# Patient Record
Sex: FEMALE | Race: WHITE | NOT HISPANIC OR LATINO | Employment: OTHER | URBAN - METROPOLITAN AREA
[De-identification: names, ages, dates, MRNs, and addresses within clinical notes are randomized per-mention and may not be internally consistent; named-entity substitution may affect disease eponyms.]

---

## 2017-11-21 ENCOUNTER — HOSPITAL ENCOUNTER (OUTPATIENT)
Dept: CT IMAGING | Facility: HOSPITAL | Age: 77
Discharge: HOME | End: 2017-11-21
Attending: NURSE PRACTITIONER | Admitting: NURSE PRACTITIONER

## 2017-11-21 LAB — CREAT BLDA-MCNC: 0.6 MG/DL (ref 0.6–1.3)

## 2018-01-03 ENCOUNTER — HOSPITAL ENCOUNTER (OUTPATIENT)
Dept: RESPIRATORY THERAPY | Facility: HOSPITAL | Age: 78
Discharge: HOME | End: 2018-01-03
Attending: INTERNAL MEDICINE | Admitting: INTERNAL MEDICINE

## 2018-01-04 ENCOUNTER — HOSPITAL ENCOUNTER (OUTPATIENT)
Dept: OTHER | Facility: HOSPITAL | Age: 78
Discharge: HOME | End: 2018-01-04
Attending: INTERNAL MEDICINE | Admitting: INTERNAL MEDICINE

## 2018-02-21 ENCOUNTER — HOSPITAL ENCOUNTER (OUTPATIENT)
Dept: RESPIRATORY THERAPY | Facility: HOSPITAL | Age: 78
Discharge: HOME | End: 2018-02-21
Attending: INTERNAL MEDICINE | Admitting: INTERNAL MEDICINE

## 2019-05-13 ENCOUNTER — HOSPITAL ENCOUNTER (OUTPATIENT)
Dept: LAB | Facility: HOSPITAL | Age: 79
Discharge: HOME | End: 2019-05-13
Attending: INTERNAL MEDICINE | Admitting: INTERNAL MEDICINE

## 2019-05-13 LAB
ALBUMIN SERPL-MCNC: 3.9 G/DL (ref 3.5–4.8)
ALBUMIN/GLOB SERPL: 1.4 {RATIO} (ref 1–1.7)
ALP SERPL-CCNC: 47 IU/L (ref 32–91)
ALT SERPL-CCNC: 20 IU/L (ref 14–54)
ANION GAP SERPL CALC-SCNC: 12.8 MMOL/L (ref 10–20)
AST SERPL-CCNC: 25 IU/L (ref 15–41)
BILIRUB SERPL-MCNC: 0.8 MG/DL (ref 0.3–1.2)
BNP SERPL-MCNC: 55 PG/ML
BUN SERPL-MCNC: 13 MG/DL (ref 8–20)
BUN/CREAT SERPL: 18.6 (ref 5.4–26.2)
CALCIUM SERPL-MCNC: 9.2 MG/DL (ref 8.9–10.3)
CHLORIDE SERPL-SCNC: 101 MMOL/L (ref 101–111)
CHOLEST SERPL-MCNC: 217 MG/DL
CHOLEST/HDLC SERPL: 3.3 {RATIO}
CONV CO2: 28 MMOL/L (ref 22–32)
CONV LDL CHOLESTEROL DIRECT: 140 MG/DL (ref 0–100)
CONV TOTAL PROTEIN: 6.7 G/DL (ref 6.1–7.9)
CREAT UR-MCNC: 0.7 MG/DL (ref 0.4–1)
GLOBULIN UR ELPH-MCNC: 2.8 G/DL (ref 2.5–3.8)
GLUCOSE SERPL-MCNC: 104 MG/DL (ref 65–99)
HDLC SERPL-MCNC: 66 MG/DL
LDLC/HDLC SERPL: 2.1 {RATIO}
LIPID INTERPRETATION: ABNORMAL
POTASSIUM SERPL-SCNC: 3.8 MMOL/L (ref 3.6–5.1)
SODIUM SERPL-SCNC: 138 MMOL/L (ref 136–144)
TRIGL SERPL-MCNC: 94 MG/DL
VLDLC SERPL CALC-MCNC: 11.2 MG/DL

## 2019-05-20 ENCOUNTER — CONVERSION ENCOUNTER (OUTPATIENT)
Dept: ORTHOPEDIC SURGERY | Facility: CLINIC | Age: 79
End: 2019-05-20

## 2019-06-01 ENCOUNTER — TRANSCRIBE ORDERS (OUTPATIENT)
Dept: CARDIOLOGY | Facility: HOSPITAL | Age: 79
End: 2019-06-01

## 2019-06-01 DIAGNOSIS — R07.9 CHEST PAIN, UNSPECIFIED TYPE: Primary | ICD-10-CM

## 2019-06-04 VITALS
DIASTOLIC BLOOD PRESSURE: 76 MMHG | OXYGEN SATURATION: 94 % | WEIGHT: 144 LBS | SYSTOLIC BLOOD PRESSURE: 145 MMHG | HEART RATE: 64 BPM

## 2019-06-13 ENCOUNTER — CONVERSION ENCOUNTER (OUTPATIENT)
Dept: ORTHOPEDIC SURGERY | Facility: CLINIC | Age: 79
End: 2019-06-13

## 2019-06-13 VITALS — HEIGHT: 62 IN | BODY MASS INDEX: 26.34 KG/M2

## 2019-07-10 ENCOUNTER — OFFICE VISIT (OUTPATIENT)
Dept: ORTHOPEDIC SURGERY | Facility: CLINIC | Age: 79
End: 2019-07-10

## 2019-07-10 VITALS
SYSTOLIC BLOOD PRESSURE: 134 MMHG | BODY MASS INDEX: 27.29 KG/M2 | WEIGHT: 139 LBS | DIASTOLIC BLOOD PRESSURE: 68 MMHG | HEIGHT: 60 IN | HEART RATE: 69 BPM

## 2019-07-10 DIAGNOSIS — M17.11 PRIMARY LOCALIZED OSTEOARTHRITIS OF RIGHT KNEE: Primary | ICD-10-CM

## 2019-07-10 PROCEDURE — 20610 DRAIN/INJ JOINT/BURSA W/O US: CPT | Performed by: ORTHOPAEDIC SURGERY

## 2019-07-10 RX ORDER — BISOPROLOL FUMARATE AND HYDROCHLOROTHIAZIDE 5; 6.25 MG/1; MG/1
TABLET ORAL
COMMUNITY
Start: 2019-05-14 | End: 2020-02-13 | Stop reason: SDUPTHER

## 2019-07-10 RX ORDER — ALPRAZOLAM 0.5 MG/1
TABLET ORAL
Refills: 0 | COMMUNITY
Start: 2019-06-23 | End: 2020-03-24

## 2019-07-10 RX ORDER — ASPIRIN 81 MG/1
81 TABLET ORAL DAILY
COMMUNITY
Start: 2019-02-18 | End: 2022-11-17 | Stop reason: HOSPADM

## 2019-07-10 NOTE — PROGRESS NOTES
"     Patient ID: Becka Oliva is a 79 y.o. female.    Right knee pain  Here for Visco 1  Review of Systems:    Right knee demonstrates no redness trace effusion    Objective:    /68   Pulse 69   Ht 152.4 cm (60\")   Wt 63 kg (139 lb)   BMI 27.15 kg/m²     Physical Examination:         Imaging:       Assessment:    Right knee degenerative joint disease    Plan:  Risks and benefits of the injection were discussed. Under sterile technique and written consent I injected one syringe of Orthovisc into the knee. It was well tolerated. Postinjection instructions were given  "

## 2019-07-17 ENCOUNTER — OFFICE VISIT (OUTPATIENT)
Dept: ORTHOPEDIC SURGERY | Facility: CLINIC | Age: 79
End: 2019-07-17

## 2019-07-17 VITALS
WEIGHT: 139 LBS | SYSTOLIC BLOOD PRESSURE: 157 MMHG | BODY MASS INDEX: 27.29 KG/M2 | HEART RATE: 72 BPM | HEIGHT: 60 IN | DIASTOLIC BLOOD PRESSURE: 83 MMHG

## 2019-07-17 DIAGNOSIS — M17.11 PRIMARY LOCALIZED OSTEOARTHRITIS OF RIGHT KNEE: Primary | ICD-10-CM

## 2019-07-17 PROCEDURE — 20610 DRAIN/INJ JOINT/BURSA W/O US: CPT | Performed by: ORTHOPAEDIC SURGERY

## 2019-07-17 NOTE — PROGRESS NOTES
"     Patient ID: Becka Oliva is a 79 y.o. female.  Right knee pain  For Visco 2      Objective:    /83   Pulse 72   Ht 152.4 cm (60\")   Wt 63 kg (139 lb)   BMI 27.15 kg/m²     Physical Examination:  Right knee demonstrates no redness mild effusion      Imaging:      Assessment:  Right knee degenerative joint disease    Plan:  Risks and benefits of the injection were discussed. Under sterile technique and written consent I injected one syringe of Orthovisc into the knee. It was well tolerated. Postinjection instructions were given  "

## 2019-07-24 ENCOUNTER — OFFICE VISIT (OUTPATIENT)
Dept: ORTHOPEDIC SURGERY | Facility: CLINIC | Age: 79
End: 2019-07-24

## 2019-07-24 VITALS
HEART RATE: 66 BPM | DIASTOLIC BLOOD PRESSURE: 77 MMHG | HEIGHT: 60 IN | BODY MASS INDEX: 27.29 KG/M2 | WEIGHT: 139 LBS | SYSTOLIC BLOOD PRESSURE: 152 MMHG

## 2019-07-24 DIAGNOSIS — M17.11 PRIMARY LOCALIZED OSTEOARTHRITIS OF RIGHT KNEE: Primary | ICD-10-CM

## 2019-07-24 PROCEDURE — 20610 DRAIN/INJ JOINT/BURSA W/O US: CPT | Performed by: ORTHOPAEDIC SURGERY

## 2019-07-24 NOTE — PROGRESS NOTES
"     Patient ID: Becka Oliva is a 79 y.o. female.  Right knee pain, here for Visco 3      Objective:    /77   Pulse 66   Ht 152.4 cm (60\")   Wt 63 kg (139 lb)   BMI 27.15 kg/m²     Physical Examination:  He demonstrates no redness trace effusion      Imaging:      Assessment:    Right knee degenerative joint disease  Plan:  Risks and benefits of the injection were discussed. Under sterile technique and written consent I injected one syringe of Orthovisc into the knee. It was well tolerated. Postinjection instructions were given  "

## 2019-09-26 ENCOUNTER — OFFICE VISIT (OUTPATIENT)
Dept: ORTHOPEDIC SURGERY | Facility: CLINIC | Age: 79
End: 2019-09-26

## 2019-09-26 VITALS
HEIGHT: 60 IN | BODY MASS INDEX: 27.29 KG/M2 | HEART RATE: 66 BPM | SYSTOLIC BLOOD PRESSURE: 183 MMHG | DIASTOLIC BLOOD PRESSURE: 79 MMHG | WEIGHT: 139 LBS

## 2019-09-26 DIAGNOSIS — M17.11 PRIMARY OSTEOARTHRITIS OF RIGHT KNEE: Primary | ICD-10-CM

## 2019-09-26 PROCEDURE — 20610 DRAIN/INJ JOINT/BURSA W/O US: CPT | Performed by: ORTHOPAEDIC SURGERY

## 2019-09-26 PROCEDURE — 99213 OFFICE O/P EST LOW 20 MIN: CPT | Performed by: ORTHOPAEDIC SURGERY

## 2019-09-26 RX ORDER — MONTELUKAST SODIUM 10 MG/1
10 TABLET ORAL NIGHTLY
COMMUNITY
Start: 2019-08-26 | End: 2021-08-23

## 2019-09-26 NOTE — PROGRESS NOTES
"     Patient ID: Becka Oliva is a 79 y.o. female.  Right  Knee pain  Having poor response from visco in July    Review of Systems:  Knee pain  Denies chest pain      Objective:    BP (!) 183/79   Pulse 66   Ht 152.4 cm (60\")   Wt 63 kg (139 lb)   BMI 27.15 kg/m²     Physical Examination:   She is a pleasant female in no distress. She is alert and oriented x3 and appears her stated age.  Right knee demonstrates a mild effusion with no redness.  She has medial joint line tenderness.  Motion is 0 to 110 degrees with no instability.Sensory and motor exam are intact all distributions. Dorsalis pedis and posterior tibialis pulses are palpable and capillary refill is less than two seconds to all digits      Imaging:       Assessment:    Right knee degenerative joint disease      Plan:  I recommend Zilretta after today's evaluation  Large Joint Arthrocentesis  Date/Time: 9/26/2019 1:06 PM  Timeout: Immediately prior to procedure a time out was called to verify the correct patient, procedure, equipment, support staff and site/side marked as required   Procedure Details  Location: knee -   Needle size: 22 G  Medications administered: 32 mg Triamcinolone Acetonide 32 MG  Patient tolerance: patient tolerated the procedure well with no immediate complications        "

## 2019-12-02 ENCOUNTER — OFFICE VISIT (OUTPATIENT)
Dept: CARDIOLOGY | Facility: CLINIC | Age: 79
End: 2019-12-02

## 2019-12-02 ENCOUNTER — HOSPITAL ENCOUNTER (OUTPATIENT)
Dept: CARDIOLOGY | Facility: HOSPITAL | Age: 79
Discharge: HOME | End: 2019-12-02
Admitting: INTERNAL MEDICINE

## 2019-12-02 VITALS
OXYGEN SATURATION: 93 % | SYSTOLIC BLOOD PRESSURE: 181 MMHG | WEIGHT: 141 LBS | HEIGHT: 64 IN | HEART RATE: 71 BPM | BODY MASS INDEX: 24.07 KG/M2 | DIASTOLIC BLOOD PRESSURE: 80 MMHG

## 2019-12-02 VITALS
SYSTOLIC BLOOD PRESSURE: 191 MMHG | HEIGHT: 64 IN | BODY MASS INDEX: 23.9 KG/M2 | DIASTOLIC BLOOD PRESSURE: 91 MMHG | WEIGHT: 140 LBS

## 2019-12-02 DIAGNOSIS — I31.39 PERICARDIAL EFFUSION: ICD-10-CM

## 2019-12-02 DIAGNOSIS — Z98.890 HISTORY OF AAA (ABDOMINAL AORTIC ANEURYSM) REPAIR: ICD-10-CM

## 2019-12-02 DIAGNOSIS — E78.5 DYSLIPIDEMIA: ICD-10-CM

## 2019-12-02 DIAGNOSIS — R07.9 CHEST PAIN, UNSPECIFIED TYPE: ICD-10-CM

## 2019-12-02 DIAGNOSIS — I10 ESSENTIAL HYPERTENSION: ICD-10-CM

## 2019-12-02 DIAGNOSIS — R06.09 DYSPNEA ON EXERTION: Primary | ICD-10-CM

## 2019-12-02 DIAGNOSIS — I73.9 PAD (PERIPHERAL ARTERY DISEASE) (HCC): ICD-10-CM

## 2019-12-02 LAB
BH CV ECHO MEAS - AI DEC SLOPE: 100.8 CM/SEC^2
BH CV ECHO MEAS - AI DEC TIME: 3 SEC
BH CV ECHO MEAS - AI MAX PG: 35.5 MMHG
BH CV ECHO MEAS - AI MAX VEL: 297.8 CM/SEC
BH CV ECHO MEAS - AI P1/2T: 864.9 MSEC
BH CV ECHO MEAS - AO MAX PG (FULL): 5.2 MMHG
BH CV ECHO MEAS - AO MAX PG: 10.7 MMHG
BH CV ECHO MEAS - AO MEAN PG (FULL): 2.8 MMHG
BH CV ECHO MEAS - AO MEAN PG: 5.6 MMHG
BH CV ECHO MEAS - AO ROOT AREA: 8.3 CM^2
BH CV ECHO MEAS - AO ROOT DIAM: 3.2 CM
BH CV ECHO MEAS - AO V2 MAX: 163.9 CM/SEC
BH CV ECHO MEAS - AO V2 MEAN: 111.5 CM/SEC
BH CV ECHO MEAS - AO V2 VTI: 35.5 CM
BH CV ECHO MEAS - ASC AORTA: 3.2 CM
BH CV ECHO MEAS - AVA(I,A): 2.1 CM^2
BH CV ECHO MEAS - AVA(I,D): 2.1 CM^2
BH CV ECHO MEAS - AVA(V,A): 2.1 CM^2
BH CV ECHO MEAS - AVA(V,D): 2.1 CM^2
BH CV ECHO MEAS - EDV(CUBED): 68.1 ML
BH CV ECHO MEAS - EDV(MOD-SP4): 37.1 ML
BH CV ECHO MEAS - EDV(TEICH): 73.5 ML
BH CV ECHO MEAS - EF(CUBED): 71.4 %
BH CV ECHO MEAS - EF(MOD-SP4): 74 %
BH CV ECHO MEAS - EF(TEICH): 63.6 %
BH CV ECHO MEAS - ESV(CUBED): 19.5 ML
BH CV ECHO MEAS - ESV(MOD-SP4): 9.6 ML
BH CV ECHO MEAS - ESV(TEICH): 26.8 ML
BH CV ECHO MEAS - FS: 34.1 %
BH CV ECHO MEAS - IVS/LVPW: 1
BH CV ECHO MEAS - IVSD: 1.1 CM
BH CV ECHO MEAS - LA DIMENSION: 4.3 CM
BH CV ECHO MEAS - LA/AO: 1.3
BH CV ECHO MEAS - LV MASS(C)D: 152.3 GRAMS
BH CV ECHO MEAS - LV MAX PG: 5.5 MMHG
BH CV ECHO MEAS - LV MEAN PG: 2.8 MMHG
BH CV ECHO MEAS - LV V1 MAX: 117.5 CM/SEC
BH CV ECHO MEAS - LV V1 MEAN: 78.3 CM/SEC
BH CV ECHO MEAS - LV V1 VTI: 25.2 CM
BH CV ECHO MEAS - LVIDD: 4.1 CM
BH CV ECHO MEAS - LVIDS: 2.7 CM
BH CV ECHO MEAS - LVOT AREA: 3 CM^2
BH CV ECHO MEAS - LVOT DIAM: 1.9 CM
BH CV ECHO MEAS - LVPWD: 1.1 CM
BH CV ECHO MEAS - MV A MAX VEL: 78.8 CM/SEC
BH CV ECHO MEAS - MV DEC SLOPE: 364.4 CM/SEC^2
BH CV ECHO MEAS - MV DEC TIME: 0.21 SEC
BH CV ECHO MEAS - MV E MAX VEL: 78.3 CM/SEC
BH CV ECHO MEAS - MV E/A: 0.99
BH CV ECHO MEAS - MV MAX PG: 3.7 MMHG
BH CV ECHO MEAS - MV MEAN PG: 1.7 MMHG
BH CV ECHO MEAS - MV V2 MAX: 96.3 CM/SEC
BH CV ECHO MEAS - MV V2 MEAN: 61.9 CM/SEC
BH CV ECHO MEAS - MV V2 VTI: 24.2 CM
BH CV ECHO MEAS - MVA(VTI): 3.1 CM^2
BH CV ECHO MEAS - PA ACC TIME: 0.12 SEC
BH CV ECHO MEAS - PA MAX PG (FULL): 0.65 MMHG
BH CV ECHO MEAS - PA MAX PG: 2.1 MMHG
BH CV ECHO MEAS - PA PR(ACCEL): 26.1 MMHG
BH CV ECHO MEAS - PA V2 MAX: 71.6 CM/SEC
BH CV ECHO MEAS - PI END-D VEL: 98.2 CM/SEC
BH CV ECHO MEAS - PI MAX PG: 13.9 MMHG
BH CV ECHO MEAS - PI MAX VEL: 186.5 CM/SEC
BH CV ECHO MEAS - RV MAX PG: 1.4 MMHG
BH CV ECHO MEAS - RV MEAN PG: 0.64 MMHG
BH CV ECHO MEAS - RV V1 MAX: 59.2 CM/SEC
BH CV ECHO MEAS - RV V1 MEAN: 36.7 CM/SEC
BH CV ECHO MEAS - RV V1 VTI: 13.3 CM
BH CV ECHO MEAS - RVDD: 2.8 CM
BH CV ECHO MEAS - SV(AO): 293.9 ML
BH CV ECHO MEAS - SV(CUBED): 48.6 ML
BH CV ECHO MEAS - SV(LVOT): 74.4 ML
BH CV ECHO MEAS - SV(MOD-SP4): 27.4 ML
BH CV ECHO MEAS - SV(TEICH): 46.7 ML
LV EF 2D ECHO EST: 70 %
MAXIMAL PREDICTED HEART RATE: 140 BPM
STRESS TARGET HR: 119 BPM

## 2019-12-02 PROCEDURE — 99214 OFFICE O/P EST MOD 30 MIN: CPT | Performed by: INTERNAL MEDICINE

## 2019-12-02 PROCEDURE — 93306 TTE W/DOPPLER COMPLETE: CPT | Performed by: INTERNAL MEDICINE

## 2019-12-02 PROCEDURE — 93306 TTE W/DOPPLER COMPLETE: CPT

## 2019-12-02 PROCEDURE — 93000 ELECTROCARDIOGRAM COMPLETE: CPT | Performed by: INTERNAL MEDICINE

## 2019-12-02 NOTE — PROGRESS NOTES
Subjective:     Encounter Date:12/02/2019      Patient ID: Becka Oliva is a 79 y.o. female.    Chief Complaint: Echo follow-up for hypertension, dyspnea  History of Present Illness     This is a 79-year-old with PMH of      # hypertension  # COPD  # AAA , endograft repair  # former smoker    Here for echo follow-up.  Patient was complaining of feeling tired and getting out of breath with class III symptoms relieved with rest cannot even take a shower without getting out of breath.  Patient's arterial blood pressure is 181/80 heart rate is 71 O2 sat of 93% on room air.  Patient had an echocardiogram today which showed mild pericardial effusion.    Was in the hospital with in the 1st week of January with cough shortness of breath palpitations and tachycardia with elevated troponin.  Reviewed hospital records, had normal Lexiscan Cardiolite 01/11/2019.  Patient reportedly and pulse ox were showing readings of 160 beats per Min    Labs from 5/13/2019 reveal normal CMP, BNP 55, cholesterol 217, HDL 66, LDL not to goal at 140    ASSESSMENT:  # ISAACS/ SOA  #Pericardial effusion  # hypertension  # recent fever cough  # COPD former smoker  # PAD, AAA repair    PLAN:  Patient has history of arthritis we will check CRUZITO and ESR  Continues to be having shortness of breath which is worsening we will check stress test patient cannot walk due to shortness of breath will do Lexiscan Cardiolite  Advised patient to check blood pressure daily and bring readings when she comes for stress test  Continue bisoprolol hydrochlorothiazide, aspirin  Patient had BNP level which was normal at 55 on 6/2019  Patient would benefit from statins, will check lipid profile and follow  Echocardiogram 1/10/19 reveals normal function PA pressures of 30-40 with mild AI/MR/TR and moderate size pericardial effusion  Review extensive records from recent hospital visit which is been sumarized in the note      Past Medical History:  Past Medical History:    Diagnosis Date   • Hypertension      Past Surgical History:  Past Surgical History:   Procedure Laterality Date   • OTHER SURGICAL HISTORY      STENT      Allergies:  No Known Allergies  Home Meds:  Current Meds:     Current Outpatient Medications:   •  ALPRAZolam (XANAX) 0.5 MG tablet, take 1 tablet by mouth every 12 hours if needed for anxiety, Disp: , Rfl: 0  •  aspirin (ASPIR-LOW) 81 MG EC tablet, Daily., Disp: , Rfl:   •  bisoprolol-hydrochlorothiazide (ZIAC) 5-6.25 MG per tablet, , Disp: , Rfl:   •  montelukast (SINGULAIR) 10 MG tablet, , Disp: , Rfl:     Current Facility-Administered Medications:   •  Hyaluronan (ORTHOVISC) injection 30 mg, 30 mg, Intra-articular, Weekly, Nick Colindres MD, 30 mg at 07/10/19 1429  •  Hyaluronan (ORTHOVISC) injection 30 mg, 30 mg, Intra-articular, Weekly, Nick Colindres MD, 30 mg at 07/17/19 1405  •  Hyaluronan (ORTHOVISC) injection 30 mg, 30 mg, Intra-articular, Weekly, Nick Colindres MD, 30 mg at 07/24/19 1410  Social History:   Social History     Tobacco Use   • Smoking status: Former Smoker   • Smokeless tobacco: Never Used   Substance Use Topics   • Alcohol use: No     Frequency: Never      Family History:  Family History   Problem Relation Age of Onset   • Cancer Other    • Diabetes Other         The following portions of the patient's history were reviewed and updated as appropriate: allergies, current medications, past family history, past medical history, past social history, past surgical history and problem list.    Review of Systems   Constitution: Positive for malaise/fatigue.   Cardiovascular: Positive for dyspnea on exertion. Negative for chest pain, leg swelling and palpitations.   Respiratory: Negative for shortness of breath.    Skin: Negative for rash.   Neurological: Negative for dizziness, light-headedness and numbness.         ECG 12 Lead  Date/Time: 12/2/2019 3:42 PM  Performed by: Kyrie Campoverde MD  Authorized by:  "Kyrie Campoverde MD   Comparison: compared with previous ECG from 1/10/2019  Comparison to previous ECG: EKG done today reviewed by me shows sinus rhythm with rate of 67 bpm with Q waves in V1 V2 cyst of anteroseptal MI unchanged from EKG from 1/10/2019, T wave inversion nonspecific in lateral leads unchanged from EKG from January 2019                 Objective:     Physical Exam  BP (!) 181/80 (BP Location: Left arm, Patient Position: Sitting, Cuff Size: Adult)   Pulse 71   Ht 162.6 cm (64\")   Wt 64 kg (141 lb)   SpO2 93%   BMI 24.20 kg/m²   General:  Appears in no acute distress  Eyes: Sclera is anicteric,  conjunctiva is clear   HEENT:  No JVD. Thyroid not visibly enlarged. No mucosal pallor or cyanosis  Respiratory: Respirations regular and unlabored at rest.  Bilaterally good breath sounds, with good air entry in all fields. No crackles, rubs or wheezes auscultated  Cardiovascular: S1,S2 Regular rate and rhythm. No murmur, rub or gallop auscultated. No pretibial pitting edema  Gastrointestinal: Abdomen soft, flat, non tender. Bowel sounds present.   Musculoskeletal:  No abnormal movements  Extremities: No digital clubbing or cyanosis  Skin: Color pink. Skin warm and dry to touch. No rashes  No xanthoma  Neuro: Alert and awake, no lateralizing deficits appreciated    Lab Review:       Assessment:         No diagnosis found.       Plan:                "

## 2019-12-05 ENCOUNTER — LAB (OUTPATIENT)
Dept: LAB | Facility: HOSPITAL | Age: 79
End: 2019-12-05

## 2019-12-05 DIAGNOSIS — R06.09 DYSPNEA ON EXERTION: ICD-10-CM

## 2019-12-05 DIAGNOSIS — Z98.890 HISTORY OF AAA (ABDOMINAL AORTIC ANEURYSM) REPAIR: ICD-10-CM

## 2019-12-05 DIAGNOSIS — I10 ESSENTIAL HYPERTENSION: ICD-10-CM

## 2019-12-05 DIAGNOSIS — E78.5 DYSLIPIDEMIA: ICD-10-CM

## 2019-12-05 DIAGNOSIS — I31.39 PERICARDIAL EFFUSION: ICD-10-CM

## 2019-12-05 DIAGNOSIS — I73.9 PAD (PERIPHERAL ARTERY DISEASE) (HCC): ICD-10-CM

## 2019-12-05 LAB — ERYTHROCYTE [SEDIMENTATION RATE] IN BLOOD: 16 MM/HR (ref 0–30)

## 2019-12-05 PROCEDURE — 36415 COLL VENOUS BLD VENIPUNCTURE: CPT

## 2019-12-05 PROCEDURE — 85652 RBC SED RATE AUTOMATED: CPT

## 2019-12-05 PROCEDURE — 86038 ANTINUCLEAR ANTIBODIES: CPT

## 2019-12-06 LAB — ANA SER QL: NEGATIVE

## 2020-01-09 ENCOUNTER — HOSPITAL ENCOUNTER (OUTPATIENT)
Dept: CARDIOLOGY | Facility: HOSPITAL | Age: 80
Discharge: HOME | End: 2020-01-09

## 2020-01-09 ENCOUNTER — OFFICE VISIT (OUTPATIENT)
Dept: CARDIOLOGY | Facility: CLINIC | Age: 80
End: 2020-01-09

## 2020-01-09 ENCOUNTER — TELEPHONE (OUTPATIENT)
Dept: CARDIOLOGY | Facility: CLINIC | Age: 80
End: 2020-01-09

## 2020-01-09 ENCOUNTER — HOSPITAL ENCOUNTER (OUTPATIENT)
Dept: CARDIOLOGY | Facility: HOSPITAL | Age: 80
Discharge: HOME | End: 2020-01-09
Admitting: INTERNAL MEDICINE

## 2020-01-09 VITALS
HEART RATE: 65 BPM | BODY MASS INDEX: 24.2 KG/M2 | DIASTOLIC BLOOD PRESSURE: 80 MMHG | SYSTOLIC BLOOD PRESSURE: 138 MMHG | HEIGHT: 64 IN

## 2020-01-09 DIAGNOSIS — Z98.890 HISTORY OF AAA (ABDOMINAL AORTIC ANEURYSM) REPAIR: ICD-10-CM

## 2020-01-09 DIAGNOSIS — R06.09 DYSPNEA ON EXERTION: ICD-10-CM

## 2020-01-09 DIAGNOSIS — I10 ESSENTIAL HYPERTENSION: Primary | ICD-10-CM

## 2020-01-09 DIAGNOSIS — I73.9 PAD (PERIPHERAL ARTERY DISEASE) (HCC): ICD-10-CM

## 2020-01-09 DIAGNOSIS — M79.604 PAIN OF RIGHT LOWER EXTREMITY: ICD-10-CM

## 2020-01-09 DIAGNOSIS — I10 ESSENTIAL HYPERTENSION: ICD-10-CM

## 2020-01-09 DIAGNOSIS — E78.5 DYSLIPIDEMIA: ICD-10-CM

## 2020-01-09 DIAGNOSIS — I31.39 PERICARDIAL EFFUSION: ICD-10-CM

## 2020-01-09 DIAGNOSIS — R60.0 UNILATERAL EDEMA OF LOWER EXTREMITY: ICD-10-CM

## 2020-01-09 LAB
BH CV LOW VAS RIGHT POPLITEAL SPONT: 1
BH CV LOWER VASCULAR LEFT COMMON FEMORAL AUGMENT: NORMAL
BH CV LOWER VASCULAR LEFT COMMON FEMORAL COMPETENT: NORMAL
BH CV LOWER VASCULAR LEFT COMMON FEMORAL COMPRESS: NORMAL
BH CV LOWER VASCULAR LEFT COMMON FEMORAL PHASIC: NORMAL
BH CV LOWER VASCULAR LEFT COMMON FEMORAL SPONT: NORMAL
BH CV LOWER VASCULAR RIGHT COMMON FEMORAL AUGMENT: NORMAL
BH CV LOWER VASCULAR RIGHT COMMON FEMORAL COMPETENT: NORMAL
BH CV LOWER VASCULAR RIGHT COMMON FEMORAL COMPRESS: NORMAL
BH CV LOWER VASCULAR RIGHT COMMON FEMORAL PHASIC: NORMAL
BH CV LOWER VASCULAR RIGHT COMMON FEMORAL SPONT: NORMAL
BH CV LOWER VASCULAR RIGHT DISTAL FEMORAL COMPRESS: NORMAL
BH CV LOWER VASCULAR RIGHT GASTRONEMIUS COMPRESS: NORMAL
BH CV LOWER VASCULAR RIGHT GREATER SAPH AK COMPRESS: NORMAL
BH CV LOWER VASCULAR RIGHT GREATER SAPH BK COMPRESS: NORMAL
BH CV LOWER VASCULAR RIGHT LESSER SAPH COMPRESS: NORMAL
BH CV LOWER VASCULAR RIGHT MID FEMORAL AUGMENT: NORMAL
BH CV LOWER VASCULAR RIGHT MID FEMORAL COMPETENT: NORMAL
BH CV LOWER VASCULAR RIGHT MID FEMORAL COMPRESS: NORMAL
BH CV LOWER VASCULAR RIGHT MID FEMORAL PHASIC: NORMAL
BH CV LOWER VASCULAR RIGHT MID FEMORAL SPONT: NORMAL
BH CV LOWER VASCULAR RIGHT PERONEAL COMPRESS: NORMAL
BH CV LOWER VASCULAR RIGHT POPLITEAL AUGMENT: NORMAL
BH CV LOWER VASCULAR RIGHT POPLITEAL COMPETENT: NORMAL
BH CV LOWER VASCULAR RIGHT POPLITEAL COMPRESS: NORMAL
BH CV LOWER VASCULAR RIGHT POPLITEAL PHASIC: NORMAL
BH CV LOWER VASCULAR RIGHT POPLITEAL SPONT: NORMAL
BH CV LOWER VASCULAR RIGHT POSTERIOR TIBIAL COMPRESS: NORMAL
BH CV LOWER VASCULAR RIGHT PROXIMAL FEMORAL COMPRESS: NORMAL
BH CV LOWER VASCULAR RIGHT SAPHENOFEMORAL JUNCTION AUGMENT: NORMAL
BH CV LOWER VASCULAR RIGHT SAPHENOFEMORAL JUNCTION COMPETENT: NORMAL
BH CV LOWER VASCULAR RIGHT SAPHENOFEMORAL JUNCTION COMPRESS: NORMAL
BH CV LOWER VASCULAR RIGHT SAPHENOFEMORAL JUNCTION PHASIC: NORMAL
BH CV LOWER VASCULAR RIGHT SAPHENOFEMORAL JUNCTION SPONT: NORMAL
BH CV VAS POP FLUID COLLECTED: 1

## 2020-01-09 PROCEDURE — 93017 CV STRESS TEST TRACING ONLY: CPT

## 2020-01-09 PROCEDURE — 0 TECHNETIUM SESTAMIBI: Performed by: INTERNAL MEDICINE

## 2020-01-09 PROCEDURE — 99214 OFFICE O/P EST MOD 30 MIN: CPT | Performed by: INTERNAL MEDICINE

## 2020-01-09 PROCEDURE — 93971 EXTREMITY STUDY: CPT

## 2020-01-09 PROCEDURE — A9500 TC99M SESTAMIBI: HCPCS | Performed by: INTERNAL MEDICINE

## 2020-01-09 PROCEDURE — 78452 HT MUSCLE IMAGE SPECT MULT: CPT

## 2020-01-09 PROCEDURE — 25010000002 REGADENOSON 0.4 MG/5ML SOLUTION: Performed by: INTERNAL MEDICINE

## 2020-01-09 PROCEDURE — 93016 CV STRESS TEST SUPVJ ONLY: CPT | Performed by: NURSE PRACTITIONER

## 2020-01-09 RX ADMIN — TECHNETIUM TC 99M SESTAMIBI 1 DOSE: 1 INJECTION INTRAVENOUS at 13:15

## 2020-01-09 RX ADMIN — REGADENOSON 0.4 MG: 0.08 INJECTION, SOLUTION INTRAVENOUS at 14:45

## 2020-01-09 NOTE — PROGRESS NOTES
Subjective:     Encounter Date:01/09/2020      Patient ID: Becka Oliva is a 79 y.o. female.    Chief Complaint : Follow-up for pericardial effusion, hypertension, dyspnea  History of Present Illness      This is a 79-year-old with PMH of    # Pericardial effusion  # hypertension  # COPD  # AAA , endograft repair  # former smoker    Here for echo follow-up.  Patient complaining of swelling and pain and unilateral lower extremity.  Denies any chest pain shortness of breath palpitations.  Patient's arterial blood pressure is 138/80, heart rate 65.    Patient had an echocardiogram today which showed mild pericardial effusion.    Was in the hospital with in the 1st week of January with cough shortness of breath palpitations and tachycardia with elevated troponin.  Reviewed hospital records, had normal Lexiscan Cardiolite 01/11/2019.  Patient reportedly and pulse ox were showing readings of 160 beats per Min.  Labs from 5/13/2019 reveal normal CMP, BNP 55, cholesterol 217, HDL 66, LDL not to goal at 140.      ASSESSMENT:  #Unilateral limb swelling and pain, right lower extremity  #Pericardial effusion  # hypertension  # recent fever cough  # COPD former smoker  # PAD, AAA repair    PLAN:  We will check venous Doppler since its unilateral leg extremity swelling  Continue medical management  Lipid profile and CMP  Patient had Lexiscan Cardiolite 12/2/2019 which was negative for ischemia  Advised patient to check blood pressure daily and bring readings when she comes for follow-up  Continue bisoprolol hydrochlorothiazide, aspirin  Patient had BNP level which was normal at 55 on 6/2019  Patient would benefit from statins, will check lipid profile and follow  Echocardiogram 1/10/19 reveals normal function PA pressures of 30-40 with mild AI/MR/TR and moderate size pericardial effusion  Review extensive records from recent hospital visit which is been sumarized in the note          Assessment:          Diagnosis Plan   1.  Essential hypertension  Duplex Venous Lower Extremity - Bilateral CAR    Comprehensive Metabolic Panel    Lipid Panel   2. Pain of right lower extremity  Duplex Venous Lower Extremity - Bilateral CAR    Comprehensive Metabolic Panel    Lipid Panel   3. Unilateral edema of lower extremity  Duplex Venous Lower Extremity - Bilateral CAR    Comprehensive Metabolic Panel    Lipid Panel   4. History of AAA (abdominal aortic aneurysm) repair  Duplex Venous Lower Extremity - Bilateral CAR    Comprehensive Metabolic Panel    Lipid Panel   5. PAD (peripheral artery disease) (CMS/HCC)  Duplex Venous Lower Extremity - Bilateral CAR    Comprehensive Metabolic Panel    Lipid Panel   6. Pericardial effusion  Duplex Venous Lower Extremity - Bilateral CAR    Comprehensive Metabolic Panel    Lipid Panel   7. Dyslipidemia  Duplex Venous Lower Extremity - Bilateral CAR    Comprehensive Metabolic Panel    Lipid Panel          Plan:         Past Medical History:  Past Medical History:   Diagnosis Date   • Aortic aneurysm (CMS/HCC)    • Hypertension      Past Surgical History:  Past Surgical History:   Procedure Laterality Date   • CARPAL TUNNEL RELEASE     • DESCENDING AORTIC ANEURYSM REPAIR W/ STENT     • OTHER SURGICAL HISTORY      STENT      Allergies:  No Known Allergies  Home Meds:  Current Meds:     Current Outpatient Medications:   •  ALPRAZolam (XANAX) 0.5 MG tablet, take 1 tablet by mouth every 12 hours if needed for anxiety, Disp: , Rfl: 0  •  aspirin (ASPIR-LOW) 81 MG EC tablet, Daily., Disp: , Rfl:   •  bisoprolol-hydrochlorothiazide (ZIAC) 5-6.25 MG per tablet, , Disp: , Rfl:   •  montelukast (SINGULAIR) 10 MG tablet, , Disp: , Rfl:     Current Facility-Administered Medications:   •  Hyaluronan (ORTHOVISC) injection 30 mg, 30 mg, Intra-articular, Weekly, Nick Colindres MD, 30 mg at 07/10/19 1429  •  Hyaluronan (ORTHOVISC) injection 30 mg, 30 mg, Intra-articular, Weekly, Nick Colindres MD, 30 mg at 07/17/19  "1405  •  Hyaluronan (ORTHOVISC) injection 30 mg, 30 mg, Intra-articular, Weekly, Nick Colindres MD, 30 mg at 07/24/19 1410  Social History:   Social History     Tobacco Use   • Smoking status: Former Smoker   • Smokeless tobacco: Never Used   Substance Use Topics   • Alcohol use: No     Frequency: Never      Family History:  Family History   Problem Relation Age of Onset   • Cancer Other    • Diabetes Other    • Aneurysm Father         The following portions of the patient's history were reviewed and updated as appropriate: allergies, current medications, past family history, past medical history, past social history, past surgical history and problem list.    Review of Systems   Constitution: Negative for malaise/fatigue.   Cardiovascular: Positive for leg swelling. Negative for chest pain and palpitations.   Respiratory: Positive for shortness of breath.    Skin: Negative for rash.   Neurological: Positive for dizziness, light-headedness and numbness.     Comprehensive review of systems were reviewed and all others review of systems were found to be negative other than HPI    Procedures       Objective:     Physical Exam  /80   Pulse 65   Ht 162.6 cm (64\")   BMI 24.20 kg/m²   General:  Appears in no acute distress  Eyes: Sclera is anicteric,  conjunctiva is clear   HEENT:  No JVD. Thyroid not visibly enlarged. No mucosal pallor or cyanosis  Respiratory: Respirations regular and unlabored at rest.  Bilaterally good breath sounds, with good air entry in all fields. No crackles, rubs or wheezes auscultated  Cardiovascular: S1,S2 Regular rate and rhythm. No murmur, rub or gallop auscultated. No pretibial pitting edema  Gastrointestinal: Abdomen soft, flat, non tender. Bowel sounds present.   Musculoskeletal:  No abnormal movements  Extremities: No digital clubbing or cyanosis  Skin: Color pink. Skin warm and dry to touch. No rashes  No xanthoma  Neuro: Alert and awake, no lateralizing deficits " appreciated    Lab Reviewed:

## 2020-01-10 LAB
BH CV STRESS COMMENTS STAGE 1: NORMAL
BH CV STRESS DOSE REGADENOSON STAGE 1: 0.4
BH CV STRESS DURATION MIN STAGE 1: 0
BH CV STRESS DURATION SEC STAGE 1: 10
BH CV STRESS PROTOCOL 1: NORMAL
BH CV STRESS RECOVERY BP: NORMAL MMHG
BH CV STRESS RECOVERY HR: 89 BPM
BH CV STRESS STAGE 1: 1
LV EF NUC BP: 66 %
MAXIMAL PREDICTED HEART RATE: 140 BPM
STRESS BASELINE BP: NORMAL MMHG
STRESS BASELINE HR: 64 BPM
STRESS TARGET HR: 119 BPM

## 2020-01-10 PROCEDURE — 93018 CV STRESS TEST I&R ONLY: CPT | Performed by: INTERNAL MEDICINE

## 2020-01-10 PROCEDURE — 78452 HT MUSCLE IMAGE SPECT MULT: CPT | Performed by: INTERNAL MEDICINE

## 2020-01-16 ENCOUNTER — OFFICE VISIT (OUTPATIENT)
Dept: ORTHOPEDIC SURGERY | Facility: CLINIC | Age: 80
End: 2020-01-16

## 2020-01-16 VITALS
WEIGHT: 141 LBS | SYSTOLIC BLOOD PRESSURE: 171 MMHG | HEIGHT: 64 IN | DIASTOLIC BLOOD PRESSURE: 80 MMHG | HEART RATE: 83 BPM | BODY MASS INDEX: 24.07 KG/M2

## 2020-01-16 DIAGNOSIS — M17.11 PRIMARY OSTEOARTHRITIS OF RIGHT KNEE: Primary | ICD-10-CM

## 2020-01-16 PROBLEM — Z86.79 HISTORY OF AORTIC ANEURYSM: Status: ACTIVE | Noted: 2019-02-18

## 2020-01-16 PROBLEM — J44.9 COPD (CHRONIC OBSTRUCTIVE PULMONARY DISEASE) (HCC): Status: ACTIVE | Noted: 2020-01-16

## 2020-01-16 PROBLEM — I10 HYPERTENSION: Status: ACTIVE | Noted: 2020-01-16

## 2020-01-16 PROBLEM — I70.209 ATHEROSCLEROTIC PERIPHERAL VASCULAR DISEASE: Status: ACTIVE | Noted: 2020-01-16

## 2020-01-16 PROBLEM — N18.9 CHRONIC KIDNEY DISEASE: Status: ACTIVE | Noted: 2020-01-16

## 2020-01-16 PROBLEM — M19.91 PRIMARY LOCALIZED OSTEOARTHRITIS: Status: ACTIVE | Noted: 2018-08-13

## 2020-01-16 PROBLEM — I31.39 PERICARDIAL EFFUSION: Status: ACTIVE | Noted: 2019-02-18

## 2020-01-16 PROBLEM — I11.9 BENIGN HYPERTENSIVE HEART DISEASE: Status: ACTIVE | Noted: 2019-02-18

## 2020-01-16 PROBLEM — R06.09 DYSPNEA ON EXERTION: Status: ACTIVE | Noted: 2019-02-18

## 2020-01-16 PROCEDURE — 99213 OFFICE O/P EST LOW 20 MIN: CPT | Performed by: ORTHOPAEDIC SURGERY

## 2020-01-16 RX ORDER — MELOXICAM 7.5 MG/1
7.5 TABLET ORAL DAILY PRN
Qty: 30 TABLET | Refills: 0 | Status: SHIPPED | OUTPATIENT
Start: 2020-01-16 | End: 2020-02-13

## 2020-01-16 NOTE — PROGRESS NOTES
"     Patient ID: Bceka Oliva is a 79 y.o. female.  Right knee pain  Did well with Zilretta injection in September, pain is returned without injury in the last week or 2.  Has had some diffuse swelling of the leg, duplex was negative.    Review of Systems:    Right leg pain  Denies chest pain    Objective:    /80   Pulse 83   Ht 162.6 cm (64\")   Wt 64 kg (141 lb)   BMI 24.20 kg/m²     Physical Examination:     She is a pleasant female in no distress. She is alert and oriented x3 and appears her stated age.  Right knee demonstrates a mild effusion with no redness.  She has medial joint line tenderness.  Motion is 0 to 120 degrees with no instability.Sensory and motor exam are intact all distributions. Dorsalis pedis and posterior tibialis pulses are palpable and capillary refill is less than two seconds to all digits    Imaging:   X-rays demonstrate mild degenerative joint disease    Assessment:    Right knee pain with degenerative joint disease    Plan:  I recommend meloxicam for the next 3 weeks, discontinue at that time and if pain has resolved see me as needed, otherwise call to proceed with repeat Zilretta          Disclaimer: Please note that areas of this note were completed with computer voice recognition software.  Quite often unanticipated grammatical, syntax, homophones, and other interpretive errors are inadvertently transcribed by the computer software. Please excuse any errors that have escaped final proofreading.  "

## 2020-02-11 ENCOUNTER — TELEPHONE (OUTPATIENT)
Dept: ORTHOPEDIC SURGERY | Facility: CLINIC | Age: 80
End: 2020-02-11

## 2020-02-13 ENCOUNTER — OFFICE VISIT (OUTPATIENT)
Dept: ORTHOPEDIC SURGERY | Facility: CLINIC | Age: 80
End: 2020-02-13

## 2020-02-13 VITALS
HEART RATE: 73 BPM | WEIGHT: 141 LBS | SYSTOLIC BLOOD PRESSURE: 182 MMHG | DIASTOLIC BLOOD PRESSURE: 93 MMHG | HEIGHT: 64 IN | BODY MASS INDEX: 24.07 KG/M2

## 2020-02-13 DIAGNOSIS — M17.11 PRIMARY OSTEOARTHRITIS OF RIGHT KNEE: Primary | ICD-10-CM

## 2020-02-13 PROCEDURE — 20610 DRAIN/INJ JOINT/BURSA W/O US: CPT | Performed by: ORTHOPAEDIC SURGERY

## 2020-02-13 PROCEDURE — 99213 OFFICE O/P EST LOW 20 MIN: CPT | Performed by: ORTHOPAEDIC SURGERY

## 2020-02-13 RX ORDER — BISOPROLOL FUMARATE AND HYDROCHLOROTHIAZIDE 5; 6.25 MG/1; MG/1
1 TABLET ORAL DAILY
Qty: 90 TABLET | Refills: 1 | Status: SHIPPED | OUTPATIENT
Start: 2020-02-13 | End: 2020-08-10

## 2020-02-13 RX ORDER — MELOXICAM 7.5 MG/1
7.5 TABLET ORAL DAILY PRN
Qty: 30 TABLET | Refills: 4 | Status: SHIPPED | OUTPATIENT
Start: 2020-02-13 | End: 2020-05-06

## 2020-02-13 NOTE — PROGRESS NOTES
"     Patient ID: Becka Oliva is a 79 y.o. female.  Right knee pain  Had return of right knee pain last week there is moderate.  No significant swelling.  Did well with Zilretta injection in September    Review of Systems:  Right knee pain denies chest pain        Objective:    BP (!) 182/93   Pulse 73   Ht 162.6 cm (64\")   Wt 64 kg (141 lb)   BMI 24.20 kg/m²     Physical Examination:   She is a pleasant female in no distress. She is alert and oriented x3 and appears her stated age.  Right knee demonstrates no scars with mild medial joint line tenderness and a mild effusion.  Range of motion 0 to 120 degrees with no instability.Sensory and motor exam are intact all distributions. Dorsalis pedis and posterior tibialis pulses are palpable and capillary refill is less than two seconds to all digits      Imaging:       Assessment:    Right knee degenerative joint disease    Plan:  I recommend Zilretta after today's evaluation  Large Joint Arthrocentesis: R knee  Date/Time: 2/13/2020 12:53 PM  Timeout: Immediately prior to procedure a time out was called to verify the correct patient, procedure, equipment, support staff and site/side marked as required   Supporting Documentation  Indications: pain   Procedure Details  Location: knee - R knee  Needle size: 22 G  Medications administered: 32 mg Triamcinolone Acetonide 32 MG  Patient tolerance: patient tolerated the procedure well with no immediate complications                Disclaimer: Please note that areas of this note were completed with computer voice recognition software.  Quite often unanticipated grammatical, syntax, homophones, and other interpretive errors are inadvertently transcribed by the computer software. Please excuse any errors that have escaped final proofreading.  "

## 2020-03-24 ENCOUNTER — APPOINTMENT (OUTPATIENT)
Dept: GENERAL RADIOLOGY | Facility: HOSPITAL | Age: 80
End: 2020-03-24

## 2020-03-24 ENCOUNTER — HOSPITAL ENCOUNTER (OUTPATIENT)
Facility: HOSPITAL | Age: 80
Setting detail: OBSERVATION
Discharge: HOME | End: 2020-03-26
Attending: INTERNAL MEDICINE | Admitting: INTERNAL MEDICINE

## 2020-03-24 DIAGNOSIS — R06.00 DYSPNEA, UNSPECIFIED TYPE: Primary | ICD-10-CM

## 2020-03-24 DIAGNOSIS — J44.1 ACUTE EXACERBATION OF CHRONIC OBSTRUCTIVE PULMONARY DISEASE (COPD) (HCC): ICD-10-CM

## 2020-03-24 DIAGNOSIS — J21.1 ACUTE BRONCHIOLITIS DUE TO HUMAN METAPNEUMOVIRUS: ICD-10-CM

## 2020-03-24 DIAGNOSIS — A41.9 SEPSIS WITHOUT ACUTE ORGAN DYSFUNCTION, DUE TO UNSPECIFIED ORGANISM (HCC): ICD-10-CM

## 2020-03-24 LAB
ALBUMIN SERPL-MCNC: 3.9 G/DL (ref 3.5–5.2)
ALBUMIN/GLOB SERPL: 1.4 G/DL
ALP SERPL-CCNC: 58 U/L (ref 39–117)
ALT SERPL W P-5'-P-CCNC: 18 U/L (ref 1–33)
ANION GAP SERPL CALCULATED.3IONS-SCNC: 13 MMOL/L (ref 5–15)
ARTERIAL PATENCY WRIST A: POSITIVE
AST SERPL-CCNC: 27 U/L (ref 1–32)
ATMOSPHERIC PRESS: ABNORMAL MM[HG]
B PERT DNA SPEC QL NAA+PROBE: NOT DETECTED
BASE EXCESS BLDA CALC-SCNC: 4.1 MMOL/L (ref 0–3)
BASOPHILS # BLD AUTO: 0 10*3/MM3 (ref 0–0.2)
BASOPHILS NFR BLD AUTO: 0.3 % (ref 0–1.5)
BDY SITE: ABNORMAL
BILIRUB SERPL-MCNC: 0.4 MG/DL (ref 0.2–1.2)
BUN BLD-MCNC: 19 MG/DL (ref 8–23)
BUN/CREAT SERPL: 30.2 (ref 7–25)
C PNEUM DNA NPH QL NAA+NON-PROBE: NOT DETECTED
CALCIUM SPEC-SCNC: 8.9 MG/DL (ref 8.6–10.5)
CHLORIDE SERPL-SCNC: 91 MMOL/L (ref 98–107)
CO2 BLDA-SCNC: 30.5 MMOL/L (ref 22–29)
CO2 SERPL-SCNC: 27 MMOL/L (ref 22–29)
CREAT BLD-MCNC: 0.63 MG/DL (ref 0.57–1)
D-LACTATE SERPL-SCNC: 0.6 MMOL/L (ref 0.5–2)
DEPRECATED RDW RBC AUTO: 45.1 FL (ref 37–54)
EOSINOPHIL # BLD AUTO: 0 10*3/MM3 (ref 0–0.4)
EOSINOPHIL NFR BLD AUTO: 0 % (ref 0.3–6.2)
ERYTHROCYTE [DISTWIDTH] IN BLOOD BY AUTOMATED COUNT: 13.4 % (ref 12.3–15.4)
FLUAV H1 2009 PAND RNA NPH QL NAA+PROBE: NOT DETECTED
FLUAV H1 HA GENE NPH QL NAA+PROBE: NOT DETECTED
FLUAV H3 RNA NPH QL NAA+PROBE: NOT DETECTED
FLUAV SUBTYP SPEC NAA+PROBE: NOT DETECTED
FLUBV RNA ISLT QL NAA+PROBE: NOT DETECTED
GFR SERPL CREATININE-BSD FRML MDRD: 91 ML/MIN/1.73
GLOBULIN UR ELPH-MCNC: 2.7 GM/DL
GLUCOSE BLD-MCNC: 195 MG/DL (ref 65–99)
GLUCOSE BLDC GLUCOMTR-MCNC: 214 MG/DL (ref 70–105)
GLUCOSE BLDC GLUCOMTR-MCNC: 216 MG/DL (ref 70–105)
HADV DNA SPEC NAA+PROBE: NOT DETECTED
HBA1C MFR BLD: 5.7 % (ref 3.5–5.6)
HCO3 BLDA-SCNC: 29.1 MMOL/L (ref 21–28)
HCOV 229E RNA SPEC QL NAA+PROBE: NOT DETECTED
HCOV HKU1 RNA SPEC QL NAA+PROBE: NOT DETECTED
HCOV NL63 RNA SPEC QL NAA+PROBE: NOT DETECTED
HCOV OC43 RNA SPEC QL NAA+PROBE: NOT DETECTED
HCT VFR BLD AUTO: 41.7 % (ref 34–46.6)
HEMODILUTION: NO
HGB BLD-MCNC: 14 G/DL (ref 12–15.9)
HMPV RNA NPH QL NAA+NON-PROBE: DETECTED
HOROWITZ INDEX BLD+IHG-RTO: 21 %
HPIV1 RNA SPEC QL NAA+PROBE: NOT DETECTED
HPIV2 RNA SPEC QL NAA+PROBE: NOT DETECTED
HPIV3 RNA NPH QL NAA+PROBE: NOT DETECTED
HPIV4 P GENE NPH QL NAA+PROBE: NOT DETECTED
LYMPHOCYTES # BLD AUTO: 0.5 10*3/MM3 (ref 0.7–3.1)
LYMPHOCYTES NFR BLD AUTO: 5.9 % (ref 19.6–45.3)
M PNEUMO IGG SER IA-ACNC: NOT DETECTED
MCH RBC QN AUTO: 32.6 PG (ref 26.6–33)
MCHC RBC AUTO-ENTMCNC: 33.5 G/DL (ref 31.5–35.7)
MCV RBC AUTO: 97.3 FL (ref 79–97)
MODALITY: ABNORMAL
MONOCYTES # BLD AUTO: 0.9 10*3/MM3 (ref 0.1–0.9)
MONOCYTES NFR BLD AUTO: 9.7 % (ref 5–12)
NEUTROPHILS # BLD AUTO: 7.5 10*3/MM3 (ref 1.7–7)
NEUTROPHILS NFR BLD AUTO: 84.1 % (ref 42.7–76)
NRBC BLD AUTO-RTO: 0 /100 WBC (ref 0–0.2)
NT-PROBNP SERPL-MCNC: 154.6 PG/ML (ref 5–1800)
PCO2 BLDA: 44.2 MM HG (ref 35–48)
PH BLDA: 7.43 PH UNITS (ref 7.35–7.45)
PLATELET # BLD AUTO: 263 10*3/MM3 (ref 140–450)
PMV BLD AUTO: 8 FL (ref 6–12)
PO2 BLDA: 82.6 MM HG (ref 83–108)
POTASSIUM BLD-SCNC: 3.6 MMOL/L (ref 3.5–5.2)
PROT SERPL-MCNC: 6.6 G/DL (ref 6–8.5)
RBC # BLD AUTO: 4.29 10*6/MM3 (ref 3.77–5.28)
RHINOVIRUS RNA SPEC NAA+PROBE: NOT DETECTED
RSV RNA NPH QL NAA+NON-PROBE: NOT DETECTED
SAO2 % BLDCOA: 96.3 % (ref 94–98)
SODIUM BLD-SCNC: 131 MMOL/L (ref 136–145)
TROPONIN T SERPL-MCNC: <0.01 NG/ML (ref 0–0.03)
WBC NRBC COR # BLD: 8.9 10*3/MM3 (ref 3.4–10.8)

## 2020-03-24 PROCEDURE — 84484 ASSAY OF TROPONIN QUANT: CPT | Performed by: NURSE PRACTITIONER

## 2020-03-24 PROCEDURE — 25010000002 METHYLPREDNISOLONE PER 125 MG: Performed by: INTERNAL MEDICINE

## 2020-03-24 PROCEDURE — 82962 GLUCOSE BLOOD TEST: CPT

## 2020-03-24 PROCEDURE — G0378 HOSPITAL OBSERVATION PER HR: HCPCS

## 2020-03-24 PROCEDURE — 71045 X-RAY EXAM CHEST 1 VIEW: CPT

## 2020-03-24 PROCEDURE — 94799 UNLISTED PULMONARY SVC/PX: CPT

## 2020-03-24 PROCEDURE — 25010000002 METHYLPREDNISOLONE PER 125 MG: Performed by: NURSE PRACTITIONER

## 2020-03-24 PROCEDURE — 93005 ELECTROCARDIOGRAM TRACING: CPT | Performed by: NURSE PRACTITIONER

## 2020-03-24 PROCEDURE — 80053 COMPREHEN METABOLIC PANEL: CPT | Performed by: NURSE PRACTITIONER

## 2020-03-24 PROCEDURE — 96376 TX/PRO/DX INJ SAME DRUG ADON: CPT

## 2020-03-24 PROCEDURE — 96365 THER/PROPH/DIAG IV INF INIT: CPT

## 2020-03-24 PROCEDURE — 85025 COMPLETE CBC W/AUTO DIFF WBC: CPT | Performed by: NURSE PRACTITIONER

## 2020-03-24 PROCEDURE — 0099U HC BIOFIRE FILMARRAY RESP PANEL 1: CPT | Performed by: NURSE PRACTITIONER

## 2020-03-24 PROCEDURE — 25010000002 ENOXAPARIN PER 10 MG: Performed by: INTERNAL MEDICINE

## 2020-03-24 PROCEDURE — 25010000002 CEFTRIAXONE PER 250 MG: Performed by: INTERNAL MEDICINE

## 2020-03-24 PROCEDURE — 87040 BLOOD CULTURE FOR BACTERIA: CPT | Performed by: NURSE PRACTITIONER

## 2020-03-24 PROCEDURE — 83605 ASSAY OF LACTIC ACID: CPT

## 2020-03-24 PROCEDURE — 63710000001 INSULIN LISPRO (HUMAN) PER 5 UNITS: Performed by: INTERNAL MEDICINE

## 2020-03-24 PROCEDURE — 96372 THER/PROPH/DIAG INJ SC/IM: CPT

## 2020-03-24 PROCEDURE — 83880 ASSAY OF NATRIURETIC PEPTIDE: CPT | Performed by: NURSE PRACTITIONER

## 2020-03-24 PROCEDURE — 82803 BLOOD GASES ANY COMBINATION: CPT

## 2020-03-24 PROCEDURE — 99284 EMERGENCY DEPT VISIT MOD MDM: CPT

## 2020-03-24 PROCEDURE — 94640 AIRWAY INHALATION TREATMENT: CPT

## 2020-03-24 PROCEDURE — 96375 TX/PRO/DX INJ NEW DRUG ADDON: CPT

## 2020-03-24 PROCEDURE — 83036 HEMOGLOBIN GLYCOSYLATED A1C: CPT | Performed by: INTERNAL MEDICINE

## 2020-03-24 PROCEDURE — 36600 WITHDRAWAL OF ARTERIAL BLOOD: CPT

## 2020-03-24 RX ORDER — DEXTROSE MONOHYDRATE 25 G/50ML
25 INJECTION, SOLUTION INTRAVENOUS
Status: DISCONTINUED | OUTPATIENT
Start: 2020-03-24 | End: 2020-03-26 | Stop reason: HOSPADM

## 2020-03-24 RX ORDER — MONTELUKAST SODIUM 10 MG/1
10 TABLET ORAL NIGHTLY
Status: DISCONTINUED | OUTPATIENT
Start: 2020-03-24 | End: 2020-03-26 | Stop reason: HOSPADM

## 2020-03-24 RX ORDER — METHYLPREDNISOLONE SODIUM SUCCINATE 125 MG/2ML
125 INJECTION, POWDER, LYOPHILIZED, FOR SOLUTION INTRAMUSCULAR; INTRAVENOUS ONCE
Status: COMPLETED | OUTPATIENT
Start: 2020-03-24 | End: 2020-03-24

## 2020-03-24 RX ORDER — ACETAMINOPHEN 325 MG/1
650 TABLET ORAL EVERY 4 HOURS PRN
Status: DISCONTINUED | OUTPATIENT
Start: 2020-03-24 | End: 2020-03-26 | Stop reason: HOSPADM

## 2020-03-24 RX ORDER — NITROGLYCERIN 0.4 MG/1
0.4 TABLET SUBLINGUAL
Status: DISCONTINUED | OUTPATIENT
Start: 2020-03-24 | End: 2020-03-26 | Stop reason: HOSPADM

## 2020-03-24 RX ORDER — IPRATROPIUM BROMIDE AND ALBUTEROL SULFATE 2.5; .5 MG/3ML; MG/3ML
3 SOLUTION RESPIRATORY (INHALATION)
Status: DISCONTINUED | OUTPATIENT
Start: 2020-03-24 | End: 2020-03-25

## 2020-03-24 RX ORDER — DOXYCYCLINE 100 MG/1
100 TABLET ORAL EVERY 12 HOURS SCHEDULED
Status: DISCONTINUED | OUTPATIENT
Start: 2020-03-24 | End: 2020-03-26 | Stop reason: HOSPADM

## 2020-03-24 RX ORDER — SODIUM CHLORIDE 0.9 % (FLUSH) 0.9 %
10 SYRINGE (ML) INJECTION EVERY 12 HOURS SCHEDULED
Status: DISCONTINUED | OUTPATIENT
Start: 2020-03-24 | End: 2020-03-26 | Stop reason: HOSPADM

## 2020-03-24 RX ORDER — NICOTINE POLACRILEX 4 MG
15 LOZENGE BUCCAL
Status: DISCONTINUED | OUTPATIENT
Start: 2020-03-24 | End: 2020-03-26 | Stop reason: HOSPADM

## 2020-03-24 RX ORDER — ASPIRIN 81 MG/1
81 TABLET ORAL DAILY
Status: DISCONTINUED | OUTPATIENT
Start: 2020-03-25 | End: 2020-03-26 | Stop reason: HOSPADM

## 2020-03-24 RX ORDER — SODIUM CHLORIDE 0.9 % (FLUSH) 0.9 %
10 SYRINGE (ML) INJECTION AS NEEDED
Status: DISCONTINUED | OUTPATIENT
Start: 2020-03-24 | End: 2020-03-26 | Stop reason: HOSPADM

## 2020-03-24 RX ORDER — METHYLPREDNISOLONE SODIUM SUCCINATE 125 MG/2ML
125 INJECTION, POWDER, LYOPHILIZED, FOR SOLUTION INTRAMUSCULAR; INTRAVENOUS EVERY 8 HOURS
Status: DISCONTINUED | OUTPATIENT
Start: 2020-03-24 | End: 2020-03-24

## 2020-03-24 RX ORDER — METHYLPREDNISOLONE SODIUM SUCCINATE 125 MG/2ML
80 INJECTION, POWDER, LYOPHILIZED, FOR SOLUTION INTRAMUSCULAR; INTRAVENOUS EVERY 12 HOURS
Status: DISCONTINUED | OUTPATIENT
Start: 2020-03-25 | End: 2020-03-26 | Stop reason: HOSPADM

## 2020-03-24 RX ORDER — IPRATROPIUM BROMIDE AND ALBUTEROL SULFATE 2.5; .5 MG/3ML; MG/3ML
3 SOLUTION RESPIRATORY (INHALATION) ONCE
Status: COMPLETED | OUTPATIENT
Start: 2020-03-24 | End: 2020-03-24

## 2020-03-24 RX ORDER — BISOPROLOL FUMARATE AND HYDROCHLOROTHIAZIDE 5; 6.25 MG/1; MG/1
1 TABLET ORAL DAILY
Status: DISCONTINUED | OUTPATIENT
Start: 2020-03-25 | End: 2020-03-26 | Stop reason: HOSPADM

## 2020-03-24 RX ORDER — ONDANSETRON 2 MG/ML
4 INJECTION INTRAMUSCULAR; INTRAVENOUS EVERY 6 HOURS PRN
Status: DISCONTINUED | OUTPATIENT
Start: 2020-03-24 | End: 2020-03-26 | Stop reason: HOSPADM

## 2020-03-24 RX ORDER — ACETAMINOPHEN 500 MG
1000 TABLET ORAL ONCE
Status: COMPLETED | OUTPATIENT
Start: 2020-03-24 | End: 2020-03-24

## 2020-03-24 RX ORDER — PANTOPRAZOLE SODIUM 40 MG/1
40 TABLET, DELAYED RELEASE ORAL
Status: DISCONTINUED | OUTPATIENT
Start: 2020-03-24 | End: 2020-03-26 | Stop reason: HOSPADM

## 2020-03-24 RX ADMIN — IPRATROPIUM BROMIDE AND ALBUTEROL SULFATE 3 ML: .5; 3 SOLUTION RESPIRATORY (INHALATION) at 09:44

## 2020-03-24 RX ADMIN — INSULIN LISPRO 3 UNITS: 100 INJECTION, SOLUTION INTRAVENOUS; SUBCUTANEOUS at 21:17

## 2020-03-24 RX ADMIN — IPRATROPIUM BROMIDE AND ALBUTEROL SULFATE 3 ML: .5; 3 SOLUTION RESPIRATORY (INHALATION) at 14:45

## 2020-03-24 RX ADMIN — CEFTRIAXONE SODIUM 1 G: 1 INJECTION, POWDER, FOR SOLUTION INTRAMUSCULAR; INTRAVENOUS at 13:00

## 2020-03-24 RX ADMIN — IPRATROPIUM BROMIDE AND ALBUTEROL SULFATE 3 ML: .5; 3 SOLUTION RESPIRATORY (INHALATION) at 18:53

## 2020-03-24 RX ADMIN — Medication 10 ML: at 16:55

## 2020-03-24 RX ADMIN — Medication 10 ML: at 21:18

## 2020-03-24 RX ADMIN — ACETAMINOPHEN 1000 MG: 500 TABLET, FILM COATED ORAL at 09:39

## 2020-03-24 RX ADMIN — MONTELUKAST SODIUM 10 MG: 10 TABLET, COATED ORAL at 21:18

## 2020-03-24 RX ADMIN — DOXYCYCLINE 100 MG: 100 TABLET, FILM COATED ORAL at 22:43

## 2020-03-24 RX ADMIN — PANTOPRAZOLE SODIUM 40 MG: 40 TABLET, DELAYED RELEASE ORAL at 16:47

## 2020-03-24 RX ADMIN — ENOXAPARIN SODIUM 40 MG: 40 INJECTION SUBCUTANEOUS at 16:47

## 2020-03-24 RX ADMIN — METHYLPREDNISOLONE SODIUM SUCCINATE 125 MG: 125 INJECTION, POWDER, FOR SOLUTION INTRAMUSCULAR; INTRAVENOUS at 09:39

## 2020-03-24 RX ADMIN — DOXYCYCLINE 100 MG: 100 INJECTION, POWDER, LYOPHILIZED, FOR SOLUTION INTRAVENOUS at 16:54

## 2020-03-24 RX ADMIN — METHYLPREDNISOLONE SODIUM SUCCINATE 125 MG: 125 INJECTION, POWDER, FOR SOLUTION INTRAMUSCULAR; INTRAVENOUS at 16:52

## 2020-03-24 NOTE — ED PROVIDER NOTES
"Subjective   Patient is an 80-year-old white female with history of COPD and hypertension who presents today with complaints of shortness of breath, productive cough, general weakness and body aches.  She states she has had a cough for the last 2 weeks.  She reports the last few days she is grown increasingly weak and \"just feels bad.\"  She is unsure whether she had fever at home.  She denies chills.  She denies any nasal congestion or sore throat.  Denies any chest pain but does report some shortness of breath worse with exertion.  She denies any abdominal pain, nausea vomiting or diarrhea.  She denies any ill contacts or recent travel.          Review of Systems   Constitutional: Negative for chills.   HENT: Negative for congestion and sore throat.    Respiratory: Positive for cough, chest tightness, shortness of breath and wheezing.    Cardiovascular: Negative for chest pain and leg swelling.   Gastrointestinal: Negative for abdominal pain, diarrhea, nausea and vomiting.   Genitourinary: Negative for decreased urine volume, dysuria, frequency and urgency.   Skin: Negative for rash.   Neurological: Positive for weakness. Negative for dizziness, numbness and headaches.       Past Medical History:   Diagnosis Date   • Aortic aneurysm (CMS/[facility])    • COPD (chronic obstructive pulmonary disease) (CMS/[facility])    • Hypertension        No Known Allergies    Past Surgical History:   Procedure Laterality Date   • CARPAL TUNNEL RELEASE     • DESCENDING AORTIC ANEURYSM REPAIR W/ STENT     • OTHER SURGICAL HISTORY      STENT       Family History   Problem Relation Age of Onset   • Cancer Other    • Diabetes Other    • Aneurysm Father        Social History     Socioeconomic History   • Marital status:      Spouse name: Not on file   • Number of children: Not on file   • Years of education: Not on file   • Highest education level: Not on file   Tobacco Use   • Smoking status: Former Smoker   • Smokeless tobacco: Never Used "   Substance and Sexual Activity   • Alcohol use: No     Frequency: Never           Objective   Physical Exam   Constitutional: She appears well-developed.   Vital signs and triage nurse note reviewed.  Constitutional: Awake, alert; well-developed and well-nourished. No acute distress is noted.  HEENT: Normocephalic, atraumatic; pupils are PERRL with intact EOM; oropharynx is pink and moist without exudate or erythema.  No drooling or pooling of oral secretions.  Neck: Supple, full range of motion without pain; no cervical lymphadenopathy. Normal phonation.  Cardiovascular: Regular rate and rhythm, normal S1-S2.  No murmur noted.  Pulmonary: Respiratory effort regular mildly tachypneic, breath sounds diffuse expiratory wheezes with rhonchi noted in the right lung base.  Abdomen: Soft, nontender, nondistended with normoactive bowel sounds; no rebound or guarding.  Musculoskeletal: Independent range of motion of all extremities with no palpable tenderness or edema.  Calves are symmetric and nontender.  Neuro: Alert oriented x3, speech is clear and appropriate, GCS 15.    Skin: Flesh tone, warm, dry, intact; no erythematous or petechial rash or lesion.        Procedures           ED Course      Labs Reviewed   RESPIRATORY PANEL, PCR - Abnormal; Notable for the following components:       Result Value    Human Metapneumovirus Detected (*)     All other components within normal limits    Narrative:     The coronavirus on the RVP is NOT COVID-19 and is NOT indicative of infection with COVID-19.    COMPREHENSIVE METABOLIC PANEL - Abnormal; Notable for the following components:    Glucose 195 (*)     Sodium 131 (*)     Chloride 91 (*)     BUN/Creatinine Ratio 30.2 (*)     All other components within normal limits    Narrative:     GFR Normal >60  Chronic Kidney Disease <60  Kidney Failure <15     CBC WITH AUTO DIFFERENTIAL - Abnormal; Notable for the following components:    MCV 97.3 (*)     Neutrophil % 84.1 (*)      Lymphocyte % 5.9 (*)     Eosinophil % 0.0 (*)     Neutrophils, Absolute 7.50 (*)     Lymphocytes, Absolute 0.50 (*)     All other components within normal limits   BLOOD GAS, ARTERIAL - Abnormal; Notable for the following components:    pO2, Arterial 82.6 (*)     HCO3, Arterial 29.1 (*)     Base Excess, Arterial 4.1 (*)     CO2 Content 30.5 (*)     All other components within normal limits   BNP (IN-HOUSE) - Normal    Narrative:     Among patients with dyspnea, NT-proBNP is highly sensitive for the detection of acute congestive heart failure. In addition NT-proBNP of <300 pg/ml effectively rules out acute congestive heart failure with 99% negative predictive value.    Results may be falsely decreased if patient taking Biotin.     TROPONIN (IN-HOUSE) - Normal    Narrative:     Troponin T Reference Range:  <= 0.03 ng/mL-   Negative for AMI  >0.03 ng/mL-     Abnormal for myocardial necrosis.  Clinicians would have to utilize clinical acumen, EKG, Troponin and serial changes to determine if it is an Acute Myocardial Infarction or myocardial injury due to an underlying chronic condition.       Results may be falsely decreased if patient taking Biotin.     POC LACTATE - Normal   BLOOD CULTURE   BLOOD CULTURE   BLOOD GAS, ARTERIAL   POC LACTATE   CBC AND DIFFERENTIAL    Narrative:     The following orders were created for panel order CBC & Differential.  Procedure                               Abnormality         Status                     ---------                               -----------         ------                     CBC Auto Differential[[id number]]        Abnormal            Final result                 Please view results for these tests on the individual orders.     Xr Chest 1 View    Result Date: 3/24/2020  Mildly limited study demonstrating chronic changes with no active disease.  Electronically Signed By-Adelfo De Jesus On:3/24/2020 10:16 AM This report was finalized on [id number] by  Adelfo De Jesus,  .    Medications   sodium chloride 0.9 % flush 10 mL (has no administration in time range)   cefTRIAXone (ROCEPHIN) in SWFI 1 gram/10ml IV PUSH syringe (has no administration in time range)   doxycycline (VIBRAMYCIN) 100 mg in sodium chloride 0.9 % 100 mL IVPB (has no administration in time range)   ipratropium-albuterol (DUO-NEB) nebulizer solution 3 mL (3 mL Nebulization Given 3/24/20 0944)   methylPREDNISolone sodium succinate (SOLU-Medrol) injection 125 mg (125 mg Intravenous Given 3/24/20 0939)   acetaminophen (TYLENOL) tablet 1,000 mg (1,000 mg Oral Given 3/24/20 0939)                                          MDM  Number of Diagnoses or Management Options  Acute bronchiolitis due to human metapneumovirus:   Dyspnea, unspecified type:   Sepsis without acute organ dysfunction, due to unspecified organism (CMS/[facility]):   Diagnosis management comments: Comorbidities: COPD, hypertension  Differentials: Pneumonia, COPD exacerbation, influenza, viral illness;this list is not all inclusive and does not constitute the entirety of considered causes  Discussion with provider:  Radiology interpretation: X-rays reviewed by me and interpreted by radiologist: As above  Lab interpretation: Labs viewed by me significant for: As above    Patient is placed on continuous cardiac monitor.  She had IV established.  She had labs, EKG chest x-ray obtained.  She was given Solu-Medrol as well as DuoNeb treatment.  She had blood cultures obtained.  POC lactate obtained and found to be normal at 0.6.    Patient was given Tylenol for fever.    Patient given 1 dose of IV antibiotics in the ED.  She was discussed with Dr. Cronin who agreed to admit.    Patient was admitted to the hospital for IV steroids, breathing treatments, further observation and treatment.    Diagnosis and treatment plan discussed with patient.  Patient agreeable to plan.            Amount and/or Complexity of Data Reviewed  Clinical lab tests: ordered and reviewed  Tests  in the radiology section of CPT®: ordered and reviewed    Patient Progress  Patient progress: stable      Final diagnoses:   Dyspnea, unspecified type   Sepsis without acute organ dysfunction, due to unspecified organism (CMS/[facility])   Acute bronchiolitis due to human metapneumovirus   Acute exacerbation of chronic obstructive pulmonary disease (COPD) (CMS/[facility])            Missy Phipps, RADHA  03/24/20 1132

## 2020-03-24 NOTE — PLAN OF CARE
Problem: Patient Care Overview  Goal: Plan of Care Review  Outcome: Ongoing (interventions implemented as appropriate)   Pt resting with no complaints

## 2020-03-24 NOTE — H&P
Patient Care Team:  Nick Colindres MD as PCP - General  Jamee Torres APRN as PCP - Family Medicine  Kyrie Campoverde MD as Consulting Physician (Cardiology)    Chief complaint SOA    Subjective     Patient is a 80 y.o. female with h/o COPD developed nasal congestion, ST, non-productive cough 10 days ago.  Two days ago she developed fever, worsening cough and SOA.  She denied recent travel or known ill exposures.  In the ER she was noted to have extensive wheezing and mild-moderate respiratory distress.  She feels much better after receiving IV steroids and bronchodilators.     Review of Systems   Constitutional: Positive for activity change, fatigue and fever. Negative for appetite change and diaphoresis.   HENT: Positive for rhinorrhea, sinus pressure, sneezing and sore throat. Negative for facial swelling and sinus pain.    Eyes: Negative for visual disturbance.   Respiratory: Positive for cough, chest tightness, shortness of breath and wheezing. Negative for choking.    Cardiovascular: Negative for chest pain, palpitations and leg swelling.   Gastrointestinal: Negative for anal bleeding, constipation, diarrhea, nausea and vomiting.   Genitourinary: Negative for dysuria and urgency.   Musculoskeletal: Negative for back pain and myalgias.   Skin: Negative for rash.   Neurological: Negative for tremors, seizures, weakness and headaches.   Psychiatric/Behavioral: Negative for confusion.          History  Past Medical History:   Diagnosis Date   • Aortic aneurysm (CMS/H[facility])    • COPD (chronic obstructive pulmonary disease) (CMS/[facility])    • Hypertension      Past Surgical History:   Procedure Laterality Date   • CARPAL TUNNEL RELEASE     • DESCENDING AORTIC ANEURYSM REPAIR W/ STENT     • OTHER SURGICAL HISTORY      STENT     Family History   Problem Relation Age of Onset   • Cancer Other    • Diabetes Other    • Aneurysm Father      Social History     Tobacco Use   • Smoking status: Former  Smoker   • Smokeless tobacco: Never Used   Substance Use Topics   • Alcohol use: No     Frequency: Never   • Drug use: Not on file     Facility-Administered Medications Prior to Admission   Medication Dose Route Frequency Provider Last Rate Last Dose   • Hyaluronan (ORTHOVISC) injection 30 mg  30 mg Intra-articular Weekly Nick Colindres MD   30 mg at 07/10/19 1429   • Hyaluronan (ORTHOVISC) injection 30 mg  30 mg Intra-articular Weekly Nick Colindres MD   30 mg at 07/17/19 1405   • Hyaluronan (ORTHOVISC) injection 30 mg  30 mg Intra-articular Weekly Nick Colindres MD   30 mg at 07/24/19 1410     Medications Prior to Admission   Medication Sig Dispense Refill Last Dose   • aspirin (ASPIR-LOW) 81 MG EC tablet Take 81 mg by mouth Daily.   3/24/2020 at Unknown time   • bisoprolol-hydrochlorothiazide (ZIAC) 5-6.25 MG per tablet Take 1 tablet by mouth Daily. 90 tablet 1 3/24/2020 at Unknown time   • montelukast (SINGULAIR) 10 MG tablet Take 10 mg by mouth Every Night.   3/23/2020 at Unknown time   • meloxicam (MOBIC) 7.5 MG tablet Take 1 tablet by mouth Daily As Needed for Moderate Pain . 30 tablet 4 Unknown at Unknown time     Allergies:  Patient has no known allergies.    Objective     Vital Signs  Temp:  [98.2 °F (36.8 °C)-101 °F (38.3 °C)] 98.2 °F (36.8 °C)  Heart Rate:  [76-93] 77  Resp:  [14-18] 18  BP: (106-174)/(67-90) 106/67     Physical Exam:      General Appearance:    Alert, cooperative, in no acute distress   Head:    Normocephalic, without obvious abnormality, atraumatic   Eyes:            Lids and lashes normal, conjunctivae and sclerae normal, no   icterus, no pallor, corneas clear, PERRLA   Ears:    Ears appear intact with no abnormalities noted   Throat:   No oral lesions, no thrush, oral mucosa moist   Neck:   No adenopathy, supple, trachea midline, no thyromegaly, no   carotid bruit, no JVD   Lungs:    Scant, scattered wheezing    Heart:    Regular rhythm and normal rate,  normal S1 and S2, no            murmur, no gallop, no rub, no click   Chest Wall:    No abnormalities observed   Abdomen:     Normal bowel sounds, no masses, no organomegaly, soft        non-tender, non-distended, no guarding, no rebound                tenderness   Extremities:   Moves all extremities well, no edema, no cyanosis, no             redness   Pulses:   Pulses palpable and equal bilaterally   Skin:   No bleeding, bruising or rash   Lymph nodes:   No palpable adenopathy   Neurologic:   Cranial nerves 2 - 12 grossly intact, sensation intact, DTR       present and equal bilaterally       Results Review:     Imaging Results (Last 24 Hours)     Procedure Component Value Units Date/Time    XR Chest 1 View [[id number]] Collected:  03/24/20 1015     Updated:  03/24/20 1019    Narrative:       DATE OF EXAM:  3/24/2020 10:00 AM     PROCEDURE:  XR CHEST 1 VW-     INDICATIONS:  Shortness of breath with fever and cough for a few weeks.     COMPARISON:  Chest radiographs 01/10/2019, 01/04/2018, and 03/09/2017. CT the chest  11/21/2017.     TECHNIQUE:   Single radiographic AP view of the chest was obtained.     FINDINGS:  The study is limited by lordotic patient positioning. Overlying  artifacts. Mild biapical pleural-parenchymal scarring. Stable  interstitial prominence throughout both lungs. No new focal lung  consolidation. No pneumothorax. Unchanged cardiomediastinal contours.  Calcified atherosclerotic disease in the thoracic aorta. Diffuse  osteopenia. Chronic changes in both shoulders. Mild thoracic  dextroscoliosis. No acute osseous abnormality is identified.        Impression:       Mildly limited study demonstrating chronic changes with no active  disease.     Electronically Signed By-Adelfo De Jesus On:3/24/2020 10:16 AM  This report was finalized on [id number] by  Adelfo De Jesus, .           Lab Results (last 24 hours)     Procedure Component Value Units Date/Time    POC Glucose Once [[id number]]  (Abnormal)  Collected:  03/24/20 1645    Specimen:  Blood Updated:  03/24/20 1647     Glucose 216 mg/dL      Comment: Serial Number: [id number]:  [id number]       Hemoglobin A1c [[id number]]  (Abnormal) Collected:  03/24/20 0932    Specimen:  Blood from Arm, Left Updated:  03/24/20 1531     Hemoglobin A1C 5.7 %     Narrative:       Hemoglobin A1C Reference Range:    <5.7 %        Normal  5.7-6.4 %     Increased risk for diabetes  > 6.4 %        Diabetes       These guidelines have been recommended by the American Diabetic Association for Hgb A1c.      The following 2010 guidelines have been recommended by the American Diabetes Association for Hemoglobin A1c.    HBA1c 5.7-6.4% Increased risk for future diabetes (pre-diabetes)  HBA1c     >6.4% Diabetes      Respiratory Panel, PCR - Swab, Nasopharynx [[id number]]  (Abnormal) Collected:  03/24/20 0933    Specimen:  Swab from Nasopharynx Updated:  03/24/20 1112     ADENOVIRUS, PCR Not Detected     Coronavirus 229E Not Detected     Coronavirus HKU1 Not Detected     Coronavirus NL63 Not Detected     Coronavirus OC43 Not Detected     Human Metapneumovirus Detected     Human Rhinovirus/Enterovirus Not Detected     Influenza B PCR Not Detected     Parainfluenza Virus 1 Not Detected     Parainfluenza Virus 2 Not Detected     Parainfluenza Virus 3 Not Detected     Parainfluenza Virus 4 Not Detected     Bordetella pertussis pcr Not Detected     Influenza A H1 2009 PCR Not Detected     Chlamydophila pneumoniae PCR Not Detected     Mycoplasma pneumo by PCR Not Detected     Influenza A PCR Not Detected     Influenza A H3 Not Detected     Influenza A H1 Not Detected     RSV, PCR Not Detected    Narrative:       The coronavirus on the RVP is NOT COVID-19 and is NOT indicative of infection with COVID-19.     Comprehensive Metabolic Panel [[id number]]  (Abnormal) Collected:  03/24/20 0932    Specimen:  Blood from Arm, Left Updated:  03/24/20 1025     Glucose 195 mg/dL      BUN 19 mg/dL       Creatinine 0.63 mg/dL      Sodium 131 mmol/L      Potassium 3.6 mmol/L      Chloride 91 mmol/L      CO2 27.0 mmol/L      Calcium 8.9 mg/dL      Total Protein 6.6 g/dL      Albumin 3.90 g/dL      ALT (SGPT) 18 U/L      AST (SGOT) 27 U/L      Alkaline Phosphatase 58 U/L      Total Bilirubin 0.4 mg/dL      eGFR Non African Amer 91 mL/min/1.73      Globulin 2.7 gm/dL      A/G Ratio 1.4 g/dL      BUN/Creatinine Ratio 30.2     Anion Gap 13.0 mmol/L     Narrative:       GFR Normal >60  Chronic Kidney Disease <60  Kidney Failure <15      Troponin [[id number]]  (Normal) Collected:  03/24/20 0932    Specimen:  Blood from Arm, Left Updated:  03/24/20 1025     Troponin T <0.010 ng/mL     Narrative:       Troponin T Reference Range:  <= 0.03 ng/mL-   Negative for AMI  >0.03 ng/mL-     Abnormal for myocardial necrosis.  Clinicians would have to utilize clinical acumen, EKG, Troponin and serial changes to determine if it is an Acute Myocardial Infarction or myocardial injury due to an underlying chronic condition.       Results may be falsely decreased if patient taking Biotin.      BNP [[id number]]  (Normal) Collected:  03/24/20 0932    Specimen:  Blood from Arm, Left Updated:  03/24/20 1022     proBNP 154.6 pg/mL     Narrative:       Among patients with dyspnea, NT-proBNP is highly sensitive for the detection of acute congestive heart failure. In addition NT-proBNP of <300 pg/ml effectively rules out acute congestive heart failure with 99% negative predictive value.    Results may be falsely decreased if patient taking Biotin.      Blood Culture - Blood, Arm, Left [[id number]] Collected:  03/24/20 0957    Specimen:  Blood from Arm, Left Updated:  03/24/20 1004    CBC & Differential [[id number]] Collected:  03/24/20 0932    Specimen:  Blood from Arm, Left Updated:  03/24/20 1003    Narrative:       The following orders were created for panel order CBC & Differential.  Procedure                               Abnormality         Status                      ---------                               -----------         ------                     CBC Auto Differential[[id number]]        Abnormal            Final result                 Please view results for these tests on the individual orders.    CBC Auto Differential [[id number]]  (Abnormal) Collected:  03/24/20 0932    Specimen:  Blood from Arm, Left Updated:  03/24/20 1003     WBC 8.90 10*3/mm3      RBC 4.29 10*6/mm3      Hemoglobin 14.0 g/dL      Hematocrit 41.7 %      MCV 97.3 fL      MCH 32.6 pg      MCHC 33.5 g/dL      RDW 13.4 %      RDW-SD 45.1 fl      MPV 8.0 fL      Platelets 263 10*3/mm3      Neutrophil % 84.1 %      Lymphocyte % 5.9 %      Monocyte % 9.7 %      Eosinophil % 0.0 %      Basophil % 0.3 %      Neutrophils, Absolute 7.50 10*3/mm3      Lymphocytes, Absolute 0.50 10*3/mm3      Monocytes, Absolute 0.90 10*3/mm3      Eosinophils, Absolute 0.00 10*3/mm3      Basophils, Absolute 0.00 10*3/mm3      nRBC 0.0 /100 WBC     Blood Culture - Blood, Arm, Left [[id number]] Collected:  03/24/20 0931    Specimen:  Blood from Arm, Left Updated:  03/24/20 0954    Blood Gas, Arterial [[id number]]  (Abnormal) Collected:  03/24/20 0948    Specimen:  Arterial Blood Updated:  03/24/20 0952     Site Right Radial     Ananth's Test Positive     pH, Arterial 7.428 pH units      pCO2, Arterial 44.2 mm Hg      pO2, Arterial 82.6 mm Hg      HCO3, Arterial 29.1 mmol/L      Base Excess, Arterial 4.1 mmol/L      Comment: Serial Number: [id number]:  [id number]        O2 Saturation, Arterial 96.3 %      CO2 Content 30.5 mmol/L      Barometric Pressure for Blood Gas --     Comment: N/A        Modality Room Air     FIO2 21 %      Hemodilution No    POC Lactate [[id number]]  (Normal) Collected:  03/24/20 0950    Specimen:  Blood Updated:  03/24/20 0951     Lactate 0.6 mmol/L      Comment: Serial Number: [id number]:  [id number]              I reviewed the patient's new clinical results.    Assessment/Plan        Dyspnea  COPD exacerbation - steroids, bronchodilators, antibiotics; attempt to collect sputum cultures  Viral URI (human metapneumovirus)  Hyperglycemia - check A1C and monitor with steroid use         I discussed the patients findings and my recommendations with patient.     Dianna Cronin MD  03/24/20  18:28

## 2020-03-25 LAB
ANION GAP SERPL CALCULATED.3IONS-SCNC: 12 MMOL/L (ref 5–15)
BASOPHILS # BLD AUTO: 0 10*3/MM3 (ref 0–0.2)
BASOPHILS NFR BLD AUTO: 0.2 % (ref 0–1.5)
BUN BLD-MCNC: 18 MG/DL (ref 8–23)
BUN/CREAT SERPL: 32.1 (ref 7–25)
CALCIUM SPEC-SCNC: 9 MG/DL (ref 8.6–10.5)
CHLORIDE SERPL-SCNC: 93 MMOL/L (ref 98–107)
CO2 SERPL-SCNC: 30 MMOL/L (ref 22–29)
CREAT BLD-MCNC: 0.56 MG/DL (ref 0.57–1)
DEPRECATED RDW RBC AUTO: 43.3 FL (ref 37–54)
EOSINOPHIL # BLD AUTO: 0 10*3/MM3 (ref 0–0.4)
EOSINOPHIL NFR BLD AUTO: 0 % (ref 0.3–6.2)
ERYTHROCYTE [DISTWIDTH] IN BLOOD BY AUTOMATED COUNT: 13 % (ref 12.3–15.4)
GFR SERPL CREATININE-BSD FRML MDRD: 104 ML/MIN/1.73
GLUCOSE BLD-MCNC: 129 MG/DL (ref 65–99)
GLUCOSE BLDC GLUCOMTR-MCNC: 126 MG/DL (ref 70–105)
GLUCOSE BLDC GLUCOMTR-MCNC: 126 MG/DL (ref 70–105)
GLUCOSE BLDC GLUCOMTR-MCNC: 144 MG/DL (ref 70–105)
GLUCOSE BLDC GLUCOMTR-MCNC: 183 MG/DL (ref 70–105)
HCT VFR BLD AUTO: 39.9 % (ref 34–46.6)
HGB BLD-MCNC: 13.8 G/DL (ref 12–15.9)
LYMPHOCYTES # BLD AUTO: 0.8 10*3/MM3 (ref 0.7–3.1)
LYMPHOCYTES NFR BLD AUTO: 9.7 % (ref 19.6–45.3)
MCH RBC QN AUTO: 33.1 PG (ref 26.6–33)
MCHC RBC AUTO-ENTMCNC: 34.5 G/DL (ref 31.5–35.7)
MCV RBC AUTO: 95.9 FL (ref 79–97)
MONOCYTES # BLD AUTO: 0.4 10*3/MM3 (ref 0.1–0.9)
MONOCYTES NFR BLD AUTO: 4.6 % (ref 5–12)
NEUTROPHILS # BLD AUTO: 7.3 10*3/MM3 (ref 1.7–7)
NEUTROPHILS NFR BLD AUTO: 85.5 % (ref 42.7–76)
NRBC BLD AUTO-RTO: 0 /100 WBC (ref 0–0.2)
PLATELET # BLD AUTO: 251 10*3/MM3 (ref 140–450)
PMV BLD AUTO: 8 FL (ref 6–12)
POTASSIUM BLD-SCNC: 4.3 MMOL/L (ref 3.5–5.2)
RBC # BLD AUTO: 4.16 10*6/MM3 (ref 3.77–5.28)
SODIUM BLD-SCNC: 135 MMOL/L (ref 136–145)
WBC NRBC COR # BLD: 8.5 10*3/MM3 (ref 3.4–10.8)

## 2020-03-25 PROCEDURE — 94799 UNLISTED PULMONARY SVC/PX: CPT

## 2020-03-25 PROCEDURE — 63710000001 INSULIN LISPRO (HUMAN) PER 5 UNITS: Performed by: INTERNAL MEDICINE

## 2020-03-25 PROCEDURE — 96372 THER/PROPH/DIAG INJ SC/IM: CPT

## 2020-03-25 PROCEDURE — 82962 GLUCOSE BLOOD TEST: CPT

## 2020-03-25 PROCEDURE — 87070 CULTURE OTHR SPECIMN AEROBIC: CPT | Performed by: INTERNAL MEDICINE

## 2020-03-25 PROCEDURE — 25010000002 ENOXAPARIN PER 10 MG: Performed by: INTERNAL MEDICINE

## 2020-03-25 PROCEDURE — 85025 COMPLETE CBC W/AUTO DIFF WBC: CPT | Performed by: INTERNAL MEDICINE

## 2020-03-25 PROCEDURE — 87205 SMEAR GRAM STAIN: CPT | Performed by: INTERNAL MEDICINE

## 2020-03-25 PROCEDURE — G0378 HOSPITAL OBSERVATION PER HR: HCPCS

## 2020-03-25 PROCEDURE — 25010000002 METHYLPREDNISOLONE PER 125 MG: Performed by: INTERNAL MEDICINE

## 2020-03-25 PROCEDURE — 96376 TX/PRO/DX INJ SAME DRUG ADON: CPT

## 2020-03-25 PROCEDURE — 25010000002 CEFTRIAXONE PER 250 MG: Performed by: INTERNAL MEDICINE

## 2020-03-25 PROCEDURE — 80048 BASIC METABOLIC PNL TOTAL CA: CPT | Performed by: INTERNAL MEDICINE

## 2020-03-25 RX ORDER — IPRATROPIUM BROMIDE AND ALBUTEROL SULFATE 2.5; .5 MG/3ML; MG/3ML
3 SOLUTION RESPIRATORY (INHALATION) EVERY 4 HOURS PRN
Status: DISCONTINUED | OUTPATIENT
Start: 2020-03-25 | End: 2020-03-26 | Stop reason: HOSPADM

## 2020-03-25 RX ADMIN — DOXYCYCLINE 100 MG: 100 TABLET, FILM COATED ORAL at 09:00

## 2020-03-25 RX ADMIN — IPRATROPIUM BROMIDE AND ALBUTEROL SULFATE 3 ML: .5; 3 SOLUTION RESPIRATORY (INHALATION) at 06:55

## 2020-03-25 RX ADMIN — DOXYCYCLINE 100 MG: 100 TABLET, FILM COATED ORAL at 20:59

## 2020-03-25 RX ADMIN — METHYLPREDNISOLONE SODIUM SUCCINATE 80 MG: 125 INJECTION, POWDER, FOR SOLUTION INTRAMUSCULAR; INTRAVENOUS at 06:04

## 2020-03-25 RX ADMIN — ASPIRIN 81 MG: 81 TABLET, COATED ORAL at 09:00

## 2020-03-25 RX ADMIN — PANTOPRAZOLE SODIUM 40 MG: 40 TABLET, DELAYED RELEASE ORAL at 06:04

## 2020-03-25 RX ADMIN — Medication 10 ML: at 21:00

## 2020-03-25 RX ADMIN — METHYLPREDNISOLONE SODIUM SUCCINATE 80 MG: 125 INJECTION, POWDER, FOR SOLUTION INTRAMUSCULAR; INTRAVENOUS at 17:03

## 2020-03-25 RX ADMIN — CEFTRIAXONE SODIUM 1 G: 1 INJECTION, POWDER, FOR SOLUTION INTRAMUSCULAR; INTRAVENOUS at 12:49

## 2020-03-25 RX ADMIN — Medication 10 ML: at 09:02

## 2020-03-25 RX ADMIN — ENOXAPARIN SODIUM 40 MG: 40 INJECTION SUBCUTANEOUS at 17:03

## 2020-03-25 RX ADMIN — MONTELUKAST SODIUM 10 MG: 10 TABLET, COATED ORAL at 20:59

## 2020-03-25 RX ADMIN — BISOPROLOL FUMARATE AND HYDROCHLOROTHIAZIDE 1 TABLET: 6.25; 5 TABLET ORAL at 10:00

## 2020-03-25 RX ADMIN — INSULIN LISPRO 2 UNITS: 100 INJECTION, SOLUTION INTRAVENOUS; SUBCUTANEOUS at 20:59

## 2020-03-25 NOTE — PROGRESS NOTES
Discharge Planning Assessment   [facility]     Patient Name: Becka Oliva  MRN: [id number]  Today's Date: 3/25/2020    Admit Date: 3/24/2020    Discharge Needs Assessment     Row Name 03/25/20 0923       Living Environment    Lives With  alone    Unique Family Situation  children rotate staying overnight with patient    Current Living Arrangements  home/apartment/condo    Primary Care Provided by  self    Provides Primary Care For  no one    Family Caregiver if Needed  child(honorio), adult    Quality of Family Relationships  helpful;supportive    Able to Return to Prior Arrangements  yes       Resource/Environmental Concerns    Resource/Environmental Concerns  none    Transportation Concerns  car, none       Transition Planning    Patient/Family Anticipates Transition to  home    Patient/Family Anticipated Services at Transition  none    Transportation Anticipated  family or friend will provide       Discharge Needs Assessment    Readmission Within the Last 30 Days  no previous admission in last 30 days    Concerns to be Addressed  denies needs/concerns at this time    Equipment Currently Used at Home  oxygen oxygen concentrator with [employer]     Anticipated Changes Related to Illness  none    Equipment Needed After Discharge  none        Discharge Plan     Row Name 03/25/20 0924       Plan    Plan  D/C Plan: Anticipate home with family.    Patient/Family in Agreement with Plan  yes    Plan Comments  Spoke with patient's daughter, Jaci, via phone. Patient lives alone but children rotate staying the night with patient. Patient is legally blind. Children provide transportation to app. Patient has home oxygen concentrator with [employer] and uses [employer].  PCP Roseanne. Denies any d/c needs at this time. Barrier to D/C: IV abx, IV Solumedrol., O2 at 2L.           Expected Discharge Date and Time     Expected Discharge Date Expected Discharge Time    Mar 26, 2020         Demographic Summary     Row Name 03/25/20 0923       General  Information    Admission Type  observation    Arrived From  emergency department    Referral Source  admission list    Reason for Consult  discharge planning    Preferred Language  English     Used During This Interaction  no        Functional Status     Row Name 03/25/20 0923       Functional Status    Usual Activity Tolerance  moderate    Current Activity Tolerance  moderate       Functional Status, IADL    Medications  independent    Meal Preparation  independent    Housekeeping  independent    Laundry  independent    Shopping  independent       Mental Status    General Appearance WDL  WDL       Mental Status Summary    Recent Changes in Mental Status/Cognitive Functioning  unable to assess        Neeru Moreira

## 2020-03-25 NOTE — PROGRESS NOTES
LOS: 0 days   Patient Care Team:  Nick Colindres MD as PCP - General  [facility] - [facility]  Kyrie Campoverde MD as Consulting Physician (Cardiology)    Subjective     Interval History:    Patient Complaints: cough and SOA is improving; feels shaky and weak after Duonebs    History taken from: patient    Review of Systems   Constitutional: Negative for chills, fatigue and fever.   HENT: Negative for rhinorrhea, sinus pressure and sinus pain.    Eyes: Negative for visual disturbance.   Respiratory: Positive for cough, shortness of breath and wheezing. Negative for stridor.    Cardiovascular: Negative for chest pain, palpitations and leg swelling.   Gastrointestinal: Negative for constipation, diarrhea, nausea and vomiting.   Endocrine: Negative for polyuria.   Genitourinary: Negative for frequency and urgency.   Musculoskeletal: Negative for arthralgias and myalgias.   Skin: Negative for rash.   Neurological: Negative for seizures and light-headedness.   Psychiatric/Behavioral: Negative for confusion.           Objective     Vital Signs  Temp:  [97.6 °F (36.4 °C)-98.2 °F (36.8 °C)] 97.6 °F (36.4 °C)  Heart Rate:  [70-83] 70  Resp:  [16-18] 18  BP: (106-137)/(63-70) 125/63    Physical Exam:     General Appearance:    Alert, cooperative, in no acute distress,   Head:    Normocephalic, without obvious abnormality, atraumatic   Eyes:            Lids and lashes normal, conjunctivae and sclerae normal, no   icterus, no pallor, corneas clear, PERRLA   Ears:    Ears appear intact with no abnormalities noted   Throat:   No oral lesions, no thrush, oral mucosa moist   Neck:   No adenopathy, supple, trachea midline, no thyromegaly, no   carotid bruit, no JVD   Lungs:     Scattered rhonchi and wheezing, left>right    Heart:    Regular rhythm and normal rate, normal S1 and S2, no            murmur, no gallop, no rub, no click   Chest Wall:    No abnormalities observed   Abdomen:      Normal bowel sounds, no masses, no organomegaly, soft        Non-tender non-distended, no guarding,   Extremities:   Moves all extremities well, no edema, no cyanosis, no             Redness   Pulses:   Pulses palpable and equal bilaterally   Skin:   No bleeding, bruising or rash   Lymph nodes:   No palpable adenopathy   Neurologic:   Cranial nerves 2 - 12 grossly intact, sensation intact, DTR       present and equal bilaterally        Results Review:    Lab Results (last 24 hours)     Procedure Component Value Units Date/Time    Blood Culture - Blood, Arm, Left [[id number]] Collected:  03/24/20 0957    Specimen:  Blood from Arm, Left Updated:  03/25/20 1015     Blood Culture No growth at 24 hours    Respiratory Culture - Sputum, Cough [[id number]] Collected:  03/25/20 0607    Specimen:  Sputum from Cough Updated:  03/25/20 1014     Gram Stain Many (4+) WBCs per low power field      Rare (1+) Mixed bacterial morphotypes seen on Gram Stain    Blood Culture - Blood, Arm, Left [[id number]] Collected:  03/24/20 0931    Specimen:  Blood from Arm, Left Updated:  03/25/20 1000     Blood Culture No growth at 24 hours    POC Glucose Once [[id number]]  (Abnormal) Collected:  03/25/20 0729    Specimen:  Blood Updated:  03/25/20 0730     Glucose 126 mg/dL      Comment: Serial Number: [id number]:  [id number]       Basic Metabolic Panel [[id number]]  (Abnormal) Collected:  03/25/20 0312    Specimen:  Blood Updated:  03/25/20 0429     Glucose 129 mg/dL      BUN 18 mg/dL      Creatinine 0.56 mg/dL      Sodium 135 mmol/L      Potassium 4.3 mmol/L      Chloride 93 mmol/L      CO2 30.0 mmol/L      Calcium 9.0 mg/dL      eGFR Non African Amer 104 mL/min/1.73      BUN/Creatinine Ratio 32.1     Anion Gap 12.0 mmol/L     Narrative:       GFR Normal >60  Chronic Kidney Disease <60  Kidney Failure <15      CBC Auto Differential [[id number]]  (Abnormal) Collected:  03/25/20 0312    Specimen:  Blood Updated:  03/25/20 0412     WBC 8.50  10*3/mm3      RBC 4.16 10*6/mm3      Hemoglobin 13.8 g/dL      Hematocrit 39.9 %      MCV 95.9 fL      MCH 33.1 pg      MCHC 34.5 g/dL      RDW 13.0 %      RDW-SD 43.3 fl      MPV 8.0 fL      Platelets 251 10*3/mm3      Neutrophil % 85.5 %      Lymphocyte % 9.7 %      Monocyte % 4.6 %      Eosinophil % 0.0 %      Basophil % 0.2 %      Neutrophils, Absolute 7.30 10*3/mm3      Lymphocytes, Absolute 0.80 10*3/mm3      Monocytes, Absolute 0.40 10*3/mm3      Eosinophils, Absolute 0.00 10*3/mm3      Basophils, Absolute 0.00 10*3/mm3      nRBC 0.0 /100 WBC     POC Glucose Once [[id number]]  (Abnormal) Collected:  03/24/20 2006    Specimen:  Blood Updated:  03/24/20 2008     Glucose 214 mg/dL      Comment: Serial Number: [id number]:  [id number]       POC Glucose Once [[id number]]  (Abnormal) Collected:  03/24/20 1645    Specimen:  Blood Updated:  03/24/20 1647     Glucose 216 mg/dL      Comment: Serial Number: [id number]:  [id number]       Hemoglobin A1c [[id number]]  (Abnormal) Collected:  03/24/20 0932    Specimen:  Blood from Arm, Left Updated:  03/24/20 1531     Hemoglobin A1C 5.7 %     Narrative:       Hemoglobin A1C Reference Range:    <5.7 %        Normal  5.7-6.4 %     Increased risk for diabetes  > 6.4 %        Diabetes       These guidelines have been recommended by the American Diabetic Association for Hgb A1c.      The following 2010 guidelines have been recommended by the American Diabetes Association for Hemoglobin A1c.    HBA1c 5.7-6.4% Increased risk for future diabetes (pre-diabetes)  HBA1c     >6.4% Diabetes      Respiratory Panel, PCR - Swab, Nasopharynx [[id number]]  (Abnormal) Collected:  03/24/20 0933    Specimen:  Swab from Nasopharynx Updated:  03/24/20 1112     ADENOVIRUS, PCR Not Detected     Coronavirus 229E Not Detected     Coronavirus HKU1 Not Detected     Coronavirus NL63 Not Detected     Coronavirus OC43 Not Detected     Human Metapneumovirus Detected     Human Rhinovirus/Enterovirus  Not Detected     Influenza B PCR Not Detected     Parainfluenza Virus 1 Not Detected     Parainfluenza Virus 2 Not Detected     Parainfluenza Virus 3 Not Detected     Parainfluenza Virus 4 Not Detected     Bordetella pertussis pcr Not Detected     Influenza A H1 2009 PCR Not Detected     Chlamydophila pneumoniae PCR Not Detected     Mycoplasma pneumo by PCR Not Detected     Influenza A PCR Not Detected     Influenza A H3 Not Detected     Influenza A H1 Not Detected     RSV, PCR Not Detected    Narrative:       The coronavirus on the RVP is NOT COVID-19 and is NOT indicative of infection with COVID-19.            Imaging Results (Last 24 Hours)     ** No results found for the last 24 hours. **               I reviewed the patient's new clinical results.    Medication Review:   Scheduled Meds:  aspirin 81 mg Oral Daily   bisoprolol-hydrochlorothiazide 1 tablet Oral Daily   cefTRIAXone 1 g Intravenous Q24H   doxycycline 100 mg Oral Q12H   enoxaparin 40 mg Subcutaneous Q24H   insulin lispro 0-7 Units Subcutaneous 4x Daily With Meals & Nightly   ipratropium-albuterol 3 mL Nebulization 4x Daily - RT   methylPREDNISolone sodium succinate 80 mg Intravenous Q12H   montelukast 10 mg Oral Nightly   pantoprazole 40 mg Oral Q AM   sodium chloride 10 mL Intravenous Q12H     Continuous Infusions:   PRN Meds:.•  acetaminophen  •  dextrose  •  dextrose  •  glucagon (human recombinant)  •  insulin lispro **AND** insulin lispro  •  nitroglycerin  •  ondansetron  •  [COMPLETED] Insert peripheral IV **AND** sodium chloride  •  sodium chloride     Assessment/Plan       Dyspnea  COPD exacerbation with human metapneumovirus, sputum culture for secondary bacterial pathogens is pending.  Continue antibiotics, steroids.  Decrease frequency of bronchodilators due to side effects.  Likely home tomorrow.        Plan for disposition:home with family assistance    Dianna Cronin MD  03/25/20  10:36

## 2020-03-25 NOTE — PLAN OF CARE
Pt rested throughout the shift. Had no c/o pain. IV steroids started. Continuing Abx. Will continue to monitor.

## 2020-03-25 NOTE — PLAN OF CARE
Problem: Patient Care Overview  Goal: Plan of Care Review  Outcome: Ongoing (interventions implemented as appropriate)  Flowsheets  Taken 3/25/2020 1619 by Daiana Hollingsworth, RN  Progress: improving  Outcome Summary: Pt resting with no complaints.  PO and IV antibiotic given.  Possible d/c tomorrow.  Taken 3/24/2020 1901 by Lisa Corey, RN  Plan of Care Reviewed With: patient

## 2020-03-26 VITALS
WEIGHT: 136.69 LBS | HEIGHT: 64 IN | TEMPERATURE: 97.5 F | OXYGEN SATURATION: 95 % | DIASTOLIC BLOOD PRESSURE: 72 MMHG | RESPIRATION RATE: 20 BRPM | HEART RATE: 70 BPM | BODY MASS INDEX: 23.34 KG/M2 | SYSTOLIC BLOOD PRESSURE: 146 MMHG

## 2020-03-26 LAB
GLUCOSE BLDC GLUCOMTR-MCNC: 135 MG/DL (ref 70–105)
GLUCOSE BLDC GLUCOMTR-MCNC: 98 MG/DL (ref 70–105)

## 2020-03-26 PROCEDURE — 94799 UNLISTED PULMONARY SVC/PX: CPT

## 2020-03-26 PROCEDURE — 25010000002 METHYLPREDNISOLONE PER 125 MG: Performed by: INTERNAL MEDICINE

## 2020-03-26 PROCEDURE — 82962 GLUCOSE BLOOD TEST: CPT

## 2020-03-26 PROCEDURE — G0378 HOSPITAL OBSERVATION PER HR: HCPCS

## 2020-03-26 PROCEDURE — 96376 TX/PRO/DX INJ SAME DRUG ADON: CPT

## 2020-03-26 RX ORDER — DOXYCYCLINE 100 MG/1
100 TABLET ORAL 2 TIMES DAILY
Qty: 14 TABLET | Refills: 0 | Status: SHIPPED | OUTPATIENT
Start: 2020-03-26 | End: 2020-04-02

## 2020-03-26 RX ORDER — ACETAMINOPHEN 325 MG/1
650 TABLET ORAL EVERY 4 HOURS PRN
Start: 2020-03-26 | End: 2023-01-05

## 2020-03-26 RX ORDER — PANTOPRAZOLE SODIUM 40 MG/1
40 TABLET, DELAYED RELEASE ORAL DAILY
Qty: 30 TABLET | Refills: 1 | Status: SHIPPED | OUTPATIENT
Start: 2020-03-26 | End: 2020-09-04

## 2020-03-26 RX ORDER — IPRATROPIUM BROMIDE AND ALBUTEROL SULFATE 2.5; .5 MG/3ML; MG/3ML
3 SOLUTION RESPIRATORY (INHALATION) EVERY 4 HOURS PRN
Qty: 360 ML | Refills: 0 | Status: SHIPPED | OUTPATIENT
Start: 2020-03-26 | End: 2021-03-23

## 2020-03-26 RX ORDER — PREDNISONE 10 MG/1
TABLET ORAL
Qty: 18 TABLET | Refills: 0 | Status: SHIPPED | OUTPATIENT
Start: 2020-03-26 | End: 2020-05-06

## 2020-03-26 RX ADMIN — IPRATROPIUM BROMIDE AND ALBUTEROL SULFATE 3 ML: .5; 3 SOLUTION RESPIRATORY (INHALATION) at 10:37

## 2020-03-26 RX ADMIN — PANTOPRAZOLE SODIUM 40 MG: 40 TABLET, DELAYED RELEASE ORAL at 05:28

## 2020-03-26 RX ADMIN — Medication 10 ML: at 11:31

## 2020-03-26 RX ADMIN — METHYLPREDNISOLONE SODIUM SUCCINATE 80 MG: 125 INJECTION, POWDER, FOR SOLUTION INTRAMUSCULAR; INTRAVENOUS at 05:28

## 2020-03-26 RX ADMIN — BISOPROLOL FUMARATE AND HYDROCHLOROTHIAZIDE 1 TABLET: 6.25; 5 TABLET ORAL at 09:39

## 2020-03-26 RX ADMIN — DOXYCYCLINE 100 MG: 100 TABLET, FILM COATED ORAL at 09:39

## 2020-03-26 RX ADMIN — ASPIRIN 81 MG: 81 TABLET, COATED ORAL at 09:39

## 2020-03-26 NOTE — DISCHARGE SUMMARY
Date of Discharge:  3/26/2020    Discharge Diagnosis: COPD exacerbation; upper respiratory tract infection with human metapneumovirus; acute bronchitis; essential hypertension, acute on chronic hypoxemia    Presenting Problem/History of Present Illness  Active Hospital Problems    Diagnosis  POA   • **Dyspnea [R06.00]  Yes      Resolved Hospital Problems   No resolved problems to display.          Hospital Course  Patient is a 80 y.o. female presented with cough, wheezing, shortness of breath.  X-ray did not show any acute infiltrates.  Respiratory panel revealed human metapneumovirus.  Sputum showed 4+ white blood cells and routine respiratory amber.  She was treated with IV steroids, bronchodilators, oxygen, and antibiotics for secondary bacterial infection.  She improved.  At the time of discharge she is able to tolerate room air during the day and is still requiring oxygen at night.  Her lung sounds have improved significantly.  She has been afebrile and is tolerating regular diet.  She will complete a course of oral antibiotics and a tapering dose of prednisone.  She has home oxygen.  She has a nebulizer.  She will not be scheduled to follow-up with me due to the COVID outbreak, but will call for any problems.    Procedures Performed         Consults:   Consults     Date and Time Order Name Status Description    3/24/2020 1122 Family Medicine Consult Completed           Pertinent Test Results:    Lab Results (most recent)     Procedure Component Value Units Date/Time    Blood Culture - Blood, Arm, Left [[id number]] Collected:  03/24/20 0957    Specimen:  Blood from Arm, Left Updated:  03/26/20 1015     Blood Culture No growth at 2 days    Blood Culture - Blood, Arm, Left [[id number]] Collected:  03/24/20 0931    Specimen:  Blood from Arm, Left Updated:  03/26/20 1000     Blood Culture No growth at 2 days    Respiratory Culture - Sputum, Cough [[id number]] Collected:  03/25/20 0607    Specimen:  Sputum from Cough  Updated:  03/26/20 0924     Respiratory Culture Light growth (2+) Normal Respiratory Dana     Gram Stain Many (4+) WBCs per low power field      Rare (1+) Mixed bacterial morphotypes seen on Gram Stain    POC Glucose Once [[id number]]  (Normal) Collected:  03/26/20 0737    Specimen:  Blood Updated:  03/26/20 0739     Glucose 98 mg/dL      Comment: Serial Number: [id number]:  [id number]       POC Glucose Once [[id number]]  (Abnormal) Collected:  03/25/20 2002    Specimen:  Blood Updated:  03/25/20 2003     Glucose 183 mg/dL      Comment: Serial Number: [id number]:  [id number]       Basic Metabolic Panel [[id number]]  (Abnormal) Collected:  03/25/20 0312    Specimen:  Blood Updated:  03/25/20 0429     Glucose 129 mg/dL      BUN 18 mg/dL      Creatinine 0.56 mg/dL      Sodium 135 mmol/L      Potassium 4.3 mmol/L      Chloride 93 mmol/L      CO2 30.0 mmol/L      Calcium 9.0 mg/dL      eGFR Non African Amer 104 mL/min/1.73      BUN/Creatinine Ratio 32.1     Anion Gap 12.0 mmol/L     Narrative:       GFR Normal >60  Chronic Kidney Disease <60  Kidney Failure <15      CBC Auto Differential [[id number]]  (Abnormal) Collected:  03/25/20 0312    Specimen:  Blood Updated:  03/25/20 0412     WBC 8.50 10*3/mm3      RBC 4.16 10*6/mm3      Hemoglobin 13.8 g/dL      Hematocrit 39.9 %      MCV 95.9 fL      MCH 33.1 pg      MCHC 34.5 g/dL      RDW 13.0 %      RDW-SD 43.3 fl      MPV 8.0 fL      Platelets 251 10*3/mm3      Neutrophil % 85.5 %      Lymphocyte % 9.7 %      Monocyte % 4.6 %      Eosinophil % 0.0 %      Basophil % 0.2 %      Neutrophils, Absolute 7.30 10*3/mm3      Lymphocytes, Absolute 0.80 10*3/mm3      Monocytes, Absolute 0.40 10*3/mm3      Eosinophils, Absolute 0.00 10*3/mm3      Basophils, Absolute 0.00 10*3/mm3      nRBC 0.0 /100 WBC     Hemoglobin A1c [[id number]]  (Abnormal) Collected:  03/24/20 0932    Specimen:  Blood from Arm, Left Updated:  03/24/20 1531     Hemoglobin A1C 5.7 %     Narrative:        Hemoglobin A1C Reference Range:    <5.7 %        Normal  5.7-6.4 %     Increased risk for diabetes  > 6.4 %        Diabetes       These guidelines have been recommended by the American Diabetic Association for Hgb A1c.      The following 2010 guidelines have been recommended by the American Diabetes Association for Hemoglobin A1c.    HBA1c 5.7-6.4% Increased risk for future diabetes (pre-diabetes)  HBA1c     >6.4% Diabetes      Respiratory Panel, PCR - Swab, Nasopharynx [[id number]]  (Abnormal) Collected:  03/24/20 0933    Specimen:  Swab from Nasopharynx Updated:  03/24/20 1112     ADENOVIRUS, PCR Not Detected     Coronavirus 229E Not Detected     Coronavirus HKU1 Not Detected     Coronavirus NL63 Not Detected     Coronavirus OC43 Not Detected     Human Metapneumovirus Detected     Human Rhinovirus/Enterovirus Not Detected     Influenza B PCR Not Detected     Parainfluenza Virus 1 Not Detected     Parainfluenza Virus 2 Not Detected     Parainfluenza Virus 3 Not Detected     Parainfluenza Virus 4 Not Detected     Bordetella pertussis pcr Not Detected     Influenza A H1 2009 PCR Not Detected     Chlamydophila pneumoniae PCR Not Detected     Mycoplasma pneumo by PCR Not Detected     Influenza A PCR Not Detected     Influenza A H3 Not Detected     Influenza A H1 Not Detected     RSV, PCR Not Detected    Narrative:       The coronavirus on the RVP is NOT COVID-19 and is NOT indicative of infection with COVID-19.     Comprehensive Metabolic Panel [[id number]]  (Abnormal) Collected:  03/24/20 0932    Specimen:  Blood from Arm, Left Updated:  03/24/20 1025     Glucose 195 mg/dL      BUN 19 mg/dL      Creatinine 0.63 mg/dL      Sodium 131 mmol/L      Potassium 3.6 mmol/L      Chloride 91 mmol/L      CO2 27.0 mmol/L      Calcium 8.9 mg/dL      Total Protein 6.6 g/dL      Albumin 3.90 g/dL      ALT (SGPT) 18 U/L      AST (SGOT) 27 U/L      Alkaline Phosphatase 58 U/L      Total Bilirubin 0.4 mg/dL      eGFR Non African Amer 91  mL/min/1.73      Globulin 2.7 gm/dL      A/G Ratio 1.4 g/dL      BUN/Creatinine Ratio 30.2     Anion Gap 13.0 mmol/L     Narrative:       GFR Normal >60  Chronic Kidney Disease <60  Kidney Failure <15      Troponin [[id number]]  (Normal) Collected:  03/24/20 0932    Specimen:  Blood from Arm, Left Updated:  03/24/20 1025     Troponin T <0.010 ng/mL     Narrative:       Troponin T Reference Range:  <= 0.03 ng/mL-   Negative for AMI  >0.03 ng/mL-     Abnormal for myocardial necrosis.  Clinicians would have to utilize clinical acumen, EKG, Troponin and serial changes to determine if it is an Acute Myocardial Infarction or myocardial injury due to an underlying chronic condition.       Results may be falsely decreased if patient taking Biotin.      BNP [[id number]]  (Normal) Collected:  03/24/20 0932    Specimen:  Blood from Arm, Left Updated:  03/24/20 1022     proBNP 154.6 pg/mL     Narrative:       Among patients with dyspnea, NT-proBNP is highly sensitive for the detection of acute congestive heart failure. In addition NT-proBNP of <300 pg/ml effectively rules out acute congestive heart failure with 99% negative predictive value.    Results may be falsely decreased if patient taking Biotin.      CBC & Differential [[id number]] Collected:  03/24/20 0932    Specimen:  Blood from Arm, Left Updated:  03/24/20 1003    Narrative:       The following orders were created for panel order CBC & Differential.  Procedure                               Abnormality         Status                     ---------                               -----------         ------                     CBC Auto Differential[[id number]]        Abnormal            Final result                 Please view results for these tests on the individual orders.    CBC Auto Differential [[id number]]  (Abnormal) Collected:  03/24/20 0932    Specimen:  Blood from Arm, Left Updated:  03/24/20 1003     WBC 8.90 10*3/mm3      RBC 4.29 10*6/mm3      Hemoglobin 14.0 g/dL       Hematocrit 41.7 %      MCV 97.3 fL      MCH 32.6 pg      MCHC 33.5 g/dL      RDW 13.4 %      RDW-SD 45.1 fl      MPV 8.0 fL      Platelets 263 10*3/mm3      Neutrophil % 84.1 %      Lymphocyte % 5.9 %      Monocyte % 9.7 %      Eosinophil % 0.0 %      Basophil % 0.3 %      Neutrophils, Absolute 7.50 10*3/mm3      Lymphocytes, Absolute 0.50 10*3/mm3      Monocytes, Absolute 0.90 10*3/mm3      Eosinophils, Absolute 0.00 10*3/mm3      Basophils, Absolute 0.00 10*3/mm3      nRBC 0.0 /100 WBC     Blood Gas, Arterial [[id number]]  (Abnormal) Collected:  03/24/20 0948    Specimen:  Arterial Blood Updated:  03/24/20 0952     Site Right Radial     Ananth's Test Positive     pH, Arterial 7.428 pH units      pCO2, Arterial 44.2 mm Hg      pO2, Arterial 82.6 mm Hg      HCO3, Arterial 29.1 mmol/L      Base Excess, Arterial 4.1 mmol/L      Comment: Serial Number: [id number]:  [id number]        O2 Saturation, Arterial 96.3 %      CO2 Content 30.5 mmol/L      Barometric Pressure for Blood Gas --     Comment: N/A        Modality Room Air     FIO2 21 %      Hemodilution No    POC Lactate [[id number]]  (Normal) Collected:  03/24/20 0950    Specimen:  Blood Updated:  03/24/20 0951     Lactate 0.6 mmol/L      Comment: Serial Number: [id number]:  [id number]              Results for orders placed during the hospital encounter of 12/02/19   Adult Transthoracic Echo Complete W/ Cont if Necessary Per Protocol    Narrative · Left ventricular wall thickness is consistent with septal asymmetric   hypertrophy.  · Estimated EF = 70%.  · Left ventricular systolic function is normal.  · Left atrial cavity size is mildly dilated.  · Mild mitral valve regurgitation is present  · There is a small (<1cm) pericardial effusion.       Normal LV size with basal septal hypertrophy, EF of 70%, no resting LVOT   gradient  Normal RV size  Mild left atrial enlargement  Aortic valve, mitral valve, tricuspid valve appears structurally normal,   mild mitral  regurgitation seen.  Small pericardial effusion seen.  Proximal aorta appears normal in size.                 Condition on Discharge: Stable    Vital Signs  Temp:  [97.5 °F (36.4 °C)-98.9 °F (37.2 °C)] 97.5 °F (36.4 °C)  Heart Rate:  [66-91] 66  Resp:  [16-17] 17  BP: (117-149)/(72-78) 146/72    Physical Exam:     General Appearance:    Alert, cooperative, in no acute distress   Head:    Normocephalic, without obvious abnormality, atraumatic   Eyes:            Lids and lashes normal, conjunctivae and sclerae normal, no   icterus, no pallor, corneas clear, PERRLA   Ears:    Ears appear intact with no abnormalities noted   Throat:   No oral lesions, no thrush, oral mucosa moist   Neck:   No adenopathy, supple, trachea midline, no thyromegaly, no   carotid bruit, no JVD   Lungs:    Mild scattered wheezing, no respiratory distress    Heart:    Regular rhythm and normal rate, normal S1 and S2, no            murmur, no gallop, no rub, no click   Chest Wall:    No abnormalities observed   Abdomen:     Normal bowel sounds, no masses, no organomegaly, soft        non-tender, non-distended, no guarding, no rebound                tenderness   Extremities:   Moves all extremities well, no edema, no cyanosis, no             redness   Pulses:   Pulses palpable and equal bilaterally   Skin:   No bleeding, bruising or rash   Lymph nodes:   No palpable adenopathy   Neurologic:   Cranial nerves 2 - 12 grossly intact, sensation intact, DTR       present and equal bilaterally       Discharge Disposition  Home or Self Care    Discharge Medications     Discharge Medications      New Medications      Instructions Start Date   acetaminophen 325 MG tablet  Commonly known as:  TYLENOL   650 mg, Oral, Every 4 Hours PRN      doxycycline 100 MG tablet  Commonly known as:  ADOXA   100 mg, Oral, 2 Times Daily      ipratropium-albuterol 0.5-2.5 mg/3 ml nebulizer  Commonly known as:  DUO-NEB   3 mL, Nebulization, Every 4 Hours PRN      pantoprazole  40 MG EC tablet  Commonly known as:  PROTONIX   40 mg, Oral, Daily      predniSONE 10 MG tablet  Commonly known as:  DELTASONE   3 po q day x 3 days, 2 po q day x 3  days, 1 po q day x 3 days         Continue These Medications      Instructions Start Date   Aspir-Low 81 MG EC tablet  Generic drug:  aspirin   81 mg, Oral, Daily      bisoprolol-hydrochlorothiazide 5-6.25 MG per tablet  Commonly known as:  ZIAC   1 tablet, Oral, Daily      meloxicam 7.5 MG tablet  Commonly known as:  MOBIC   7.5 mg, Oral, Daily PRN      montelukast 10 MG tablet  Commonly known as:  SINGULAIR   10 mg, Oral, Nightly             Discharge Diet:     Activity at Discharge:     Follow-up Appointments  Future Appointments   Date Time Provider Department Center   1/14/2021  1:00 PM Kyrie Campoverde MD [facility]     Additional Instructions for the Follow-ups that You Need to Schedule     Discharge Follow-up with Specified Provider: Call Dr. Cronin's office for any problems   As directed      To:  Call Dr. Cronin's office for any problems               Test Results Pending at Discharge   Order Current Status    Blood Culture - Blood, Arm, Left Preliminary result    Blood Culture - Blood, Arm, Left Preliminary result    Respiratory Culture - Sputum, Cough Preliminary result           Dianna Cronin MD  03/26/20  10:31    Time: Discharge 25 min

## 2020-03-26 NOTE — PLAN OF CARE
Pt rested throughout the shift. Had no complaints. Continuing steroids, abx, and bronchodilators. Poss d/c today. Will continue to monitor.

## 2020-03-27 ENCOUNTER — READMISSION MANAGEMENT (OUTPATIENT)
Dept: CALL CENTER | Facility: HOSPITAL | Age: 80
End: 2020-03-27

## 2020-03-27 LAB
BACTERIA SPEC RESP CULT: NORMAL
GRAM STN SPEC: NORMAL
GRAM STN SPEC: NORMAL

## 2020-03-27 NOTE — OUTREACH NOTE
Prep Survey      Responses   [facility]?  [facility]   Is LACE score < 7 ?  No   Eligibility  Read[facility] Mgmt   Discharge diagnosis  COPD exac. upper resp. tract infection with hman metapneumovirus, acute bronchitis, essential HTN, a/c hypoxemia   Does the patient have one of the following disease processes/diagnoses(primary or secondary)?  COPD/Pneumonia   Does the patient have Home health ordered?  No   Is there a DME ordered?  No   Comments regarding appointments  Pt to schedule F/U with PCP   Prep survey completed?  Yes          Bettina Nieto RN

## 2020-03-29 LAB
BACTERIA SPEC AEROBE CULT: NORMAL
BACTERIA SPEC AEROBE CULT: NORMAL

## 2020-03-30 ENCOUNTER — READMISSION MANAGEMENT (OUTPATIENT)
Dept: CALL CENTER | Facility: HOSPITAL | Age: 80
End: 2020-03-30

## 2020-03-30 NOTE — OUTREACH NOTE
COPD/PN Week 1 Survey      Responses   [facility] patient discharged from?  [facility]   Does the patient have one of the following disease processes/diagnoses(primary or secondary)?  COPD/Pneumonia   Is there a successful TCM telephone encounter documented?  No   Was the primary reason for admission:  COPD exacerbation   Week 1 attempt successful?  Yes   Call start time  1030   Call end time  1037   Meds reviewed with patient/caregiver?  Yes   Is the patient having any side effects they believe may be caused by any medication additions or changes?  No   Does the patient have all medications ordered at discharge?  Yes   Is the patient taking all medications as directed (includes completed medication regime)?  Yes   Does the patient have a primary care provider?   Yes   Does the patient have an appointment with their PCP or pulmonologist within 7 days of discharge?  No   Comments regarding PCP  PCP DOCUMENTS IN HOSPITAL CHART, NO NEED FOR FOLLOW UP APPOINTMENT DUE TO COVID 19, AND PATIENT SHOULD CALL WITH ANY PROBLEMS. SAME INSTRUCTIONS REITERATED TO PATIENT.   Has the patient kept scheduled appointments due by today?  N/A   Has home health visited the patient within 72 hours of discharge?  N/A   Did the patient receive a copy of their discharge instructions?  Yes   Nursing interventions  Reviewed instructions with patient   What is the patient's perception of their health status since discharge?  Improving   Nursing Interventions  Nurse provided patient education   Is the patient/caregiver able to teach back the hierarchy of who to call/visit for symptoms/problems? PCP, Specialist, Home health nurse, Urgent Care, ED, 911  Yes   Is the patient able to teach back COPD zones?  Yes   Nursing interventions  Education provided on various zones   Patient reports what zone on this call?  Green Zone   Green Zone  Reports doing well, Breathing without shortness of breath, Usual activity and exercise level, Usual amount of  phlegm/mucus without difficulty coughing up, Sleeping well, Appetite is good   Green Zone interventions:  Take daily medications, Use oxygen as prescribed, Avoid indoor/outdoor triggers, Continue regular exercise/diet plan   Week 1 call completed?  Yes          Melanie Rucker LPN

## 2020-04-06 ENCOUNTER — READMISSION MANAGEMENT (OUTPATIENT)
Dept: CALL CENTER | Facility: HOSPITAL | Age: 80
End: 2020-04-06

## 2020-04-06 NOTE — OUTREACH NOTE
COPD/PN Week 2 Survey      Responses   [facility] patient discharged from?  [facility]   COVID-19 Test Status  Not tested   Does the patient have one of the following disease processes/diagnoses(primary or secondary)?  COPD/Pneumonia   Was the primary reason for admission:  COPD exacerbation   Week 2 attempt successful?  Yes   Call start time  1249   Call end time  1304   Discharge diagnosis  COPD exac. upper resp. tract infection with hman metapneumovirus, acute bronchitis, essential HTN, a/c hypoxemia   Is patient permission given to speak with other caregiver?  No   Meds reviewed with patient/caregiver?  Yes   Is the patient taking all medications as directed (includes completed medication regime)?  Yes   Has the patient kept scheduled appointments due by today?  N/A   Comments  No appt were made for pt with PCP, she will call office to see if needs to have e visit. Pt is doing well.    What is the patient's perception of their health status since discharge?  Improving   Are the patient's immunizations up to date?   Yes   Additional teach back comments  Pt is not sure about if she has had pneumonia vaccine but take the flu shot yearly.    Is the patient/caregiver able to teach back signs and symptoms of worsening condition:  Fever/chills, Shortness of breath, Chest pain   Is the patient/caregiver able to teach back importance of completing antibiotic course of treatment?  Yes   Week 2 call completed?  Yes   Wrap up additional comments  O2 stays above 93%, has home O2 but has not required it.  Feeling better. Afebrie. Cough has decreased and no longer productive.           Dannielle Couch, RN

## 2020-04-13 ENCOUNTER — READMISSION MANAGEMENT (OUTPATIENT)
Dept: CALL CENTER | Facility: HOSPITAL | Age: 80
End: 2020-04-13

## 2020-04-13 NOTE — OUTREACH NOTE
COPD/PN Week 3 Survey      Responses   [facility] patient discharged from?  [facility]   COVID-19 Test Status  Not tested   Does the patient have one of the following disease processes/diagnoses(primary or secondary)?  COPD/Pneumonia   Was the primary reason for admission:  COPD exacerbation   Week 3 attempt successful?  No   Unsuccessful attempts  Attempt 1          Melanie Rucker LPN

## 2020-04-14 ENCOUNTER — READMISSION MANAGEMENT (OUTPATIENT)
Dept: CALL CENTER | Facility: HOSPITAL | Age: 80
End: 2020-04-14

## 2020-04-14 NOTE — OUTREACH NOTE
COPD/PN Week 3 Survey      Responses   [facility] patient discharged from?  [facility]   COVID-19 Test Status  Not tested   Does the patient have one of the following disease processes/diagnoses(primary or secondary)?  COPD/Pneumonia   Week 3 attempt successful?  No   Unsuccessful attempts  Attempt 2          Chelsea Wadsworth, RN

## 2020-05-06 ENCOUNTER — OFFICE VISIT (OUTPATIENT)
Dept: ORTHOPEDIC SURGERY | Facility: CLINIC | Age: 80
End: 2020-05-06

## 2020-05-06 VITALS
DIASTOLIC BLOOD PRESSURE: 82 MMHG | HEIGHT: 64 IN | WEIGHT: 136 LBS | SYSTOLIC BLOOD PRESSURE: 192 MMHG | BODY MASS INDEX: 23.22 KG/M2 | HEART RATE: 65 BPM

## 2020-05-06 DIAGNOSIS — M17.11 PRIMARY OSTEOARTHRITIS OF RIGHT KNEE: Primary | ICD-10-CM

## 2020-05-06 PROCEDURE — 99213 OFFICE O/P EST LOW 20 MIN: CPT | Performed by: ORTHOPAEDIC SURGERY

## 2020-05-06 NOTE — PROGRESS NOTES
"     Patient ID: Becka Oliva is a 80 y.o. female.  Right knee pain  Becka is had return of right knee pain after Zilretta injection in February, relief lasted about 2 months.  Pain is sharp and a 6/10    Review of Systems:  Right knee pain  Denies chest pain      Objective:    BP (!) 192/82 (BP Location: Left arm, Patient Position: Sitting, Cuff Size: Adult)   Pulse 65   Ht 162.6 cm (64\")   Wt 61.7 kg (136 lb)   BMI 23.34 kg/m²     Physical Examination:   She is a pleasant female in no distress. She is alert and oriented x3 and appears her stated age.  Right knee demonstrates no scars with mild medial joint line tenderness and a moderate effusion.  Range of motion 0 to 120 degrees with no instability.Sensory and motor exam are intact all distributions. Dorsalis pedis and posterior tibialis pulses are palpable and capillary refill is less than two seconds to all digits      Imaging:       Assessment:    Right knee degenerative disease    Plan:  I recommend a compound cream and Visco supplementation          Disclaimer: Please note that areas of this note were completed with computer voice recognition software.  Quite often unanticipated grammatical, syntax, homophones, and other interpretive errors are inadvertently transcribed by the computer software. Please excuse any errors that have escaped final proofreading.  "

## 2020-05-08 ENCOUNTER — TELEPHONE (OUTPATIENT)
Dept: ORTHOPEDIC SURGERY | Facility: CLINIC | Age: 80
End: 2020-05-08

## 2020-05-08 NOTE — TELEPHONE ENCOUNTER
Called Patient to go over SOB for Monovisc inj. Would like to proceed with monovisc inj right knee. Was transferred to schedule her appointment.

## 2020-05-13 ENCOUNTER — OFFICE VISIT (OUTPATIENT)
Dept: ORTHOPEDIC SURGERY | Facility: CLINIC | Age: 80
End: 2020-05-13

## 2020-05-13 VITALS
DIASTOLIC BLOOD PRESSURE: 78 MMHG | BODY MASS INDEX: 23.22 KG/M2 | SYSTOLIC BLOOD PRESSURE: 190 MMHG | HEART RATE: 78 BPM | HEIGHT: 64 IN | WEIGHT: 136 LBS

## 2020-05-13 DIAGNOSIS — M17.11 PRIMARY OSTEOARTHRITIS OF RIGHT KNEE: Primary | ICD-10-CM

## 2020-05-13 PROCEDURE — 20610 DRAIN/INJ JOINT/BURSA W/O US: CPT | Performed by: ORTHOPAEDIC SURGERY

## 2020-05-13 NOTE — PROGRESS NOTES
Patient ID: Becka Oliva is a 80 y.o. female.  Right knee pain  Here for Visco  Review of Systems:        Objective:    There were no vitals taken for this visit.    Physical Examination:  Right knee demonstrates mild effusion no redness      Imaging:       Assessment:    Right knee degenerative disease    Plan:  Risks and benefits of the injection were discussed. Under sterile technique and written consent I injected one syringe of monovisc into the knee. It was well tolerated. Postinjection instructions were given          Disclaimer: Please note that areas of this note were completed with computer voice recognition software.  Quite often unanticipated grammatical, syntax, homophones, and other interpretive errors are inadvertently transcribed by the computer software. Please excuse any errors that have escaped final proofreading.

## 2020-06-24 ENCOUNTER — OFFICE VISIT (OUTPATIENT)
Dept: ORTHOPEDIC SURGERY | Facility: CLINIC | Age: 80
End: 2020-06-24

## 2020-06-24 VITALS
DIASTOLIC BLOOD PRESSURE: 83 MMHG | HEIGHT: 63 IN | WEIGHT: 148 LBS | SYSTOLIC BLOOD PRESSURE: 172 MMHG | HEART RATE: 73 BPM | BODY MASS INDEX: 26.22 KG/M2

## 2020-06-24 DIAGNOSIS — M25.561 ACUTE PAIN OF RIGHT KNEE: ICD-10-CM

## 2020-06-24 DIAGNOSIS — M17.11 PRIMARY OSTEOARTHRITIS OF RIGHT KNEE: Primary | ICD-10-CM

## 2020-06-24 PROCEDURE — 99213 OFFICE O/P EST LOW 20 MIN: CPT | Performed by: ORTHOPAEDIC SURGERY

## 2020-06-24 RX ORDER — BUDESONIDE AND FORMOTEROL FUMARATE DIHYDRATE 160; 4.5 UG/1; UG/1
2 AEROSOL RESPIRATORY (INHALATION)
Status: ON HOLD | COMMUNITY
Start: 2020-05-27 | End: 2021-07-28

## 2020-06-24 NOTE — PROGRESS NOTES
"     Patient ID: Becka Oliva is a 80 y.o. female.  Right knee pain  Becka is an 80-year-old female with continued right knee pain.  She had Visco supplementation in May with little long-term relief.  She continues to have medial lateral joint line with clicking difficulty bending and straightening her knee    Review of Systems:  Right knee pain  Denies chest pain      Objective:    /83   Pulse 73   Ht 160 cm (63\")   Wt 67.1 kg (148 lb)   BMI 26.22 kg/m²     Physical Examination:   She is a pleasant female in no distress. She is alert and oriented x3 and appears her stated age.  Right knee demonstrates no scars and no atrophy with mild effusion and medial joint line tenderness, range of motion 2 to 125 degrees with no varus or valgus laxity and a positive medial Cesar  Sensory and motor exam are intact all distributions. Dorsalis pedis and posterior tibialis pulses are palpable and capillary refill is less than two seconds to all digits      Imaging:     Assessment:    Right knee pain  Plan:  Recommend MRI to evaluate for meniscus tear          Disclaimer: Please note that areas of this note were completed with computer voice recognition software.  Quite often unanticipated grammatical, syntax, homophones, and other interpretive errors are inadvertently transcribed by the computer software. Please excuse any errors that have escaped final proofreading.  "

## 2020-07-22 ENCOUNTER — OFFICE VISIT (OUTPATIENT)
Dept: ORTHOPEDIC SURGERY | Facility: CLINIC | Age: 80
End: 2020-07-22

## 2020-07-22 VITALS
DIASTOLIC BLOOD PRESSURE: 84 MMHG | BODY MASS INDEX: 26.22 KG/M2 | HEART RATE: 66 BPM | SYSTOLIC BLOOD PRESSURE: 199 MMHG | HEIGHT: 63 IN | WEIGHT: 148 LBS

## 2020-07-22 DIAGNOSIS — M17.11 PRIMARY OSTEOARTHRITIS OF RIGHT KNEE: Primary | ICD-10-CM

## 2020-07-22 PROCEDURE — 99212 OFFICE O/P EST SF 10 MIN: CPT | Performed by: ORTHOPAEDIC SURGERY

## 2020-07-22 NOTE — PROGRESS NOTES
"     Patient ID: Becka Oliva is a 80 y.o. female.  Right knee pain  Becka is an 80-year-old female with continued right knee pain.  She had Visco supplementation in May with little long-term relief.  She continues to have medial lateral joint line with clicking difficulty bending and straightening her knee    Review of Systems:  Right knee pain  Denies chest pain      Objective:    Ht 160 cm (63\")   Wt 67.1 kg (148 lb)   BMI 26.22 kg/m²     Physical Examination:     She is a pleasant female in no distress. She is alert and oriented x3 and appears her stated age.  Right knee demonstrates no scars and no atrophy with mild effusion and medial joint line tenderness, range of motion 2 to 125 degrees with no varus or valgus laxity and a positive medial Cesar  Sensory and motor exam are intact all distributions. Dorsalis pedis and posterior tibialis pulses are palpable and capillary refill is less than two seconds to all digits    Imaging:   MRI demonstrates end-stage degenerative joint disease especially in the lateral compartment with other widespread areas of mild to moderate degenerative disease    Assessment:    Right knee degenerative joint disease    Plan:  Treatment options were discussed and she would like to discuss total knee arthroplasty with a colleague of mine.          Disclaimer: Please note that areas of this note were completed with computer voice recognition software.  Quite often unanticipated grammatical, syntax, homophones, and other interpretive errors are inadvertently transcribed by the computer software. Please excuse any errors that have escaped final proofreading.  "

## 2020-08-10 RX ORDER — BISOPROLOL FUMARATE AND HYDROCHLOROTHIAZIDE 5; 6.25 MG/1; MG/1
1 TABLET ORAL DAILY
Qty: 90 TABLET | Refills: 1 | Status: SHIPPED | OUTPATIENT
Start: 2020-08-10 | End: 2021-02-08

## 2020-08-17 ENCOUNTER — TELEPHONE (OUTPATIENT)
Dept: ORTHOPEDIC SURGERY | Facility: CLINIC | Age: 80
End: 2020-08-17

## 2020-08-17 NOTE — TELEPHONE ENCOUNTER
Patients daughter called for her mother and LVM, did not states what it was about. Called her back and, could not reach her. LVM.

## 2020-08-25 ENCOUNTER — TELEPHONE (OUTPATIENT)
Dept: CARDIOLOGY | Facility: CLINIC | Age: 80
End: 2020-08-25

## 2020-09-02 ENCOUNTER — HOSPITAL ENCOUNTER (OUTPATIENT)
Dept: CARDIOLOGY | Facility: HOSPITAL | Age: 80
Discharge: HOME | End: 2020-09-02
Admitting: ORTHOPAEDIC SURGERY

## 2020-09-02 ENCOUNTER — TRANSCRIBE ORDERS (OUTPATIENT)
Dept: ADMINISTRATIVE | Facility: HOSPITAL | Age: 80
End: 2020-09-02

## 2020-09-02 ENCOUNTER — LAB (OUTPATIENT)
Dept: LAB | Facility: HOSPITAL | Age: 80
End: 2020-09-02

## 2020-09-02 ENCOUNTER — HOSPITAL ENCOUNTER (OUTPATIENT)
Dept: GENERAL RADIOLOGY | Facility: HOSPITAL | Age: 80
Discharge: HOME | End: 2020-09-02

## 2020-09-02 DIAGNOSIS — Z01.818 PREOPERATIVE CLEARANCE: ICD-10-CM

## 2020-09-02 DIAGNOSIS — Z01.818 PREOPERATIVE CLEARANCE: Primary | ICD-10-CM

## 2020-09-02 DIAGNOSIS — I99.8 ANGIECTASIA: ICD-10-CM

## 2020-09-02 LAB
ALBUMIN SERPL-MCNC: 4.1 G/DL (ref 3.5–5.2)
ANION GAP SERPL CALCULATED.3IONS-SCNC: 11.7 MMOL/L (ref 5–15)
BASOPHILS # BLD AUTO: 0.05 10*3/MM3 (ref 0–0.2)
BASOPHILS NFR BLD AUTO: 0.7 % (ref 0–1.5)
BUN SERPL-MCNC: 12 MG/DL (ref 8–23)
BUN/CREAT SERPL: 17.4 (ref 7–25)
CALCIUM SPEC-SCNC: 9.9 MG/DL (ref 8.6–10.5)
CHLORIDE SERPL-SCNC: 99 MMOL/L (ref 98–107)
CO2 SERPL-SCNC: 29.3 MMOL/L (ref 22–29)
CREAT SERPL-MCNC: 0.69 MG/DL (ref 0.57–1)
DEPRECATED RDW RBC AUTO: 41.6 FL (ref 37–54)
EOSINOPHIL # BLD AUTO: 0.17 10*3/MM3 (ref 0–0.4)
EOSINOPHIL NFR BLD AUTO: 2.5 % (ref 0.3–6.2)
ERYTHROCYTE [DISTWIDTH] IN BLOOD BY AUTOMATED COUNT: 11.9 % (ref 12.3–15.4)
GFR SERPL CREATININE-BSD FRML MDRD: 82 ML/MIN/1.73
GLUCOSE SERPL-MCNC: 101 MG/DL (ref 65–99)
HBA1C MFR BLD: 5.6 % (ref 3.5–5.6)
HCT VFR BLD AUTO: 40.3 % (ref 34–46.6)
HGB BLD-MCNC: 13.8 G/DL (ref 12–15.9)
IMM GRANULOCYTES # BLD AUTO: 0.02 10*3/MM3 (ref 0–0.05)
IMM GRANULOCYTES NFR BLD AUTO: 0.3 % (ref 0–0.5)
LYMPHOCYTES # BLD AUTO: 1.53 10*3/MM3 (ref 0.7–3.1)
LYMPHOCYTES NFR BLD AUTO: 22.5 % (ref 19.6–45.3)
MCH RBC QN AUTO: 32.2 PG (ref 26.6–33)
MCHC RBC AUTO-ENTMCNC: 34.2 G/DL (ref 31.5–35.7)
MCV RBC AUTO: 94.2 FL (ref 79–97)
MONOCYTES # BLD AUTO: 0.63 10*3/MM3 (ref 0.1–0.9)
MONOCYTES NFR BLD AUTO: 9.3 % (ref 5–12)
NEUTROPHILS NFR BLD AUTO: 4.39 10*3/MM3 (ref 1.7–7)
NEUTROPHILS NFR BLD AUTO: 64.7 % (ref 42.7–76)
NRBC BLD AUTO-RTO: 0 /100 WBC (ref 0–0.2)
PLATELET # BLD AUTO: 260 10*3/MM3 (ref 140–450)
PMV BLD AUTO: 10.7 FL (ref 6–12)
POTASSIUM SERPL-SCNC: 4.2 MMOL/L (ref 3.5–5.2)
RBC # BLD AUTO: 4.28 10*6/MM3 (ref 3.77–5.28)
SODIUM SERPL-SCNC: 140 MMOL/L (ref 136–145)
WBC # BLD AUTO: 6.79 10*3/MM3 (ref 3.4–10.8)

## 2020-09-02 PROCEDURE — U0004 COV-19 TEST NON-CDC HGH THRU: HCPCS

## 2020-09-02 PROCEDURE — C9803 HOPD COVID-19 SPEC COLLECT: HCPCS

## 2020-09-02 PROCEDURE — 71046 X-RAY EXAM CHEST 2 VIEWS: CPT

## 2020-09-02 PROCEDURE — 83036 HEMOGLOBIN GLYCOSYLATED A1C: CPT

## 2020-09-02 PROCEDURE — 85025 COMPLETE CBC W/AUTO DIFF WBC: CPT

## 2020-09-02 PROCEDURE — 93005 ELECTROCARDIOGRAM TRACING: CPT | Performed by: ORTHOPAEDIC SURGERY

## 2020-09-02 PROCEDURE — 80048 BASIC METABOLIC PNL TOTAL CA: CPT

## 2020-09-02 PROCEDURE — 36415 COLL VENOUS BLD VENIPUNCTURE: CPT

## 2020-09-02 PROCEDURE — 82040 ASSAY OF SERUM ALBUMIN: CPT

## 2020-09-03 LAB — SARS-COV-2 RNA NOSE QL NAA+PROBE: NOT DETECTED

## 2020-09-04 ENCOUNTER — OFFICE VISIT (OUTPATIENT)
Dept: CARDIOLOGY | Facility: CLINIC | Age: 80
End: 2020-09-04

## 2020-09-04 VITALS
HEART RATE: 71 BPM | BODY MASS INDEX: 26.4 KG/M2 | DIASTOLIC BLOOD PRESSURE: 83 MMHG | WEIGHT: 149 LBS | OXYGEN SATURATION: 93 % | SYSTOLIC BLOOD PRESSURE: 193 MMHG | HEIGHT: 63 IN

## 2020-09-04 DIAGNOSIS — R20.0 BILATERAL LEG NUMBNESS: ICD-10-CM

## 2020-09-04 DIAGNOSIS — R06.09 DYSPNEA ON EXERTION: ICD-10-CM

## 2020-09-04 DIAGNOSIS — I73.9 PAD (PERIPHERAL ARTERY DISEASE) (HCC): ICD-10-CM

## 2020-09-04 DIAGNOSIS — I10 ESSENTIAL HYPERTENSION: ICD-10-CM

## 2020-09-04 DIAGNOSIS — J43.1 PANLOBULAR EMPHYSEMA (HCC): ICD-10-CM

## 2020-09-04 DIAGNOSIS — Z01.810 PREOPERATIVE CARDIOVASCULAR EXAMINATION: Primary | ICD-10-CM

## 2020-09-04 DIAGNOSIS — I31.39 PERICARDIAL EFFUSION: ICD-10-CM

## 2020-09-04 DIAGNOSIS — E78.5 DYSLIPIDEMIA: ICD-10-CM

## 2020-09-04 DIAGNOSIS — M17.4 OTHER SECONDARY OSTEOARTHRITIS OF BOTH KNEES: ICD-10-CM

## 2020-09-04 PROCEDURE — 99214 OFFICE O/P EST MOD 30 MIN: CPT | Performed by: INTERNAL MEDICINE

## 2020-09-04 RX ORDER — MELOXICAM 7.5 MG/1
TABLET ORAL
COMMUNITY
Start: 2020-08-20 | End: 2020-09-04

## 2020-09-04 NOTE — PROGRESS NOTES
Subjective:     Encounter Date:09/04/2020      Patient ID: Becka Oliva is a 80 y.o. female.    Chief Complaint : Acute visit for preoperative cardiovascular evaluation for knee replacement surgery.  History of pericardial effusion, hypertension, AAA, COPD.  Complaining of numbness left greater than right lower extremity.  History of Present Illness      This is a 80-year-old with PMH of    # Pericardial effusion  # hypertension  # COPD  # AAA , endograft repair  # former smoker    Here for acute visit for preoperative cardiovascular evaluation.  Patient has been having DJD and right knee pain and is scheduled for total knee replacement is here for preoperative cardiovascular evaluation.  Patient is complaining of shortness of breath was seen by PMD for COPD and started on Symbicort which relieved her dyspnea,denies any chest pain, palpitations.  Patient is fairly active in spite of her knee pain..  Patient was complaining of swelling and pain and unilateral lower extremity.  Underwent venous Dopplers which revealed fluid in the popliteal fossa no DVT.    Patient's arterial blood pressure is 193/83, heart rate 71, O2 sat of 93% on room air.    Patient had an echocardiogram 12-2-19 which showed mild pericardial effusion.  Patient had Lexiscan Cardiolite 1/10/2020 which was negative for ischemia    Was in the hospital with in the 1st week of January with cough shortness of breath palpitations and tachycardia with elevated troponin.  Reviewed hospital records, had normal Lexiscan Cardiolite 01/11/20.  Patient reportedly and pulse ox were showing readings of 160 beats per Min.    Labs from 5/13/2019 reveal normal CMP, BNP 55, cholesterol 217, HDL 66, LDL not to goal at 140.  Repeat labs 9/2/2020 reveal hemoglobin A1c of 5.6.  Chem-7 unremarkable.      ASSESSMENT:  #Leg numbness  #DJD, preoperative cardiovascular evaluation for total knee replacement  #Hypertension  #Pericardial effusion  # hypertension  # recent  fever cough  # COPD, kyphoscoliosis, former smoker  # PAD, AAA repair    PLAN:  Reviewed EKG results with patient  Patient is chest pain-free and has normal EKG should be okay from cardiovascular standpoint for knee surgery.  Patient's numbness is not exertional or claudication advised her to follow-up with PMD to evaluate neuropathy.  Reviewed venous Doppler results with patient.  Continue medical management  Patient's arterial blood pressure is elevated.  Advised her to check blood pressure and call me with readings since patient is saying her blood pressure was normal at home.  Patient has aortic aneurysm would benefit from good blood pressure control.  Continue bisoprolol hydrochlorothiazide, aspirin  Patient had BNP level which was normal at 55 on 6/2019  Patient would benefit from statins, will check lipid profile and follow-up  Echocardiogram 1/10/19 reveals normal function PA pressures of 30-40 with mild AI/MR/TR and moderate size pericardial effusion, repeat echo 6/1/2019 revealed small pericardial effusion, LVEF of 70%  Review extensive records from recent hospital visit which is been sumarized in the note        Assessment:          Diagnosis Plan   1. Preoperative cardiovascular examination     2. Other secondary osteoarthritis of both knees     3. Bilateral leg numbness     4. Essential hypertension     5. Dyspnea on exertion     6. Pericardial effusion     7. Dyslipidemia     8. Panlobular emphysema (CMS/HCC)     9. PAD (peripheral artery disease) (CMS/HCC)            Plan:         Past Medical History:  Past Medical History:   Diagnosis Date   • Aortic aneurysm (CMS/HCC)    • COPD (chronic obstructive pulmonary disease) (CMS/HCC)    • Hypertension      Past Surgical History:  Past Surgical History:   Procedure Laterality Date   • CARPAL TUNNEL RELEASE     • DESCENDING AORTIC ANEURYSM REPAIR W/ STENT     • OTHER SURGICAL HISTORY      STENT      Allergies:  No Known Allergies  Home Meds:  Current Meds:  "    Current Outpatient Medications:   •  acetaminophen (TYLENOL) 325 MG tablet, Take 2 tablets by mouth Every 4 (Four) Hours As Needed for Mild Pain ., Disp: , Rfl:   •  aspirin (ASPIR-LOW) 81 MG EC tablet, Take 81 mg by mouth Daily., Disp: , Rfl:   •  bisoprolol-hydrochlorothiazide (ZIAC) 5-6.25 MG per tablet, TAKE 1 TABLET BY MOUTH DAILY. , Disp: 90 tablet, Rfl: 1  •  diclofenac (VOLTAREN) 1 % gel gel, APPLY 4 GRAMS TOPICALLY TO AFFECTED AREA ON KNEE QID, Disp: , Rfl:   •  ipratropium-albuterol (DUO-NEB) 0.5-2.5 mg/3 ml nebulizer, Take 3 mL by nebulization Every 4 (Four) Hours As Needed for Wheezing or Shortness of Air., Disp: 360 mL, Rfl: 0  •  montelukast (SINGULAIR) 10 MG tablet, Take 10 mg by mouth Every Night., Disp: , Rfl:   •  SYMBICORT 160-4.5 MCG/ACT inhaler, , Disp: , Rfl:   Social History:   Social History     Tobacco Use   • Smoking status: Former Smoker   • Smokeless tobacco: Never Used   Substance Use Topics   • Alcohol use: No     Frequency: Never      Family History:  Family History   Problem Relation Age of Onset   • Cancer Other    • Diabetes Other    • Aneurysm Father         The following portions of the patient's history were reviewed and updated as appropriate: allergies, current medications, past family history, past medical history, past social history, past surgical history and problem list.      Review of Systems   Cardiovascular: Positive for leg swelling. Negative for chest pain and palpitations.   Respiratory: Positive for shortness of breath.    Neurological: Positive for numbness. Negative for dizziness.     All other systems are negative    Procedures EKG reviewed by me from 9/2/2020 reveals sinus rhythm at the rate of 66 bpm with no acute ST-T changes       Objective:     Physical Exam  BP (!) 193/83 (BP Location: Left arm, Patient Position: Sitting, Cuff Size: Adult)   Pulse 71   Ht 160 cm (63\")   Wt 67.6 kg (149 lb)   SpO2 93%   BMI 26.39 kg/m²   General:  Appears in no " acute distress  Eyes: Sclera is anicteric,  conjunctiva is clear   HEENT:  No JVD. Thyroid not visibly enlarged. No mucosal pallor or cyanosis  Respiratory: Respirations regular and unlabored at rest.  Bilaterally good breath sounds, with good air entry in all fields. No crackles, rubs or wheezes auscultated  Cardiovascular: S1,S2 Regular rate and rhythm. No murmur, rub or gallop auscultated. No pretibial pitting edema  Gastrointestinal: Abdomen soft, flat, non tender. Bowel sounds present.   Musculoskeletal:  No abnormal movements  Extremities: No digital clubbing or cyanosis  Skin: Color pink. Skin warm and dry to touch. No rashes  No xanthoma  Neuro: Alert and awake, no lateralizing deficits appreciated    Lab Reviewed:

## 2020-09-06 PROCEDURE — 93010 ELECTROCARDIOGRAM REPORT: CPT | Performed by: INTERNAL MEDICINE

## 2021-02-08 RX ORDER — BISOPROLOL FUMARATE AND HYDROCHLOROTHIAZIDE 5; 6.25 MG/1; MG/1
1 TABLET ORAL DAILY
Qty: 90 TABLET | Refills: 1 | Status: SHIPPED | OUTPATIENT
Start: 2021-02-08 | End: 2021-08-12

## 2021-03-23 ENCOUNTER — APPOINTMENT (OUTPATIENT)
Dept: GENERAL RADIOLOGY | Facility: HOSPITAL | Age: 81
End: 2021-03-23

## 2021-03-23 ENCOUNTER — HOSPITAL ENCOUNTER (INPATIENT)
Facility: HOSPITAL | Age: 81
LOS: 3 days | Discharge: HOME HEALTH | End: 2021-03-26
Attending: INTERNAL MEDICINE | Admitting: INTERNAL MEDICINE

## 2021-03-23 ENCOUNTER — APPOINTMENT (OUTPATIENT)
Dept: CT IMAGING | Facility: HOSPITAL | Age: 81
End: 2021-03-23

## 2021-03-23 DIAGNOSIS — I70.209 ATHEROSCLEROTIC PERIPHERAL VASCULAR DISEASE (HCC): ICD-10-CM

## 2021-03-23 DIAGNOSIS — R06.02 SHORTNESS OF BREATH: ICD-10-CM

## 2021-03-23 DIAGNOSIS — J44.1 CHRONIC OBSTRUCTIVE PULMONARY DISEASE WITH ACUTE EXACERBATION (HCC): ICD-10-CM

## 2021-03-23 DIAGNOSIS — R53.1 GENERALIZED WEAKNESS: ICD-10-CM

## 2021-03-23 DIAGNOSIS — N12 PYELONEPHRITIS: ICD-10-CM

## 2021-03-23 DIAGNOSIS — N39.0 ACUTE UTI: Primary | ICD-10-CM

## 2021-03-23 LAB
ALBUMIN SERPL-MCNC: 3.9 G/DL (ref 3.5–5.2)
ALBUMIN/GLOB SERPL: 1.2 G/DL
ALP SERPL-CCNC: 79 U/L (ref 39–117)
ALT SERPL W P-5'-P-CCNC: 14 U/L (ref 1–33)
ANION GAP SERPL CALCULATED.3IONS-SCNC: 13 MMOL/L (ref 5–15)
AST SERPL-CCNC: 21 U/L (ref 1–32)
B PARAPERT DNA SPEC QL NAA+PROBE: NOT DETECTED
B PERT DNA SPEC QL NAA+PROBE: NOT DETECTED
BACTERIA UR QL AUTO: ABNORMAL /HPF
BASOPHILS # BLD AUTO: 0 10*3/MM3 (ref 0–0.2)
BASOPHILS NFR BLD AUTO: 0.3 % (ref 0–1.5)
BILIRUB SERPL-MCNC: 0.7 MG/DL (ref 0–1.2)
BILIRUB UR QL STRIP: ABNORMAL
BUN SERPL-MCNC: 11 MG/DL (ref 8–23)
BUN/CREAT SERPL: 17.2 (ref 7–25)
C PNEUM DNA NPH QL NAA+NON-PROBE: NOT DETECTED
CALCIUM SPEC-SCNC: 9.4 MG/DL (ref 8.6–10.5)
CHLORIDE SERPL-SCNC: 94 MMOL/L (ref 98–107)
CLARITY UR: ABNORMAL
CO2 SERPL-SCNC: 26 MMOL/L (ref 22–29)
COLOR UR: ABNORMAL
CREAT SERPL-MCNC: 0.64 MG/DL (ref 0.57–1)
D DIMER PPP FEU-MCNC: 2.26 MG/L (FEU) (ref 0–0.59)
D-LACTATE SERPL-SCNC: 1.1 MMOL/L (ref 0.5–2)
DEPRECATED RDW RBC AUTO: 54.3 FL (ref 37–54)
EOSINOPHIL # BLD AUTO: 0 10*3/MM3 (ref 0–0.4)
EOSINOPHIL NFR BLD AUTO: 0 % (ref 0.3–6.2)
ERYTHROCYTE [DISTWIDTH] IN BLOOD BY AUTOMATED COUNT: 16.8 % (ref 12.3–15.4)
FLUAV SUBTYP SPEC NAA+PROBE: NOT DETECTED
FLUBV RNA ISLT QL NAA+PROBE: NOT DETECTED
GFR SERPL CREATININE-BSD FRML MDRD: 89 ML/MIN/1.73
GLOBULIN UR ELPH-MCNC: 3.3 GM/DL
GLUCOSE SERPL-MCNC: 133 MG/DL (ref 65–99)
GLUCOSE UR STRIP-MCNC: NEGATIVE MG/DL
HADV DNA SPEC NAA+PROBE: NOT DETECTED
HCOV 229E RNA SPEC QL NAA+PROBE: NOT DETECTED
HCOV HKU1 RNA SPEC QL NAA+PROBE: NOT DETECTED
HCOV NL63 RNA SPEC QL NAA+PROBE: NOT DETECTED
HCOV OC43 RNA SPEC QL NAA+PROBE: NOT DETECTED
HCT VFR BLD AUTO: 39.2 % (ref 34–46.6)
HGB BLD-MCNC: 13.5 G/DL (ref 12–15.9)
HGB UR QL STRIP.AUTO: ABNORMAL
HMPV RNA NPH QL NAA+NON-PROBE: NOT DETECTED
HPIV1 RNA SPEC QL NAA+PROBE: NOT DETECTED
HPIV2 RNA SPEC QL NAA+PROBE: NOT DETECTED
HPIV3 RNA NPH QL NAA+PROBE: NOT DETECTED
HPIV4 P GENE NPH QL NAA+PROBE: NOT DETECTED
HYALINE CASTS UR QL AUTO: ABNORMAL /LPF
KETONES UR QL STRIP: ABNORMAL
LEUKOCYTE ESTERASE UR QL STRIP.AUTO: ABNORMAL
LIPASE SERPL-CCNC: 11 U/L (ref 13–60)
LYMPHOCYTES # BLD AUTO: 0.5 10*3/MM3 (ref 0.7–3.1)
LYMPHOCYTES NFR BLD AUTO: 3.1 % (ref 19.6–45.3)
M PNEUMO IGG SER IA-ACNC: NOT DETECTED
MCH RBC QN AUTO: 31.9 PG (ref 26.6–33)
MCHC RBC AUTO-ENTMCNC: 34.3 G/DL (ref 31.5–35.7)
MCV RBC AUTO: 92.8 FL (ref 79–97)
MONOCYTES # BLD AUTO: 1.3 10*3/MM3 (ref 0.1–0.9)
MONOCYTES NFR BLD AUTO: 7.8 % (ref 5–12)
NEUTROPHILS NFR BLD AUTO: 14.8 10*3/MM3 (ref 1.7–7)
NEUTROPHILS NFR BLD AUTO: 88.8 % (ref 42.7–76)
NITRITE UR QL STRIP: POSITIVE
NRBC BLD AUTO-RTO: 0 /100 WBC (ref 0–0.2)
NT-PROBNP SERPL-MCNC: 668.4 PG/ML (ref 0–1800)
PH UR STRIP.AUTO: 7 [PH] (ref 5–8)
PLATELET # BLD AUTO: 264 10*3/MM3 (ref 140–450)
PMV BLD AUTO: 8 FL (ref 6–12)
POTASSIUM SERPL-SCNC: 3.9 MMOL/L (ref 3.5–5.2)
PROT SERPL-MCNC: 7.2 G/DL (ref 6–8.5)
PROT UR QL STRIP: ABNORMAL
RBC # BLD AUTO: 4.22 10*6/MM3 (ref 3.77–5.28)
RBC # UR: ABNORMAL /HPF
REF LAB TEST METHOD: ABNORMAL
RHINOVIRUS RNA SPEC NAA+PROBE: NOT DETECTED
RSV RNA NPH QL NAA+NON-PROBE: NOT DETECTED
SARS-COV-2 RNA NPH QL NAA+NON-PROBE: NOT DETECTED
SODIUM SERPL-SCNC: 133 MMOL/L (ref 136–145)
SP GR UR STRIP: 1.02 (ref 1–1.03)
SQUAMOUS #/AREA URNS HPF: ABNORMAL /HPF
TROPONIN T SERPL-MCNC: <0.01 NG/ML (ref 0–0.03)
UROBILINOGEN UR QL STRIP: ABNORMAL
WBC # BLD AUTO: 16.7 10*3/MM3 (ref 3.4–10.8)
WBC UR QL AUTO: ABNORMAL /HPF

## 2021-03-23 PROCEDURE — 87040 BLOOD CULTURE FOR BACTERIA: CPT | Performed by: NURSE PRACTITIONER

## 2021-03-23 PROCEDURE — 83605 ASSAY OF LACTIC ACID: CPT

## 2021-03-23 PROCEDURE — 99284 EMERGENCY DEPT VISIT MOD MDM: CPT

## 2021-03-23 PROCEDURE — 80053 COMPREHEN METABOLIC PANEL: CPT | Performed by: NURSE PRACTITIONER

## 2021-03-23 PROCEDURE — 87186 SC STD MICRODIL/AGAR DIL: CPT | Performed by: NURSE PRACTITIONER

## 2021-03-23 PROCEDURE — 83690 ASSAY OF LIPASE: CPT | Performed by: NURSE PRACTITIONER

## 2021-03-23 PROCEDURE — 25010000002 CEFTRIAXONE PER 250 MG: Performed by: NURSE PRACTITIONER

## 2021-03-23 PROCEDURE — 94760 N-INVAS EAR/PLS OXIMETRY 1: CPT

## 2021-03-23 PROCEDURE — 87088 URINE BACTERIA CULTURE: CPT | Performed by: NURSE PRACTITIONER

## 2021-03-23 PROCEDURE — 71275 CT ANGIOGRAPHY CHEST: CPT

## 2021-03-23 PROCEDURE — 85379 FIBRIN DEGRADATION QUANT: CPT | Performed by: NURSE PRACTITIONER

## 2021-03-23 PROCEDURE — 84484 ASSAY OF TROPONIN QUANT: CPT | Performed by: NURSE PRACTITIONER

## 2021-03-23 PROCEDURE — 74177 CT ABD & PELVIS W/CONTRAST: CPT

## 2021-03-23 PROCEDURE — 81001 URINALYSIS AUTO W/SCOPE: CPT | Performed by: NURSE PRACTITIONER

## 2021-03-23 PROCEDURE — 71045 X-RAY EXAM CHEST 1 VIEW: CPT

## 2021-03-23 PROCEDURE — 83880 ASSAY OF NATRIURETIC PEPTIDE: CPT | Performed by: NURSE PRACTITIONER

## 2021-03-23 PROCEDURE — 87086 URINE CULTURE/COLONY COUNT: CPT | Performed by: NURSE PRACTITIONER

## 2021-03-23 PROCEDURE — 93005 ELECTROCARDIOGRAM TRACING: CPT | Performed by: NURSE PRACTITIONER

## 2021-03-23 PROCEDURE — 94640 AIRWAY INHALATION TREATMENT: CPT

## 2021-03-23 PROCEDURE — 0 IOPAMIDOL PER 1 ML: Performed by: NURSE PRACTITIONER

## 2021-03-23 PROCEDURE — 85025 COMPLETE CBC W/AUTO DIFF WBC: CPT | Performed by: NURSE PRACTITIONER

## 2021-03-23 PROCEDURE — 0202U NFCT DS 22 TRGT SARS-COV-2: CPT | Performed by: NURSE PRACTITIONER

## 2021-03-23 RX ORDER — PANTOPRAZOLE SODIUM 40 MG/1
40 TABLET, DELAYED RELEASE ORAL
Status: DISCONTINUED | OUTPATIENT
Start: 2021-03-24 | End: 2021-03-25

## 2021-03-23 RX ORDER — SODIUM CHLORIDE 0.9 % (FLUSH) 0.9 %
3-10 SYRINGE (ML) INJECTION AS NEEDED
Status: DISCONTINUED | OUTPATIENT
Start: 2021-03-23 | End: 2021-03-26 | Stop reason: HOSPADM

## 2021-03-23 RX ORDER — MELATONIN
2000 DAILY
COMMUNITY

## 2021-03-23 RX ORDER — SODIUM CHLORIDE 9 MG/ML
100 INJECTION, SOLUTION INTRAVENOUS CONTINUOUS
Status: DISCONTINUED | OUTPATIENT
Start: 2021-03-23 | End: 2021-03-25

## 2021-03-23 RX ORDER — PYRIDOXINE HCL (VITAMIN B6) 100 MG
100 TABLET ORAL DAILY
COMMUNITY

## 2021-03-23 RX ORDER — ONDANSETRON 2 MG/ML
4 INJECTION INTRAMUSCULAR; INTRAVENOUS EVERY 6 HOURS PRN
Status: DISCONTINUED | OUTPATIENT
Start: 2021-03-23 | End: 2021-03-26 | Stop reason: HOSPADM

## 2021-03-23 RX ORDER — SODIUM CHLORIDE 0.9 % (FLUSH) 0.9 %
10 SYRINGE (ML) INJECTION AS NEEDED
Status: DISCONTINUED | OUTPATIENT
Start: 2021-03-23 | End: 2021-03-26 | Stop reason: HOSPADM

## 2021-03-23 RX ORDER — ACETAMINOPHEN 325 MG/1
650 TABLET ORAL EVERY 4 HOURS PRN
Status: DISCONTINUED | OUTPATIENT
Start: 2021-03-23 | End: 2021-03-26 | Stop reason: HOSPADM

## 2021-03-23 RX ORDER — BUDESONIDE AND FORMOTEROL FUMARATE DIHYDRATE 160; 4.5 UG/1; UG/1
2 AEROSOL RESPIRATORY (INHALATION)
Status: DISCONTINUED | OUTPATIENT
Start: 2021-03-23 | End: 2021-03-26 | Stop reason: HOSPADM

## 2021-03-23 RX ORDER — LANOLIN ALCOHOL/MO/W.PET/CERES
2500 CREAM (GRAM) TOPICAL DAILY
COMMUNITY

## 2021-03-23 RX ORDER — ACETAMINOPHEN 325 MG/1
650 TABLET ORAL EVERY 4 HOURS PRN
Status: DISCONTINUED | OUTPATIENT
Start: 2021-03-23 | End: 2021-03-23

## 2021-03-23 RX ORDER — ACETAMINOPHEN 500 MG
1000 TABLET ORAL ONCE
Status: COMPLETED | OUTPATIENT
Start: 2021-03-23 | End: 2021-03-23

## 2021-03-23 RX ORDER — ASPIRIN 81 MG/1
81 TABLET ORAL DAILY
Status: DISCONTINUED | OUTPATIENT
Start: 2021-03-23 | End: 2021-03-26 | Stop reason: HOSPADM

## 2021-03-23 RX ORDER — MONTELUKAST SODIUM 10 MG/1
10 TABLET ORAL NIGHTLY
Status: DISCONTINUED | OUTPATIENT
Start: 2021-03-23 | End: 2021-03-26 | Stop reason: HOSPADM

## 2021-03-23 RX ORDER — IPRATROPIUM BROMIDE AND ALBUTEROL SULFATE 2.5; .5 MG/3ML; MG/3ML
3 SOLUTION RESPIRATORY (INHALATION) EVERY 4 HOURS PRN
Status: DISCONTINUED | OUTPATIENT
Start: 2021-03-23 | End: 2021-03-26 | Stop reason: HOSPADM

## 2021-03-23 RX ORDER — SODIUM CHLORIDE 0.9 % (FLUSH) 0.9 %
3 SYRINGE (ML) INJECTION EVERY 12 HOURS SCHEDULED
Status: DISCONTINUED | OUTPATIENT
Start: 2021-03-23 | End: 2021-03-26 | Stop reason: HOSPADM

## 2021-03-23 RX ADMIN — ASPIRIN 81 MG: 81 TABLET, COATED ORAL at 19:43

## 2021-03-23 RX ADMIN — ACETAMINOPHEN 1000 MG: 500 TABLET, FILM COATED ORAL at 10:13

## 2021-03-23 RX ADMIN — SODIUM CHLORIDE 100 ML/HR: 9 INJECTION, SOLUTION INTRAVENOUS at 19:57

## 2021-03-23 RX ADMIN — Medication 3 ML: at 22:11

## 2021-03-23 RX ADMIN — MONTELUKAST 10 MG: 10 TABLET, FILM COATED ORAL at 21:56

## 2021-03-23 RX ADMIN — Medication 10 ML: at 19:45

## 2021-03-23 RX ADMIN — IOPAMIDOL 100 ML: 755 INJECTION, SOLUTION INTRAVENOUS at 11:21

## 2021-03-23 RX ADMIN — WATER 1 G: 100 INJECTION, SOLUTION INTRAVENOUS at 12:38

## 2021-03-23 RX ADMIN — BUDESONIDE AND FORMOTEROL FUMARATE DIHYDRATE 2 PUFF: 160; 4.5 AEROSOL RESPIRATORY (INHALATION) at 20:37

## 2021-03-24 LAB
ANION GAP SERPL CALCULATED.3IONS-SCNC: 11 MMOL/L (ref 5–15)
BASOPHILS # BLD AUTO: 0 10*3/MM3 (ref 0–0.2)
BASOPHILS NFR BLD AUTO: 0.3 % (ref 0–1.5)
BUN SERPL-MCNC: 14 MG/DL (ref 8–23)
BUN/CREAT SERPL: 24.1 (ref 7–25)
CALCIUM SPEC-SCNC: 8.5 MG/DL (ref 8.6–10.5)
CHLORIDE SERPL-SCNC: 96 MMOL/L (ref 98–107)
CO2 SERPL-SCNC: 25 MMOL/L (ref 22–29)
CREAT SERPL-MCNC: 0.58 MG/DL (ref 0.57–1)
DEPRECATED RDW RBC AUTO: 55.6 FL (ref 37–54)
EOSINOPHIL # BLD AUTO: 0 10*3/MM3 (ref 0–0.4)
EOSINOPHIL NFR BLD AUTO: 0.1 % (ref 0.3–6.2)
ERYTHROCYTE [DISTWIDTH] IN BLOOD BY AUTOMATED COUNT: 16.8 % (ref 12.3–15.4)
GFR SERPL CREATININE-BSD FRML MDRD: 100 ML/MIN/1.73
GLUCOSE SERPL-MCNC: 96 MG/DL (ref 65–99)
HCT VFR BLD AUTO: 36.9 % (ref 34–46.6)
HGB BLD-MCNC: 12 G/DL (ref 12–15.9)
LYMPHOCYTES # BLD AUTO: 1.3 10*3/MM3 (ref 0.7–3.1)
LYMPHOCYTES NFR BLD AUTO: 9.1 % (ref 19.6–45.3)
MCH RBC QN AUTO: 30.8 PG (ref 26.6–33)
MCHC RBC AUTO-ENTMCNC: 32.6 G/DL (ref 31.5–35.7)
MCV RBC AUTO: 94.6 FL (ref 79–97)
MONOCYTES # BLD AUTO: 1.1 10*3/MM3 (ref 0.1–0.9)
MONOCYTES NFR BLD AUTO: 8 % (ref 5–12)
NEUTROPHILS NFR BLD AUTO: 11.8 10*3/MM3 (ref 1.7–7)
NEUTROPHILS NFR BLD AUTO: 82.5 % (ref 42.7–76)
NRBC BLD AUTO-RTO: 0.1 /100 WBC (ref 0–0.2)
PLATELET # BLD AUTO: 210 10*3/MM3 (ref 140–450)
PMV BLD AUTO: 8.4 FL (ref 6–12)
POTASSIUM SERPL-SCNC: 3.6 MMOL/L (ref 3.5–5.2)
QT INTERVAL: 360 MS
RBC # BLD AUTO: 3.9 10*6/MM3 (ref 3.77–5.28)
SODIUM SERPL-SCNC: 132 MMOL/L (ref 136–145)
WBC # BLD AUTO: 14.3 10*3/MM3 (ref 3.4–10.8)

## 2021-03-24 PROCEDURE — 94799 UNLISTED PULMONARY SVC/PX: CPT

## 2021-03-24 PROCEDURE — 25010000002 ENOXAPARIN PER 10 MG: Performed by: INTERNAL MEDICINE

## 2021-03-24 PROCEDURE — 25010000002 CEFTRIAXONE PER 250 MG: Performed by: INTERNAL MEDICINE

## 2021-03-24 PROCEDURE — 85025 COMPLETE CBC W/AUTO DIFF WBC: CPT | Performed by: INTERNAL MEDICINE

## 2021-03-24 PROCEDURE — 80048 BASIC METABOLIC PNL TOTAL CA: CPT | Performed by: INTERNAL MEDICINE

## 2021-03-24 RX ADMIN — CEFTRIAXONE SODIUM 1 G: 1 INJECTION, POWDER, FOR SOLUTION INTRAMUSCULAR; INTRAVENOUS at 08:52

## 2021-03-24 RX ADMIN — BUDESONIDE AND FORMOTEROL FUMARATE DIHYDRATE 2 PUFF: 160; 4.5 AEROSOL RESPIRATORY (INHALATION) at 20:18

## 2021-03-24 RX ADMIN — Medication 3 ML: at 08:53

## 2021-03-24 RX ADMIN — PANTOPRAZOLE SODIUM 40 MG: 40 TABLET, DELAYED RELEASE ORAL at 05:36

## 2021-03-24 RX ADMIN — BUDESONIDE AND FORMOTEROL FUMARATE DIHYDRATE 2 PUFF: 160; 4.5 AEROSOL RESPIRATORY (INHALATION) at 07:13

## 2021-03-24 RX ADMIN — MONTELUKAST 10 MG: 10 TABLET, FILM COATED ORAL at 21:15

## 2021-03-24 RX ADMIN — Medication 3 ML: at 21:15

## 2021-03-24 RX ADMIN — SODIUM CHLORIDE 100 ML/HR: 9 INJECTION, SOLUTION INTRAVENOUS at 20:30

## 2021-03-24 RX ADMIN — ENOXAPARIN SODIUM 40 MG: 40 INJECTION SUBCUTANEOUS at 17:00

## 2021-03-24 RX ADMIN — SODIUM CHLORIDE 100 ML/HR: 9 INJECTION, SOLUTION INTRAVENOUS at 08:52

## 2021-03-24 RX ADMIN — ASPIRIN 81 MG: 81 TABLET, COATED ORAL at 08:53

## 2021-03-25 LAB
ANION GAP SERPL CALCULATED.3IONS-SCNC: 7 MMOL/L (ref 5–15)
BACTERIA SPEC AEROBE CULT: ABNORMAL
BASOPHILS # BLD AUTO: 0 10*3/MM3 (ref 0–0.2)
BASOPHILS NFR BLD AUTO: 0.4 % (ref 0–1.5)
BUN SERPL-MCNC: 8 MG/DL (ref 8–23)
BUN/CREAT SERPL: 14.3 (ref 7–25)
CALCIUM SPEC-SCNC: 8.3 MG/DL (ref 8.6–10.5)
CHLORIDE SERPL-SCNC: 98 MMOL/L (ref 98–107)
CO2 SERPL-SCNC: 28 MMOL/L (ref 22–29)
CREAT SERPL-MCNC: 0.56 MG/DL (ref 0.57–1)
DEPRECATED RDW RBC AUTO: 55.1 FL (ref 37–54)
EOSINOPHIL # BLD AUTO: 0 10*3/MM3 (ref 0–0.4)
EOSINOPHIL NFR BLD AUTO: 0.1 % (ref 0.3–6.2)
ERYTHROCYTE [DISTWIDTH] IN BLOOD BY AUTOMATED COUNT: 16.5 % (ref 12.3–15.4)
GFR SERPL CREATININE-BSD FRML MDRD: 104 ML/MIN/1.73
GLUCOSE SERPL-MCNC: 98 MG/DL (ref 65–99)
HCT VFR BLD AUTO: 33.9 % (ref 34–46.6)
HGB BLD-MCNC: 11.2 G/DL (ref 12–15.9)
LYMPHOCYTES # BLD AUTO: 0.7 10*3/MM3 (ref 0.7–3.1)
LYMPHOCYTES NFR BLD AUTO: 8.2 % (ref 19.6–45.3)
MCH RBC QN AUTO: 31.1 PG (ref 26.6–33)
MCHC RBC AUTO-ENTMCNC: 33.1 G/DL (ref 31.5–35.7)
MCV RBC AUTO: 94.1 FL (ref 79–97)
MONOCYTES # BLD AUTO: 0.8 10*3/MM3 (ref 0.1–0.9)
MONOCYTES NFR BLD AUTO: 10.1 % (ref 5–12)
NEUTROPHILS NFR BLD AUTO: 6.8 10*3/MM3 (ref 1.7–7)
NEUTROPHILS NFR BLD AUTO: 81.2 % (ref 42.7–76)
NRBC BLD AUTO-RTO: 0.1 /100 WBC (ref 0–0.2)
PLATELET # BLD AUTO: 210 10*3/MM3 (ref 140–450)
PMV BLD AUTO: 8.2 FL (ref 6–12)
POTASSIUM SERPL-SCNC: 4.3 MMOL/L (ref 3.5–5.2)
RBC # BLD AUTO: 3.6 10*6/MM3 (ref 3.77–5.28)
SODIUM SERPL-SCNC: 133 MMOL/L (ref 136–145)
WBC # BLD AUTO: 8.4 10*3/MM3 (ref 3.4–10.8)

## 2021-03-25 PROCEDURE — 85025 COMPLETE CBC W/AUTO DIFF WBC: CPT | Performed by: INTERNAL MEDICINE

## 2021-03-25 PROCEDURE — 97161 PT EVAL LOW COMPLEX 20 MIN: CPT

## 2021-03-25 PROCEDURE — 94799 UNLISTED PULMONARY SVC/PX: CPT

## 2021-03-25 PROCEDURE — 25010000002 CEFTRIAXONE PER 250 MG: Performed by: INTERNAL MEDICINE

## 2021-03-25 PROCEDURE — 25010000002 ENOXAPARIN PER 10 MG: Performed by: INTERNAL MEDICINE

## 2021-03-25 PROCEDURE — 80048 BASIC METABOLIC PNL TOTAL CA: CPT | Performed by: INTERNAL MEDICINE

## 2021-03-25 RX ORDER — CEPHALEXIN 500 MG/1
500 CAPSULE ORAL EVERY 8 HOURS SCHEDULED
Status: DISCONTINUED | OUTPATIENT
Start: 2021-03-26 | End: 2021-03-26 | Stop reason: HOSPADM

## 2021-03-25 RX ORDER — BISOPROLOL FUMARATE 5 MG/1
5 TABLET, FILM COATED ORAL
Status: DISCONTINUED | OUTPATIENT
Start: 2021-03-25 | End: 2021-03-26 | Stop reason: HOSPADM

## 2021-03-25 RX ADMIN — Medication 3 ML: at 09:51

## 2021-03-25 RX ADMIN — MONTELUKAST 10 MG: 10 TABLET, FILM COATED ORAL at 21:51

## 2021-03-25 RX ADMIN — Medication 3 ML: at 21:50

## 2021-03-25 RX ADMIN — ASPIRIN 81 MG: 81 TABLET, COATED ORAL at 09:51

## 2021-03-25 RX ADMIN — BUDESONIDE AND FORMOTEROL FUMARATE DIHYDRATE 2 PUFF: 160; 4.5 AEROSOL RESPIRATORY (INHALATION) at 07:15

## 2021-03-25 RX ADMIN — PANTOPRAZOLE SODIUM 40 MG: 40 TABLET, DELAYED RELEASE ORAL at 05:59

## 2021-03-25 RX ADMIN — BUDESONIDE AND FORMOTEROL FUMARATE DIHYDRATE 2 PUFF: 160; 4.5 AEROSOL RESPIRATORY (INHALATION) at 19:21

## 2021-03-25 RX ADMIN — CEFTRIAXONE SODIUM 1 G: 1 INJECTION, POWDER, FOR SOLUTION INTRAMUSCULAR; INTRAVENOUS at 09:58

## 2021-03-25 RX ADMIN — BISOPROLOL FUMARATE 5 MG: 5 TABLET, FILM COATED ORAL at 09:51

## 2021-03-25 RX ADMIN — ENOXAPARIN SODIUM 40 MG: 40 INJECTION SUBCUTANEOUS at 16:00

## 2021-03-26 VITALS
DIASTOLIC BLOOD PRESSURE: 80 MMHG | WEIGHT: 152.56 LBS | BODY MASS INDEX: 28.07 KG/M2 | OXYGEN SATURATION: 93 % | HEIGHT: 62 IN | HEART RATE: 78 BPM | TEMPERATURE: 98.2 F | SYSTOLIC BLOOD PRESSURE: 162 MMHG | RESPIRATION RATE: 17 BRPM

## 2021-03-26 PROBLEM — R06.02 SHORTNESS OF BREATH: Status: ACTIVE | Noted: 2020-03-24

## 2021-03-26 PROBLEM — R53.1 GENERALIZED WEAKNESS: Status: ACTIVE | Noted: 2021-03-26

## 2021-03-26 LAB
ANION GAP SERPL CALCULATED.3IONS-SCNC: 9 MMOL/L (ref 5–15)
BASOPHILS # BLD AUTO: 0 10*3/MM3 (ref 0–0.2)
BASOPHILS NFR BLD AUTO: 1 % (ref 0–1.5)
BUN SERPL-MCNC: 7 MG/DL (ref 8–23)
BUN/CREAT SERPL: 14.6 (ref 7–25)
CALCIUM SPEC-SCNC: 8 MG/DL (ref 8.6–10.5)
CHLORIDE SERPL-SCNC: 102 MMOL/L (ref 98–107)
CO2 SERPL-SCNC: 26 MMOL/L (ref 22–29)
CREAT SERPL-MCNC: 0.48 MG/DL (ref 0.57–1)
DEPRECATED RDW RBC AUTO: 52.1 FL (ref 37–54)
EOSINOPHIL # BLD AUTO: 0.1 10*3/MM3 (ref 0–0.4)
EOSINOPHIL NFR BLD AUTO: 2.2 % (ref 0.3–6.2)
ERYTHROCYTE [DISTWIDTH] IN BLOOD BY AUTOMATED COUNT: 16 % (ref 12.3–15.4)
GFR SERPL CREATININE-BSD FRML MDRD: 124 ML/MIN/1.73
GLUCOSE SERPL-MCNC: 112 MG/DL (ref 65–99)
HCT VFR BLD AUTO: 33.8 % (ref 34–46.6)
HGB BLD-MCNC: 11.1 G/DL (ref 12–15.9)
LYMPHOCYTES # BLD AUTO: 0.8 10*3/MM3 (ref 0.7–3.1)
LYMPHOCYTES NFR BLD AUTO: 17 % (ref 19.6–45.3)
MCH RBC QN AUTO: 31 PG (ref 26.6–33)
MCHC RBC AUTO-ENTMCNC: 32.8 G/DL (ref 31.5–35.7)
MCV RBC AUTO: 94.4 FL (ref 79–97)
MONOCYTES # BLD AUTO: 0.7 10*3/MM3 (ref 0.1–0.9)
MONOCYTES NFR BLD AUTO: 15.6 % (ref 5–12)
NEUTROPHILS NFR BLD AUTO: 3 10*3/MM3 (ref 1.7–7)
NEUTROPHILS NFR BLD AUTO: 64.2 % (ref 42.7–76)
NRBC BLD AUTO-RTO: 0.1 /100 WBC (ref 0–0.2)
PLATELET # BLD AUTO: 181 10*3/MM3 (ref 140–450)
PMV BLD AUTO: 8.5 FL (ref 6–12)
POTASSIUM SERPL-SCNC: 3.5 MMOL/L (ref 3.5–5.2)
RBC # BLD AUTO: 3.58 10*6/MM3 (ref 3.77–5.28)
SODIUM SERPL-SCNC: 137 MMOL/L (ref 136–145)
WBC # BLD AUTO: 4.7 10*3/MM3 (ref 3.4–10.8)

## 2021-03-26 PROCEDURE — 85025 COMPLETE CBC W/AUTO DIFF WBC: CPT | Performed by: INTERNAL MEDICINE

## 2021-03-26 PROCEDURE — 94799 UNLISTED PULMONARY SVC/PX: CPT

## 2021-03-26 PROCEDURE — 80048 BASIC METABOLIC PNL TOTAL CA: CPT | Performed by: INTERNAL MEDICINE

## 2021-03-26 RX ORDER — CEPHALEXIN 500 MG/1
500 CAPSULE ORAL EVERY 8 HOURS SCHEDULED
Qty: 23 CAPSULE | Refills: 0 | Status: SHIPPED | OUTPATIENT
Start: 2021-03-26 | End: 2021-04-03

## 2021-03-26 RX ORDER — IPRATROPIUM BROMIDE AND ALBUTEROL SULFATE 2.5; .5 MG/3ML; MG/3ML
3 SOLUTION RESPIRATORY (INHALATION) EVERY 4 HOURS PRN
Qty: 360 ML
Start: 2021-03-26 | End: 2022-01-16

## 2021-03-26 RX ADMIN — ASPIRIN 81 MG: 81 TABLET, COATED ORAL at 10:16

## 2021-03-26 RX ADMIN — BISOPROLOL FUMARATE 5 MG: 5 TABLET, FILM COATED ORAL at 10:16

## 2021-03-26 RX ADMIN — Medication 3 ML: at 10:16

## 2021-03-26 RX ADMIN — CEPHALEXIN 500 MG: 500 CAPSULE ORAL at 05:27

## 2021-03-26 RX ADMIN — BUDESONIDE AND FORMOTEROL FUMARATE DIHYDRATE 2 PUFF: 160; 4.5 AEROSOL RESPIRATORY (INHALATION) at 08:07

## 2021-03-26 RX ADMIN — CEPHALEXIN 500 MG: 500 CAPSULE ORAL at 14:38

## 2021-03-27 ENCOUNTER — READMISSION MANAGEMENT (OUTPATIENT)
Dept: CALL CENTER | Facility: HOSPITAL | Age: 81
End: 2021-03-27

## 2021-03-27 NOTE — OUTREACH NOTE
Prep Survey      Responses   [facility]   Is LACE score < 7 ?  No   Emergency Room discharge w/ pulse ox?  No   Eligibility  Readm Mgmt   Discharge diagnosis  Pyelonephritis, generalized weakness, acute UTI   Does the patient have one of the following disease processes/diagnoses(primary or secondary)?  Other   Does the patient have Home health ordered?  Yes   What is the Home health agency?   [facility]   Is there a DME ordered?  No   Comments regarding appointments  Needs f/u scheduled   Medication alerts for this patient  Continue Aspirin.  Meds per AVS.   Prep survey completed?  Yes          Michelle Arizmendi RN

## 2021-03-28 LAB
BACTERIA SPEC AEROBE CULT: NORMAL
BACTERIA SPEC AEROBE CULT: NORMAL

## 2021-04-01 ENCOUNTER — READMISSION MANAGEMENT (OUTPATIENT)
Dept: CALL CENTER | Facility: HOSPITAL | Age: 81
End: 2021-04-01

## 2021-04-01 NOTE — OUTREACH NOTE
Medical Week 1 Survey      Responses   [facility] patient discharged from?  [facility]   Does the patient have one of the following disease processes/diagnoses(primary or secondary)?  Other   Week 1 attempt successful?  No   Unsuccessful attempts  Attempt 1          Melanie Rucker LPN

## 2021-04-05 ENCOUNTER — READMISSION MANAGEMENT (OUTPATIENT)
Dept: CALL CENTER | Facility: HOSPITAL | Age: 81
End: 2021-04-05

## 2021-04-05 NOTE — OUTREACH NOTE
Medical Week 1 Survey      Responses   [facility] patient discharged from?  [facility]   Does the patient have one of the following disease processes/diagnoses(primary or secondary)?  Other   Week 1 attempt successful?  No   Unsuccessful attempts  Attempt 2          Melanie Rucker LPN

## 2021-04-07 ENCOUNTER — READMISSION MANAGEMENT (OUTPATIENT)
Dept: CALL CENTER | Facility: HOSPITAL | Age: 81
End: 2021-04-07

## 2021-04-07 NOTE — OUTREACH NOTE
Medical Week 2 Survey      Responses   [facility] patient discharged from?  [facility]   Does the patient have one of the following disease processes/diagnoses(primary or secondary)?  Other   Week 2 attempt successful?  No   Unsuccessful attempts  Attempt 1 [ATTEMPTED TO CALL PATIENT'S AND DAUGHTER'S PHONES]          Melanie Rucker LPN

## 2021-04-09 ENCOUNTER — READMISSION MANAGEMENT (OUTPATIENT)
Dept: CALL CENTER | Facility: HOSPITAL | Age: 81
End: 2021-04-09

## 2021-04-09 NOTE — OUTREACH NOTE
Medical Week 2 Survey      Responses   [facility] patient discharged from?  [facility]   Does the patient have one of the following disease processes/diagnoses(primary or secondary)?  Other   Week 2 attempt successful?  Yes   Call start time  1544   Call end time  1546   Is patient permission given to speak with other caregiver?  Yes   List who call center can speak with  MARYJO FENG   Person spoke with today (if not patient) and relationship  MARYJOTOMAS FENG- DAUGHTER   Meds reviewed with patient/caregiver?  Yes   Is the patient having any side effects they believe may be caused by any medication additions or changes?  No   Does the patient have all medications ordered at discharge?  Yes   Is the patient taking all medications as directed (includes completed medication regime)?  Yes   Does the patient have a primary care provider?   Yes   Has the patient kept scheduled appointments due by today?  Yes   What is the Home health agency?   [facility]   Has home health visited the patient within 72 hours of discharge?  Yes   Psychosocial issues?  No   Did the patient receive a copy of their discharge instructions?  Yes   Nursing interventions  Reviewed instructions with patient   What is the patient's perception of their health status since discharge?  Improving   Is the patient/caregiver able to teach back signs and symptoms related to disease process for when to call PCP?  Yes   Is the patient/caregiver able to teach back signs and symptoms related to disease process for when to call 911?  Yes   Is the patient/caregiver able to teach back the hierarchy of who to call/visit for symptoms/problems? PCP, Specialist, Home health nurse, Urgent Care, ED, 911  Yes   If the patient is a current smoker, are they able to teach back resources for cessation?  Not a smoker   Week 2 Call Completed?  Yes          Melanie Rucker LPN

## 2021-04-15 ENCOUNTER — READMISSION MANAGEMENT (OUTPATIENT)
Dept: CALL CENTER | Facility: HOSPITAL | Age: 81
End: 2021-04-15

## 2021-04-15 NOTE — OUTREACH NOTE
Medical Week 3 Survey      Responses   [facility] patient discharged from?  [facility]   Does the patient have one of the following disease processes/diagnoses(primary or secondary)?  Other   Week 3 attempt successful?  Yes   Call start time  1326   Call end time  1327   Discharge diagnosis  Pyelonephritis, generalized weakness, acute UTI   Is patient permission given to speak with other caregiver?  Yes   List who call center can speak with  MARYJO FENG   Person spoke with today (if not patient) and relationship  MARYJOTOMAS FENG- DAUGHTER   Meds reviewed with patient/caregiver?  Yes   Is the patient having any side effects they believe may be caused by any medication additions or changes?  No   Does the patient have all medications ordered at discharge?  Yes   Is the patient taking all medications as directed (includes completed medication regime)?  Yes   Does the patient have a primary care provider?   Yes   Comments regarding PCP  PCP DOCUMENTS IN HOSPITAL CHART, NO NEED FOR FOLLOW UP APPOINTMENT DUE TO COVID 19, AND PATIENT SHOULD CALL WITH ANY PROBLEMS. SAME INSTRUCTIONS REITERATED TO PATIENT.   Has the patient kept scheduled appointments due by today?  N/A   What is the Home health agency?   [facility]   Has home health visited the patient within 72 hours of discharge?  Yes   Psychosocial issues?  No   Did the patient receive a copy of their discharge instructions?  Yes   Nursing interventions  Reviewed instructions with patient   What is the patient's perception of their health status since discharge?  Improving   Is the patient/caregiver able to teach back signs and symptoms related to disease process for when to call PCP?  Yes   Is the patient/caregiver able to teach back signs and symptoms related to disease process for when to call 911?  Yes   Is the patient/caregiver able to teach back the hierarchy of who to call/visit for symptoms/problems? PCP, Specialist, Home health nurse, Urgent  [facility], ED, 911  Yes   If the patient is a current smoker, are they able to teach back resources for cessation?  Not a smoker   Week 3 Call Completed?  Yes          Melanie Rucker LPN

## 2021-04-23 ENCOUNTER — READMISSION MANAGEMENT (OUTPATIENT)
Dept: CALL CENTER | Facility: HOSPITAL | Age: 81
End: 2021-04-23

## 2021-04-23 NOTE — OUTREACH NOTE
Medical Week 4 Survey      Responses   [facility] patient discharged from?  [facility]   Does the patient have one of the following disease processes/diagnoses(primary or secondary)?  Other   Week 4 attempt successful?  Yes   Call start time  1850   Call end time  1852   Meds reviewed with patient/caregiver?  Yes   Is the patient taking all medications as directed (includes completed medication regime)?  Yes   Has the patient kept scheduled appointments due by today?  Yes   Is the patient still receiving Home Health Services?  N/A   What is the patient's perception of their health status since discharge?  Improving   Week 4 Call Completed?  Yes   Would the patient like one additional call?  No   Graduated  Yes   Is the patient interested in additional calls from an ambulatory ?  NOTE:  applies to high risk patients requiring additional follow-up.  No   Did the patient feel the follow up calls were helpful during their recovery period?  Yes   Was the number of calls appropriate?  Yes   Wrap up additional comments  goals are met and daughter feels she is doing well          Yris Garcia RN

## 2021-06-14 ENCOUNTER — OFFICE VISIT (OUTPATIENT)
Dept: CARDIOLOGY | Facility: CLINIC | Age: 81
End: 2021-06-14

## 2021-06-14 VITALS
HEIGHT: 62 IN | SYSTOLIC BLOOD PRESSURE: 183 MMHG | BODY MASS INDEX: 27.97 KG/M2 | DIASTOLIC BLOOD PRESSURE: 101 MMHG | WEIGHT: 152 LBS | OXYGEN SATURATION: 92 % | HEART RATE: 73 BPM

## 2021-06-14 DIAGNOSIS — I10 ESSENTIAL HYPERTENSION: ICD-10-CM

## 2021-06-14 DIAGNOSIS — J43.1 PANLOBULAR EMPHYSEMA (HCC): ICD-10-CM

## 2021-06-14 DIAGNOSIS — I73.9 PVD (PERIPHERAL VASCULAR DISEASE) WITH CLAUDICATION (HCC): ICD-10-CM

## 2021-06-14 DIAGNOSIS — I31.39 PERICARDIAL EFFUSION: ICD-10-CM

## 2021-06-14 DIAGNOSIS — R06.09 DYSPNEA ON EXERTION: Primary | ICD-10-CM

## 2021-06-14 DIAGNOSIS — E78.5 DYSLIPIDEMIA: ICD-10-CM

## 2021-06-14 DIAGNOSIS — R60.0 EDEMA LEG: ICD-10-CM

## 2021-06-14 DIAGNOSIS — I73.9 PAD (PERIPHERAL ARTERY DISEASE) (HCC): ICD-10-CM

## 2021-06-14 PROCEDURE — 99214 OFFICE O/P EST MOD 30 MIN: CPT | Performed by: INTERNAL MEDICINE

## 2021-06-14 RX ORDER — AMLODIPINE BESYLATE 10 MG/1
10 TABLET ORAL DAILY
Qty: 90 TABLET | Refills: 3 | Status: SHIPPED | OUTPATIENT
Start: 2021-06-14 | End: 2022-06-13

## 2021-06-14 NOTE — PROGRESS NOTES
Subjective:     Encounter Date:06/14/2021      Patient ID: Becka Oliva is a 81 y.o. female.     Chief Complaint : Follow-up for pericardial effusion, hypertension, AAA, COPD and is complaining of dyspnea on exertion, edema and claudication  History of Present Illness        This is a 81-year-old with PMH of    # Pericardial effusion  # hypertension  # COPD  # AAA , endograft repair  # former smoker    Here for follow-up..  Patient is complaining of shortness of breath and dyspnea on exertion relieved with rest, claudication, edema,denies any chest pain, palpitations.  Patient was complaining of swelling and pain and unilateral lower extremity.  Underwent venous Dopplers which revealed fluid in the popliteal fossa no DVT.    Patient's arterial blood pressure is 183/101, heart rate 73, O2 sat of 92 % on room air.    Patient had an echocardiogram 12-2-19 which showed mild pericardial effusion.  Patient had Lexiscan Cardiolite 1/10/2020 which was negative for ischemia    Was in the hospital with in the 1st week of January with cough shortness of breath palpitations and tachycardia with elevated troponin.  Reviewed hospital records, had normal Lexiscan Cardiolite 01/11/20.  Patient reportedly and pulse ox were showing readings of 160 beats per Min.    Labs from 5/13/2 019 reveal normal CMP, BNP 55, cholesterol 217, HDL 66, LDL not to goal at 140.  Repeat labs 9/2/2020 reveal hemoglobin A1c of 5.6.  Chem-7 unremarkable.  Labs from 3/26/2021 reveal Chem-7 with a glucose of 112, calcium of 8, hemoglobin of 11.1.  Labs from 3/23/2021 revealed proBNP of 668.      ASSESSMENT:  #Dyspnea on exertion  #edema  #Claudication  #Pericardial effusion  # hypertension  # COPD, kyphoscoliosis, former smoker  # PAD, AAA repair    PLAN:  Reviewed EKG results with patient  Patient's blood pressure is elevated will add amlodipine 10 mg.  We will schedule a stress test patient has left knee pain cannot walk on treadmill will do Lexiscan  Cardiolite.  Patient is complaining of numbness of the leg history of claudication we will check ABIs.  Continue medical management with aspirin, bisoprolol hydrochlorothiazide, amlodipine to help with blood pressure, dyspnea on exertion, edema, claudication, hypertension, PAD history of AAA repair as tolerated.  Patient's arterial blood pressure is elevated.  Advised her to check blood pressure and call me with readings since patient is saying her blood pressure was normal at home.  Patient has aortic aneurysm would benefit from good blood pressure control, will add amlodipine..  Continue bisoprolol hydrochlorothiazide, aspirin  Patient had BNP level which was normal   Patient would benefit from statins, will check lipid profile and follow-up  Echocardiogram 1/10/19 reveals normal function PA pressures of 30-40 with mild AI/MR/TR and moderate size pericardial effusion, repeat echo 6/1/2019 revealed small pericardial effusion, LVEF of 70%, will repeat echocardiogram.  Review extensive records from recent hospital visit which is been sumarized in the note            The following portions of the patient's history were reviewed and updated as appropriate: allergies, current medications, past family history, past medical history, past social history, past surgical history and problem list.    Assessment:         [facility]     Diagnosis Plan   1. Dyspnea on exertion  Adult Transthoracic Echo Complete W/ Cont if Necessary Per Protocol    Stress Test With Myocardial Perfusion One Day   2. Essential hypertension  Adult Transthoracic Echo Complete W/ Cont if Necessary Per Protocol    Stress Test With Myocardial Perfusion One Day   3. Panlobular emphysema (CMS/HCC)  Adult Transthoracic Echo Complete W/ Cont if Necessary Per Protocol    Stress Test With Myocardial Perfusion One Day   4. Pericardial effusion  Adult Transthoracic Echo Complete W/ Cont if Necessary Per Protocol    Stress Test With Myocardial Perfusion One Day   5.  Edema leg  Adult Transthoracic Echo Complete W/ Cont if Necessary Per Protocol    Stress Test With Myocardial Perfusion One Day   6. Dyslipidemia  Adult Transthoracic Echo Complete W/ Cont if Necessary Per Protocol    Stress Test With Myocardial Perfusion One Day   7. PAD (peripheral artery disease) (CMS/HCC)  Adult Transthoracic Echo Complete W/ Cont if Necessary Per Protocol    Stress Test With Myocardial Perfusion One Day   8. PVD (peripheral vascular disease) with claudication (CMS/HCC)  Doppler Ankle Brachial Index Single Level CAR          Plan:           Procedures EKG done 3/23/2021 reviewed and interpreted by me reveals sinus rhythm with rate of 96 bpm with no acute ST-T changes.    Past Medical History:  Past Medical History:   Diagnosis Date   • Aortic aneurysm (CMS/HCC)    • COPD (chronic obstructive pulmonary disease) (CMS/HCC)    • Hypertension      Past Surgical History:  Past Surgical History:   Procedure Laterality Date   • CARPAL TUNNEL RELEASE     • DESCENDING AORTIC ANEURYSM REPAIR W/ STENT     • OTHER SURGICAL HISTORY      STENT      Allergies:  No Known Allergies  Home Meds:  Current Meds:     Current Outpatient Medications:   •  acetaminophen (TYLENOL) 325 MG tablet, Take 2 tablets by mouth Every 4 (Four) Hours As Needed for Mild Pain ., Disp: , Rfl:   •  aspirin (ASPIR-LOW) 81 MG EC tablet, Take 81 mg by mouth Daily., Disp: , Rfl:   •  bisoprolol-hydrochlorothiazide (ZIAC) 5-6.25 MG per tablet, TAKE 1 TABLET BY MOUTH DAILY. , Disp: 90 tablet, Rfl: 1  •  cholecalciferol (VITAMIN D3) 25 MCG (1000 UT) tablet, Take 1,000 Units by mouth Daily., Disp: , Rfl:   •  ipratropium-albuterol (DUO-NEB) 0.5-2.5 mg/3 ml nebulizer, Take 3 mL by nebulization Every 4 (Four) Hours As Needed for Shortness of Air., Disp: 360 mL, Rfl:   •  montelukast (SINGULAIR) 10 MG tablet, Take 10 mg by mouth Every Night., Disp: , Rfl:   •  pyridoxine (VITAMIN B-6) 100 MG tablet tablet, Take 100 mg by mouth Daily., Disp: , Rfl:  "  •  SYMBICORT 160-4.5 MCG/ACT inhaler, Inhale 2 puffs 2 (Two) Times a Day., Disp: , Rfl:   •  vitamin B-12 (CYANOCOBALAMIN) 1000 MCG tablet, Take 1,000 mcg by mouth Daily., Disp: , Rfl:   •  amLODIPine (NORVASC) 10 MG tablet, Take 1 tablet by mouth Daily., Disp: 90 tablet, Rfl: 3  Social History:   Social History     Tobacco Use   • Smoking status: Former Smoker   • Smokeless tobacco: Never Used   Substance Use Topics   • Alcohol use: No      Family History:  Family History   Problem Relation Age of Onset   • Cancer Other    • Diabetes Other    • Aneurysm Father               Review of Systems   Constitutional: Positive for malaise/fatigue. Negative for fever.   Cardiovascular: Positive for leg swelling. Negative for chest pain, dyspnea on exertion and palpitations.   Respiratory: Positive for shortness of breath. Negative for cough.    Skin: Negative for rash.   Gastrointestinal: Negative for abdominal pain, nausea and vomiting.   Neurological: Positive for light-headedness and numbness. Negative for focal weakness and headaches.   All other systems reviewed and are negative.    All other systems are negative         Objective:     Physical Exam  BP (!) 183/101 (BP Location: Right arm, Patient Position: Sitting, Cuff Size: Adult)   Pulse 73   Ht 157.5 cm (62\")   Wt 68.9 kg (152 lb)   SpO2 92%   BMI 27.80 kg/m²   General:  Appears in no acute distress  Eyes: Sclera is anicteric,  conjunctiva is clear   HEENT:  No JVD.  No carotid bruits  Respiratory: Respirations regular and unlabored at rest.  Clear to auscultation  Cardiovascular: S1,S2 Regular rate and rhythm. No murmur, rub or gallop auscultated.   Gastrointestinal: Abdomen nondistended, soft  Extremities: No digital clubbing or cyanosis, trace edema  Skin: Color pink. Skin warm and dry to touch. No rashes  No xanthoma  Neuro: Alert and awake, no lateralizing deficits appreciated    Lab Reviewed:                  "

## 2021-06-25 ENCOUNTER — HOSPITAL ENCOUNTER (OUTPATIENT)
Dept: CARDIOLOGY | Facility: HOSPITAL | Age: 81
Discharge: HOME | End: 2021-06-25

## 2021-06-25 ENCOUNTER — OFFICE VISIT (OUTPATIENT)
Dept: CARDIOLOGY | Facility: CLINIC | Age: 81
End: 2021-06-25

## 2021-06-25 VITALS
SYSTOLIC BLOOD PRESSURE: 140 MMHG | HEART RATE: 75 BPM | DIASTOLIC BLOOD PRESSURE: 80 MMHG | WEIGHT: 152 LBS | HEIGHT: 62 IN | BODY MASS INDEX: 27.97 KG/M2

## 2021-06-25 VITALS
BODY MASS INDEX: 27.97 KG/M2 | SYSTOLIC BLOOD PRESSURE: 123 MMHG | WEIGHT: 152 LBS | DIASTOLIC BLOOD PRESSURE: 74 MMHG | HEIGHT: 62 IN

## 2021-06-25 DIAGNOSIS — I73.9 PAD (PERIPHERAL ARTERY DISEASE) (HCC): ICD-10-CM

## 2021-06-25 DIAGNOSIS — R60.0 EDEMA LEG: ICD-10-CM

## 2021-06-25 DIAGNOSIS — R06.09 DYSPNEA ON EXERTION: ICD-10-CM

## 2021-06-25 DIAGNOSIS — J43.1 PANLOBULAR EMPHYSEMA (HCC): ICD-10-CM

## 2021-06-25 DIAGNOSIS — I31.39 PERICARDIAL EFFUSION: ICD-10-CM

## 2021-06-25 DIAGNOSIS — I10 ESSENTIAL HYPERTENSION: ICD-10-CM

## 2021-06-25 DIAGNOSIS — E78.5 DYSLIPIDEMIA: ICD-10-CM

## 2021-06-25 DIAGNOSIS — I73.9 PVD (PERIPHERAL VASCULAR DISEASE) WITH CLAUDICATION (HCC): Primary | ICD-10-CM

## 2021-06-25 LAB
BH CV ECHO MEAS - AI DEC SLOPE: 138.5 CM/SEC^2
BH CV ECHO MEAS - AI DEC TIME: 1.9 SEC
BH CV ECHO MEAS - AI MAX PG: 27.9 MMHG
BH CV ECHO MEAS - AI MAX VEL: 264.2 CM/SEC
BH CV ECHO MEAS - AI P1/2T: 558.6 MSEC
BH CV ECHO MEAS - AO MAX PG (FULL): 4.2 MMHG
BH CV ECHO MEAS - AO MAX PG: 10.8 MMHG
BH CV ECHO MEAS - AO MEAN PG (FULL): 1.9 MMHG
BH CV ECHO MEAS - AO MEAN PG: 5.5 MMHG
BH CV ECHO MEAS - AO ROOT AREA (BSA CORRECTED): 2
BH CV ECHO MEAS - AO ROOT AREA: 9.2 CM^2
BH CV ECHO MEAS - AO ROOT DIAM: 3.4 CM
BH CV ECHO MEAS - AO V2 MAX: 164.6 CM/SEC
BH CV ECHO MEAS - AO V2 MEAN: 112.7 CM/SEC
BH CV ECHO MEAS - AO V2 VTI: 32 CM
BH CV ECHO MEAS - AVA(I,A): 2.8 CM^2
BH CV ECHO MEAS - AVA(I,D): 2.8 CM^2
BH CV ECHO MEAS - AVA(V,A): 2.6 CM^2
BH CV ECHO MEAS - AVA(V,D): 2.6 CM^2
BH CV ECHO MEAS - BSA(HAYCOCK): 1.8 M^2
BH CV ECHO MEAS - BSA: 1.7 M^2
BH CV ECHO MEAS - BZI_BMI: 27.8 KILOGRAMS/M^2
BH CV ECHO MEAS - BZI_METRIC_HEIGHT: 157.5 CM
BH CV ECHO MEAS - BZI_METRIC_WEIGHT: 68.9 KG
BH CV ECHO MEAS - EDV(CUBED): 64 ML
BH CV ECHO MEAS - EDV(MOD-SP4): 45.6 ML
BH CV ECHO MEAS - EDV(TEICH): 70 ML
BH CV ECHO MEAS - EF(CUBED): 90.2 %
BH CV ECHO MEAS - EF(MOD-SP4): 88 %
BH CV ECHO MEAS - EF(TEICH): 85.3 %
BH CV ECHO MEAS - ESV(CUBED): 6.3 ML
BH CV ECHO MEAS - ESV(MOD-SP4): 5.5 ML
BH CV ECHO MEAS - ESV(TEICH): 10.3 ML
BH CV ECHO MEAS - FS: 54 %
BH CV ECHO MEAS - IVS/LVPW: 1.1
BH CV ECHO MEAS - IVSD: 1.1 CM
BH CV ECHO MEAS - LA DIMENSION: 4.4 CM
BH CV ECHO MEAS - LA/AO: 1.3
BH CV ECHO MEAS - LV DIASTOLIC VOL/BSA (35-75): 26.8 ML/M^2
BH CV ECHO MEAS - LV MASS(C)D: 126.3 GRAMS
BH CV ECHO MEAS - LV MASS(C)DI: 74.3 GRAMS/M^2
BH CV ECHO MEAS - LV MAX PG: 6.7 MMHG
BH CV ECHO MEAS - LV MEAN PG: 3.6 MMHG
BH CV ECHO MEAS - LV SYSTOLIC VOL/BSA (12-30): 3.2 ML/M^2
BH CV ECHO MEAS - LV V1 MAX: 129.3 CM/SEC
BH CV ECHO MEAS - LV V1 MEAN: 91 CM/SEC
BH CV ECHO MEAS - LV V1 VTI: 27.4 CM
BH CV ECHO MEAS - LVIDD: 4 CM
BH CV ECHO MEAS - LVIDS: 1.8 CM
BH CV ECHO MEAS - LVOT AREA: 3.2 CM^2
BH CV ECHO MEAS - LVOT DIAM: 2 CM
BH CV ECHO MEAS - LVPWD: 0.94 CM
BH CV ECHO MEAS - MV A MAX VEL: 95.5 CM/SEC
BH CV ECHO MEAS - MV DEC SLOPE: 283.5 CM/SEC^2
BH CV ECHO MEAS - MV DEC TIME: 0.26 SEC
BH CV ECHO MEAS - MV E MAX VEL: 73.4 CM/SEC
BH CV ECHO MEAS - MV E/A: 0.77
BH CV ECHO MEAS - MV MAX PG: 2.9 MMHG
BH CV ECHO MEAS - MV MEAN PG: 1.5 MMHG
BH CV ECHO MEAS - MV V2 MAX: 85.6 CM/SEC
BH CV ECHO MEAS - MV V2 MEAN: 58.3 CM/SEC
BH CV ECHO MEAS - MV V2 VTI: 20.8 CM
BH CV ECHO MEAS - MVA(VTI): 4.3 CM^2
BH CV ECHO MEAS - PA ACC TIME: 0.1 SEC
BH CV ECHO MEAS - PA PR(ACCEL): 35.4 MMHG
BH CV ECHO MEAS - RV MAX PG: 2.2 MMHG
BH CV ECHO MEAS - RV MEAN PG: 1.2 MMHG
BH CV ECHO MEAS - RV V1 MAX: 73.8 CM/SEC
BH CV ECHO MEAS - RV V1 MEAN: 51.4 CM/SEC
BH CV ECHO MEAS - RV V1 VTI: 16.9 CM
BH CV ECHO MEAS - RVDD: 2.8 CM
BH CV ECHO MEAS - SI(AO): 173.6 ML/M^2
BH CV ECHO MEAS - SI(CUBED): 34 ML/M^2
BH CV ECHO MEAS - SI(LVOT): 52.3 ML/M^2
BH CV ECHO MEAS - SI(MOD-SP4): 23.6 ML/M^2
BH CV ECHO MEAS - SI(TEICH): 35.1 ML/M^2
BH CV ECHO MEAS - SV(AO): 295.3 ML
BH CV ECHO MEAS - SV(CUBED): 57.8 ML
BH CV ECHO MEAS - SV(LVOT): 89 ML
BH CV ECHO MEAS - SV(MOD-SP4): 40.2 ML
BH CV ECHO MEAS - SV(TEICH): 59.7 ML
MAXIMAL PREDICTED HEART RATE: 138 BPM
STRESS TARGET HR: 117 BPM

## 2021-06-25 PROCEDURE — 78452 HT MUSCLE IMAGE SPECT MULT: CPT

## 2021-06-25 PROCEDURE — 93018 CV STRESS TEST I&R ONLY: CPT | Performed by: INTERNAL MEDICINE

## 2021-06-25 PROCEDURE — 93017 CV STRESS TEST TRACING ONLY: CPT

## 2021-06-25 PROCEDURE — 0 TECHNETIUM SESTAMIBI: Performed by: INTERNAL MEDICINE

## 2021-06-25 PROCEDURE — 93306 TTE W/DOPPLER COMPLETE: CPT

## 2021-06-25 PROCEDURE — 93306 TTE W/DOPPLER COMPLETE: CPT | Performed by: INTERNAL MEDICINE

## 2021-06-25 PROCEDURE — A9500 TC99M SESTAMIBI: HCPCS | Performed by: INTERNAL MEDICINE

## 2021-06-25 PROCEDURE — 78452 HT MUSCLE IMAGE SPECT MULT: CPT | Performed by: INTERNAL MEDICINE

## 2021-06-25 PROCEDURE — 25010000002 REGADENOSON 0.4 MG/5ML SOLUTION: Performed by: INTERNAL MEDICINE

## 2021-06-25 PROCEDURE — 99214 OFFICE O/P EST MOD 30 MIN: CPT | Performed by: INTERNAL MEDICINE

## 2021-06-25 RX ADMIN — TECHNETIUM TC 99M SESTAMIBI 1 DOSE: 1 INJECTION INTRAVENOUS at 10:15

## 2021-06-25 RX ADMIN — REGADENOSON 0.4 MG: 0.08 INJECTION, SOLUTION INTRAVENOUS at 10:15

## 2021-06-25 RX ADMIN — TECHNETIUM TC 99M SESTAMIBI 1 DOSE: 1 INJECTION INTRAVENOUS at 08:05

## 2021-06-28 ENCOUNTER — HOSPITAL ENCOUNTER (OUTPATIENT)
Dept: CARDIOLOGY | Facility: HOSPITAL | Age: 81
Discharge: HOME | End: 2021-06-28
Admitting: INTERNAL MEDICINE

## 2021-06-28 DIAGNOSIS — I73.9 PVD (PERIPHERAL VASCULAR DISEASE) WITH CLAUDICATION (HCC): ICD-10-CM

## 2021-06-28 LAB
BH CV LOWER ARTERIAL LEFT ABI RATIO: 0.74
BH CV LOWER ARTERIAL LEFT CALF RATIO: 0.79
BH CV LOWER ARTERIAL LEFT DORSALIS PEDIS SYS MAX: 92 MMHG
BH CV LOWER ARTERIAL LEFT GREAT TOE SYS MAX: 72 MMHG
BH CV LOWER ARTERIAL LEFT LOW THIGH SYS MAX: 108 MMHG
BH CV LOWER ARTERIAL LEFT POPLITEAL SYS MAX: 98 MMHG
BH CV LOWER ARTERIAL LEFT POST TIBIAL SYS MAX: 81 MMHG
BH CV LOWER ARTERIAL LEFT TBI RATIO: 0.58
BH CV LOWER ARTERIAL RIGHT ABI RATIO: 1.05
BH CV LOWER ARTERIAL RIGHT CALF RATIO: 1.22
BH CV LOWER ARTERIAL RIGHT DORSALIS PEDIS SYS MAX: 125 MMHG
BH CV LOWER ARTERIAL RIGHT GREAT TOE SYS MAX: 73 MMHG
BH CV LOWER ARTERIAL RIGHT LOW THIGH SYS MAX: 168 MMHG
BH CV LOWER ARTERIAL RIGHT POPLITEAL SYS MAX: 151 MMHG
BH CV LOWER ARTERIAL RIGHT POST TIBIAL SYS MAX: 130 MMHG
BH CV LOWER ARTERIAL RIGHT TBI RATIO: 0.59
MAXIMAL PREDICTED HEART RATE: 138 BPM
STRESS TARGET HR: 117 BPM
UPPER ARTERIAL LEFT ARM BRACHIAL SYS MAX: 124 MMHG
UPPER ARTERIAL RIGHT ARM BRACHIAL SYS MAX: 119 MMHG

## 2021-06-28 PROCEDURE — 93922 UPR/L XTREMITY ART 2 LEVELS: CPT

## 2021-07-08 ENCOUNTER — TELEPHONE (OUTPATIENT)
Dept: CARDIOLOGY | Facility: CLINIC | Age: 81
End: 2021-07-08

## 2021-07-08 LAB
BH CV REST NUCLEAR ISOTOPE DOSE: 10 MCI
BH CV STRESS COMMENTS STAGE 1: NORMAL
BH CV STRESS DOSE REGADENOSON STAGE 1: 0.4
BH CV STRESS DURATION MIN STAGE 1: 0
BH CV STRESS DURATION SEC STAGE 1: 10
BH CV STRESS NUCLEAR ISOTOPE DOSE: 32.3 MCI
BH CV STRESS PROTOCOL 1: NORMAL
BH CV STRESS RECOVERY BP: NORMAL MMHG
BH CV STRESS RECOVERY HR: 93 BPM
BH CV STRESS STAGE 1: 1
MAXIMAL PREDICTED HEART RATE: 138 BPM
PERCENT MAX PREDICTED HR: 75.36 %
STRESS BASELINE BP: NORMAL MMHG
STRESS BASELINE HR: 73 BPM
STRESS PERCENT HR: 89 %
STRESS POST PEAK BP: NORMAL MMHG
STRESS POST PEAK HR: 104 BPM
STRESS TARGET HR: 117 BPM

## 2021-07-09 DIAGNOSIS — I73.9 PVD (PERIPHERAL VASCULAR DISEASE) WITH CLAUDICATION (HCC): Primary | ICD-10-CM

## 2021-07-09 DIAGNOSIS — E78.5 DYSLIPIDEMIA: ICD-10-CM

## 2021-07-09 DIAGNOSIS — I10 ESSENTIAL HYPERTENSION: ICD-10-CM

## 2021-07-09 DIAGNOSIS — I73.9 PAD (PERIPHERAL ARTERY DISEASE) (HCC): ICD-10-CM

## 2021-07-09 RX ORDER — CLOPIDOGREL BISULFATE 75 MG/1
75 TABLET ORAL DAILY
Qty: 90 TABLET | Refills: 3 | Status: SHIPPED | OUTPATIENT
Start: 2021-07-09 | End: 2022-08-01

## 2021-07-09 NOTE — TELEPHONE ENCOUNTER
Please ask scheduling to schedule patient for AIF, possible PCI on Wednesday  I have placed the orders  Discussed with patient's daughter explained risk benefits alternatives

## 2021-07-12 ENCOUNTER — TELEPHONE (OUTPATIENT)
Dept: CARDIOLOGY | Facility: CLINIC | Age: 81
End: 2021-07-12

## 2021-07-12 PROBLEM — I73.9 PAD (PERIPHERAL ARTERY DISEASE): Status: ACTIVE | Noted: 2021-07-12

## 2021-07-12 PROBLEM — E78.5 DYSLIPIDEMIA: Status: ACTIVE | Noted: 2021-07-12

## 2021-07-12 PROBLEM — I73.9 PVD (PERIPHERAL VASCULAR DISEASE) WITH CLAUDICATION (HCC): Status: ACTIVE | Noted: 2021-07-12

## 2021-07-12 PROBLEM — I10 ESSENTIAL HYPERTENSION: Status: ACTIVE | Noted: 2021-07-12

## 2021-07-26 ENCOUNTER — LAB (OUTPATIENT)
Dept: LAB | Facility: HOSPITAL | Age: 81
End: 2021-07-26

## 2021-07-26 ENCOUNTER — HOSPITAL ENCOUNTER (OUTPATIENT)
Dept: GENERAL RADIOLOGY | Facility: HOSPITAL | Age: 81
Discharge: HOME | End: 2021-07-26

## 2021-07-26 DIAGNOSIS — I73.9 PVD (PERIPHERAL VASCULAR DISEASE) WITH CLAUDICATION (HCC): ICD-10-CM

## 2021-07-26 DIAGNOSIS — I10 ESSENTIAL HYPERTENSION: ICD-10-CM

## 2021-07-26 DIAGNOSIS — I73.9 PAD (PERIPHERAL ARTERY DISEASE) (HCC): ICD-10-CM

## 2021-07-26 DIAGNOSIS — E78.5 DYSLIPIDEMIA: ICD-10-CM

## 2021-07-26 LAB
ANION GAP SERPL CALCULATED.3IONS-SCNC: 8.7 MMOL/L (ref 5–15)
BUN SERPL-MCNC: 11 MG/DL (ref 8–23)
BUN/CREAT SERPL: 18 (ref 7–25)
CALCIUM SPEC-SCNC: 9.2 MG/DL (ref 8.6–10.5)
CHLORIDE SERPL-SCNC: 101 MMOL/L (ref 98–107)
CO2 SERPL-SCNC: 30.3 MMOL/L (ref 22–29)
CREAT SERPL-MCNC: 0.61 MG/DL (ref 0.57–1)
DEPRECATED RDW RBC AUTO: 43.3 FL (ref 37–54)
ERYTHROCYTE [DISTWIDTH] IN BLOOD BY AUTOMATED COUNT: 12.5 % (ref 12.3–15.4)
GFR SERPL CREATININE-BSD FRML MDRD: 94 ML/MIN/1.73
GLUCOSE SERPL-MCNC: 104 MG/DL (ref 65–99)
HCT VFR BLD AUTO: 40.8 % (ref 34–46.6)
HGB BLD-MCNC: 13.7 G/DL (ref 12–15.9)
INR PPP: 0.98 (ref 0.93–1.1)
MCH RBC QN AUTO: 31.7 PG (ref 26.6–33)
MCHC RBC AUTO-ENTMCNC: 33.6 G/DL (ref 31.5–35.7)
MCV RBC AUTO: 94.4 FL (ref 79–97)
PLATELET # BLD AUTO: 291 10*3/MM3 (ref 140–450)
PMV BLD AUTO: 10.5 FL (ref 6–12)
POTASSIUM SERPL-SCNC: 4.2 MMOL/L (ref 3.5–5.2)
PROTHROMBIN TIME: 10.9 SECONDS (ref 9.6–11.7)
RBC # BLD AUTO: 4.32 10*6/MM3 (ref 3.77–5.28)
SODIUM SERPL-SCNC: 140 MMOL/L (ref 136–145)
WBC # BLD AUTO: 6.66 10*3/MM3 (ref 3.4–10.8)

## 2021-07-26 PROCEDURE — 85027 COMPLETE CBC AUTOMATED: CPT

## 2021-07-26 PROCEDURE — 71046 X-RAY EXAM CHEST 2 VIEWS: CPT

## 2021-07-26 PROCEDURE — 85610 PROTHROMBIN TIME: CPT

## 2021-07-26 PROCEDURE — 36415 COLL VENOUS BLD VENIPUNCTURE: CPT

## 2021-07-26 PROCEDURE — 80048 BASIC METABOLIC PNL TOTAL CA: CPT

## 2021-07-28 ENCOUNTER — HOSPITAL ENCOUNTER (OUTPATIENT)
Facility: HOSPITAL | Age: 81
Setting detail: HOSPITAL OUTPATIENT SURGERY
Discharge: HOME | End: 2021-07-28
Attending: INTERNAL MEDICINE | Admitting: INTERNAL MEDICINE

## 2021-07-28 VITALS
DIASTOLIC BLOOD PRESSURE: 57 MMHG | OXYGEN SATURATION: 94 % | WEIGHT: 152.56 LBS | RESPIRATION RATE: 16 BRPM | BODY MASS INDEX: 28.8 KG/M2 | TEMPERATURE: 97.1 F | SYSTOLIC BLOOD PRESSURE: 126 MMHG | HEIGHT: 61 IN | HEART RATE: 71 BPM

## 2021-07-28 DIAGNOSIS — E78.5 DYSLIPIDEMIA: ICD-10-CM

## 2021-07-28 DIAGNOSIS — I73.9 PAD (PERIPHERAL ARTERY DISEASE) (HCC): ICD-10-CM

## 2021-07-28 DIAGNOSIS — I73.9 PVD (PERIPHERAL VASCULAR DISEASE) WITH CLAUDICATION (HCC): ICD-10-CM

## 2021-07-28 DIAGNOSIS — I10 ESSENTIAL HYPERTENSION: ICD-10-CM

## 2021-07-28 PROCEDURE — 36200 PLACE CATHETER IN AORTA: CPT | Performed by: INTERNAL MEDICINE

## 2021-07-28 PROCEDURE — 99153 MOD SED SAME PHYS/QHP EA: CPT | Performed by: INTERNAL MEDICINE

## 2021-07-28 PROCEDURE — 75716 ARTERY X-RAYS ARMS/LEGS: CPT | Performed by: INTERNAL MEDICINE

## 2021-07-28 PROCEDURE — 25010000002 FENTANYL CITRATE (PF) 50 MCG/ML SOLUTION: Performed by: INTERNAL MEDICINE

## 2021-07-28 PROCEDURE — 75625 CONTRAST EXAM ABDOMINL AORTA: CPT | Performed by: INTERNAL MEDICINE

## 2021-07-28 PROCEDURE — C1894 INTRO/SHEATH, NON-LASER: HCPCS | Performed by: INTERNAL MEDICINE

## 2021-07-28 PROCEDURE — C1769 GUIDE WIRE: HCPCS | Performed by: INTERNAL MEDICINE

## 2021-07-28 PROCEDURE — C1887 CATHETER, GUIDING: HCPCS | Performed by: INTERNAL MEDICINE

## 2021-07-28 PROCEDURE — 25010000002 MIDAZOLAM PER 1 MG: Performed by: INTERNAL MEDICINE

## 2021-07-28 PROCEDURE — 99152 MOD SED SAME PHYS/QHP 5/>YRS: CPT | Performed by: INTERNAL MEDICINE

## 2021-07-28 PROCEDURE — 0 IOPAMIDOL PER 1 ML: Performed by: INTERNAL MEDICINE

## 2021-07-28 RX ORDER — LIDOCAINE HYDROCHLORIDE 20 MG/ML
INJECTION, SOLUTION INFILTRATION; PERINEURAL AS NEEDED
Status: DISCONTINUED | OUTPATIENT
Start: 2021-07-28 | End: 2021-07-28 | Stop reason: HOSPADM

## 2021-07-28 RX ORDER — FENTANYL CITRATE 50 UG/ML
INJECTION, SOLUTION INTRAMUSCULAR; INTRAVENOUS AS NEEDED
Status: DISCONTINUED | OUTPATIENT
Start: 2021-07-28 | End: 2021-07-28 | Stop reason: HOSPADM

## 2021-07-28 RX ORDER — MIDAZOLAM HYDROCHLORIDE 1 MG/ML
INJECTION INTRAMUSCULAR; INTRAVENOUS AS NEEDED
Status: DISCONTINUED | OUTPATIENT
Start: 2021-07-28 | End: 2021-07-28 | Stop reason: HOSPADM

## 2021-07-28 RX ORDER — ACETAMINOPHEN 325 MG/1
650 TABLET ORAL EVERY 4 HOURS PRN
Status: DISCONTINUED | OUTPATIENT
Start: 2021-07-28 | End: 2021-07-29 | Stop reason: HOSPADM

## 2021-07-28 RX ORDER — SODIUM CHLORIDE 9 MG/ML
100 INJECTION, SOLUTION INTRAVENOUS CONTINUOUS
Status: DISCONTINUED | OUTPATIENT
Start: 2021-07-28 | End: 2021-07-29 | Stop reason: HOSPADM

## 2021-07-28 RX ORDER — ROSUVASTATIN CALCIUM 10 MG/1
10 TABLET, COATED ORAL DAILY
Qty: 90 TABLET | Refills: 3 | Status: SHIPPED | OUTPATIENT
Start: 2021-07-28 | End: 2022-07-26

## 2021-07-28 RX ORDER — FENTANYL CITRATE 50 UG/ML
25 INJECTION, SOLUTION INTRAMUSCULAR; INTRAVENOUS
Status: DISCONTINUED | OUTPATIENT
Start: 2021-07-28 | End: 2021-07-29 | Stop reason: HOSPADM

## 2021-07-28 RX ORDER — SODIUM CHLORIDE 9 MG/ML
250 INJECTION, SOLUTION INTRAVENOUS ONCE AS NEEDED
Status: DISCONTINUED | OUTPATIENT
Start: 2021-07-28 | End: 2021-07-29 | Stop reason: HOSPADM

## 2021-07-28 RX ADMIN — FENTANYL CITRATE 25 MCG: 50 INJECTION, SOLUTION INTRAMUSCULAR; INTRAVENOUS at 12:50

## 2021-07-28 RX ADMIN — ACETAMINOPHEN 650 MG: 325 TABLET, FILM COATED ORAL at 13:22

## 2021-08-12 RX ORDER — BISOPROLOL FUMARATE AND HYDROCHLOROTHIAZIDE 5; 6.25 MG/1; MG/1
1 TABLET ORAL DAILY
Qty: 90 TABLET | Refills: 2 | Status: SHIPPED | OUTPATIENT
Start: 2021-08-12 | End: 2022-05-24

## 2021-08-12 NOTE — TELEPHONE ENCOUNTER
Rx Refill Note  Requested Prescriptions     Pending Prescriptions Disp Refills   • bisoprolol-hydrochlorothiazide (ZIAC) 5-6.25 MG per tablet [Pharmacy Med Name: BISOPROLOL FUMARATE/HYDROCHLOROTHIAZIDE 5-6.25MG TABLET] 90 tablet 1     Sig: TAKE 1 TABLET BY MOUTH DAILY.      Last office visit with prescribing clinician: 6/25/2021      Next office visit with prescribing clinician: 8/23/2021            Delma Calvillo MA  08/12/21, 15:40 EDT

## 2021-08-23 ENCOUNTER — OFFICE VISIT (OUTPATIENT)
Dept: CARDIOLOGY | Facility: CLINIC | Age: 81
End: 2021-08-23

## 2021-08-23 VITALS
HEIGHT: 61 IN | OXYGEN SATURATION: 94 % | HEART RATE: 72 BPM | DIASTOLIC BLOOD PRESSURE: 72 MMHG | WEIGHT: 157 LBS | BODY MASS INDEX: 29.64 KG/M2 | SYSTOLIC BLOOD PRESSURE: 119 MMHG

## 2021-08-23 DIAGNOSIS — J43.1 PANLOBULAR EMPHYSEMA (HCC): ICD-10-CM

## 2021-08-23 DIAGNOSIS — I10 ESSENTIAL HYPERTENSION: ICD-10-CM

## 2021-08-23 DIAGNOSIS — I73.9 PVD (PERIPHERAL VASCULAR DISEASE) WITH CLAUDICATION (HCC): Primary | ICD-10-CM

## 2021-08-23 DIAGNOSIS — I73.9 PAD (PERIPHERAL ARTERY DISEASE) (HCC): ICD-10-CM

## 2021-08-23 DIAGNOSIS — E78.5 DYSLIPIDEMIA: ICD-10-CM

## 2021-08-23 DIAGNOSIS — I31.39 PERICARDIAL EFFUSION: ICD-10-CM

## 2021-08-23 PROCEDURE — 99214 OFFICE O/P EST MOD 30 MIN: CPT | Performed by: INTERNAL MEDICINE

## 2021-08-23 NOTE — PROGRESS NOTES
Subjective:     Encounter Date:08/23/2021      Patient ID: Becka Oliva is a 81 y.o. female.    Chief Complaint : Follow-up for PAD, pericardial effusion, hypertension  History of Present Illness      Patient was complaining of claudication underwent HERBERT which showed abnormal on the left at 0.74.    Underwent AIF which showed 100% occluded left common iliac stent       This is a 81-year-old with PMH of     # Pericardial effusion  # hypertension  # COPD  #PAD, AAA , endograft repair with by common iliac stent  # former smoker     Here for  follow-up.  Patient is complaining of claudication on medical management with clopidogrel, amlodipine, aspirin, underwent HERBERT which was abnormal here for AIF..  Patient was was complaining of shortness of breath and dyspnea on exertion relieved with rest, claudication, edema,denies any chest pain, palpitations.  Patient was complaining of swelling and pain and unilateral lower extremity.  Underwent venous Dopplers which revealed fluid in the popliteal fossa no DVT.     Patient had an echocardiogram 12-2-19 which showed mild pericardial effusion.  Patient had Lexiscan Cardiolite 1/10/2020 which was negative for ischemia     Labs from 5/13/2 019 reveal normal CMP, BNP 55, cholesterol 217, HDL 66, LDL not to goal at 140.  Repeat labs 9/2/2020 reveal hemoglobin A1c of 5.6.  Chem-7 unremarkable.  Labs from 3/26/2021 reveal Chem-7 with a glucose of 112, calcium of 8, hemoglobin of 11.1.  Labs from 3/23/2021 revealed proBNP of 668.  Labs from 7/26/2021 revealed normal Chem-7 and CBC     Patient underwent echocardiogram and Lexiscan Cardiolite 6/25/2021 both of which were normal.        ASSESSMENT:  #Claudication, PAD, abnormal HERBERT, occluded left common iliac in-stent  #edema  #Shortness of breath  #Pericardial effusion  # hypertension  # COPD, kyphoscoliosis, former smoker  # PAD, AAA repair     PLAN:  Reviewed   AI films with patient.  We will send patient to vascular  surgery.  Continue medical management with aspirin, clopidogrel, bisoprolol hydrochlorothiazide, amlodipine to help with blood pressure, dyspnea on exertion, edema, claudication, hypertension, PAD history of AAA repair as tolerated.  Patient had BNP level which was normal   Patient would benefit from statins, will get lipid profile from [facility] office  Echocardiogram 1/10/19 reveals normal function PA pressures of 30-40 with mild AI/MR/TR and moderate size pericardial effusion, repeat echo 6/1/2019 revealed small pericardial effusion, LVEF of 70%     Lexiscan Cardiolite 6/25/2021 was negative for ischemia EF of 67%  Echocardiogram 6/25/2021 reviewed and interpreted by me reveals normal LV systolic function with LVEF of 70% with small pericardial effusion       Procedures   Procedures Lexiscan Cardiolite 6/25/2021 was negative for ischemia EF of 67%  Echocardiogram 6/25/2021 reviewed and interpreted by me reveals normal LV systolic function with LVEF of 70% with small pericardial effusion    The following portions of the patient's history were reviewed and updated as appropriate: allergies, current medications, past family history, past medical history, past social history, past surgical history and problem list.    Assessment:         [facility]     Diagnosis Plan   1. PVD (peripheral vascular disease) with claudication (CMS/[facility])  Ambulatory Referral to Vascular Surgery   2. Dyslipidemia  Ambulatory Referral to Vascular Surgery   3. Essential hypertension  Ambulatory Referral to Vascular Surgery   4. PAD (peripheral artery disease) (CMS/[facility])  Ambulatory Referral to Vascular Surgery   5. Panlobular emphysema (CMS/[facility])  Ambulatory Referral to Vascular Surgery   6. Pericardial effusion  Ambulatory Referral to Vascular Surgery          Plan:               Past Medical History:  Past Medical History:   Diagnosis Date   • Aortic aneurysm (CMS/[facility])    • COPD (chronic obstructive pulmonary disease) (CMS/[facility])    • Hernia, inguinal,  left    • Hypertension      Past Surgical History:  Past Surgical History:   Procedure Laterality Date   • CARPAL TUNNEL RELEASE     • DESCENDING AORTIC ANEURYSM REPAIR W/ STENT     • INTERVENTIONAL RADIOLOGY PROCEDURE N/A 7/28/2021    Procedure: Abdominal Aortagram with Runoff, possible PCI;  Surgeon: Kyrie Campoverde MD;  Location: [facility] INVASIVE LOCATION;  Service: Cardiovascular;  Laterality: N/A;   • KNEE SURGERY  2020   • OTHER SURGICAL HISTORY      STENT      Allergies:  No Known Allergies  Home Meds:  Current Meds:     Current Outpatient Medications:   •  amLODIPine (NORVASC) 10 MG tablet, Take 1 tablet by mouth Daily., Disp: 90 tablet, Rfl: 3  •  aspirin (ASPIR-LOW) 81 MG EC tablet, Take 81 mg by mouth Daily., Disp: , Rfl:   •  bisoprolol-hydrochlorothiazide (ZIAC) 5-6.25 MG per tablet, TAKE 1 TABLET BY MOUTH DAILY. , Disp: 90 tablet, Rfl: 2  •  cholecalciferol (VITAMIN D3) 25 MCG (1000 UT) tablet, Take 1,000 Units by mouth Daily., Disp: , Rfl:   •  clopidogrel (PLAVIX) 75 MG tablet, Take 1 tablet by mouth Daily., Disp: 90 tablet, Rfl: 3  •  Multiple Vitamins-Minerals (PRESERVISION AREDS 2+MULTI VIT PO), Take  by mouth., Disp: , Rfl:   •  pyridoxine (VITAMIN B-6) 100 MG tablet tablet, Take 100 mg by mouth Daily., Disp: , Rfl:   •  rosuvastatin (CRESTOR) 10 MG tablet, Take 1 tablet by mouth Daily., Disp: 90 tablet, Rfl: 3  •  Trelegy Ellipta 100-62.5-25 MCG/INH inhaler, , Disp: , Rfl:   •  vitamin B-12 (CYANOCOBALAMIN) 1000 MCG tablet, Take 1,000 mcg by mouth Daily., Disp: , Rfl:   •  acetaminophen (TYLENOL) 325 MG tablet, Take 2 tablets by mouth Every 4 (Four) Hours As Needed for Mild Pain ., Disp: , Rfl:   •  ipratropium-albuterol (DUO-NEB) 0.5-2.5 mg/3 ml nebulizer, Take 3 mL by nebulization Every 4 (Four) Hours As Needed for Shortness of Air., Disp: 360 mL, Rfl:   Social History:   Social History     Tobacco Use   • Smoking status: Former Smoker     Packs/day: 1.00     Years: 30.00      "Pack years: 30.00     Types: Cigarettes   • Smokeless tobacco: Never Used   Substance Use Topics   • Alcohol use: No      Family History:  Family History   Problem Relation Age of Onset   • Cancer Other    • Diabetes Other    • Aneurysm Father               Review of Systems   Constitutional: Positive for malaise/fatigue.   Cardiovascular: Positive for leg swelling. Negative for chest pain and palpitations.   Respiratory: Positive for shortness of breath.    Skin: Negative for rash.   Neurological: Positive for dizziness and light-headedness. Negative for numbness.     All other systems are negative         Objective:     Physical Exam  /72   Pulse 72   Ht 153.7 cm (60.5\")   Wt 71.2 kg (157 lb)   SpO2 94%   BMI 30.16 kg/m²   General:  Appears in no acute distress  Eyes: Sclera is anicteric,  conjunctiva is clear   HEENT:  No JVD.  No carotid bruits  Respiratory: Respirations regular and unlabored at rest.  Clear to auscultation  Cardiovascular: S1,S2 Regular rate and rhythm. No murmur, rub or gallop auscultated.   Extremities: No digital clubbing or cyanosis, no edema  Skin: Color pink. Skin warm and dry to touch. No rashes  No xanthoma  Neuro: Alert and awake, no lateralizing deficits appreciated    Lab Reviewed:                  "

## 2021-09-08 ENCOUNTER — TELEPHONE (OUTPATIENT)
Dept: CARDIOLOGY | Facility: CLINIC | Age: 81
End: 2021-09-08

## 2021-09-09 ENCOUNTER — OFFICE VISIT (OUTPATIENT)
Dept: SURGERY | Facility: CLINIC | Age: 81
End: 2021-09-09

## 2021-09-09 ENCOUNTER — PREP FOR SURGERY (OUTPATIENT)
Dept: OTHER | Facility: HOSPITAL | Age: 81
End: 2021-09-09

## 2021-09-09 VITALS
HEIGHT: 61 IN | SYSTOLIC BLOOD PRESSURE: 155 MMHG | BODY MASS INDEX: 29 KG/M2 | WEIGHT: 153.6 LBS | OXYGEN SATURATION: 96 % | TEMPERATURE: 97.7 F | DIASTOLIC BLOOD PRESSURE: 84 MMHG | HEART RATE: 74 BPM

## 2021-09-09 DIAGNOSIS — K40.90 LEFT INGUINAL HERNIA: Primary | ICD-10-CM

## 2021-09-09 PROCEDURE — 99204 OFFICE O/P NEW MOD 45 MIN: CPT | Performed by: STUDENT IN AN ORGANIZED HEALTH CARE EDUCATION/TRAINING PROGRAM

## 2021-09-09 NOTE — PROGRESS NOTES
"Chief Complaint  New Patient (Inguinal Hernia)    Savannah Oliva presents to [facility] GENERAL SURGERY  History of Present Illness    81-year-old lady with history of peripheral vascular disease on aspirin Plavix, known left iliac stent occlusion, COPD on duo nebs not on home oxygen, aortic aneurysm status post EVAR, chronic kidney disease who presents to my office to discuss her left inguinal hernia.  She is thinks this is been present for at least a few months, but probably a few years.  Causing her significant amount of pain especially when up and walking around for long amounts of time.  She denies nausea, vomiting or obstipation.  CT abdomen pelvis with large left inguinal hernia containing small bowel.            Objective   Vital Signs:   /84 (Cuff Size: Adult)   Pulse 74   Temp 97.7 °F (36.5 °C) (Infrared)   Ht 153.7 cm (60.5\")   Wt 69.7 kg (153 lb 9.6 oz)   SpO2 96%   BMI 29.50 kg/m²     Physical Exam  Constitutional:       General: She is not in acute distress.     Appearance: Normal appearance. She is not ill-appearing.   HENT:      Head: Normocephalic and atraumatic.      Right Ear: External ear normal.      Left Ear: External ear normal.   Eyes:      Extraocular Movements: Extraocular movements intact.      Conjunctiva/sclera: Conjunctivae normal.   Cardiovascular:      Rate and Rhythm: Normal rate and regular rhythm.   Pulmonary:      Effort: Pulmonary effort is normal. No respiratory distress.   Abdominal:      General: There is no distension.      Palpations: Abdomen is soft.      Tenderness: There is no abdominal tenderness.      Comments: Nonreducible left inguinal hernia, no overlying skin changes, no abdominal pain or distention   Musculoskeletal:         General: No swelling or deformity.   Skin:     General: Skin is warm and dry.   Neurological:      Mental Status: She is alert and oriented to person, place, and time. Mental status is at " baseline.        Result Review :   The following data was reviewed by: Jasper Mcneill MD on 09/09/2021:  Common labs    Common Labsle 3/25/21 3/25/21 3/26/21 3/26/21 7/26/21 7/26/21    0338 [id number] 0335 0335 1147 1147   Glucose  98  112 (A)  104 (A)   BUN  8  7 (A)  11   Creatinine  0.56 (A)  0.48 (A)  0.61   eGFR Non  Am  104  124  94   Sodium  133 (A)  137  140   Potassium  4.3  3.5  4.2   Chloride  98  102  101   Calcium  8.3 (A)  8.0 (A)  9.2   WBC 8.40  4.70  6.66    Hemoglobin 11.2 (A)  11.1 (A)  13.7    Hematocrit 33.9 (A)  33.8 (A)  40.8    Platelets 210  181  291    (A) Abnormal value            Images as well as read of her recent CT abdomen and pelvis, procedure note from her recent vascular angiogram, last cardiology note, stress test report, cardiac echo report         Assessment and Plan    Diagnoses and all orders for this visit:    1. Left inguinal hernia (Primary)      81-year-old lady with history of peripheral vascular disease on aspirin Plavix, known left iliac stent occlusion, COPD on duo nebs not on home oxygen, aortic aneurysm status post EVAR, chronic kidney disease who presents to my office to discuss her left inguinal hernia.  She is thinks this is been present for at least a few months, but probably a few years.  Causing her significant amount of pain especially when up and walking around for long amounts of time.  She denies nausea, vomiting or obstipation.  CT abdomen pelvis with large left inguinal hernia containing small bowel.  Hernia containing large amount of small bowel, with relatively narrow neck of the hernia putting bowel at risk of relation.  Patient with relatively good functional status given her age, with recent cardiac work-up with no ischemia.  Will reach out to her cardiologist for preoperative optimization and clearance and to see if we can stop her Plavix, will also reach out to her vascular surgeon to see if we can stop her Plavix before surgery.  Plan for  robotic left inguinal hernia repair with mesh if cleared and able to stop Plavix.  I discussed risk benefits and alternatives to robotic inguinal hernia repair, including bowel injury, mesh infection requiring mesh excision, recurrence, bleeding and chronic inguinal pain resulting from nerve injury and patient elected to proceed.         Follow Up   Return for Scheduled date of surgery.  Patient was given instructions and counseling regarding her condition or for health maintenance advice. Please see specific information pulled into the AVS if appropriate.

## 2021-09-10 ENCOUNTER — TELEPHONE (OUTPATIENT)
Dept: CARDIOLOGY | Facility: CLINIC | Age: 81
End: 2021-09-10

## 2021-09-10 NOTE — TELEPHONE ENCOUNTER
DR. WOO VARELA   PHONE [phone]  [phone]  INGUINAL HERNIA REPAIR LAPAROSCOPIC WITH DAVINCI ROBOT   LOV 8/23/21

## 2021-09-14 PROBLEM — K40.90 LEFT INGUINAL HERNIA: Status: ACTIVE | Noted: 2021-09-14

## 2021-09-21 ENCOUNTER — TRANSCRIBE ORDERS (OUTPATIENT)
Dept: ADMINISTRATIVE | Facility: HOSPITAL | Age: 81
End: 2021-09-21

## 2021-09-21 ENCOUNTER — TELEPHONE (OUTPATIENT)
Dept: CARDIOLOGY | Facility: CLINIC | Age: 81
End: 2021-09-21

## 2021-09-21 ENCOUNTER — LAB (OUTPATIENT)
Dept: LAB | Facility: HOSPITAL | Age: 81
End: 2021-09-21

## 2021-09-21 DIAGNOSIS — Z01.818 PREOP EXAMINATION: Primary | ICD-10-CM

## 2021-09-21 DIAGNOSIS — Z01.818 PREOP EXAMINATION: ICD-10-CM

## 2021-09-21 LAB
ALBUMIN SERPL-MCNC: 4.5 G/DL (ref 3.5–5.2)
ALBUMIN/GLOB SERPL: 1.5 G/DL
ALP SERPL-CCNC: 81 U/L (ref 39–117)
ALT SERPL W P-5'-P-CCNC: 14 U/L (ref 1–33)
ANION GAP SERPL CALCULATED.3IONS-SCNC: 9.2 MMOL/L (ref 5–15)
APTT PPP: 26.2 SECONDS (ref 24–31)
AST SERPL-CCNC: 24 U/L (ref 1–32)
BASOPHILS # BLD AUTO: 0.07 10*3/MM3 (ref 0–0.2)
BASOPHILS NFR BLD AUTO: 0.9 % (ref 0–1.5)
BILIRUB SERPL-MCNC: 0.5 MG/DL (ref 0–1.2)
BUN SERPL-MCNC: 14 MG/DL (ref 8–23)
BUN/CREAT SERPL: 25.9 (ref 7–25)
CALCIUM SPEC-SCNC: 9.8 MG/DL (ref 8.6–10.5)
CHLORIDE SERPL-SCNC: 99 MMOL/L (ref 98–107)
CO2 SERPL-SCNC: 29.8 MMOL/L (ref 22–29)
CREAT SERPL-MCNC: 0.54 MG/DL (ref 0.57–1)
DEPRECATED RDW RBC AUTO: 42.6 FL (ref 37–54)
EOSINOPHIL # BLD AUTO: 0.09 10*3/MM3 (ref 0–0.4)
EOSINOPHIL NFR BLD AUTO: 1.1 % (ref 0.3–6.2)
ERYTHROCYTE [DISTWIDTH] IN BLOOD BY AUTOMATED COUNT: 12.2 % (ref 12.3–15.4)
GFR SERPL CREATININE-BSD FRML MDRD: 108 ML/MIN/1.73
GLOBULIN UR ELPH-MCNC: 3 GM/DL
GLUCOSE SERPL-MCNC: 91 MG/DL (ref 65–99)
HCT VFR BLD AUTO: 43.6 % (ref 34–46.6)
HGB BLD-MCNC: 14.7 G/DL (ref 12–15.9)
IMM GRANULOCYTES # BLD AUTO: 0.03 10*3/MM3 (ref 0–0.05)
IMM GRANULOCYTES NFR BLD AUTO: 0.4 % (ref 0–0.5)
INR PPP: 1.05 (ref 0.93–1.1)
LYMPHOCYTES # BLD AUTO: 2.18 10*3/MM3 (ref 0.7–3.1)
LYMPHOCYTES NFR BLD AUTO: 27.6 % (ref 19.6–45.3)
MCH RBC QN AUTO: 32.4 PG (ref 26.6–33)
MCHC RBC AUTO-ENTMCNC: 33.7 G/DL (ref 31.5–35.7)
MCV RBC AUTO: 96 FL (ref 79–97)
MONOCYTES # BLD AUTO: 0.73 10*3/MM3 (ref 0.1–0.9)
MONOCYTES NFR BLD AUTO: 9.2 % (ref 5–12)
NEUTROPHILS NFR BLD AUTO: 4.8 10*3/MM3 (ref 1.7–7)
NEUTROPHILS NFR BLD AUTO: 60.8 % (ref 42.7–76)
NRBC BLD AUTO-RTO: 0 /100 WBC (ref 0–0.2)
PLATELET # BLD AUTO: 279 10*3/MM3 (ref 140–450)
PMV BLD AUTO: 10.2 FL (ref 6–12)
POTASSIUM SERPL-SCNC: 4.5 MMOL/L (ref 3.5–5.2)
PROT SERPL-MCNC: 7.5 G/DL (ref 6–8.5)
PROTHROMBIN TIME: 11.6 SECONDS (ref 9.6–11.7)
RBC # BLD AUTO: 4.54 10*6/MM3 (ref 3.77–5.28)
SODIUM SERPL-SCNC: 138 MMOL/L (ref 136–145)
WBC # BLD AUTO: 7.9 10*3/MM3 (ref 3.4–10.8)

## 2021-09-21 PROCEDURE — 36415 COLL VENOUS BLD VENIPUNCTURE: CPT

## 2021-09-21 PROCEDURE — 85025 COMPLETE CBC W/AUTO DIFF WBC: CPT

## 2021-09-21 PROCEDURE — 80053 COMPREHEN METABOLIC PANEL: CPT

## 2021-09-21 PROCEDURE — 85730 THROMBOPLASTIN TIME PARTIAL: CPT

## 2021-09-21 PROCEDURE — 85610 PROTHROMBIN TIME: CPT

## 2021-09-21 NOTE — TELEPHONE ENCOUNTER
Jamee Perez NP ( I think that was the name) LM  Asking if pt needs to be seen by Dr Campoverde or if she is cleared for vascular surgery ... L iliac artery occlusion     Surgery Monday the 27th     Phone number [phone]

## 2021-09-23 ENCOUNTER — TRANSCRIBE ORDERS (OUTPATIENT)
Dept: ADMINISTRATIVE | Facility: HOSPITAL | Age: 81
End: 2021-09-23

## 2021-09-23 ENCOUNTER — LAB (OUTPATIENT)
Dept: LAB | Facility: HOSPITAL | Age: 81
End: 2021-09-23

## 2021-09-23 DIAGNOSIS — Z11.52 ENCOUNTER FOR SCREENING FOR SEVERE ACUTE RESPIRATORY SYNDROME CORONAVIRUS 2 (SARS-COV-2) INFECTION: ICD-10-CM

## 2021-09-23 DIAGNOSIS — Z11.52 ENCOUNTER FOR SCREENING FOR SEVERE ACUTE RESPIRATORY SYNDROME CORONAVIRUS 2 (SARS-COV-2) INFECTION: Primary | ICD-10-CM

## 2021-09-23 LAB — SARS-COV-2 ORF1AB RESP QL NAA+PROBE: NOT DETECTED

## 2021-09-23 PROCEDURE — C9803 HOPD COVID-19 SPEC COLLECT: HCPCS

## 2021-09-23 PROCEDURE — U0004 COV-19 TEST NON-CDC HGH THRU: HCPCS

## 2021-09-23 PROCEDURE — U0005 INFEC AGEN DETEC AMPLI PROBE: HCPCS

## 2021-09-24 NOTE — TELEPHONE ENCOUNTER
CALLED KARISHMA SHE GAVE ME OFFICE NUMBER ([facility]) TO CALL AND GET FAX NUMBER   [phone]    Faxed clearance and I put in box to be scanned

## 2021-10-19 ENCOUNTER — APPOINTMENT (OUTPATIENT)
Dept: VASCULAR SURGERY | Facility: HOSPITAL | Age: 81
End: 2021-10-19

## 2022-01-16 PROCEDURE — U0004 COV-19 TEST NON-CDC HGH THRU: HCPCS | Performed by: FAMILY MEDICINE

## 2022-01-16 PROCEDURE — U0005 INFEC AGEN DETEC AMPLI PROBE: HCPCS | Performed by: FAMILY MEDICINE

## 2022-02-21 ENCOUNTER — OFFICE VISIT (OUTPATIENT)
Dept: CARDIOLOGY | Facility: CLINIC | Age: 82
End: 2022-02-21

## 2022-02-21 VITALS
WEIGHT: 156 LBS | BODY MASS INDEX: 27.64 KG/M2 | OXYGEN SATURATION: 92 % | HEIGHT: 63 IN | DIASTOLIC BLOOD PRESSURE: 75 MMHG | HEART RATE: 69 BPM | SYSTOLIC BLOOD PRESSURE: 138 MMHG

## 2022-02-21 DIAGNOSIS — I73.9 PAD (PERIPHERAL ARTERY DISEASE): ICD-10-CM

## 2022-02-21 DIAGNOSIS — R42 DIZZINESS: Primary | ICD-10-CM

## 2022-02-21 DIAGNOSIS — J43.1 PANLOBULAR EMPHYSEMA: ICD-10-CM

## 2022-02-21 DIAGNOSIS — I10 ESSENTIAL HYPERTENSION: ICD-10-CM

## 2022-02-21 PROCEDURE — 99214 OFFICE O/P EST MOD 30 MIN: CPT | Performed by: INTERNAL MEDICINE

## 2022-02-21 PROCEDURE — 93000 ELECTROCARDIOGRAM COMPLETE: CPT | Performed by: INTERNAL MEDICINE

## 2022-02-21 NOTE — PROGRESS NOTES
Subjective:     Encounter Date:02/21/2022      Patient ID: Becka Oliva is a 82 y.o. female.    Chief Complaint : Follow-up for PAD, hypertension, pericardial effusion  History of Present Illness      Becka Oliva is a 81 y.o. female.      # Pericardial effusion  # hypertension  # COPD  #PAD, AAA , endograft repair with by common iliac stent  # former smoker     Here for  follow-up.  Patient is complaining of dyspnea on exertion relieved with rest.  Occasional dizziness.  Denies any chest pain or loss of consciousness.    Patient had an echocardiogram 12-2-19 which showed mild pericardial effusion.  Patient had Lexiscan Cardiolite 1/10/2020 which was negative for ischemia     Labs from 5/13/2 019 reveal normal CMP, BNP 55, cholesterol 217, HDL 66, LDL not to goal at 140.  Repeat labs 9/2/2020 reveal hemoglobin A1c of 5.6.  Chem-7 unremarkable.  Labs from 3/26/2021 reveal Chem-7 with a glucose of 112, calcium of 8, hemoglobin of 11.1.  Labs from 3/23/2021 revealed proBNP of 668.  Labs from 7/26/2021 revealed normal Chem-7 and CBC.  Labs from 9/28/2021 reveal cholesterol 67, triglycerides 57, HDL 32, LDL 24.     Patient underwent echocardiogram and Lexiscan Cardiolite 6/25/2021 both of which were normal.        ASSESSMENT:    -Dizziness  #Pericardial effusion  # hypertension  # COPD, kyphoscoliosis, former smoker  # PAD, AAA repair     PLAN:    Continue medical management with aspirin, clopidogrel, bisoprolol hydrochlorothiazide, amlodipine, Crestor to help with blood pressure, dyspnea on exertion, edema, claudication, hypertension, dyslipidemia, PAD history of AAA repair as tolerated.  Patient had BNP level which was normal   Echocardiogram 1/10/19 reveals normal function PA pressures of 30-40 with mild AI/MR/TR and moderate size pericardial effusion, repeat echo 6/1/2019 revealed small pericardial effusion, LVEF of 70%  Lexiscan Cardiolite 6/25/2021 was negative for ischemia EF of 67%  Echocardiogram 6/25/2021  reviewed and interpreted by me reveals normal LV systolic function with LVEF of 70% with small pericardial effusion  We will follow-up in 6 months and check labs before visit.  Reviewed EKG results with patient.        ECG 12 Lead    Date/Time: 2/21/2022 4:17 PM  Performed by: Kyrie Campoverde MD  Authorized by: Kyrie Campoverde MD   Comparison: compared with previous ECG from 3/23/2021  Comparison to previous ECG: EKG done today reviewed/interpreted by me reveals sinus rhythm with rate of 70 bpm with baseline artifact, no new change compared to EKG from 3/23/2021              The following portions of the patient's history were reviewed and updated as appropriate: allergies, current medications, past family history, past medical history, past social history, past surgical history and problem list.    Assessment:         [facility]     Diagnosis Plan   1. Dizziness  Lipid Panel    Comprehensive Metabolic Panel    Holter Monitor - 72 Hour Up To 15 Days   2. PAD (peripheral artery disease) (HCC)  Lipid Panel    Comprehensive Metabolic Panel    Holter Monitor - 72 Hour Up To 15 Days   3. Essential hypertension  Lipid Panel    Comprehensive Metabolic Panel    Holter Monitor - 72 Hour Up To 15 Days   4. Panlobular emphysema (HCC)  Lipid Panel    Comprehensive Metabolic Panel    Holter Monitor - 72 Hour Up To 15 Days          Plan:               Past Medical History:  Past Medical History:   Diagnosis Date   • Aortic aneurysm (HCC)    • COPD (chronic obstructive pulmonary disease) (HCC)    • Hernia, inguinal, left    • Hypertension      Past Surgical History:  Past Surgical History:   Procedure Laterality Date   • CARPAL TUNNEL RELEASE     • DESCENDING AORTIC ANEURYSM REPAIR W/ STENT     • INTERVENTIONAL RADIOLOGY PROCEDURE N/A 7/28/2021    Procedure: Abdominal Aortagram with Runoff, possible PCI;  Surgeon: Kyrie Campoverde MD;  Location: [facility] CATH INVASIVE LOCATION;  Service: Cardiovascular;   Laterality: N/A;   • KNEE SURGERY  2020   • OTHER SURGICAL HISTORY      STENT      Allergies:  No Known Allergies  Home Meds:  Current Meds:     Current Outpatient Medications:   •  amLODIPine (NORVASC) 10 MG tablet, Take 1 tablet by mouth Daily., Disp: 90 tablet, Rfl: 3  •  aspirin (ASPIR-LOW) 81 MG EC tablet, Take 81 mg by mouth Daily., Disp: , Rfl:   •  bisoprolol-hydrochlorothiazide (ZIAC) 5-6.25 MG per tablet, TAKE 1 TABLET BY MOUTH DAILY. , Disp: 90 tablet, Rfl: 2  •  cholecalciferol (VITAMIN D3) 25 MCG (1000 UT) tablet, Take 1,000 Units by mouth Daily., Disp: , Rfl:   •  clopidogrel (PLAVIX) 75 MG tablet, Take 1 tablet by mouth Daily., Disp: 90 tablet, Rfl: 3  •  Multiple Vitamins-Minerals (PRESERVISION AREDS 2+MULTI VIT PO), Take  by mouth., Disp: , Rfl:   •  pyridoxine (VITAMIN B-6) 100 MG tablet tablet, Take 100 mg by mouth Daily., Disp: , Rfl:   •  rosuvastatin (CRESTOR) 10 MG tablet, Take 1 tablet by mouth Daily., Disp: 90 tablet, Rfl: 3  •  Trelegy Ellipta 100-62.5-25 MCG/INH inhaler, , Disp: , Rfl:   •  vitamin B-12 (CYANOCOBALAMIN) 1000 MCG tablet, Take 1,000 mcg by mouth Daily., Disp: , Rfl:   •  acetaminophen (TYLENOL) 325 MG tablet, Take 2 tablets by mouth Every 4 (Four) Hours As Needed for Mild Pain ., Disp: , Rfl:   Social History:   Social History     Tobacco Use   • Smoking status: Former Smoker     Packs/day: 1.00     Years: 30.00     Pack years: 30.00     Types: Cigarettes   • Smokeless tobacco: Never Used   Substance Use Topics   • Alcohol use: No      Family History:  Family History   Problem Relation Age of Onset   • Cancer Other    • Diabetes Other    • Aneurysm Father    • Cancer Mother               Review of Systems   Constitutional: Negative for malaise/fatigue.   Cardiovascular: Positive for leg swelling. Negative for chest pain and palpitations.   Respiratory: Negative for shortness of breath.    Skin: Negative for rash.   Neurological: Positive for dizziness, light-headedness and  "numbness.     All other systems are negative         Objective:     Physical Exam  /75   Pulse 69   Ht 160 cm (63\")   Wt 70.8 kg (156 lb)   SpO2 92%   BMI 27.63 kg/m²   General:  Appears in no acute distress  Eyes: Sclera is anicteric,  conjunctiva is clear   HEENT:  No JVD.  No carotid bruits  Respiratory: Respirations regular and unlabored at rest.  Clear to auscultation  Cardiovascular: S1,S2 Regular rate and rhythm. No murmur, rub or gallop auscultated.   Extremities: No digital clubbing or cyanosis, no edema  Skin: Color pink. Skin warm and dry to touch. No rashes  No xanthoma  Neuro: Alert and awake.    Lab Reviewed:         Kyrie Campoverde MD  2/21/2022 16:17 EST      Much of the above report is an electronic transcription/translation of the spoken language to printed text using Dragon Software. As such, the subtleties and finesse of the spoken language may permit erroneous, or at times, nonsensical words or phrases to be inadvertently transcribed; thus changes may be made at a later date to rectify these errors.         "

## 2022-03-09 ENCOUNTER — TELEPHONE (OUTPATIENT)
Dept: CARDIOLOGY | Facility: CLINIC | Age: 82
End: 2022-03-09

## 2022-03-09 DIAGNOSIS — I49.3 PVC (PREMATURE VENTRICULAR CONTRACTION): ICD-10-CM

## 2022-03-09 NOTE — TELEPHONE ENCOUNTER
Caller:  estella    Relationship:self    Best call back number: [phone]                                               What is your medical concern? Calling for 3 day holter results, returned 3/4

## 2022-03-09 NOTE — TELEPHONE ENCOUNTER
We have not received the report back from [facility] as of yet, so as soon as we get that back it will be uploaded to the doctors reading work list.

## 2022-03-14 NOTE — TELEPHONE ENCOUNTER
Will register patient for a new unit and have it mailed to the patients home at the request of her daughter in-law.

## 2022-03-14 NOTE — TELEPHONE ENCOUNTER
"Hope from [facility] called to say the holter was \"all artifacts \"  Pt will need new holter   "

## 2022-04-12 DIAGNOSIS — I49.3 PVC (PREMATURE VENTRICULAR CONTRACTION): Primary | ICD-10-CM

## 2022-04-18 ENCOUNTER — LAB (OUTPATIENT)
Dept: LAB | Facility: HOSPITAL | Age: 82
End: 2022-04-18

## 2022-04-18 DIAGNOSIS — R42 DIZZINESS: ICD-10-CM

## 2022-04-18 DIAGNOSIS — E78.5 DYSLIPIDEMIA: ICD-10-CM

## 2022-04-18 DIAGNOSIS — J43.1 PANLOBULAR EMPHYSEMA: ICD-10-CM

## 2022-04-18 DIAGNOSIS — I10 ESSENTIAL HYPERTENSION: ICD-10-CM

## 2022-04-18 DIAGNOSIS — I73.9 PAD (PERIPHERAL ARTERY DISEASE): ICD-10-CM

## 2022-04-18 LAB
ALBUMIN SERPL-MCNC: 3.9 G/DL (ref 3.5–5.2)
ALBUMIN/GLOB SERPL: 1.5 G/DL
ALP SERPL-CCNC: 74 U/L (ref 39–117)
ALT SERPL W P-5'-P-CCNC: 12 U/L (ref 1–33)
ANION GAP SERPL CALCULATED.3IONS-SCNC: 11.8 MMOL/L (ref 5–15)
AST SERPL-CCNC: 22 U/L (ref 1–32)
BILIRUB SERPL-MCNC: 0.4 MG/DL (ref 0–1.2)
BUN SERPL-MCNC: 11 MG/DL (ref 8–23)
BUN/CREAT SERPL: 19 (ref 7–25)
CALCIUM SPEC-SCNC: 9.4 MG/DL (ref 8.6–10.5)
CHLORIDE SERPL-SCNC: 101 MMOL/L (ref 98–107)
CHOLEST SERPL-MCNC: 122 MG/DL (ref 0–200)
CO2 SERPL-SCNC: 28.2 MMOL/L (ref 22–29)
CREAT SERPL-MCNC: 0.58 MG/DL (ref 0.57–1)
EGFRCR SERPLBLD CKD-EPI 2021: 89.9 ML/MIN/1.73
GLOBULIN UR ELPH-MCNC: 2.6 GM/DL
GLUCOSE SERPL-MCNC: 118 MG/DL (ref 65–99)
HDLC SERPL-MCNC: 66 MG/DL (ref 40–60)
LDLC SERPL CALC-MCNC: 40 MG/DL (ref 0–100)
LDLC/HDLC SERPL: 0.6 {RATIO}
MAGNESIUM SERPL-MCNC: 2.1 MG/DL (ref 1.6–2.4)
POTASSIUM SERPL-SCNC: 3.7 MMOL/L (ref 3.5–5.2)
PROT SERPL-MCNC: 6.5 G/DL (ref 6–8.5)
SODIUM SERPL-SCNC: 141 MMOL/L (ref 136–145)
TRIGL SERPL-MCNC: 81 MG/DL (ref 0–150)
VLDLC SERPL-MCNC: 16 MG/DL (ref 5–40)

## 2022-04-18 PROCEDURE — 80061 LIPID PANEL: CPT

## 2022-04-18 PROCEDURE — 83735 ASSAY OF MAGNESIUM: CPT | Performed by: INTERNAL MEDICINE

## 2022-04-18 PROCEDURE — 80053 COMPREHEN METABOLIC PANEL: CPT

## 2022-04-18 PROCEDURE — 36415 COLL VENOUS BLD VENIPUNCTURE: CPT

## 2022-05-24 RX ORDER — BISOPROLOL FUMARATE AND HYDROCHLOROTHIAZIDE 5; 6.25 MG/1; MG/1
1 TABLET ORAL DAILY
Qty: 90 TABLET | Refills: 2 | Status: SHIPPED | OUTPATIENT
Start: 2022-05-24 | End: 2022-11-20

## 2022-05-24 NOTE — TELEPHONE ENCOUNTER
Rx Refill Note  Requested Prescriptions     Pending Prescriptions Disp Refills   • bisoprolol-hydrochlorothiazide (ZIAC) 5-6.25 MG per tablet [Pharmacy Med Name: BISOPROLOL/HCTZ 5MG/6.25MG TABS] 90 tablet 2     Sig: TAKE 1 TABLET BY MOUTH DAILY.      Last office visit with prescribing clinician: 2/21/2022      Next office visit with prescribing clinician: 8/29/2022            Malinda Biggs MA  05/24/22, 15:26 EDT

## 2022-06-13 RX ORDER — AMLODIPINE BESYLATE 10 MG/1
10 TABLET ORAL DAILY
Qty: 90 TABLET | Refills: 3 | Status: SHIPPED | OUTPATIENT
Start: 2022-06-13

## 2022-06-13 NOTE — TELEPHONE ENCOUNTER
Rx Refill Note  Requested Prescriptions     Pending Prescriptions Disp Refills   • amLODIPine (NORVASC) 10 MG tablet [Pharmacy Med Name: AMLODIPINE BESYLATE 10MG TABLETS] 90 tablet 3     Sig: TAKE 1 TABLET BY MOUTH DAILY.      Last office visit with prescribing clinician: 2/21/2022      Next office visit with prescribing clinician: 8/29/2022            Delma Calvillo MA  06/13/22, 10:21 EDT

## 2022-07-26 RX ORDER — ROSUVASTATIN CALCIUM 10 MG/1
TABLET, COATED ORAL
Qty: 90 TABLET | Refills: 3 | Status: SHIPPED | OUTPATIENT
Start: 2022-07-26

## 2022-07-26 NOTE — TELEPHONE ENCOUNTER
Rx Refill Note  Requested Prescriptions     Pending Prescriptions Disp Refills   • rosuvastatin (CRESTOR) 10 MG tablet [Pharmacy Med Name: ROSUVASTATIN 10MG TABLETS] 90 tablet 3     Sig: TAKE 1 TABLET BY MOUTH EVERY DAY      Last office visit with prescribing clinician: 2/21/2022      Next office visit with prescribing clinician: 8/29/2022            Yadira Biswas MA  07/26/22, 13:58 EDT

## 2022-08-01 RX ORDER — CLOPIDOGREL BISULFATE 75 MG/1
75 TABLET ORAL DAILY
Qty: 90 TABLET | Refills: 3 | Status: SHIPPED | OUTPATIENT
Start: 2022-08-01 | End: 2022-11-17 | Stop reason: HOSPADM

## 2022-08-01 NOTE — TELEPHONE ENCOUNTER
Rx Refill Note  Requested Prescriptions     Pending Prescriptions Disp Refills   • clopidogrel (PLAVIX) 75 MG tablet [Pharmacy Med Name: CLOPIDOGREL 75MG TABLETS] 90 tablet 3     Sig: TAKE 1 TABLET BY MOUTH DAILY.      Last office visit with prescribing clinician: 2/21/2022      Next office visit with prescribing clinician: 8/29/2022            MONY MEDELLIN MA  08/01/22, 12:38 EDT

## 2022-08-15 ENCOUNTER — OFFICE VISIT (OUTPATIENT)
Dept: PODIATRY | Facility: CLINIC | Age: 82
End: 2022-08-15

## 2022-08-15 VITALS — HEIGHT: 63 IN | WEIGHT: 156 LBS | BODY MASS INDEX: 27.64 KG/M2 | HEART RATE: 91 BPM | OXYGEN SATURATION: 93 %

## 2022-08-15 DIAGNOSIS — M72.2 PLANTAR FASCIITIS, LEFT: ICD-10-CM

## 2022-08-15 DIAGNOSIS — M79.672 LEFT FOOT PAIN: Primary | ICD-10-CM

## 2022-08-15 PROCEDURE — 99203 OFFICE O/P NEW LOW 30 MIN: CPT | Performed by: PODIATRIST

## 2022-08-15 RX ORDER — HYDROCODONE BITARTRATE AND ACETAMINOPHEN 5; 325 MG/1; MG/1
1 TABLET ORAL EVERY 8 HOURS PRN
COMMUNITY
Start: 2022-07-25 | End: 2022-11-20

## 2022-08-15 RX ORDER — HYDROCHLOROTHIAZIDE 12.5 MG/1
12.5 TABLET ORAL DAILY
COMMUNITY
Start: 2022-05-26 | End: 2022-11-20

## 2022-08-15 RX ORDER — POTASSIUM CHLORIDE 20 MEQ/1
20 TABLET, EXTENDED RELEASE ORAL DAILY
COMMUNITY
Start: 2022-07-25

## 2022-08-15 NOTE — PROGRESS NOTES
08/15/2022  Foot and Ankle Surgery - New Patient   Provider: Dr. Jose D Griffin DPM  Location: [facility]    Subjective:  Becka Oliva is a 82 y.o. female.     Chief Complaint   Patient presents with   • Left Foot - Pain     Heel pain        HPI:   The patient is an 82-year-old female who presents to the clinic for left foot pain. She complains of pain involving the plantar aspect of her heel over the last 2 months. She denies any injury to the area. She discusses pain with first few steps in the morning after long periods of activity. She states that the pain can be quite significant and limiting for daily activities. No other issues or concerns voiced. There is an adult female present who contributes to her care.    She reports he has been having issues with her left heel for approximately 2 months. She notes she ambulates around her home, as well as ascending and descending stairs daily. She notes she has pain when she wakes and places weight on her feet in the morning. The adult female notes the patient has hardwood floors and often walks barefoot on her hardwood floors. The patient states she does not wish to have surgery and inquires if we have toe spacers at the clinic. The adult female states she had metal placed in her foot in the 1930's and inquires if 's Go Walk shoes would do well.    No Known Allergies    Past Medical History:   Diagnosis Date   • Aortic aneurysm (HCC)    • COPD (chronic obstructive pulmonary disease) ([facility])    • Hernia, inguinal, left    • Hypertension        Past Surgical History:   Procedure Laterality Date   • CARPAL TUNNEL RELEASE     • DESCENDING AORTIC ANEURYSM REPAIR W/ STENT     • INTERVENTIONAL RADIOLOGY PROCEDURE N/A 7/28/2021    Procedure: Abdominal Aortagram with Runoff, possible PCI;  Surgeon: Kyrie Campoverde MD;  Location: [facility] INVASIVE LOCATION;  Service: Cardiovascular;  Laterality: N/A;   • KNEE SURGERY  2020   • OTHER SURGICAL  HISTORY      STENT       Family History   Problem Relation Age of Onset   • Cancer Other    • Diabetes Other    • Aneurysm Father    • Cancer Mother        Social History     Socioeconomic History   • Marital status:    Tobacco Use   • Smoking status: Former Smoker     Packs/day: 1.00     Years: 30.00     Pack years: 30.00     Types: Cigarettes   • Smokeless tobacco: Never Used   Vaping Use   • Vaping Use: Never used   Substance and Sexual Activity   • Alcohol use: No   • Drug use: Never   • Sexual activity: Defer        Current Outpatient Medications on File Prior to Visit   Medication Sig Dispense Refill   • acetaminophen (TYLENOL) 325 MG tablet Take 2 tablets by mouth Every 4 (Four) Hours As Needed for Mild Pain .     • amLODIPine (NORVASC) 10 MG tablet TAKE 1 TABLET BY MOUTH DAILY. 90 tablet 3   • aspirin (aspirin) 81 MG EC tablet Take 81 mg by mouth Daily.     • bisoprolol-hydrochlorothiazide (ZIAC) 5-6.25 MG per tablet TAKE 1 TABLET BY MOUTH DAILY. 90 tablet 2   • cholecalciferol (VITAMIN D3) 25 MCG (1000 UT) tablet Take 1,000 Units by mouth Daily.     • clopidogrel (PLAVIX) 75 MG tablet TAKE 1 TABLET BY MOUTH DAILY. 90 tablet 3   • hydroCHLOROthiazide (HYDRODIURIL) 12.5 MG tablet Take 12.5 mg by mouth Daily.     • HYDROcodone-acetaminophen (NORCO) 5-325 MG per tablet Take 1 tablet by mouth Every 8 (Eight) Hours As Needed. for pain     • Multiple Vitamins-Minerals (PRESERVISION AREDS 2+MULTI VIT PO) Take  by mouth.     • potassium chloride (K-DUR,KLOR-CON) 20 MEQ CR tablet Take 20 mEq by mouth Daily. with food     • pyridoxine (VITAMIN B-6) 100 MG tablet tablet Take 100 mg by mouth Daily.     • rosuvastatin (CRESTOR) 10 MG tablet TAKE 1 TABLET BY MOUTH EVERY DAY 90 tablet 3   • Trelegy Ellipta 100-62.5-25 MCG/INH inhaler      • vitamin B-12 (CYANOCOBALAMIN) 1000 MCG tablet Take 1,000 mcg by mouth Daily.       No current facility-administered medications on file prior to visit.       Review of  "Systems:  General: Denies fever, chills, fatigue, and weakness.  Eyes: Denies vision loss, blurry vision, and excessive redness.  ENT: Denies hearing issues and difficulty swallowing.  Cardiovascular: Denies palpitations, chest pain, or syncopal episodes.  Respiratory: Denies shortness of breath, wheezing, and coughing.  GI: Denies abdominal pain, nausea, and vomiting.   : Denies frequency, hematuria, and urgency.  Musculoskeletal: Denies muscle cramps, joint pains, and stiffness.  Derm: Denies rash, open wounds, or suspicious lesions.  Neuro: Denies headaches, numbness, loss of coordination, and tremors.  Psych: Denies anxiety and depression.  Endocrine: Denies temperature intolerance and changes in appetite.  Heme: Denies bleeding disorders or abnormal bruising.     Objective   Pulse 91   Ht 160 cm (63\")   Wt 70.8 kg (156 lb)   SpO2 93%   BMI 27.63 kg/m²     Foot/Ankle Exam:       General:   Appearance: elderly    Orientation: AAOx3      VASCULAR      Left Foot Vascularity   Normal vascular exam    Dorsalis pedis:  2+  Posterior tibial:  2+  Skin Temperature: warm    Edema Grading:  None  CFT:  < 3 seconds  Pedal Hair Growth:  Present  Varicosities: none        NEUROLOGIC     Left Foot Neurologic   Normal sensation    Light touch sensation:  Normal  Vibratory sensation:  Normal  Hot/cold sensation: normal    Normal reflexes    Achilles reflex:  2+  Babinski reflex:  2+     MUSCULOSKELETAL      Left Foot Musculoskeletal   Ecchymosis:  None  Tenderness: calcaneus tenderness    Tenderness comment:   Moderate discomfort with palpation involving the plantar medial calcaneal tuberosity     MUSCLE STRENGTH     Left Foot Muscle Strength   Normal strength    Foot dorsiflexion:  5  Foot plantar flexion:  5  Foot inversion:  5  Foot eversion:  5     DERMATOLOGIC     Left Foot Dermatologic   Skin: skin intact        Right Foot Additional Comments \      Left Foot Additional Comments: Moderate bunion deformity. Mild " equinus contracture with knee extended and flexed. No gross deformity or instability involving the foot.       Assessment & Plan   Diagnoses and all orders for this visit:    1. Left foot pain (Primary)    2. Plantar fasciitis, left         The patient presents to the clinic for left foot pain. She complains of pain involving the plantar aspect of her heel over the last 2 months, which appears to be plantar fasciitis. It is recommended she get Power Step insoles to wear daily with supportive shoes. Als, she may purchase toe spacers from [employer] to wear, as well. The patient was advised to avoid wearing flip flops, sandals, or ambulate barefoot. Stretching exercises have been provided for the patient to perform daily. She is to follow up in 1 month.  We did review rice therapy and proper use of OTC anti-inflammatories if tolerated.  Greater than 30 minutes spent before and after evaluation for patient care.    No orders of the defined types were placed in this encounter.       Note is dictated utilizing voice recognition software. Unfortunately this leads to occasional typographical errors. I apologize in advance if the situation occurs. If questions occur please do not hesitate to call our office.    Transcribed from ambient dictation for ANY Griffin DPM by Jeanette Herrera.  08/15/22   12:08 EDT    Patient verbalized consent to the visit recording.

## 2022-09-21 ENCOUNTER — TELEPHONE (OUTPATIENT)
Dept: PODIATRY | Facility: CLINIC | Age: 82
End: 2022-09-21

## 2022-09-21 ENCOUNTER — OFFICE VISIT (OUTPATIENT)
Dept: SURGERY | Facility: CLINIC | Age: 82
End: 2022-09-21

## 2022-09-21 VITALS
HEIGHT: 63 IN | OXYGEN SATURATION: 91 % | BODY MASS INDEX: 26.79 KG/M2 | SYSTOLIC BLOOD PRESSURE: 146 MMHG | TEMPERATURE: 97.7 F | HEART RATE: 70 BPM | WEIGHT: 151.2 LBS | DIASTOLIC BLOOD PRESSURE: 85 MMHG

## 2022-09-21 DIAGNOSIS — K40.90 NON-RECURRENT UNILATERAL INGUINAL HERNIA WITHOUT OBSTRUCTION OR GANGRENE: Primary | ICD-10-CM

## 2022-09-21 PROCEDURE — 99213 OFFICE O/P EST LOW 20 MIN: CPT | Performed by: STUDENT IN AN ORGANIZED HEALTH CARE EDUCATION/TRAINING PROGRAM

## 2022-09-21 RX ORDER — FUROSEMIDE 20 MG/1
20 TABLET ORAL DAILY
COMMUNITY
Start: 2022-09-07

## 2022-09-21 RX ORDER — POTASSIUM CHLORIDE 750 MG/1
20 TABLET, FILM COATED, EXTENDED RELEASE ORAL DAILY
COMMUNITY
Start: 2022-09-07 | End: 2022-11-20

## 2022-09-21 NOTE — TELEPHONE ENCOUNTER
CALLED MARYJO AND BAILEY THAT MORE THAN LIKELY TRISHA WAS NOT ABLE TO READ CREDIT/DEBIT CARD INFO FROM THE DAY VISIT TO GO AHEAD WITH PAYMENT THAT IS WHY PATIENT IS GETTING A BILL. PRUDENCIO [place] (IN OFFICE) WILL LOOK INTO THIS SITUATION TO VERIFY IF THIS IS WHAT HAPPENED AND WILL CALL MARYJO BACK.

## 2022-09-21 NOTE — TELEPHONE ENCOUNTER
Caller: MARYJO    Relationship to patient: DAUGHTER    Best call back number: [phone]    Patient is needing: PATIENT'S DAUGHTER, MARYJO WAS CALLING TO DISCUSS PATIENT GETTING A BILL FROM [employer] FOR $45.96. MARYJO STATED PATIENT RECEIVED THE INSERTS FROM YOUR OFFICE. MARYJO SPOKE TO [employer] AND THEY STATED THEY SHOWED A BALANCE BUT MARYJO STATED THEY PAID FOR THEM THAT DAY IN OFFICE. I ATTEMPTED TO WARM TRANSFER CALL TO THE OFFICE TO DISCUSS THIS BUT RECEIVED NO ANSWER. THANK YOU!

## 2022-10-03 NOTE — PROGRESS NOTES
"Chief Complaint  Follow-up (LLQ  pain at night)    Subjective        Becka Oliva presents to [facility]  History of Present Illness    81-year-old lady with history of peripheral vascular disease on aspirin Plavix, known left iliac stent occlusion, COPD on duo nebs not on home oxygen, aortic aneurysm status post EVAR, chronic kidney disease who presents to my office to discuss her left inguinal hernia.  I saw her about a year ago for this however since then she is underwent a femoral to femoral bypass for her iliac stent occlusion.  Continues to have significant amount of pain especially when up and walking around for long amounts of time.  She denies nausea, vomiting or obstipation.  CT abdomen pelvis with large left inguinal hernia containing small bowel, this was before her femoral-femoral bypass.  Bypass graft near the hernia will obtain repeat CT abdomen and pelvis to assess for changes in her anatomy status post femorofemoral bypass also to see how close her bypass graft is to her hernia sac.  We will discuss surgical options further after her repeat imaging.  Will reach out to her cardiologist for preoperative optimization and clearance and to see if we can stop her Plavix, will also reach out to her vascular surgeon to see if we can stop her Plavix before surgery.    Likely, plan for robotic left inguinal hernia repair with mesh if cleared and able to stop Plavix.     Objective   Vital Signs:  /85 (Cuff Size: Adult)   Pulse 70   Temp 97.7 °F (36.5 °C) (Infrared)   Ht 160 cm (63\")   Wt 68.6 kg (151 lb 3.2 oz)   SpO2 91%   BMI 26.78 kg/m²   Estimated body mass index is 26.78 kg/m² as calculated from the following:    Height as of this encounter: 160 cm (63\").    Weight as of this encounter: 68.6 kg (151 lb 3.2 oz).          Physical Exam   Result Review :                Assessment and Plan   Diagnoses and all orders for this visit:    1. Non-recurrent unilateral " inguinal hernia without obstruction or gangrene (Primary)  -     CT Abdomen Pelvis Without Contrast; Future      81-year-old lady with history of peripheral vascular disease on aspirin Plavix, known left iliac stent occlusion, COPD on duo nebs not on home oxygen, aortic aneurysm status post EVAR, chronic kidney disease who presents to my office to discuss her left inguinal hernia.  I saw her about a year ago for this however since then she is underwent a femoral to femoral bypass for her iliac stent occlusion.  Continues to have significant amount of pain especially when up and walking around for long amounts of time.  She denies nausea, vomiting or obstipation.  CT abdomen pelvis with large left inguinal hernia containing small bowel, this was before her femoral-femoral bypass.  Bypass graft near the hernia will obtain repeat CT abdomen and pelvis to assess for changes in her anatomy status post femorofemoral bypass also to see how close her bypass graft is to her hernia sac.  We will discuss surgical options further after her repeat imaging.  Will reach out to her cardiologist for preoperative optimization and clearance and to see if we can stop her Plavix, will also reach out to her vascular surgeon to see if we can stop her Plavix before surgery.    Likely, plan for robotic left inguinal hernia repair with mesh if cleared and able to stop Plavix.        Follow Up   No follow-ups on file.  Patient was given instructions and counseling regarding her condition or for health maintenance advice. Please see specific information pulled into the AVS if appropriate.

## 2022-10-12 ENCOUNTER — HOSPITAL ENCOUNTER (OUTPATIENT)
Dept: CT IMAGING | Facility: HOSPITAL | Age: 82
Discharge: HOME | End: 2022-10-12
Admitting: STUDENT IN AN ORGANIZED HEALTH CARE EDUCATION/TRAINING PROGRAM

## 2022-10-12 DIAGNOSIS — K40.90 NON-RECURRENT UNILATERAL INGUINAL HERNIA WITHOUT OBSTRUCTION OR GANGRENE: ICD-10-CM

## 2022-10-12 PROCEDURE — 74176 CT ABD & PELVIS W/O CONTRAST: CPT

## 2022-10-13 ENCOUNTER — APPOINTMENT (OUTPATIENT)
Dept: CT IMAGING | Facility: HOSPITAL | Age: 82
End: 2022-10-13

## 2022-10-20 ENCOUNTER — PREP FOR SURGERY (OUTPATIENT)
Dept: OTHER | Facility: HOSPITAL | Age: 82
End: 2022-10-20

## 2022-10-20 ENCOUNTER — OFFICE VISIT (OUTPATIENT)
Dept: SURGERY | Facility: CLINIC | Age: 82
End: 2022-10-20

## 2022-10-20 ENCOUNTER — TELEPHONE (OUTPATIENT)
Dept: CARDIOLOGY | Facility: CLINIC | Age: 82
End: 2022-10-20

## 2022-10-20 VITALS
WEIGHT: 154.4 LBS | HEART RATE: 71 BPM | DIASTOLIC BLOOD PRESSURE: 74 MMHG | BODY MASS INDEX: 27.36 KG/M2 | HEIGHT: 63 IN | OXYGEN SATURATION: 94 % | TEMPERATURE: 97.5 F | SYSTOLIC BLOOD PRESSURE: 137 MMHG

## 2022-10-20 DIAGNOSIS — K40.90 LEFT INGUINAL HERNIA: Primary | ICD-10-CM

## 2022-10-20 PROCEDURE — 99214 OFFICE O/P EST MOD 30 MIN: CPT | Performed by: STUDENT IN AN ORGANIZED HEALTH CARE EDUCATION/TRAINING PROGRAM

## 2022-10-20 NOTE — TELEPHONE ENCOUNTER
[facility]  Dr. Mcneill  Robotic hernia repair  Schedule TBD  Phone# [phone]  Fax# [phone]          Placed on Dr. Gilles karimi

## 2022-10-22 NOTE — PROGRESS NOTES
"Chief Complaint  Follow-up (Follow-up CT at [facility])    Subjective        Becka Oliva presents to [facility] GENERAL SURGERY  History of Present Illness    81-year-old lady with history of peripheral vascular disease on aspirin Plavix, known left iliac stent occlusion, COPD on duo nebs not on home oxygen, aortic aneurysm status post EVAR, chronic kidney disease who presents to my office to discuss her left inguinal hernia.  I saw her about a year ago for this however since then she is underwent a femoral to femoral bypass for her iliac stent occlusion.  Continues to have significant amount of pain especially when up and walking around for long amounts of time.  She denies nausea, vomiting or obstipation.  Repeat CT abdomen pelvis with large left inguinal hernia containing small bowel, her femoral to femoral bypass is near her hernia sac but outside the sac.  We discussed options for continued nonoperative management versus open inguinal hernia repair versus robotic inguinal hernia repair and patient elected to proceed with robotic inguinal hernia repair.       Objective   Vital Signs:  /74 (Cuff Size: Adult)   Pulse 71   Temp 97.5 °F (36.4 °C) (Infrared)   Ht 160 cm (63\")   Wt 70 kg (154 lb 6.4 oz)   SpO2 94%   BMI 27.35 kg/m²   Estimated body mass index is 27.35 kg/m² as calculated from the following:    Height as of this encounter: 160 cm (63\").    Weight as of this encounter: 70 kg (154 lb 6.4 oz).          Physical Exam  Constitutional:       General: She is not in acute distress.     Appearance: Normal appearance. She is not ill-appearing.   HENT:      Head: Normocephalic and atraumatic.      Right Ear: External ear normal.      Left Ear: External ear normal.   Eyes:      Extraocular Movements: Extraocular movements intact.      Conjunctiva/sclera: Conjunctivae normal.   Cardiovascular:      Rate and Rhythm: Normal rate and regular rhythm.   Pulmonary:      Effort: Pulmonary effort is " normal. No respiratory distress.   Abdominal:      General: There is no distension.      Palpations: Abdomen is soft.      Tenderness: There is no abdominal tenderness.      Comments: Large reducible left inguinal hernia   Musculoskeletal:         General: No swelling or deformity.   Skin:     General: Skin is warm and dry.   Neurological:      Mental Status: She is alert and oriented to person, place, and time. Mental status is at baseline.        Result Review :                Assessment and Plan {CC Problem List  Visit Diagnosis   ROS  Review (Popup)  [facility] Maintenance  Quality  BestPractice  Medications  SmartSets  SnapShot Encounters  Media :23}  Diagnoses and all orders for this visit:    1. Left inguinal hernia (Primary)    81-year-old lady with history of peripheral vascular disease on aspirin Plavix, known left iliac stent occlusion, COPD on duo nebs not on home oxygen, aortic aneurysm status post EVAR, chronic kidney disease who presents to my office to discuss her left inguinal hernia.  I saw her about a year ago for this however since then she is underwent a femoral to femoral bypass for her iliac stent occlusion.  Continues to have significant amount of pain especially when up and walking around for long amounts of time.  She denies nausea, vomiting or obstipation.  Repeat CT abdomen pelvis with large left inguinal hernia containing small bowel, her femoral to femoral bypass is near her hernia sac but outside the sac.  We discussed options for continued nonoperative management versus open inguinal hernia repair versus robotic inguinal hernia repair and patient elected to proceed with robotic inguinal hernia repair. Will reach out to her cardiologist for preoperative optimization and clearance and to see if we can stop her Plavix, will also reach out to her vascular surgeon to see if we can stop her Plavix before surgery. I discussed risk benefits and alternatives to robotic inguinal hernia  repair, including bowel injury, mesh infection requiring mesh excision, recurrence, bleeding, and chronic inguinal pain resulting from nerve injury, infection of her femoral-femoral bypass graft, occlusion or thrombosis of her femoral-femoral bypass graft and patient elected to proceed.          Follow Up   No follow-ups on file.  Patient was given instructions and counseling regarding her condition or for health maintenance advice. Please see specific information pulled into the AVS if appropriate.

## 2022-11-07 ENCOUNTER — LAB (OUTPATIENT)
Dept: LAB | Facility: HOSPITAL | Age: 82
End: 2022-11-07

## 2022-11-07 ENCOUNTER — HOSPITAL ENCOUNTER (OUTPATIENT)
Dept: CARDIOLOGY | Facility: HOSPITAL | Age: 82
Discharge: HOME | End: 2022-11-07

## 2022-11-07 DIAGNOSIS — K40.90 LEFT INGUINAL HERNIA: ICD-10-CM

## 2022-11-07 LAB
ABO GROUP BLD: NORMAL
ANION GAP SERPL CALCULATED.3IONS-SCNC: 10.2 MMOL/L (ref 5–15)
BLD GP AB SCN SERPL QL: NEGATIVE
BUN SERPL-MCNC: 12 MG/DL (ref 8–23)
BUN/CREAT SERPL: 21.1 (ref 7–25)
CALCIUM SPEC-SCNC: 9.7 MG/DL (ref 8.6–10.5)
CHLORIDE SERPL-SCNC: 100 MMOL/L (ref 98–107)
CO2 SERPL-SCNC: 31.8 MMOL/L (ref 22–29)
CREAT SERPL-MCNC: 0.57 MG/DL (ref 0.57–1)
DEPRECATED RDW RBC AUTO: 40.4 FL (ref 37–54)
EGFRCR SERPLBLD CKD-EPI 2021: 90.3 ML/MIN/1.73
ERYTHROCYTE [DISTWIDTH] IN BLOOD BY AUTOMATED COUNT: 11.8 % (ref 12.3–15.4)
GLUCOSE SERPL-MCNC: 95 MG/DL (ref 65–99)
HCT VFR BLD AUTO: 41.6 % (ref 34–46.6)
HGB BLD-MCNC: 14.6 G/DL (ref 12–15.9)
MCH RBC QN AUTO: 32.5 PG (ref 26.6–33)
MCHC RBC AUTO-ENTMCNC: 35.1 G/DL (ref 31.5–35.7)
MCV RBC AUTO: 92.7 FL (ref 79–97)
PLATELET # BLD AUTO: 229 10*3/MM3 (ref 140–450)
PMV BLD AUTO: 10.6 FL (ref 6–12)
POTASSIUM SERPL-SCNC: 3.7 MMOL/L (ref 3.5–5.2)
RBC # BLD AUTO: 4.49 10*6/MM3 (ref 3.77–5.28)
RH BLD: POSITIVE
SODIUM SERPL-SCNC: 142 MMOL/L (ref 136–145)
T&S EXPIRATION DATE: NORMAL
WBC NRBC COR # BLD: 6.67 10*3/MM3 (ref 3.4–10.8)

## 2022-11-07 PROCEDURE — 36415 COLL VENOUS BLD VENIPUNCTURE: CPT

## 2022-11-07 PROCEDURE — 86900 BLOOD TYPING SEROLOGIC ABO: CPT

## 2022-11-07 PROCEDURE — 93005 ELECTROCARDIOGRAM TRACING: CPT | Performed by: STUDENT IN AN ORGANIZED HEALTH CARE EDUCATION/TRAINING PROGRAM

## 2022-11-07 PROCEDURE — 86901 BLOOD TYPING SEROLOGIC RH(D): CPT

## 2022-11-07 PROCEDURE — 93010 ELECTROCARDIOGRAM REPORT: CPT | Performed by: INTERNAL MEDICINE

## 2022-11-07 PROCEDURE — 80048 BASIC METABOLIC PNL TOTAL CA: CPT

## 2022-11-07 PROCEDURE — 85027 COMPLETE CBC AUTOMATED: CPT

## 2022-11-07 PROCEDURE — 86850 RBC ANTIBODY SCREEN: CPT

## 2022-11-10 LAB — QT INTERVAL: 408 MS

## 2022-11-15 ENCOUNTER — ANESTHESIA EVENT (OUTPATIENT)
Dept: PERIOP | Facility: HOSPITAL | Age: 82
End: 2022-11-15

## 2022-11-16 ENCOUNTER — ANESTHESIA (OUTPATIENT)
Dept: PERIOP | Facility: HOSPITAL | Age: 82
End: 2022-11-16

## 2022-11-16 ENCOUNTER — HOSPITAL ENCOUNTER (OUTPATIENT)
Facility: HOSPITAL | Age: 82
Discharge: HOME | End: 2022-11-17
Attending: STUDENT IN AN ORGANIZED HEALTH CARE EDUCATION/TRAINING PROGRAM | Admitting: STUDENT IN AN ORGANIZED HEALTH CARE EDUCATION/TRAINING PROGRAM

## 2022-11-16 DIAGNOSIS — K40.90 LEFT INGUINAL HERNIA: ICD-10-CM

## 2022-11-16 PROCEDURE — 25010000002 CEFAZOLIN PER 500 MG: Performed by: STUDENT IN AN ORGANIZED HEALTH CARE EDUCATION/TRAINING PROGRAM

## 2022-11-16 PROCEDURE — G0378 HOSPITAL OBSERVATION PER HR: HCPCS

## 2022-11-16 PROCEDURE — 25010000002 PROPOFOL 200 MG/20ML EMULSION: Performed by: NURSE ANESTHETIST, CERTIFIED REGISTERED

## 2022-11-16 PROCEDURE — C1781 MESH (IMPLANTABLE): HCPCS | Performed by: STUDENT IN AN ORGANIZED HEALTH CARE EDUCATION/TRAINING PROGRAM

## 2022-11-16 PROCEDURE — 25010000002 FENTANYL CITRATE (PF) 50 MCG/ML SOLUTION: Performed by: NURSE ANESTHETIST, CERTIFIED REGISTERED

## 2022-11-16 PROCEDURE — 63710000001 SENNOSIDES-DOCUSATE 8.6-50 MG TABLET: Performed by: STUDENT IN AN ORGANIZED HEALTH CARE EDUCATION/TRAINING PROGRAM

## 2022-11-16 PROCEDURE — A9270 NON-COVERED ITEM OR SERVICE: HCPCS | Performed by: STUDENT IN AN ORGANIZED HEALTH CARE EDUCATION/TRAINING PROGRAM

## 2022-11-16 PROCEDURE — 49650 LAP ING HERNIA REPAIR INIT: CPT | Performed by: STUDENT IN AN ORGANIZED HEALTH CARE EDUCATION/TRAINING PROGRAM

## 2022-11-16 PROCEDURE — 25010000002 FENTANYL CITRATE (PF) 100 MCG/2ML SOLUTION: Performed by: NURSE ANESTHETIST, CERTIFIED REGISTERED

## 2022-11-16 PROCEDURE — 63710000001 POLYETHYLENE GLYCOL 17 G PACK: Performed by: STUDENT IN AN ORGANIZED HEALTH CARE EDUCATION/TRAINING PROGRAM

## 2022-11-16 PROCEDURE — 63710000001 POLYETHYL GLYCOL-PROPYL GLYCOL 0.4-0.3 % SOLUTION 15 ML BOTTLE: Performed by: SURGERY

## 2022-11-16 PROCEDURE — A9270 NON-COVERED ITEM OR SERVICE: HCPCS | Performed by: SURGERY

## 2022-11-16 PROCEDURE — 25010000002 ONDANSETRON PER 1 MG: Performed by: NURSE ANESTHETIST, CERTIFIED REGISTERED

## 2022-11-16 PROCEDURE — 63710000001 HYDROCODONE-ACETAMINOPHEN 7.5-325 MG TABLET: Performed by: NURSE ANESTHETIST, CERTIFIED REGISTERED

## 2022-11-16 PROCEDURE — 63710000001 ACETAMINOPHEN 500 MG TABLET: Performed by: STUDENT IN AN ORGANIZED HEALTH CARE EDUCATION/TRAINING PROGRAM

## 2022-11-16 PROCEDURE — A9270 NON-COVERED ITEM OR SERVICE: HCPCS | Performed by: NURSE ANESTHETIST, CERTIFIED REGISTERED

## 2022-11-16 DEVICE — ABSORBABLE WOUND CLOSURE DEVICE
Type: IMPLANTABLE DEVICE | Site: GROIN | Status: FUNCTIONAL
Brand: V-LOC 180

## 2022-11-16 DEVICE — MESH PROGRIP LAP S/FIXATING LPG1510: Type: IMPLANTABLE DEVICE | Site: GROIN | Status: FUNCTIONAL

## 2022-11-16 RX ORDER — ONDANSETRON 4 MG/1
4 TABLET, FILM COATED ORAL EVERY 6 HOURS PRN
Status: DISCONTINUED | OUTPATIENT
Start: 2022-11-16 | End: 2022-11-17 | Stop reason: HOSPADM

## 2022-11-16 RX ORDER — PROPOFOL 10 MG/ML
INJECTION, EMULSION INTRAVENOUS AS NEEDED
Status: DISCONTINUED | OUTPATIENT
Start: 2022-11-16 | End: 2022-11-16 | Stop reason: SURG

## 2022-11-16 RX ORDER — MEPERIDINE HYDROCHLORIDE 25 MG/ML
12.5 INJECTION INTRAMUSCULAR; INTRAVENOUS; SUBCUTANEOUS
Status: DISCONTINUED | OUTPATIENT
Start: 2022-11-16 | End: 2022-11-16 | Stop reason: HOSPADM

## 2022-11-16 RX ORDER — FENTANYL CITRATE 50 UG/ML
50 INJECTION, SOLUTION INTRAMUSCULAR; INTRAVENOUS
Status: DISCONTINUED | OUTPATIENT
Start: 2022-11-16 | End: 2022-11-16 | Stop reason: HOSPADM

## 2022-11-16 RX ORDER — ONDANSETRON 2 MG/ML
INJECTION INTRAMUSCULAR; INTRAVENOUS AS NEEDED
Status: DISCONTINUED | OUTPATIENT
Start: 2022-11-16 | End: 2022-11-16 | Stop reason: SURG

## 2022-11-16 RX ORDER — POLYETHYLENE GLYCOL 3350 17 G/17G
17 POWDER, FOR SOLUTION ORAL DAILY
Status: DISCONTINUED | OUTPATIENT
Start: 2022-11-16 | End: 2022-11-17 | Stop reason: HOSPADM

## 2022-11-16 RX ORDER — ONDANSETRON 4 MG/1
4 TABLET, FILM COATED ORAL EVERY 8 HOURS PRN
Qty: 30 TABLET | Refills: 1 | Status: SHIPPED | OUTPATIENT
Start: 2022-11-16 | End: 2023-11-16

## 2022-11-16 RX ORDER — ROCURONIUM BROMIDE 10 MG/ML
INJECTION, SOLUTION INTRAVENOUS AS NEEDED
Status: DISCONTINUED | OUTPATIENT
Start: 2022-11-16 | End: 2022-11-16 | Stop reason: SURG

## 2022-11-16 RX ORDER — SODIUM CHLORIDE, SODIUM LACTATE, POTASSIUM CHLORIDE, CALCIUM CHLORIDE 600; 310; 30; 20 MG/100ML; MG/100ML; MG/100ML; MG/100ML
INJECTION, SOLUTION INTRAVENOUS CONTINUOUS PRN
Status: DISCONTINUED | OUTPATIENT
Start: 2022-11-16 | End: 2022-11-16 | Stop reason: SURG

## 2022-11-16 RX ORDER — NALOXONE HCL 0.4 MG/ML
0.4 VIAL (ML) INJECTION AS NEEDED
Status: DISCONTINUED | OUTPATIENT
Start: 2022-11-16 | End: 2022-11-16 | Stop reason: HOSPADM

## 2022-11-16 RX ORDER — FENTANYL CITRATE 50 UG/ML
INJECTION, SOLUTION INTRAMUSCULAR; INTRAVENOUS AS NEEDED
Status: DISCONTINUED | OUTPATIENT
Start: 2022-11-16 | End: 2022-11-16 | Stop reason: SURG

## 2022-11-16 RX ORDER — ONDANSETRON 2 MG/ML
4 INJECTION INTRAMUSCULAR; INTRAVENOUS ONCE AS NEEDED
Status: COMPLETED | OUTPATIENT
Start: 2022-11-16 | End: 2022-11-16

## 2022-11-16 RX ORDER — OXYCODONE HYDROCHLORIDE 5 MG/1
5 TABLET ORAL EVERY 4 HOURS PRN
Status: DISCONTINUED | OUTPATIENT
Start: 2022-11-16 | End: 2022-11-17 | Stop reason: HOSPADM

## 2022-11-16 RX ORDER — ACETAMINOPHEN 500 MG
1000 TABLET ORAL EVERY 8 HOURS
Status: DISCONTINUED | OUTPATIENT
Start: 2022-11-16 | End: 2022-11-17 | Stop reason: HOSPADM

## 2022-11-16 RX ORDER — PROCHLORPERAZINE EDISYLATE 5 MG/ML
10 INJECTION INTRAMUSCULAR; INTRAVENOUS ONCE AS NEEDED
Status: DISCONTINUED | OUTPATIENT
Start: 2022-11-16 | End: 2022-11-16 | Stop reason: HOSPADM

## 2022-11-16 RX ORDER — FLUMAZENIL 0.1 MG/ML
0.1 INJECTION INTRAVENOUS AS NEEDED
Status: DISCONTINUED | OUTPATIENT
Start: 2022-11-16 | End: 2022-11-16 | Stop reason: HOSPADM

## 2022-11-16 RX ORDER — BUPIVACAINE HYDROCHLORIDE 2.5 MG/ML
INJECTION, SOLUTION INFILTRATION; PERINEURAL AS NEEDED
Status: DISCONTINUED | OUTPATIENT
Start: 2022-11-16 | End: 2022-11-16 | Stop reason: HOSPADM

## 2022-11-16 RX ORDER — EPHEDRINE SULFATE 5 MG/ML
INJECTION INTRAVENOUS AS NEEDED
Status: DISCONTINUED | OUTPATIENT
Start: 2022-11-16 | End: 2022-11-16 | Stop reason: SURG

## 2022-11-16 RX ORDER — HYDROCODONE BITARTRATE AND ACETAMINOPHEN 5; 325 MG/1; MG/1
1 TABLET ORAL EVERY 6 HOURS PRN
Qty: 20 TABLET | Refills: 0 | Status: SHIPPED | OUTPATIENT
Start: 2022-11-16

## 2022-11-16 RX ORDER — SODIUM CHLORIDE 0.9 % (FLUSH) 0.9 %
10 SYRINGE (ML) INJECTION EVERY 12 HOURS SCHEDULED
Status: DISCONTINUED | OUTPATIENT
Start: 2022-11-16 | End: 2022-11-16 | Stop reason: HOSPADM

## 2022-11-16 RX ORDER — POLYETHYLENE GLYCOL 3350 17 G/17G
17 POWDER, FOR SOLUTION ORAL DAILY
Qty: 14 EACH | Refills: 0 | Status: SHIPPED | OUTPATIENT
Start: 2022-11-16

## 2022-11-16 RX ORDER — AMOXICILLIN 250 MG
1 CAPSULE ORAL 2 TIMES DAILY
Status: DISCONTINUED | OUTPATIENT
Start: 2022-11-16 | End: 2022-11-17 | Stop reason: HOSPADM

## 2022-11-16 RX ORDER — FUROSEMIDE 20 MG/1
20 TABLET ORAL DAILY
Status: DISCONTINUED | OUTPATIENT
Start: 2022-11-17 | End: 2022-11-17 | Stop reason: HOSPADM

## 2022-11-16 RX ORDER — AMLODIPINE BESYLATE 5 MG/1
10 TABLET ORAL DAILY
Status: DISCONTINUED | OUTPATIENT
Start: 2022-11-17 | End: 2022-11-17 | Stop reason: HOSPADM

## 2022-11-16 RX ORDER — ONDANSETRON 2 MG/ML
4 INJECTION INTRAMUSCULAR; INTRAVENOUS EVERY 6 HOURS PRN
Status: DISCONTINUED | OUTPATIENT
Start: 2022-11-16 | End: 2022-11-17 | Stop reason: HOSPADM

## 2022-11-16 RX ORDER — HYDROCODONE BITARTRATE AND ACETAMINOPHEN 7.5; 325 MG/1; MG/1
1 TABLET ORAL ONCE AS NEEDED
Status: COMPLETED | OUTPATIENT
Start: 2022-11-16 | End: 2022-11-16

## 2022-11-16 RX ORDER — SODIUM CHLORIDE 0.9 % (FLUSH) 0.9 %
10 SYRINGE (ML) INJECTION AS NEEDED
Status: DISCONTINUED | OUTPATIENT
Start: 2022-11-16 | End: 2022-11-16 | Stop reason: HOSPADM

## 2022-11-16 RX ORDER — DIPHENHYDRAMINE HYDROCHLORIDE 50 MG/ML
12.5 INJECTION INTRAMUSCULAR; INTRAVENOUS ONCE AS NEEDED
Status: DISCONTINUED | OUTPATIENT
Start: 2022-11-16 | End: 2022-11-16 | Stop reason: HOSPADM

## 2022-11-16 RX ORDER — ROSUVASTATIN CALCIUM 10 MG/1
10 TABLET, COATED ORAL DAILY
Status: DISCONTINUED | OUTPATIENT
Start: 2022-11-17 | End: 2022-11-17 | Stop reason: HOSPADM

## 2022-11-16 RX ORDER — FENTANYL CITRATE 50 UG/ML
25 INJECTION, SOLUTION INTRAMUSCULAR; INTRAVENOUS
Status: DISCONTINUED | OUTPATIENT
Start: 2022-11-16 | End: 2022-11-16 | Stop reason: HOSPADM

## 2022-11-16 RX ORDER — SODIUM CHLORIDE, SODIUM LACTATE, POTASSIUM CHLORIDE, AND CALCIUM CHLORIDE .6; .31; .03; .02 G/100ML; G/100ML; G/100ML; G/100ML
20 INJECTION, SOLUTION INTRAVENOUS CONTINUOUS
Status: DISCONTINUED | OUTPATIENT
Start: 2022-11-16 | End: 2022-11-17 | Stop reason: HOSPADM

## 2022-11-16 RX ORDER — IPRATROPIUM BROMIDE AND ALBUTEROL SULFATE 2.5; .5 MG/3ML; MG/3ML
3 SOLUTION RESPIRATORY (INHALATION) ONCE AS NEEDED
Status: DISCONTINUED | OUTPATIENT
Start: 2022-11-16 | End: 2022-11-16 | Stop reason: HOSPADM

## 2022-11-16 RX ADMIN — ONDANSETRON 4 MG: 2 INJECTION INTRAMUSCULAR; INTRAVENOUS at 14:48

## 2022-11-16 RX ADMIN — FENTANYL CITRATE 50 MCG: 50 INJECTION, SOLUTION INTRAMUSCULAR; INTRAVENOUS at 14:38

## 2022-11-16 RX ADMIN — ACETAMINOPHEN 1000 MG: 500 TABLET ORAL at 22:59

## 2022-11-16 RX ADMIN — SODIUM CHLORIDE, SODIUM LACTATE, POTASSIUM CHLORIDE, AND CALCIUM CHLORIDE: .6; .31; .03; .02 INJECTION, SOLUTION INTRAVENOUS at 12:06

## 2022-11-16 RX ADMIN — EPHEDRINE SULFATE 10 MG: 5 INJECTION INTRAVENOUS at 12:18

## 2022-11-16 RX ADMIN — PROPOFOL 100 MG: 10 INJECTION, EMULSION INTRAVENOUS at 12:12

## 2022-11-16 RX ADMIN — SUGAMMADEX 100 MG: 100 INJECTION, SOLUTION INTRAVENOUS at 14:52

## 2022-11-16 RX ADMIN — ONDANSETRON 4 MG: 2 INJECTION INTRAMUSCULAR; INTRAVENOUS at 16:05

## 2022-11-16 RX ADMIN — FENTANYL CITRATE 50 MCG: 50 INJECTION, SOLUTION INTRAMUSCULAR; INTRAVENOUS at 12:13

## 2022-11-16 RX ADMIN — SODIUM CHLORIDE, POTASSIUM CHLORIDE, SODIUM LACTATE AND CALCIUM CHLORIDE 20 ML/HR: 600; 310; 30; 20 INJECTION, SOLUTION INTRAVENOUS at 10:12

## 2022-11-16 RX ADMIN — POLYETHYLENE GLYCOL 3350 17 G: 17 POWDER, FOR SOLUTION ORAL at 22:59

## 2022-11-16 RX ADMIN — ROCURONIUM BROMIDE 40 MG: 50 INJECTION INTRAVENOUS at 12:11

## 2022-11-16 RX ADMIN — FENTANYL CITRATE 25 MCG: 50 INJECTION, SOLUTION INTRAMUSCULAR; INTRAVENOUS at 17:32

## 2022-11-16 RX ADMIN — CEFAZOLIN 2 G: 2 INJECTION, POWDER, FOR SOLUTION INTRAMUSCULAR; INTRAVENOUS at 12:16

## 2022-11-16 RX ADMIN — FENTANYL CITRATE 25 MCG: 50 INJECTION, SOLUTION INTRAMUSCULAR; INTRAVENOUS at 18:00

## 2022-11-16 RX ADMIN — POLYETHYLENE GLYCOL, PROPYLENE GLYCOL 1 DROP: .4; .3 LIQUID OPHTHALMIC at 22:59

## 2022-11-16 RX ADMIN — HYDROCODONE BITARTRATE AND ACETAMINOPHEN 1 TABLET: 7.5; 325 TABLET ORAL at 17:14

## 2022-11-16 RX ADMIN — SENNOSIDES AND DOCUSATE SODIUM 1 TABLET: 50; 8.6 TABLET ORAL at 22:59

## 2022-11-16 RX ADMIN — ROCURONIUM BROMIDE 10 MG: 50 INJECTION INTRAVENOUS at 14:11

## 2022-11-16 NOTE — ANESTHESIA PROCEDURE NOTES
Airway  Urgency: elective    Date/Time: 11/16/2022 12:13 PM  Airway not difficult    General Information and Staff    Patient location during procedure: OR  CRNA/CAA: Eduardo Hernández CRNA    Indications and Patient Condition  Indications for airway management: airway protection and CNS depression    Preoxygenated: yes  MILS maintained throughout  Mask difficulty assessment: 1 - vent by mask    Final Airway Details  Final airway type: endotracheal airway      Successful airway: ETT  Cuffed: yes   Successful intubation technique: direct laryngoscopy  Endotracheal tube insertion site: oral  Blade: Ella  ETT size (mm): 7.0  Cormack-Lehane Classification: grade IIa - partial view of glottis  Placement verified by: capnometry   Measured from: lips  Number of attempts at approach: 1  Assessment: lips, teeth, and gum same as pre-op and atraumatic intubation

## 2022-11-16 NOTE — ANESTHESIA POSTPROCEDURE EVALUATION
Patient: Becka Oliva    Procedure Summary     Date: 11/16/22 Room / Location: [facility] OR 08 / [facility] MAIN OR    Anesthesia Start: 1206 Anesthesia Stop: 1513    Procedure: INGUINAL HERNIA REPAIR LAPAROSCOPIC WITH DAVINCI ROBOT (Bilateral: Abdomen) Diagnosis:       Left inguinal hernia      (Left inguinal hernia [K40.90])    Surgeons: Jasper Mcneill MD Provider: Bradley Ball MD    Anesthesia Type: general ASA Status: 3          Anesthesia Type: general    Vitals  Vitals Value Taken Time   /69 11/16/22 1543   Temp 96.9 °F (36.1 °C) 11/16/22 1516   Pulse 69 11/16/22 1547   Resp 17 11/16/22 1531   SpO2 92 % 11/16/22 1547   Vitals shown include unvalidated device data.        Post Anesthesia Care and Evaluation    Patient location during evaluation: PACU  Patient participation: complete - patient participated  Level of consciousness: awake  Pain scale: See nurse's notes for pain score.  Pain management: adequate    Airway patency: patent  Anesthetic complications: No anesthetic complications  PONV Status: none  Cardiovascular status: acceptable  Respiratory status: acceptable  Hydration status: acceptable    Comments: Patient seen and examined postoperatively; vital signs stable; SpO2 greater than or equal to 90%; cardiopulmonary status stable; nausea/vomiting adequately controlled; pain adequately controlled; no apparent anesthesia complications; patient discharged from anesthesia care when discharge criteria were met

## 2022-11-16 NOTE — ANESTHESIA PREPROCEDURE EVALUATION
Anesthesia Evaluation     Patient summary reviewed and Nursing notes reviewed   NPO Solid Status: > 8 hours  NPO Liquid Status: > 8 hours           Airway   Mallampati: I  TM distance: >3 FB  Neck ROM: full  No difficulty expected  Dental - normal exam     Pulmonary - normal exam   (+) a smoker Former, COPD, shortness of breath,   Cardiovascular - normal exam    (+) hypertension, pericardial effusion, PVD,     ROS comment: Interpretation Summary       Normal LV size and contractility EF of 70%  Normal RV size  Normal atrial size  Aortic valve, mitral valve, tricuspid valve appears structurally normal, mild aortic regurgitation seen.  Small pericardial effusion seen which appears organized and fibrinous., no signs of increased intrapericardial pressures.  Proximal aorta appears normal in size.      Neuro/Psych- negative ROS  GI/Hepatic/Renal/Endo    (+)   renal disease CRI,     Musculoskeletal     Abdominal  - normal exam    Bowel sounds: normal.   Substance History - negative use     OB/GYN negative ob/gyn ROS         Other   arthritis,      ROS/Med Hx Other: NEG MYOVIEW 6/2021  SMALL PERICARDIAL EFFUSION 6/2021                  Anesthesia Plan    ASA 3     general     intravenous induction     Anesthetic plan, risks, benefits, and alternatives have been provided, discussed and informed consent has been obtained with: patient.    Plan discussed with CRNA and CAA.        CODE STATUS:

## 2022-11-16 NOTE — OP NOTE
Operative Report:    Patient Name:  Becka Oliva  YOB: 1940    Date of Surgery:  11/16/2022     Indications:    81-year-old lady with history of peripheral vascular disease on aspirin Plavix, known left iliac stent occlusion, COPD on duo nebs not on home oxygen, aortic aneurysm status post EVAR, chronic kidney disease who presents to my office to discuss her left inguinal hernia.  I saw her about a year ago for this however since then she is underwent a femoral to femoral bypass for her iliac stent occlusion.  Continues to have significant amount of pain especially when up and walking around for long amounts of time.  She denies nausea, vomiting or obstipation.  Repeat CT abdomen pelvis with large left inguinal hernia containing small bowel, her femoral to femoral bypass is near her hernia sac but outside the sac.  We discussed options for continued nonoperative management versus open inguinal hernia repair versus robotic inguinal hernia repair and patient elected to proceed with robotic inguinal hernia repair. Will reach out to her cardiologist for preoperative optimization and clearance and to see if we can stop her Plavix, will also reach out to her vascular surgeon to see if we can stop her Plavix before surgery. I discussed risk benefits and alternatives to robotic inguinal hernia repair, including bowel injury, mesh infection requiring mesh excision, recurrence, bleeding, and chronic inguinal pain resulting from nerve injury, infection of her femoral-femoral bypass graft, occlusion or thrombosis of her femoral-femoral bypass graft and patient elected to proceed.     Pre-op Diagnosis:   Left inguinal hernia [K40.90]       Post-Op Diagnosis Codes:     * Left inguinal hernia [K40.90]    Procedure/CPT® Codes:      Procedure(s):  Bilateral INGUINAL HERNIA REPAIR LAPAROSCOPIC WITH DAVINCI ROBOT, nonincarcerated nonrecurrent    Staff:  Surgeon(s):  Jasper Mcneill MD    Circulator: Chelsy Servin,  RN; Morris Flynn RN  Scrub Person: Karlie He  Assistant: Asad Adorno CSFA  was responsible for performing the following activities: Retraction, Suction, Irrigation, Suturing, Closing, Placing Dressing and Held/Positioned Camera and their skilled assistance was necessary for the success of this case.        Anesthesia: General    Estimated Blood Loss: minimal    Implants:    Implant Name Type Inv. Item Serial No.  Lot No. LRB No. Used Action   DEV CLS WND VLOC/PBT PAL NONABS 1/2CIR SZ3/0 26MM 23CM GRN - [id number] Implant DEV CLS WND VLOC/PBT PAL NONABS 1/2CIR SZ3/0 26MM 23CM GRN  COVIDIEN [id number] Left 2 Implanted   MESH PROGRIP LAP S/FIXATING [id number] - [id number] Implant MESH PROGRIP LAP S/FIXATING [id number]  COVIDIEN [id number] Left 2 Implanted       Specimen:          None        Findings: Large indirect left inguinal hernia, moderate sized direct right inguinal hernia, dopplerable flow in femorofemoral graft both before and after procedure    Complications: None apparent    Description of Procedure:   After risk benefits and alternatives were discussed with patient informed consent was obtained.  Patient was transported to the operating room and placed supine on the operating room table.  He underwent general anesthesia.  The abdomen was prepped and draped in a sterile fashion and a surgical timeout was completed.    An 8 mm incision was made in the patient's left upper quadrant with an 11 blade scalpel.  A 5 mm Optiview trocar was used to gain entry into the patient's abdomen and insufflated the patient's abdomen successfully.  The area immediately underlying our entry was inspected with no injury identified.  An 8 mm trocar was placed in the patient's right upper quadrant and a third 8 mm trocar through the patient's left rectus muscle I upsized my entry trocar to an 8 mm robotic trocar.  Patient was placed in Trendelenburg position and the da Job robot was docked.    Using a  combination of blunt dissection as well as electrocautery I made a preperitoneal flap 6 centimeters above the patient's left inguinal hernia defect, extending from the medial umbilical ligament to the patient's ASIS.  I dissected this flap off of the posterior abdominal wall until identified Gage's ligament and the pubic tubercle.  I then dissected laterally until there was enough space to place my mesh.  I exposed the direct hernia space as well as the femoral space and small direct hernia but no femoral hernia was identified.  There was a large indirect hernia sac I carefully dissected this free.    Using a combination of blunt dissection as well as electrocautery I made a preperitoneal flap 6 centimeters above the patient's right inguinal hernia defect, extending from the medial umbilical ligament to the patient's ASIS.  I dissected this flap off of the posterior abdominal wall until identified Gage's ligament and the pubic tubercle.  I then dissected laterally until there was enough space to place my mesh.  I exposed the direct hernia space as well as the femoral space, she had a moderate sized direct hernia but no femoral hernia, no significant indirect hernia. I carefully dissected the peritoneal flap until there is enough space to place my mesh.    I placed a 10 cm x 15 cm permanent progrip mesh over the patient's left and right inguinal defects.  I inspected the area and ensured adequate hemostasis.  I closed the peritoneal flap with 3.0 absorbable V-Loc sutures in order to completely cover the mesh and keep it out of the peritoneal cavity.  The patient's abdomen was desufflated.  The trochars were removed and the skin incisions were closed with interrupted 4-0 Vicryl suture.  The surgical incisions were covered with surgical glue.  The patient was awoken from general anesthesia and transported to PACU without incident.       Jasper Mcneill MD     Date: 11/16/2022  Time: 15:26 EST    This note was  created using Dragon Voice Recognition software.

## 2022-11-17 VITALS
DIASTOLIC BLOOD PRESSURE: 66 MMHG | BODY MASS INDEX: 28.23 KG/M2 | OXYGEN SATURATION: 92 % | HEIGHT: 62 IN | SYSTOLIC BLOOD PRESSURE: 128 MMHG | WEIGHT: 153.4 LBS | HEART RATE: 80 BPM | RESPIRATION RATE: 17 BRPM | TEMPERATURE: 98.1 F

## 2022-11-17 LAB
ALBUMIN SERPL-MCNC: 3.5 G/DL (ref 3.5–5.2)
ALBUMIN/GLOB SERPL: 1.5 G/DL
ALP SERPL-CCNC: 56 U/L (ref 39–117)
ALT SERPL W P-5'-P-CCNC: 10 U/L (ref 1–33)
ANION GAP SERPL CALCULATED.3IONS-SCNC: 8 MMOL/L (ref 5–15)
AST SERPL-CCNC: 22 U/L (ref 1–32)
BILIRUB SERPL-MCNC: 0.4 MG/DL (ref 0–1.2)
BUN SERPL-MCNC: 17 MG/DL (ref 8–23)
BUN/CREAT SERPL: 29.3 (ref 7–25)
CALCIUM SPEC-SCNC: 8.9 MG/DL (ref 8.6–10.5)
CHLORIDE SERPL-SCNC: 102 MMOL/L (ref 98–107)
CO2 SERPL-SCNC: 30 MMOL/L (ref 22–29)
CREAT SERPL-MCNC: 0.58 MG/DL (ref 0.57–1)
EGFRCR SERPLBLD CKD-EPI 2021: 89.9 ML/MIN/1.73
GLOBULIN UR ELPH-MCNC: 2.4 GM/DL
GLUCOSE SERPL-MCNC: 104 MG/DL (ref 65–99)
POTASSIUM SERPL-SCNC: 4.2 MMOL/L (ref 3.5–5.2)
PROT SERPL-MCNC: 5.9 G/DL (ref 6–8.5)
SODIUM SERPL-SCNC: 140 MMOL/L (ref 136–145)

## 2022-11-17 PROCEDURE — A9270 NON-COVERED ITEM OR SERVICE: HCPCS | Performed by: STUDENT IN AN ORGANIZED HEALTH CARE EDUCATION/TRAINING PROGRAM

## 2022-11-17 PROCEDURE — 63710000001 HYDROCHLOROTHIAZIDE 12.5 MG TABLET 100 EACH BOTTLE: Performed by: STUDENT IN AN ORGANIZED HEALTH CARE EDUCATION/TRAINING PROGRAM

## 2022-11-17 PROCEDURE — 63710000001 FUROSEMIDE 20 MG TABLET: Performed by: STUDENT IN AN ORGANIZED HEALTH CARE EDUCATION/TRAINING PROGRAM

## 2022-11-17 PROCEDURE — 80053 COMPREHEN METABOLIC PANEL: CPT | Performed by: STUDENT IN AN ORGANIZED HEALTH CARE EDUCATION/TRAINING PROGRAM

## 2022-11-17 PROCEDURE — 63710000001 POLYETHYLENE GLYCOL 17 G PACK: Performed by: STUDENT IN AN ORGANIZED HEALTH CARE EDUCATION/TRAINING PROGRAM

## 2022-11-17 PROCEDURE — 63710000001 BISOPROLOL 5 MG TABLET 30 EACH BOTTLE: Performed by: STUDENT IN AN ORGANIZED HEALTH CARE EDUCATION/TRAINING PROGRAM

## 2022-11-17 PROCEDURE — 63710000001 ACETAMINOPHEN 500 MG TABLET: Performed by: STUDENT IN AN ORGANIZED HEALTH CARE EDUCATION/TRAINING PROGRAM

## 2022-11-17 PROCEDURE — 63710000001 AMLODIPINE 5 MG TABLET: Performed by: STUDENT IN AN ORGANIZED HEALTH CARE EDUCATION/TRAINING PROGRAM

## 2022-11-17 PROCEDURE — G0378 HOSPITAL OBSERVATION PER HR: HCPCS

## 2022-11-17 PROCEDURE — 63710000001 SENNOSIDES-DOCUSATE 8.6-50 MG TABLET: Performed by: STUDENT IN AN ORGANIZED HEALTH CARE EDUCATION/TRAINING PROGRAM

## 2022-11-17 PROCEDURE — 63710000001 ROSUVASTATIN 10 MG TABLET: Performed by: STUDENT IN AN ORGANIZED HEALTH CARE EDUCATION/TRAINING PROGRAM

## 2022-11-17 RX ADMIN — ROSUVASTATIN 10 MG: 10 TABLET, FILM COATED ORAL at 08:18

## 2022-11-17 RX ADMIN — FUROSEMIDE 20 MG: 20 TABLET ORAL at 08:18

## 2022-11-17 RX ADMIN — ACETAMINOPHEN 1000 MG: 500 TABLET ORAL at 12:01

## 2022-11-17 RX ADMIN — AMLODIPINE BESYLATE 10 MG: 5 TABLET ORAL at 08:18

## 2022-11-17 RX ADMIN — BISOPROLOL FUMARATE: 5 TABLET ORAL at 08:18

## 2022-11-17 RX ADMIN — ACETAMINOPHEN 1000 MG: 500 TABLET ORAL at 04:53

## 2022-11-17 RX ADMIN — SENNOSIDES AND DOCUSATE SODIUM 1 TABLET: 50; 8.6 TABLET ORAL at 08:18

## 2022-11-17 RX ADMIN — POLYETHYLENE GLYCOL 3350 17 G: 17 POWDER, FOR SOLUTION ORAL at 08:18

## 2022-11-17 NOTE — PLAN OF CARE
Goal Outcome Evaluation:               Patient discharging home and will follow up with Dr. Mcneill at next scheduled appt.

## 2022-11-17 NOTE — PLAN OF CARE
Goal Outcome Evaluation:      Patient alert oriented able to make needs known. Patient has had no complaints of pain this shift. Patient ambulates to bathroom with stand by assist and walker.patient on oxygen 2L/NC at .

## 2022-11-17 NOTE — DISCHARGE SUMMARY
Date of Discharge:  11/17/2022    Discharge Diagnosis: Bilateral inguinal hernia    Presenting Problem/History of Present Illness  Active Hospital Problems    Diagnosis  POA   • **Left inguinal hernia [K40.90]  Unknown      Resolved Hospital Problems   No resolved problems to display.        Hospital Course  Patient was taken to the operating room on 11/16/2022 for robotic bilateral inguinal hernia repair.  Postoperatively she was in too much pain to be discharged so she was admitted overnight for observation.  Today she is feeling great, no significant pain, tolerating a diet, passing flatus and ambulating without assistance.  No significant leg pain or coolness.  She was discharged with plans to follow-up in the office in 2 weeks.    Procedures Performed    Procedure(s):  INGUINAL HERNIA REPAIR LAPAROSCOPIC WITH DAVINCI ROBOT  -------------------        Consults:   Consults     No orders found for last 30 day(s).          Condition on Discharge:  Satisfactory    Vital Signs  Temp:  [96.9 °F (36.1 °C)-98.7 °F (37.1 °C)] 98.1 °F (36.7 °C)  Heart Rate:  [60-96] 80  Resp:  [14-24] 17  BP: (103-146)/(50-77) 128/66    Physical Exam:   Physical Exam  Constitutional:       General: She is not in acute distress.     Appearance: Normal appearance. She is not ill-appearing.   HENT:      Head: Normocephalic and atraumatic.      Right Ear: External ear normal.      Left Ear: External ear normal.   Eyes:      Extraocular Movements: Extraocular movements intact.      Conjunctiva/sclera: Conjunctivae normal.   Cardiovascular:      Rate and Rhythm: Normal rate and regular rhythm.   Pulmonary:      Effort: Pulmonary effort is normal. No respiratory distress.   Abdominal:      General: There is no distension.      Palpations: Abdomen is soft.      Comments: Incisions clean dry and intact   Musculoskeletal:         General: No swelling or deformity.   Skin:     General: Skin is warm and dry.   Neurological:      Mental Status: She  is alert and oriented to person, place, and time. Mental status is at baseline.         Discharge Disposition  Home or Self Care    Discharge Medications     Discharge Medications      New Medications      Instructions Start Date   ondansetron 4 MG tablet  Commonly known as: Zofran   4 mg, Oral, Every 8 Hours PRN      polyethylene glycol 17 g packet  Commonly known as: MIRALAX   Mix 17g (1 packet) as directed with liquid and drink by mouth Daily.         Changes to Medications      Instructions Start Date   acetaminophen 325 MG tablet  Commonly known as: TYLENOL  What changed:   · when to take this  · additional instructions   650 mg, Oral, Every 4 Hours PRN      amLODIPine 10 MG tablet  Commonly known as: NORVASC  What changed: additional instructions   10 mg, Oral, Daily      bisoprolol-hydrochlorothiazide 5-6.25 MG per tablet  Commonly known as: ZIAC  What changed: additional instructions   1 tablet, Oral, Daily      HYDROcodone-acetaminophen 5-325 MG per tablet  Commonly known as: NORCO  What changed: Another medication with the same name was added. Make sure you understand how and when to take each.   1 tablet, Oral, Every 8 Hours PRN, for pain  TAKES NIGHTLY      HYDROcodone-acetaminophen 5-325 MG per tablet  Commonly known as: [place]  What changed: You were already taking a medication with the same name, and this prescription was added. Make sure you understand how and when to take each.   1 tablet, Oral, Every 6 Hours PRN      rosuvastatin 10 MG tablet  Commonly known as: CRESTOR  What changed: additional instructions   TAKE 1 TABLET BY MOUTH EVERY DAY         Continue These Medications      Instructions Start Date   cholecalciferol 25 MCG (1000 UT) tablet  Commonly known as: VITAMIN D3   2,000 Units, Oral, Daily, LAST 11/9      furosemide 20 MG tablet  Commonly known as: LASIX   20 mg, Oral, Daily, NONE PREOP      hydroCHLOROthiazide 12.5 MG tablet  Commonly known as: HYDRODIURIL   12.5 mg, Oral, Daily, NONE  PREOP      potassium chloride 10 MEQ CR tablet   2 tablets Daily. TAKE PREOP      potassium chloride 20 MEQ CR tablet  Commonly known as: K-DUR,KLOR-CON   20 mEq, Oral, Daily, with food      PRESERVISION AREDS 2+MULTI VIT PO   1 tablet, Oral, 2 Times Daily, LD 11/9      pyridoxine 100 MG tablet tablet  Commonly known as: VITAMIN B-6   100 mg, Oral, Daily, LD 11/9      Trelegy Ellipta 100-62.5-25 MCG/ACT inhaler  Generic drug: Fluticasone-Umeclidin-Vilant   1 puff, Inhalation, USE PREOP      vitamin B-12 1000 MCG tablet  Commonly known as: CYANOCOBALAMIN   2,500 mcg, Oral, Daily, LAST DOSE 11/9         Stop These Medications    aspirin 81 MG EC tablet     clopidogrel 75 MG tablet  Commonly known as: PLAVIX            Discharge Diet:   Diet Instructions     Advance Diet as Tolerated      Advance Diet as Tolerated            Activity at Discharge:   Activity Instructions     Discharge Activity      1) No driving for 48 hours and no longer taking narcotics.   3) May shower / sponge bathe in 48 hours, do not soak in water for 2 weeks  4) Do not lift / push / pull more than 10 pounds for 6 weeks    Discharge Activity      1) No driving for 48 hours and no longer taking narcotics.   3) May shower / sponge bathe in 48 hours, do not soak in water for 2 weeks  4) Do not lift / push / pull more than 10 pounds for 6 weeks          Follow-up Appointments  Future Appointments   Date Time Provider Department Center   12/19/2022  3:00 PM Jasper Mcneill MD [facility]     Additional Instructions for the Follow-ups that You Need to Schedule     Discharge Follow-up with Specified Provider: [facility]; 2 Weeks   As directed      To: [facility]    Follow Up: 2 Weeks         Discharge Follow-up with Specified Provider: [facility]; 2 Weeks   As directed      To: [place]    Follow Up: 2 Weeks         Notify Physician or Go To The ED For the Following Conditions   As directed      Fever, vomiting, severe abdominal pain, severe leg pain    Order  Comments: Fever, vomiting, severe abdominal pain, severe leg pain          Notify Physician or Go To The ED For the Following Conditions   As directed      Fever, severe abdominal pain, vomiting    Order Comments: Fever, severe abdominal pain, vomiting                Test Results Pending at Discharge       Jasper Mcneill MD  11/17/22  13:01 EST

## 2022-11-18 ENCOUNTER — NURSE TRIAGE (OUTPATIENT)
Dept: CALL CENTER | Facility: HOSPITAL | Age: 82
End: 2022-11-18

## 2022-11-18 NOTE — TELEPHONE ENCOUNTER
Reason for Disposition  • [1] Caller has URGENT medicine question about med that PCP or specialist prescribed AND [2] triager unable to answer question    Additional Information  • Negative: [1] Intentional drug overdose AND [2] suicidal thoughts or ideas  • Negative: Drug overdose and triager unable to answer question  • Negative: Caller requesting information unrelated to medicine  • Negative: Caller requesting information about COVID-19 Vaccine  • Negative: Caller requesting information about Emergency Contraception  • Negative: Caller requesting information about Combined Birth Control Pills  • Negative: Caller requesting information about Progestin Birth Control Pills  • Negative: Caller requesting information about Post-Op pain or medicines  • Negative: Caller requesting a prescription antibiotic (such as Penicillin) for Strep throat and has a positive culture result  • Negative: Caller requesting a prescription anti-viral med (such as Tamiflu) and has influenza (flu)  symptoms  • Negative: Immunization reaction suspected  • Negative: Rash while taking a medicine or within 3 days of stopping it  • Negative: [1] Asthma and [2] having symptoms of asthma (cough, wheezing, etc.)  • Negative: [1] Symptom of illness (e.g., headache, abdominal pain, earache, vomiting) AND [2] more than mild  • Negative: Breastfeeding questions about mother's medicines and diet  • Negative: MORE THAN A DOUBLE DOSE of a prescription or over-the-counter (OTC) drug  • Negative: [1] DOUBLE DOSE (an extra dose or lesser amount) of prescription drug AND [2] any symptoms (e.g., dizziness, nausea, pain, sleepiness)  • Negative: [1] DOUBLE DOSE (an extra dose or lesser amount) of over-the-counter (OTC) drug AND [2] any symptoms (e.g., dizziness, nausea, pain, sleepiness)  • Negative: Took another person's prescription drug  • Negative: [1] DOUBLE DOSE (an extra dose or lesser amount) of prescription drug AND [2] NO symptoms (Exception: a  "double dose of antibiotics)  • Negative: Diabetes drug error or overdose (e.g., took wrong type of insulin or took extra dose)  • Negative: [1] Prescription refill request for ESSENTIAL medicine (i.e., likelihood of harm to patient if not taken) AND [2] triager unable to refill per department policy  • Negative: [1] Prescription not at pharmacy AND [2] was prescribed by PCP recently (Exception: triager has access to EMR and prescription is recorded there. Go to Home Care and confirm for pharmacy.)  • Negative: [1] Pharmacy calling with prescription question AND [2] triager unable to answer question    Answer Assessment - Initial Assessment Questions  1. NAME of MEDICATION: \"What medicine are you calling about?\"      Plavix  2. QUESTION: \"What is your question?\" (e.g., medication refill, side effect)     How lig to not take plavix  3. PRESCRIBING HCP: \"Who prescribed it?\" Reason: if prescribed by specialist, call should be referred to that group.     Cardiology  4. SYMPTOMS: \"Do you have any symptoms?\"      no  5. SEVERITY: If symptoms are present, ask \"Are they mild, moderate or severe?\"      no  6. PREGNANCY:  \"Is there any chance that you are pregnant?\" \"When was your last menstrual period?\"      no    Protocols used: MEDICATION QUESTION CALL-ADULT-      "

## 2022-11-19 ENCOUNTER — APPOINTMENT (OUTPATIENT)
Dept: GENERAL RADIOLOGY | Facility: HOSPITAL | Age: 82
End: 2022-11-19

## 2022-11-19 ENCOUNTER — APPOINTMENT (OUTPATIENT)
Dept: CT IMAGING | Facility: HOSPITAL | Age: 82
End: 2022-11-19

## 2022-11-19 ENCOUNTER — HOSPITAL ENCOUNTER (OUTPATIENT)
Facility: HOSPITAL | Age: 82
Setting detail: OBSERVATION
Discharge: HOME | End: 2022-11-20
Attending: EMERGENCY MEDICINE | Admitting: INTERNAL MEDICINE

## 2022-11-19 DIAGNOSIS — Z99.81 HYPOXEMIA REQUIRING SUPPLEMENTAL OXYGEN: ICD-10-CM

## 2022-11-19 DIAGNOSIS — T81.82XA SUBCUTANEOUS EMPHYSEMA AFTER PROCEDURE: ICD-10-CM

## 2022-11-19 DIAGNOSIS — J90 BILATERAL PLEURAL EFFUSION: ICD-10-CM

## 2022-11-19 DIAGNOSIS — R09.02 HYPOXEMIA REQUIRING SUPPLEMENTAL OXYGEN: ICD-10-CM

## 2022-11-19 DIAGNOSIS — I51.7 CARDIOMEGALY: ICD-10-CM

## 2022-11-19 DIAGNOSIS — R06.00 DYSPNEA, UNSPECIFIED TYPE: Primary | ICD-10-CM

## 2022-11-19 LAB
ALBUMIN SERPL-MCNC: 3.9 G/DL (ref 3.5–5.2)
ALBUMIN/GLOB SERPL: 1.2 G/DL
ALP SERPL-CCNC: 66 U/L (ref 39–117)
ALT SERPL W P-5'-P-CCNC: 16 U/L (ref 1–33)
ANION GAP SERPL CALCULATED.3IONS-SCNC: 10 MMOL/L (ref 5–15)
ARTERIAL PATENCY WRIST A: POSITIVE
AST SERPL-CCNC: 27 U/L (ref 1–32)
ATMOSPHERIC PRESS: ABNORMAL MM[HG]
BASE EXCESS BLDA CALC-SCNC: 7 MMOL/L (ref 0–3)
BASOPHILS # BLD AUTO: 0 10*3/MM3 (ref 0–0.2)
BASOPHILS NFR BLD AUTO: 0.5 % (ref 0–1.5)
BDY SITE: ABNORMAL
BILIRUB SERPL-MCNC: 0.6 MG/DL (ref 0–1.2)
BUN SERPL-MCNC: 13 MG/DL (ref 8–23)
BUN/CREAT SERPL: 22 (ref 7–25)
CALCIUM SPEC-SCNC: 9.2 MG/DL (ref 8.6–10.5)
CHLORIDE SERPL-SCNC: 93 MMOL/L (ref 98–107)
CO2 BLDA-SCNC: 34.7 MMOL/L (ref 22–29)
CO2 SERPL-SCNC: 33 MMOL/L (ref 22–29)
CREAT SERPL-MCNC: 0.59 MG/DL (ref 0.57–1)
D DIMER PPP FEU-MCNC: 1.18 MG/L (FEU) (ref 0–0.59)
DEPRECATED RDW RBC AUTO: 48.1 FL (ref 37–54)
EGFRCR SERPLBLD CKD-EPI 2021: 89.6 ML/MIN/1.73
EOSINOPHIL # BLD AUTO: 0 10*3/MM3 (ref 0–0.4)
EOSINOPHIL NFR BLD AUTO: 0.5 % (ref 0.3–6.2)
ERYTHROCYTE [DISTWIDTH] IN BLOOD BY AUTOMATED COUNT: 13.5 % (ref 12.3–15.4)
GLOBULIN UR ELPH-MCNC: 3.2 GM/DL
GLUCOSE SERPL-MCNC: 117 MG/DL (ref 65–99)
HCO3 BLDA-SCNC: 33.1 MMOL/L (ref 21–28)
HCT VFR BLD AUTO: 40.9 % (ref 34–46.6)
HEMODILUTION: NO
HGB BLD-MCNC: 13.9 G/DL (ref 12–15.9)
HOLD SPECIMEN: NORMAL
HOLD SPECIMEN: NORMAL
INHALED O2 CONCENTRATION: 32 %
LYMPHOCYTES # BLD AUTO: 1.3 10*3/MM3 (ref 0.7–3.1)
LYMPHOCYTES NFR BLD AUTO: 14.5 % (ref 19.6–45.3)
MCH RBC QN AUTO: 33.2 PG (ref 26.6–33)
MCHC RBC AUTO-ENTMCNC: 34.1 G/DL (ref 31.5–35.7)
MCV RBC AUTO: 97.4 FL (ref 79–97)
MODALITY: ABNORMAL
MONOCYTES # BLD AUTO: 0.8 10*3/MM3 (ref 0.1–0.9)
MONOCYTES NFR BLD AUTO: 9.2 % (ref 5–12)
NEUTROPHILS NFR BLD AUTO: 6.8 10*3/MM3 (ref 1.7–7)
NEUTROPHILS NFR BLD AUTO: 75.3 % (ref 42.7–76)
NRBC BLD AUTO-RTO: 0.1 /100 WBC (ref 0–0.2)
NT-PROBNP SERPL-MCNC: 619.6 PG/ML (ref 0–1800)
PCO2 BLDA: 51.4 MM HG (ref 35–48)
PH BLDA: 7.42 PH UNITS (ref 7.35–7.45)
PLATELET # BLD AUTO: 239 10*3/MM3 (ref 140–450)
PMV BLD AUTO: 8.5 FL (ref 6–12)
PO2 BLDA: 75.9 MM HG (ref 83–108)
POTASSIUM SERPL-SCNC: 4.2 MMOL/L (ref 3.5–5.2)
PROT SERPL-MCNC: 7.1 G/DL (ref 6–8.5)
RBC # BLD AUTO: 4.19 10*6/MM3 (ref 3.77–5.28)
SAO2 % BLDCOA: 95 % (ref 94–98)
SODIUM SERPL-SCNC: 136 MMOL/L (ref 136–145)
TROPONIN T SERPL-MCNC: <0.01 NG/ML (ref 0–0.03)
WBC NRBC COR # BLD: 9 10*3/MM3 (ref 3.4–10.8)
WHOLE BLOOD HOLD COAG: NORMAL
WHOLE BLOOD HOLD SPECIMEN: NORMAL

## 2022-11-19 PROCEDURE — 25010000002 METHYLPREDNISOLONE PER 125 MG: Performed by: INTERNAL MEDICINE

## 2022-11-19 PROCEDURE — 71046 X-RAY EXAM CHEST 2 VIEWS: CPT

## 2022-11-19 PROCEDURE — G0378 HOSPITAL OBSERVATION PER HR: HCPCS

## 2022-11-19 PROCEDURE — 96375 TX/PRO/DX INJ NEW DRUG ADDON: CPT

## 2022-11-19 PROCEDURE — 84484 ASSAY OF TROPONIN QUANT: CPT | Performed by: NURSE PRACTITIONER

## 2022-11-19 PROCEDURE — 71275 CT ANGIOGRAPHY CHEST: CPT

## 2022-11-19 PROCEDURE — 93005 ELECTROCARDIOGRAM TRACING: CPT | Performed by: NURSE PRACTITIONER

## 2022-11-19 PROCEDURE — 96372 THER/PROPH/DIAG INJ SC/IM: CPT

## 2022-11-19 PROCEDURE — 85025 COMPLETE CBC W/AUTO DIFF WBC: CPT | Performed by: NURSE PRACTITIONER

## 2022-11-19 PROCEDURE — 94640 AIRWAY INHALATION TREATMENT: CPT

## 2022-11-19 PROCEDURE — 36415 COLL VENOUS BLD VENIPUNCTURE: CPT

## 2022-11-19 PROCEDURE — 0 IOPAMIDOL PER 1 ML: Performed by: EMERGENCY MEDICINE

## 2022-11-19 PROCEDURE — 94799 UNLISTED PULMONARY SVC/PX: CPT

## 2022-11-19 PROCEDURE — 25010000002 FUROSEMIDE PER 20 MG: Performed by: INTERNAL MEDICINE

## 2022-11-19 PROCEDURE — 80053 COMPREHEN METABOLIC PANEL: CPT | Performed by: NURSE PRACTITIONER

## 2022-11-19 PROCEDURE — 93005 ELECTROCARDIOGRAM TRACING: CPT | Performed by: EMERGENCY MEDICINE

## 2022-11-19 PROCEDURE — 82803 BLOOD GASES ANY COMBINATION: CPT

## 2022-11-19 PROCEDURE — 36600 WITHDRAWAL OF ARTERIAL BLOOD: CPT

## 2022-11-19 PROCEDURE — 99285 EMERGENCY DEPT VISIT HI MDM: CPT

## 2022-11-19 PROCEDURE — 25010000002 ENOXAPARIN PER 10 MG: Performed by: INTERNAL MEDICINE

## 2022-11-19 PROCEDURE — 83880 ASSAY OF NATRIURETIC PEPTIDE: CPT | Performed by: NURSE PRACTITIONER

## 2022-11-19 PROCEDURE — 85379 FIBRIN DEGRADATION QUANT: CPT | Performed by: NURSE PRACTITIONER

## 2022-11-19 PROCEDURE — 96374 THER/PROPH/DIAG INJ IV PUSH: CPT

## 2022-11-19 PROCEDURE — 93005 ELECTROCARDIOGRAM TRACING: CPT

## 2022-11-19 RX ORDER — FAMOTIDINE 20 MG/1
40 TABLET, FILM COATED ORAL
Status: DISCONTINUED | OUTPATIENT
Start: 2022-11-20 | End: 2022-11-20 | Stop reason: HOSPADM

## 2022-11-19 RX ORDER — SODIUM CHLORIDE 0.9 % (FLUSH) 0.9 %
10 SYRINGE (ML) INJECTION AS NEEDED
Status: DISCONTINUED | OUTPATIENT
Start: 2022-11-19 | End: 2022-11-20 | Stop reason: HOSPADM

## 2022-11-19 RX ORDER — ROSUVASTATIN CALCIUM 10 MG/1
10 TABLET, COATED ORAL NIGHTLY
Status: DISCONTINUED | OUTPATIENT
Start: 2022-11-19 | End: 2022-11-20 | Stop reason: HOSPADM

## 2022-11-19 RX ORDER — ACETAMINOPHEN 325 MG/1
650 TABLET ORAL EVERY 4 HOURS PRN
Status: DISCONTINUED | OUTPATIENT
Start: 2022-11-19 | End: 2022-11-20 | Stop reason: HOSPADM

## 2022-11-19 RX ORDER — ONDANSETRON 2 MG/ML
4 INJECTION INTRAMUSCULAR; INTRAVENOUS EVERY 6 HOURS PRN
Status: DISCONTINUED | OUTPATIENT
Start: 2022-11-19 | End: 2022-11-20 | Stop reason: HOSPADM

## 2022-11-19 RX ORDER — AMLODIPINE BESYLATE 5 MG/1
10 TABLET ORAL DAILY
Status: DISCONTINUED | OUTPATIENT
Start: 2022-11-20 | End: 2022-11-20 | Stop reason: HOSPADM

## 2022-11-19 RX ORDER — SODIUM CHLORIDE 0.9 % (FLUSH) 0.9 %
3 SYRINGE (ML) INJECTION EVERY 12 HOURS SCHEDULED
Status: DISCONTINUED | OUTPATIENT
Start: 2022-11-19 | End: 2022-11-20 | Stop reason: HOSPADM

## 2022-11-19 RX ORDER — NITROGLYCERIN 0.4 MG/1
0.4 TABLET SUBLINGUAL
Status: DISCONTINUED | OUTPATIENT
Start: 2022-11-19 | End: 2022-11-20 | Stop reason: HOSPADM

## 2022-11-19 RX ORDER — ENOXAPARIN SODIUM 100 MG/ML
40 INJECTION SUBCUTANEOUS NIGHTLY
Status: DISCONTINUED | OUTPATIENT
Start: 2022-11-19 | End: 2022-11-20 | Stop reason: HOSPADM

## 2022-11-19 RX ORDER — METHYLPREDNISOLONE SODIUM SUCCINATE 125 MG/2ML
80 INJECTION, POWDER, LYOPHILIZED, FOR SOLUTION INTRAMUSCULAR; INTRAVENOUS ONCE
Status: COMPLETED | OUTPATIENT
Start: 2022-11-19 | End: 2022-11-19

## 2022-11-19 RX ORDER — IPRATROPIUM BROMIDE AND ALBUTEROL SULFATE 2.5; .5 MG/3ML; MG/3ML
3 SOLUTION RESPIRATORY (INHALATION)
Status: DISCONTINUED | OUTPATIENT
Start: 2022-11-19 | End: 2022-11-20 | Stop reason: HOSPADM

## 2022-11-19 RX ORDER — SODIUM CHLORIDE 9 MG/ML
40 INJECTION, SOLUTION INTRAVENOUS AS NEEDED
Status: DISCONTINUED | OUTPATIENT
Start: 2022-11-19 | End: 2022-11-20 | Stop reason: HOSPADM

## 2022-11-19 RX ORDER — FUROSEMIDE 10 MG/ML
20 INJECTION INTRAMUSCULAR; INTRAVENOUS ONCE
Status: COMPLETED | OUTPATIENT
Start: 2022-11-19 | End: 2022-11-19

## 2022-11-19 RX ORDER — ACETAMINOPHEN 325 MG/1
650 TABLET ORAL EVERY 4 HOURS PRN
Status: DISCONTINUED | OUTPATIENT
Start: 2022-11-19 | End: 2022-11-19 | Stop reason: SDUPTHER

## 2022-11-19 RX ORDER — SODIUM CHLORIDE 0.9 % (FLUSH) 0.9 %
3-10 SYRINGE (ML) INJECTION AS NEEDED
Status: DISCONTINUED | OUTPATIENT
Start: 2022-11-19 | End: 2022-11-20 | Stop reason: HOSPADM

## 2022-11-19 RX ORDER — POLYETHYLENE GLYCOL 3350 17 G/17G
17 POWDER, FOR SOLUTION ORAL DAILY
Status: DISCONTINUED | OUTPATIENT
Start: 2022-11-20 | End: 2022-11-20 | Stop reason: HOSPADM

## 2022-11-19 RX ORDER — BUDESONIDE AND FORMOTEROL FUMARATE DIHYDRATE 160; 4.5 UG/1; UG/1
2 AEROSOL RESPIRATORY (INHALATION)
Status: DISCONTINUED | OUTPATIENT
Start: 2022-11-19 | End: 2022-11-20 | Stop reason: HOSPADM

## 2022-11-19 RX ORDER — CHOLECALCIFEROL (VITAMIN D3) 125 MCG
5 CAPSULE ORAL NIGHTLY PRN
Status: DISCONTINUED | OUTPATIENT
Start: 2022-11-19 | End: 2022-11-20 | Stop reason: HOSPADM

## 2022-11-19 RX ORDER — HYDROCODONE BITARTRATE AND ACETAMINOPHEN 5; 325 MG/1; MG/1
1 TABLET ORAL EVERY 4 HOURS PRN
Status: DISCONTINUED | OUTPATIENT
Start: 2022-11-19 | End: 2022-11-20 | Stop reason: HOSPADM

## 2022-11-19 RX ADMIN — IPRATROPIUM BROMIDE AND ALBUTEROL SULFATE 3 ML: 2.5; .5 SOLUTION RESPIRATORY (INHALATION) at 20:44

## 2022-11-19 RX ADMIN — IOPAMIDOL 100 ML: 755 INJECTION, SOLUTION INTRAVENOUS at 17:14

## 2022-11-19 RX ADMIN — HYDROCODONE BITARTRATE AND ACETAMINOPHEN 1 TABLET: 5; 325 TABLET ORAL at 20:45

## 2022-11-19 RX ADMIN — BUDESONIDE AND FORMOTEROL FUMARATE DIHYDRATE 2 PUFF: 160; 4.5 AEROSOL RESPIRATORY (INHALATION) at 20:48

## 2022-11-19 RX ADMIN — METHYLPREDNISOLONE SODIUM SUCCINATE 80 MG: 125 INJECTION, POWDER, FOR SOLUTION INTRAMUSCULAR; INTRAVENOUS at 20:30

## 2022-11-19 RX ADMIN — ENOXAPARIN SODIUM 40 MG: 100 INJECTION SUBCUTANEOUS at 20:30

## 2022-11-19 RX ADMIN — FUROSEMIDE 20 MG: 10 INJECTION, SOLUTION INTRAMUSCULAR; INTRAVENOUS at 20:31

## 2022-11-20 VITALS
HEIGHT: 62 IN | WEIGHT: 155 LBS | HEART RATE: 82 BPM | DIASTOLIC BLOOD PRESSURE: 55 MMHG | BODY MASS INDEX: 28.52 KG/M2 | SYSTOLIC BLOOD PRESSURE: 119 MMHG | OXYGEN SATURATION: 95 % | TEMPERATURE: 98.4 F | RESPIRATION RATE: 18 BRPM

## 2022-11-20 LAB
B PARAPERT DNA SPEC QL NAA+PROBE: NOT DETECTED
B PERT DNA SPEC QL NAA+PROBE: NOT DETECTED
C PNEUM DNA NPH QL NAA+NON-PROBE: NOT DETECTED
FLUAV SUBTYP SPEC NAA+PROBE: NOT DETECTED
FLUBV RNA ISLT QL NAA+PROBE: NOT DETECTED
HADV DNA SPEC NAA+PROBE: NOT DETECTED
HCOV 229E RNA SPEC QL NAA+PROBE: NOT DETECTED
HCOV HKU1 RNA SPEC QL NAA+PROBE: NOT DETECTED
HCOV NL63 RNA SPEC QL NAA+PROBE: NOT DETECTED
HCOV OC43 RNA SPEC QL NAA+PROBE: NOT DETECTED
HMPV RNA NPH QL NAA+NON-PROBE: NOT DETECTED
HPIV1 RNA ISLT QL NAA+PROBE: NOT DETECTED
HPIV2 RNA SPEC QL NAA+PROBE: NOT DETECTED
HPIV3 RNA NPH QL NAA+PROBE: NOT DETECTED
HPIV4 P GENE NPH QL NAA+PROBE: NOT DETECTED
M PNEUMO IGG SER IA-ACNC: NOT DETECTED
QT INTERVAL: 446 MS
RHINOVIRUS RNA SPEC NAA+PROBE: NOT DETECTED
RSV RNA NPH QL NAA+NON-PROBE: NOT DETECTED
SARS-COV-2 RNA NPH QL NAA+NON-PROBE: NOT DETECTED

## 2022-11-20 PROCEDURE — 94761 N-INVAS EAR/PLS OXIMETRY MLT: CPT

## 2022-11-20 PROCEDURE — 0202U NFCT DS 22 TRGT SARS-COV-2: CPT | Performed by: INTERNAL MEDICINE

## 2022-11-20 PROCEDURE — 94799 UNLISTED PULMONARY SVC/PX: CPT

## 2022-11-20 PROCEDURE — G0378 HOSPITAL OBSERVATION PER HR: HCPCS

## 2022-11-20 RX ORDER — PREDNISONE 20 MG/1
20 TABLET ORAL
Status: DISCONTINUED | OUTPATIENT
Start: 2022-11-20 | End: 2022-11-20 | Stop reason: HOSPADM

## 2022-11-20 RX ORDER — PREDNISONE 20 MG/1
20 TABLET ORAL
Qty: 3 TABLET | Refills: 0 | Status: SHIPPED | OUTPATIENT
Start: 2022-11-20 | End: 2022-11-23

## 2022-11-20 RX ADMIN — BUDESONIDE AND FORMOTEROL FUMARATE DIHYDRATE 2 PUFF: 160; 4.5 AEROSOL RESPIRATORY (INHALATION) at 06:39

## 2022-11-20 RX ADMIN — IPRATROPIUM BROMIDE AND ALBUTEROL SULFATE 3 ML: 2.5; .5 SOLUTION RESPIRATORY (INHALATION) at 06:39

## 2022-11-20 RX ADMIN — AMLODIPINE BESYLATE 10 MG: 5 TABLET ORAL at 08:32

## 2022-11-20 RX ADMIN — POLYETHYLENE GLYCOL 3350 17 G: 17 POWDER, FOR SOLUTION ORAL at 08:32

## 2022-11-20 RX ADMIN — Medication 3 ML: at 08:31

## 2022-11-20 RX ADMIN — IPRATROPIUM BROMIDE AND ALBUTEROL SULFATE 3 ML: 2.5; .5 SOLUTION RESPIRATORY (INHALATION) at 10:37

## 2022-11-20 RX ADMIN — FAMOTIDINE 40 MG: 20 TABLET, FILM COATED ORAL at 08:32

## 2022-11-22 ENCOUNTER — TELEPHONE (OUTPATIENT)
Dept: SURGERY | Facility: CLINIC | Age: 82
End: 2022-11-22

## 2022-12-19 ENCOUNTER — OFFICE VISIT (OUTPATIENT)
Dept: SURGERY | Facility: CLINIC | Age: 82
End: 2022-12-19
Payer: MEDICARE

## 2022-12-19 VITALS
OXYGEN SATURATION: 95 % | WEIGHT: 149.2 LBS | BODY MASS INDEX: 27.46 KG/M2 | HEART RATE: 100 BPM | SYSTOLIC BLOOD PRESSURE: 156 MMHG | DIASTOLIC BLOOD PRESSURE: 83 MMHG | HEIGHT: 62 IN | TEMPERATURE: 97.5 F

## 2022-12-19 DIAGNOSIS — K40.90 NON-RECURRENT INGUINAL HERNIA WITHOUT OBSTRUCTION OR GANGRENE, UNSPECIFIED LATERALITY: Primary | ICD-10-CM

## 2022-12-19 PROCEDURE — 99024 POSTOP FOLLOW-UP VISIT: CPT | Performed by: STUDENT IN AN ORGANIZED HEALTH CARE EDUCATION/TRAINING PROGRAM

## 2022-12-19 RX ORDER — HYDROCHLOROTHIAZIDE 12.5 MG/1
12.5 TABLET ORAL DAILY
Status: ON HOLD | COMMUNITY
Start: 2022-11-21 | End: 2023-04-08

## 2023-01-05 NOTE — PROGRESS NOTES
Chief Complaint  Post-op (PO Inguinal Hernia Repair)    Savannah Oliva presents to [facility] GENERAL SURGERY  History of Present Illness    82-year-old lady status post robotic inguinal hernia repair.  Doing great, no significant pain, tolerating a diet and having bowel movements.  No further restrictions, can follow-up me again in the future if needed.    Objective   Vital Signs:  /83 (Cuff Size: Adult)   Pulse 100   Temp 97.5 °F (36.4 °C) (Infrared)   Ht 157.5 cm (62\")   Wt 67.7 kg (149 lb 3.2 oz)   SpO2 95%   BMI 27.29 kg/m²   Estimated body mass index is 27.29 kg/m² as calculated from the following:    Height as of this encounter: 157.5 cm (62\").    Weight as of this encounter: 67.7 kg (149 lb 3.2 oz).          Physical Exam  Constitutional:       General: She is not in acute distress.     Appearance: Normal appearance. She is not ill-appearing.   HENT:      Head: Normocephalic and atraumatic.      Right Ear: External ear normal.      Left Ear: External ear normal.   Eyes:      Extraocular Movements: Extraocular movements intact.      Conjunctiva/sclera: Conjunctivae normal.   Cardiovascular:      Rate and Rhythm: Normal rate and regular rhythm.   Pulmonary:      Effort: Pulmonary effort is normal. No respiratory distress.   Abdominal:      General: There is no distension.      Palpations: Abdomen is soft.      Tenderness: There is no abdominal tenderness.   Musculoskeletal:         General: No swelling or deformity.   Skin:     General: Skin is warm and dry.   Neurological:      Mental Status: She is alert and oriented to person, place, and time. Mental status is at baseline.        Result Review :                Assessment and Plan   Diagnoses and all orders for this visit:    1. Non-recurrent inguinal hernia without obstruction or gangrene, unspecified laterality (Primary)        82-year-old lady status post robotic inguinal hernia repair.  Doing great, no  significant pain, tolerating a diet and having bowel movements.  No further restrictions, can follow-up me again in the future if needed.           Follow Up   No follow-ups on file.  Patient was given instructions and counseling regarding her condition or for health maintenance advice. Please see specific information pulled into the AVS if appropriate.

## 2023-02-22 ENCOUNTER — TELEPHONE (OUTPATIENT)
Dept: CARDIOLOGY | Facility: CLINIC | Age: 83
End: 2023-02-22
Payer: MEDICARE

## 2023-03-01 DIAGNOSIS — R06.02 SHORTNESS OF BREATH: ICD-10-CM

## 2023-03-01 DIAGNOSIS — E78.5 DYSLIPIDEMIA: ICD-10-CM

## 2023-03-01 DIAGNOSIS — I10 PRIMARY HYPERTENSION: Primary | ICD-10-CM

## 2023-03-01 NOTE — TELEPHONE ENCOUNTER
Rosuvastatin 10 mg  Amlodipine 10 mg  clopidogrel 75 mg    Daughter called to update pt's list.

## 2023-03-02 RX ORDER — CLOPIDOGREL BISULFATE 75 MG/1
75 TABLET ORAL DAILY
Status: ON HOLD | COMMUNITY

## 2023-03-08 ENCOUNTER — LAB (OUTPATIENT)
Dept: LAB | Facility: HOSPITAL | Age: 83
End: 2023-03-08
Payer: MEDICARE

## 2023-03-08 DIAGNOSIS — E78.5 DYSLIPIDEMIA: ICD-10-CM

## 2023-03-08 DIAGNOSIS — I10 PRIMARY HYPERTENSION: ICD-10-CM

## 2023-03-08 DIAGNOSIS — R06.02 SHORTNESS OF BREATH: ICD-10-CM

## 2023-03-08 LAB
ALBUMIN SERPL-MCNC: 4 G/DL (ref 3.5–5.2)
ALBUMIN/GLOB SERPL: 1.4 G/DL
ALP SERPL-CCNC: 71 U/L (ref 39–117)
ALT SERPL W P-5'-P-CCNC: 17 U/L (ref 1–33)
ANION GAP SERPL CALCULATED.3IONS-SCNC: 7.2 MMOL/L (ref 5–15)
AST SERPL-CCNC: 23 U/L (ref 1–32)
BILIRUB SERPL-MCNC: 0.6 MG/DL (ref 0–1.2)
BUN SERPL-MCNC: 10 MG/DL (ref 8–23)
BUN/CREAT SERPL: 16.4 (ref 7–25)
CALCIUM SPEC-SCNC: 9.7 MG/DL (ref 8.6–10.5)
CHLORIDE SERPL-SCNC: 104 MMOL/L (ref 98–107)
CHOLEST SERPL-MCNC: 165 MG/DL (ref 0–200)
CO2 SERPL-SCNC: 27.8 MMOL/L (ref 22–29)
CREAT SERPL-MCNC: 0.61 MG/DL (ref 0.57–1)
EGFRCR SERPLBLD CKD-EPI 2021: 88.3 ML/MIN/1.73
GLOBULIN UR ELPH-MCNC: 2.8 GM/DL
GLUCOSE SERPL-MCNC: 100 MG/DL (ref 65–99)
HDLC SERPL-MCNC: 74 MG/DL (ref 40–60)
LDLC SERPL CALC-MCNC: 76 MG/DL (ref 0–100)
LDLC/HDLC SERPL: 1.01 {RATIO}
POTASSIUM SERPL-SCNC: 4.3 MMOL/L (ref 3.5–5.2)
PROT SERPL-MCNC: 6.8 G/DL (ref 6–8.5)
SODIUM SERPL-SCNC: 139 MMOL/L (ref 136–145)
TRIGL SERPL-MCNC: 80 MG/DL (ref 0–150)
VLDLC SERPL-MCNC: 15 MG/DL (ref 5–40)

## 2023-03-08 PROCEDURE — 36415 COLL VENOUS BLD VENIPUNCTURE: CPT

## 2023-03-08 PROCEDURE — 80053 COMPREHEN METABOLIC PANEL: CPT

## 2023-03-08 PROCEDURE — 80061 LIPID PANEL: CPT

## 2023-03-12 NOTE — PROGRESS NOTES
Subjective:     Encounter Date:03/13/2023      Patient ID: Becka Oliva is a 83 y.o. female.    Chief Complaint and history of present illness:     Follow-up for PAD, hypertension, pericardial effusion    History of Present Illness  :     Becka Oliva is a 83 y.o. female.        # Pericardial effusion  # hypertension  # COPD  #PAD, AAA , endograft repair with by common iliac stent  # former smoker     Here for  follow-up.  Patient denies any chest pain or shortness of breath.  Patient's arterial blood pressure is 155/83, heart rate 49 bpm, O2 sat of 93% on room air.       Labs from 5/13/2 019 reveal normal CMP, BNP 55, cholesterol 217, HDL 66, LDL not to goal at 140.  Repeat labs 9/2/2020 reveal hemoglobin A1c of 5.6.  Chem-7 unremarkable.  Labs from 3/26/2021 reveal Chem-7 with a glucose of 112, calcium of 8, hemoglobin of 11.1.  Labs from 3/23/2021 revealed proBNP of 668.  Labs from 7/26/2021 revealed normal Chem-7 and CBC.  Labs from 9/28/2021 reveal cholesterol 67, triglycerides 57, HDL 32, LDL 24.  Labs from 3/8/2023 revealed normal CMP.  Lipid profile with cholesterol 165, triglycerides 80, HDL 74, LDL 76.       Patient had an echocardiogram 12-2-19 which showed mild pericardial effusion.  Patient had Lexiscan Cardiolite 1/10/2020 which was negative for ischemia     Patient underwent echocardiogram and Lexiscan Cardiolite 6/25/2021 both of which were normal.    HERBERT done at [place] abnormal with 0.81/0.79 on the right.  And 0.79 on the left.        ASSESSMENT:     -Hypertension  #Pericardial effusion  # hypertension  # COPD, kyphoscoliosis, former smoker  # PAD, AAA repair     PLAN:    Patient's beta-blockers were discontinued after surgery for unknown reasons.  We will restart bisoprolol hydrochlorothiazide 5/6.25  We will check a BMP and follow-up  Continue medical management with aspirin, clopidogrel, bisoprolol hydrochlorothiazide, amlodipine, Crestor to help with blood pressure, dyspnea on exertion,  edema, claudication, hypertension, dyslipidemia, PAD history of AAA repair as tolerated.  Patient is asymptomatic from claudication standpoint at the current time.        Echocardiogram 1/10/19 reveals normal function PA pressures of 30-40 with mild AI/MR/TR and moderate size pericardial effusion, repeat echo 6/1/2019 revealed small pericardial effusion, LVEF of 70%  Lexiscan Cardiolite 6/25/2021 was negative for ischemia EF of 67%  Echocardiogram 6/25/2021 reviewed and interpreted by me reveals normal LV systolic function with LVEF of 70% with small pericardial effusion.         Procedures EKG done 11/19/2022 reviewed/interpreted by me reveals sinus rhythm with rate of 60 bpm with incomplete right bundle branch block and inferior Q waves.    Copied text in this portion of the note has been reviewed and is accurate as of 3/13/2023  The following portions of the patient's history were reviewed and updated as appropriate: allergies, current medications, past family history, past medical history, past social history, past surgical history and problem list.    Assessment:         [facility]     Diagnosis Plan   1. Essential hypertension  Basic Metabolic Panel      2. PAD (peripheral artery disease) ([facility])  Basic Metabolic Panel      3. Panlobular emphysema (HCC)  Basic Metabolic Panel      4. Pericardial effusion  Basic Metabolic Panel      5. Dyslipidemia  Basic Metabolic Panel             Plan:               Past Medical History:  Past Medical History:   Diagnosis Date   • Aortic aneurysm ([facility])    • COPD (chronic obstructive pulmonary disease) ([facility])    • Hernia, inguinal, left    • Hyperlipidemia    • Hypertension    • Macular degeneration    • Peripheral edema    • PVD (peripheral vascular disease) ([facility])      Past Surgical History:  Past Surgical History:   Procedure Laterality Date   • CARPAL TUNNEL RELEASE     • DESCENDING AORTIC ANEURYSM REPAIR W/ STENT     • INGUINAL HERNIA REPAIR Bilateral 11/16/2022    Procedure:  INGUINAL HERNIA REPAIR LAPAROSCOPIC WITH DAVINCI ROBOT;  Surgeon: Jasper Mcneill MD;  Location: [facility] MAIN OR;  Service: Robotics - DaVinci;  Laterality: Bilateral;   • INTERVENTIONAL RADIOLOGY PROCEDURE N/A 07/28/2021    Procedure: Abdominal Aortagram with Runoff, possible PCI;  Surgeon: Kyrie Campoverde MD;  Location: [facility] CATH INVASIVE LOCATION;  Service: Cardiovascular;  Laterality: N/A;   • KNEE SURGERY  2020   • OTHER SURGICAL HISTORY      STENT   • VASCULAR SURGERY      fem fem bypass      Allergies:  No Known Allergies  Home Meds:  Current Meds:     Current Outpatient Medications:   •  amLODIPine (NORVASC) 10 MG tablet, TAKE 1 TABLET BY MOUTH DAILY., Disp: 90 tablet, Rfl: 3  •  aspirin 81 MG chewable tablet, Chew 1 tablet Daily., Disp: , Rfl:   •  cholecalciferol (VITAMIN D3) 25 MCG (1000 UT) tablet, Take 2 tablets by mouth Daily. LAST 11/9, Disp: , Rfl:   •  clopidogrel (PLAVIX) 75 MG tablet, Take 1 tablet by mouth Daily., Disp: , Rfl:   •  HYDROcodone-acetaminophen (Norco) 5-325 MG per tablet, Take 1 tablet by mouth Every 6 (Six) Hours As Needed for Severe Pain., Disp: 20 tablet, Rfl: 0  •  pyridoxine (VITAMIN B-6) 100 MG tablet tablet, Take 1 tablet by mouth Daily. LD 11/9, Disp: , Rfl:   •  rosuvastatin (CRESTOR) 10 MG tablet, TAKE 1 TABLET BY MOUTH EVERY DAY, Disp: 90 tablet, Rfl: 3  •  Trelegy Ellipta 100-62.5-25 MCG/INH inhaler, Inhale 1 puff Daily. USE PREOP, Disp: , Rfl:   •  furosemide (LASIX) 20 MG tablet, Take 1 tablet by mouth Daily. NONE PREOP, Disp: , Rfl:   •  hydroCHLOROthiazide (HYDRODIURIL) 12.5 MG tablet, Take 12.5 mg by mouth Daily., Disp: , Rfl:   •  Multiple Vitamins-Minerals (PRESERVISION AREDS 2+MULTI VIT PO), Take 1 tablet by mouth 2 (Two) Times a Day. LD 11/9, Disp: , Rfl:   •  ondansetron (Zofran) 4 MG tablet, Take 1 tablet by mouth Every 8 (Eight) Hours As Needed for Nausea or Vomiting., Disp: 30 tablet, Rfl: 1  •  polyethylene glycol (MIRALAX) 17 g packet, Mix 17g  "(1 packet) as directed with liquid and drink by mouth Daily., Disp: 14 each, Rfl: 0  •  potassium chloride (K-DUR,KLOR-CON) 20 MEQ CR tablet, Take 20 mEq by mouth Daily. with food, Disp: , Rfl:   •  vitamin B-12 (CYANOCOBALAMIN) 1000 MCG tablet, Take 2.5 tablets by mouth Daily. LAST DOSE , Disp: , Rfl:   Social History:   Social History     Tobacco Use   • Smoking status: Former     Packs/day: 1.00     Years: 30.00     Pack years: 30.00     Types: Cigarettes     Quit date:      Years since quittin.2   • Smokeless tobacco: Never   Substance Use Topics   • Alcohol use: No      Family History:  Family History   Problem Relation Age of Onset   • Cancer Other    • Diabetes Other    • Aneurysm Father    • Cancer Mother               Review of Systems   Constitutional: Negative for malaise/fatigue.   Cardiovascular: Positive for dyspnea on exertion. Negative for chest pain, leg swelling and palpitations.   Respiratory: Negative for shortness of breath.    Skin: Negative for rash.   Neurological: Negative for dizziness, light-headedness and numbness.     All other systems are negative         Objective:     Physical Exam  /73   Pulse (!) 49   Ht 157.5 cm (62\")   Wt 68 kg (150 lb)   SpO2 93%   BMI 27.44 kg/m²   General:  Appears in no acute distress  Eyes: Sclera is anicteric,  conjunctiva is clear   HEENT:  No JVD.  No carotid bruits  Respiratory: Respirations regular and unlabored at rest.  Clear to auscultation  Cardiovascular: S1,S2 Regular rate and rhythm. No murmur, rub or gallop auscultated.   Extremities: No digital clubbing or cyanosis, no edema  Skin: Color pink. Skin warm and dry to touch. No rashes  No xanthoma  Neuro: Alert and awake.    Lab Reviewed:         Kyrie Campoverde MD  3/13/2023 13:31 EDT      EMR Dragon/Transcription:   \"Dictated utilizing Dragon dictation\".        "

## 2023-03-13 ENCOUNTER — OFFICE VISIT (OUTPATIENT)
Dept: CARDIOLOGY | Facility: CLINIC | Age: 83
End: 2023-03-13
Payer: MEDICARE

## 2023-03-13 VITALS
DIASTOLIC BLOOD PRESSURE: 73 MMHG | HEIGHT: 62 IN | HEART RATE: 49 BPM | SYSTOLIC BLOOD PRESSURE: 158 MMHG | WEIGHT: 150 LBS | OXYGEN SATURATION: 93 % | BODY MASS INDEX: 27.6 KG/M2

## 2023-03-13 DIAGNOSIS — J43.1 PANLOBULAR EMPHYSEMA: ICD-10-CM

## 2023-03-13 DIAGNOSIS — E78.5 DYSLIPIDEMIA: ICD-10-CM

## 2023-03-13 DIAGNOSIS — I10 ESSENTIAL HYPERTENSION: Primary | ICD-10-CM

## 2023-03-13 DIAGNOSIS — I73.9 PAD (PERIPHERAL ARTERY DISEASE): ICD-10-CM

## 2023-03-13 DIAGNOSIS — I31.39 PERICARDIAL EFFUSION: ICD-10-CM

## 2023-03-13 PROCEDURE — 99214 OFFICE O/P EST MOD 30 MIN: CPT | Performed by: INTERNAL MEDICINE

## 2023-03-13 RX ORDER — ASPIRIN 81 MG/1
81 TABLET, CHEWABLE ORAL DAILY
Status: ON HOLD | COMMUNITY

## 2023-03-15 ENCOUNTER — TELEPHONE (OUTPATIENT)
Dept: CARDIOLOGY | Facility: CLINIC | Age: 83
End: 2023-03-15
Payer: MEDICARE

## 2023-03-15 NOTE — TELEPHONE ENCOUNTER
"Pt's daughter Jaci came by the office to say pt has had a recent ekg with PCP [facility]   \"frequent premature ventricular contractions \"     Called PCP office   ekg 2/15/23    Whitney todd over   "

## 2023-03-16 NOTE — TELEPHONE ENCOUNTER
Bisoprolol 5/6.25 is not on the pt's med list  It was DC'd at hosp 11/19/22  I will call pt to confirm that she has restarted it and ask if any symptoms

## 2023-03-16 NOTE — TELEPHONE ENCOUNTER
Reviewed EKG.  PVCs noted.  It looks like Dr. Campoverde restarted patient's bisoprolol which will help with PVCs.  Is she symptomatic: lightheaded/dizzy, having palpitations?

## 2023-03-17 RX ORDER — BISOPROLOL FUMARATE AND HYDROCHLOROTHIAZIDE 5; 6.25 MG/1; MG/1
1 TABLET ORAL DAILY
Status: ON HOLD | COMMUNITY

## 2023-03-17 NOTE — TELEPHONE ENCOUNTER
Called spoke to pt's daughter aJci pt is back on Bisoprolol and feeling okay   Will call if any changes

## 2023-04-08 ENCOUNTER — APPOINTMENT (OUTPATIENT)
Dept: CT IMAGING | Facility: HOSPITAL | Age: 83
DRG: 190 | End: 2023-04-08
Payer: MEDICARE

## 2023-04-08 ENCOUNTER — APPOINTMENT (OUTPATIENT)
Dept: GENERAL RADIOLOGY | Facility: HOSPITAL | Age: 83
DRG: 190 | End: 2023-04-08
Payer: MEDICARE

## 2023-04-08 ENCOUNTER — HOSPITAL ENCOUNTER (INPATIENT)
Facility: HOSPITAL | Age: 83
LOS: 1 days | Discharge: HOME | DRG: 190 | End: 2023-04-11
Attending: EMERGENCY MEDICINE | Admitting: INTERNAL MEDICINE
Payer: MEDICARE

## 2023-04-08 DIAGNOSIS — K40.90 LEFT INGUINAL HERNIA: ICD-10-CM

## 2023-04-08 DIAGNOSIS — J96.01 ACUTE RESPIRATORY FAILURE WITH HYPOXIA: ICD-10-CM

## 2023-04-08 DIAGNOSIS — J44.1 COPD WITH ACUTE EXACERBATION: Primary | ICD-10-CM

## 2023-04-08 LAB
ALBUMIN SERPL-MCNC: 3.6 G/DL (ref 3.5–5.2)
ALBUMIN/GLOB SERPL: 1.3 G/DL
ALP SERPL-CCNC: 72 U/L (ref 39–117)
ALT SERPL W P-5'-P-CCNC: 14 U/L (ref 1–33)
ANION GAP SERPL CALCULATED.3IONS-SCNC: 13 MMOL/L (ref 5–15)
APTT PPP: 28.6 SECONDS (ref 24–31)
AST SERPL-CCNC: 22 U/L (ref 1–32)
B PARAPERT DNA SPEC QL NAA+PROBE: NOT DETECTED
B PERT DNA SPEC QL NAA+PROBE: NOT DETECTED
BACTERIA UR QL AUTO: ABNORMAL /HPF
BASOPHILS # BLD AUTO: 0 10*3/MM3 (ref 0–0.2)
BASOPHILS NFR BLD AUTO: 0.4 % (ref 0–1.5)
BILIRUB SERPL-MCNC: 0.6 MG/DL (ref 0–1.2)
BILIRUB UR QL STRIP: NEGATIVE
BUN SERPL-MCNC: 15 MG/DL (ref 8–23)
BUN/CREAT SERPL: 31.3 (ref 7–25)
C PNEUM DNA NPH QL NAA+NON-PROBE: NOT DETECTED
CALCIUM SPEC-SCNC: 8.7 MG/DL (ref 8.6–10.5)
CHLORIDE SERPL-SCNC: 93 MMOL/L (ref 98–107)
CLARITY UR: CLEAR
CO2 SERPL-SCNC: 25 MMOL/L (ref 22–29)
COLOR UR: YELLOW
CREAT SERPL-MCNC: 0.48 MG/DL (ref 0.57–1)
D DIMER PPP FEU-MCNC: 1.55 MG/L (FEU) (ref 0–0.84)
DEPRECATED RDW RBC AUTO: 45.1 FL (ref 37–54)
EGFRCR SERPLBLD CKD-EPI 2021: 93.5 ML/MIN/1.73
EOSINOPHIL # BLD AUTO: 0 10*3/MM3 (ref 0–0.4)
EOSINOPHIL NFR BLD AUTO: 0.1 % (ref 0.3–6.2)
ERYTHROCYTE [DISTWIDTH] IN BLOOD BY AUTOMATED COUNT: 13.2 % (ref 12.3–15.4)
FLUAV SUBTYP SPEC NAA+PROBE: NOT DETECTED
FLUAV SUBTYP SPEC NAA+PROBE: NOT DETECTED
FLUBV RNA ISLT QL NAA+PROBE: NOT DETECTED
FLUBV RNA ISLT QL NAA+PROBE: NOT DETECTED
GLOBULIN UR ELPH-MCNC: 2.8 GM/DL
GLUCOSE SERPL-MCNC: 107 MG/DL (ref 65–99)
GLUCOSE UR STRIP-MCNC: NEGATIVE MG/DL
HADV DNA SPEC NAA+PROBE: NOT DETECTED
HCOV 229E RNA SPEC QL NAA+PROBE: NOT DETECTED
HCOV HKU1 RNA SPEC QL NAA+PROBE: NOT DETECTED
HCOV NL63 RNA SPEC QL NAA+PROBE: NOT DETECTED
HCOV OC43 RNA SPEC QL NAA+PROBE: NOT DETECTED
HCT VFR BLD AUTO: 38.2 % (ref 34–46.6)
HGB BLD-MCNC: 12.4 G/DL (ref 12–15.9)
HGB UR QL STRIP.AUTO: ABNORMAL
HMPV RNA NPH QL NAA+NON-PROBE: NOT DETECTED
HOLD SPECIMEN: NORMAL
HPIV1 RNA ISLT QL NAA+PROBE: NOT DETECTED
HPIV2 RNA SPEC QL NAA+PROBE: NOT DETECTED
HPIV3 RNA NPH QL NAA+PROBE: NOT DETECTED
HPIV4 P GENE NPH QL NAA+PROBE: NOT DETECTED
HYALINE CASTS UR QL AUTO: ABNORMAL /LPF
INR PPP: 1.16 (ref 0.93–1.1)
KETONES UR QL STRIP: ABNORMAL
LEUKOCYTE ESTERASE UR QL STRIP.AUTO: ABNORMAL
LYMPHOCYTES # BLD AUTO: 0.5 10*3/MM3 (ref 0.7–3.1)
LYMPHOCYTES NFR BLD AUTO: 5.8 % (ref 19.6–45.3)
M PNEUMO IGG SER IA-ACNC: NOT DETECTED
MCH RBC QN AUTO: 32 PG (ref 26.6–33)
MCHC RBC AUTO-ENTMCNC: 32.5 G/DL (ref 31.5–35.7)
MCV RBC AUTO: 98.3 FL (ref 79–97)
MONOCYTES # BLD AUTO: 0.9 10*3/MM3 (ref 0.1–0.9)
MONOCYTES NFR BLD AUTO: 9.9 % (ref 5–12)
NEUTROPHILS NFR BLD AUTO: 7.3 10*3/MM3 (ref 1.7–7)
NEUTROPHILS NFR BLD AUTO: 83.8 % (ref 42.7–76)
NITRITE UR QL STRIP: NEGATIVE
NRBC BLD AUTO-RTO: 0 /100 WBC (ref 0–0.2)
NT-PROBNP SERPL-MCNC: 1681 PG/ML (ref 0–1800)
PH UR STRIP.AUTO: 5.5 [PH] (ref 5–8)
PLATELET # BLD AUTO: 202 10*3/MM3 (ref 140–450)
PMV BLD AUTO: 8.9 FL (ref 6–12)
POTASSIUM SERPL-SCNC: 3.8 MMOL/L (ref 3.5–5.2)
PROT SERPL-MCNC: 6.4 G/DL (ref 6–8.5)
PROT UR QL STRIP: ABNORMAL
PROTHROMBIN TIME: 11.8 SECONDS (ref 9.6–11.7)
RBC # BLD AUTO: 3.89 10*6/MM3 (ref 3.77–5.28)
RBC # UR STRIP: ABNORMAL /HPF
REF LAB TEST METHOD: ABNORMAL
RHINOVIRUS RNA SPEC NAA+PROBE: NOT DETECTED
RSV RNA NPH QL NAA+NON-PROBE: NOT DETECTED
SARS-COV-2 RNA NPH QL NAA+NON-PROBE: NOT DETECTED
SARS-COV-2 RNA RESP QL NAA+PROBE: NOT DETECTED
SODIUM SERPL-SCNC: 131 MMOL/L (ref 136–145)
SP GR UR STRIP: 1.08 (ref 1–1.03)
SQUAMOUS #/AREA URNS HPF: ABNORMAL /HPF
TROPONIN T SERPL HS-MCNC: 16 NG/L
UROBILINOGEN UR QL STRIP: ABNORMAL
WBC # UR STRIP: ABNORMAL /HPF
WBC NRBC COR # BLD: 8.7 10*3/MM3 (ref 3.4–10.8)
WHOLE BLOOD HOLD COAG: NORMAL
WHOLE BLOOD HOLD SPECIMEN: NORMAL

## 2023-04-08 PROCEDURE — 94799 UNLISTED PULMONARY SVC/PX: CPT

## 2023-04-08 PROCEDURE — 87086 URINE CULTURE/COLONY COUNT: CPT | Performed by: INTERNAL MEDICINE

## 2023-04-08 PROCEDURE — 71045 X-RAY EXAM CHEST 1 VIEW: CPT

## 2023-04-08 PROCEDURE — 84484 ASSAY OF TROPONIN QUANT: CPT | Performed by: PHYSICIAN ASSISTANT

## 2023-04-08 PROCEDURE — 83880 ASSAY OF NATRIURETIC PEPTIDE: CPT | Performed by: PHYSICIAN ASSISTANT

## 2023-04-08 PROCEDURE — G0378 HOSPITAL OBSERVATION PER HR: HCPCS

## 2023-04-08 PROCEDURE — 85025 COMPLETE CBC W/AUTO DIFF WBC: CPT | Performed by: PHYSICIAN ASSISTANT

## 2023-04-08 PROCEDURE — 99285 EMERGENCY DEPT VISIT HI MDM: CPT

## 2023-04-08 PROCEDURE — 93005 ELECTROCARDIOGRAM TRACING: CPT

## 2023-04-08 PROCEDURE — 81001 URINALYSIS AUTO W/SCOPE: CPT | Performed by: INTERNAL MEDICINE

## 2023-04-08 PROCEDURE — 85379 FIBRIN DEGRADATION QUANT: CPT | Performed by: PHYSICIAN ASSISTANT

## 2023-04-08 PROCEDURE — 87636 SARSCOV2 & INF A&B AMP PRB: CPT | Performed by: PHYSICIAN ASSISTANT

## 2023-04-08 PROCEDURE — 80053 COMPREHEN METABOLIC PANEL: CPT | Performed by: PHYSICIAN ASSISTANT

## 2023-04-08 PROCEDURE — 71275 CT ANGIOGRAPHY CHEST: CPT

## 2023-04-08 PROCEDURE — 94640 AIRWAY INHALATION TREATMENT: CPT

## 2023-04-08 PROCEDURE — 87040 BLOOD CULTURE FOR BACTERIA: CPT | Performed by: INTERNAL MEDICINE

## 2023-04-08 PROCEDURE — 25510000001 IOPAMIDOL PER 1 ML: Performed by: EMERGENCY MEDICINE

## 2023-04-08 PROCEDURE — 25010000002 ENOXAPARIN PER 10 MG: Performed by: INTERNAL MEDICINE

## 2023-04-08 PROCEDURE — 85730 THROMBOPLASTIN TIME PARTIAL: CPT | Performed by: PHYSICIAN ASSISTANT

## 2023-04-08 PROCEDURE — 25010000002 METHYLPREDNISOLONE PER 125 MG: Performed by: PHYSICIAN ASSISTANT

## 2023-04-08 PROCEDURE — 94664 DEMO&/EVAL PT USE INHALER: CPT

## 2023-04-08 PROCEDURE — 85610 PROTHROMBIN TIME: CPT | Performed by: PHYSICIAN ASSISTANT

## 2023-04-08 PROCEDURE — 0202U NFCT DS 22 TRGT SARS-COV-2: CPT | Performed by: INTERNAL MEDICINE

## 2023-04-08 RX ORDER — IPRATROPIUM BROMIDE AND ALBUTEROL SULFATE 2.5; .5 MG/3ML; MG/3ML
3 SOLUTION RESPIRATORY (INHALATION) ONCE
Status: COMPLETED | OUTPATIENT
Start: 2023-04-08 | End: 2023-04-08

## 2023-04-08 RX ORDER — ASPIRIN 81 MG/1
81 TABLET, CHEWABLE ORAL DAILY
Status: DISCONTINUED | OUTPATIENT
Start: 2023-04-09 | End: 2023-04-11 | Stop reason: HOSPADM

## 2023-04-08 RX ORDER — SODIUM CHLORIDE 9 MG/ML
40 INJECTION, SOLUTION INTRAVENOUS AS NEEDED
Status: DISCONTINUED | OUTPATIENT
Start: 2023-04-08 | End: 2023-04-11 | Stop reason: HOSPADM

## 2023-04-08 RX ORDER — AMLODIPINE BESYLATE 5 MG/1
10 TABLET ORAL DAILY
Status: DISCONTINUED | OUTPATIENT
Start: 2023-04-09 | End: 2023-04-11 | Stop reason: HOSPADM

## 2023-04-08 RX ORDER — BUDESONIDE AND FORMOTEROL FUMARATE DIHYDRATE 160; 4.5 UG/1; UG/1
2 AEROSOL RESPIRATORY (INHALATION)
Status: DISCONTINUED | OUTPATIENT
Start: 2023-04-08 | End: 2023-04-11 | Stop reason: HOSPADM

## 2023-04-08 RX ORDER — NITROFURANTOIN 25; 75 MG/1; MG/1
100 CAPSULE ORAL 2 TIMES DAILY
COMMUNITY
Start: 2023-04-06 | End: 2023-04-11 | Stop reason: HOSPADM

## 2023-04-08 RX ORDER — ONDANSETRON 2 MG/ML
4 INJECTION INTRAMUSCULAR; INTRAVENOUS EVERY 6 HOURS PRN
Status: DISCONTINUED | OUTPATIENT
Start: 2023-04-08 | End: 2023-04-11 | Stop reason: HOSPADM

## 2023-04-08 RX ORDER — IPRATROPIUM BROMIDE AND ALBUTEROL SULFATE 2.5; .5 MG/3ML; MG/3ML
3 SOLUTION RESPIRATORY (INHALATION) EVERY 4 HOURS PRN
Status: DISCONTINUED | OUTPATIENT
Start: 2023-04-08 | End: 2023-04-11 | Stop reason: HOSPADM

## 2023-04-08 RX ORDER — HYDROCODONE BITARTRATE AND ACETAMINOPHEN 5; 325 MG/1; MG/1
1 TABLET ORAL EVERY 6 HOURS PRN
Status: DISCONTINUED | OUTPATIENT
Start: 2023-04-08 | End: 2023-04-11 | Stop reason: HOSPADM

## 2023-04-08 RX ORDER — NITROGLYCERIN 0.4 MG/1
0.4 TABLET SUBLINGUAL
Status: DISCONTINUED | OUTPATIENT
Start: 2023-04-08 | End: 2023-04-11 | Stop reason: HOSPADM

## 2023-04-08 RX ORDER — SODIUM CHLORIDE 0.9 % (FLUSH) 0.9 %
10 SYRINGE (ML) INJECTION AS NEEDED
Status: DISCONTINUED | OUTPATIENT
Start: 2023-04-08 | End: 2023-04-11 | Stop reason: HOSPADM

## 2023-04-08 RX ORDER — ROSUVASTATIN CALCIUM 10 MG/1
10 TABLET, COATED ORAL DAILY
Status: DISCONTINUED | OUTPATIENT
Start: 2023-04-09 | End: 2023-04-11 | Stop reason: HOSPADM

## 2023-04-08 RX ORDER — ACETAMINOPHEN 325 MG/1
650 TABLET ORAL EVERY 4 HOURS PRN
Status: DISCONTINUED | OUTPATIENT
Start: 2023-04-08 | End: 2023-04-11 | Stop reason: HOSPADM

## 2023-04-08 RX ORDER — ALBUTEROL SULFATE 90 UG/1
2 AEROSOL, METERED RESPIRATORY (INHALATION) EVERY 4 HOURS PRN
COMMUNITY

## 2023-04-08 RX ORDER — FAMOTIDINE 20 MG/1
40 TABLET, FILM COATED ORAL NIGHTLY
Status: DISCONTINUED | OUTPATIENT
Start: 2023-04-08 | End: 2023-04-11 | Stop reason: HOSPADM

## 2023-04-08 RX ORDER — SODIUM CHLORIDE 0.9 % (FLUSH) 0.9 %
3-10 SYRINGE (ML) INJECTION AS NEEDED
Status: DISCONTINUED | OUTPATIENT
Start: 2023-04-08 | End: 2023-04-11 | Stop reason: HOSPADM

## 2023-04-08 RX ORDER — METHYLPREDNISOLONE SODIUM SUCCINATE 125 MG/2ML
125 INJECTION, POWDER, LYOPHILIZED, FOR SOLUTION INTRAMUSCULAR; INTRAVENOUS ONCE
Status: COMPLETED | OUTPATIENT
Start: 2023-04-08 | End: 2023-04-08

## 2023-04-08 RX ORDER — SODIUM CHLORIDE 0.9 % (FLUSH) 0.9 %
3 SYRINGE (ML) INJECTION EVERY 12 HOURS SCHEDULED
Status: DISCONTINUED | OUTPATIENT
Start: 2023-04-08 | End: 2023-04-11 | Stop reason: HOSPADM

## 2023-04-08 RX ORDER — AZITHROMYCIN 250 MG/1
500 TABLET, FILM COATED ORAL DAILY
Status: COMPLETED | OUTPATIENT
Start: 2023-04-08 | End: 2023-04-08

## 2023-04-08 RX ORDER — ENOXAPARIN SODIUM 100 MG/ML
40 INJECTION SUBCUTANEOUS DAILY
Status: DISCONTINUED | OUTPATIENT
Start: 2023-04-08 | End: 2023-04-11 | Stop reason: HOSPADM

## 2023-04-08 RX ORDER — AZITHROMYCIN 250 MG/1
250 TABLET, FILM COATED ORAL DAILY
Status: DISCONTINUED | OUTPATIENT
Start: 2023-04-09 | End: 2023-04-10

## 2023-04-08 RX ORDER — POLYETHYLENE GLYCOL 3350 17 G/17G
17 POWDER, FOR SOLUTION ORAL DAILY
Status: DISCONTINUED | OUTPATIENT
Start: 2023-04-09 | End: 2023-04-11 | Stop reason: HOSPADM

## 2023-04-08 RX ORDER — CLOPIDOGREL BISULFATE 75 MG/1
75 TABLET ORAL DAILY
Status: DISCONTINUED | OUTPATIENT
Start: 2023-04-09 | End: 2023-04-11 | Stop reason: HOSPADM

## 2023-04-08 RX ADMIN — FAMOTIDINE 40 MG: 20 TABLET, FILM COATED ORAL at 20:49

## 2023-04-08 RX ADMIN — METHYLPREDNISOLONE SODIUM SUCCINATE 125 MG: 125 INJECTION, POWDER, FOR SOLUTION INTRAMUSCULAR; INTRAVENOUS at 19:21

## 2023-04-08 RX ADMIN — AZITHROMYCIN MONOHYDRATE 500 MG: 250 TABLET ORAL at 20:48

## 2023-04-08 RX ADMIN — BUDESONIDE AND FORMOTEROL FUMARATE DIHYDRATE 2 PUFF: 160; 4.5 AEROSOL RESPIRATORY (INHALATION) at 21:02

## 2023-04-08 RX ADMIN — ENOXAPARIN SODIUM 40 MG: 100 INJECTION SUBCUTANEOUS at 20:50

## 2023-04-08 RX ADMIN — IOPAMIDOL 100 ML: 755 INJECTION, SOLUTION INTRAVENOUS at 16:42

## 2023-04-08 RX ADMIN — Medication 3 ML: at 20:49

## 2023-04-08 RX ADMIN — IPRATROPIUM BROMIDE AND ALBUTEROL SULFATE 3 ML: 2.5; .5 SOLUTION RESPIRATORY (INHALATION) at 19:10

## 2023-04-08 RX ADMIN — ACETAMINOPHEN 650 MG: 325 TABLET, FILM COATED ORAL at 20:48

## 2023-04-08 RX ADMIN — HYDROCODONE BITARTRATE AND ACETAMINOPHEN 1 TABLET: 5; 325 TABLET ORAL at 20:48

## 2023-04-08 NOTE — ED PROVIDER NOTES
Subjective   History of Present Illness     Patient is an 83-year-old female comes in complaining of generalized weakness and shortness of breath for the past 3 days.  Patient was seen by her primary care provider on Thursday and diagnosed with a UTI despite antibiotics since that time.  Patient states she has had poor oral intake since that time.  Patient reports worsening fatigue as well as shortness of breath and was seen again by primary care earlier today in the office and referred to the ER for patient's respiratory distress and worsening symptoms.  Patient denies any fever, chills, cough, chest pain, abdominal pain, vomiting or diarrhea.  Patient states that she wears 2 L nasal cannula as needed but has needed to wear this since Thursday.  Patient denies any urinary symptoms or swelling legs bilaterally.  Patient was documented and hypoxic in triage here today at 85% on room air.        Review of Systems   Constitutional: Positive for fatigue. Negative for chills and fever.   HENT: Negative for congestion, sore throat, tinnitus and trouble swallowing.    Eyes: Negative for photophobia, discharge and visual disturbance.   Respiratory: Positive for shortness of breath. Negative for cough and wheezing.    Cardiovascular: Negative for chest pain, palpitations and leg swelling.   Gastrointestinal: Positive for nausea. Negative for abdominal pain, blood in stool, diarrhea and vomiting.   Genitourinary: Negative for dysuria, flank pain, frequency, hematuria and urgency.   Musculoskeletal: Negative for arthralgias and myalgias.   Skin: Negative for rash.   Neurological: Negative for dizziness, syncope, light-headedness and headaches.   Psychiatric/Behavioral: Negative for confusion.       Past Medical History:   Diagnosis Date   • Aortic aneurysm    • COPD (chronic obstructive pulmonary disease)    • Hernia, inguinal, left    • Hyperlipidemia    • Hypertension    • Macular degeneration    • Peripheral edema    • PVD  (peripheral vascular disease)        No Known Allergies    Past Surgical History:   Procedure Laterality Date   • CARPAL TUNNEL RELEASE     • DESCENDING AORTIC ANEURYSM REPAIR W/ STENT     • INGUINAL HERNIA REPAIR Bilateral 2022    Procedure: INGUINAL HERNIA REPAIR LAPAROSCOPIC WITH DAVINCI ROBOT;  Surgeon: Jasper Mcneill MD;  Location: [facility] MAIN OR;  Service: Robotics - DaVinci;  Laterality: Bilateral;   • INTERVENTIONAL RADIOLOGY PROCEDURE N/A 2021    Procedure: Abdominal Aortagram with Runoff, possible PCI;  Surgeon: Kyrie Campoverde MD;  Location: [facility] CATH INVASIVE LOCATION;  Service: Cardiovascular;  Laterality: N/A;   • KNEE SURGERY     • OTHER SURGICAL HISTORY      STENT   • VASCULAR SURGERY      fem fem bypass       Family History   Problem Relation Age of Onset   • Cancer Other    • Diabetes Other    • Aneurysm Father    • Cancer Mother        Social History     Socioeconomic History   • Marital status:    Tobacco Use   • Smoking status: Former     Packs/day: 1.00     Years: 30.00     Pack years: 30.00     Types: Cigarettes     Quit date:      Years since quittin.2   • Smokeless tobacco: Never   Vaping Use   • Vaping Use: Never used   Substance and Sexual Activity   • Alcohol use: No   • Drug use: Never   • Sexual activity: Defer           Objective   Physical Exam  Vitals and nursing note reviewed.   Constitutional:       General: She is not in acute distress.     Appearance: She is well-developed. She is not diaphoretic.   HENT:      Head: Normocephalic and atraumatic.      Nose: Nose normal.      Mouth/Throat:      Pharynx: No oropharyngeal exudate.   Eyes:      Extraocular Movements: Extraocular movements intact.      Conjunctiva/sclera: Conjunctivae normal.      Pupils: Pupils are equal, round, and reactive to light.   Cardiovascular:      Rate and Rhythm: Normal rate and regular rhythm.      Pulses: Normal pulses.      Heart sounds: Normal heart sounds.  "     Comments: S1, S2 audible.  Pulmonary:      Effort: Pulmonary effort is normal. No tachypnea or respiratory distress.      Breath sounds: Normal breath sounds. No wheezing, rhonchi or rales.      Comments: On 2 L nasal cannula.  Abdominal:      General: Bowel sounds are normal. There is no distension.      Palpations: Abdomen is soft.      Tenderness: There is no abdominal tenderness.   Musculoskeletal:         General: No tenderness or deformity. Normal range of motion.      Cervical back: Normal range of motion.      Right lower leg: No edema.      Left lower leg: No edema.   Skin:     General: Skin is warm.      Capillary Refill: Capillary refill takes less than 2 seconds.      Findings: No erythema or rash.   Neurological:      General: No focal deficit present.      Mental Status: She is alert and oriented to person, place, and time.      Cranial Nerves: No cranial nerve deficit.   Psychiatric:         Mood and Affect: Mood normal.         Behavior: Behavior normal.         Procedures           ED Course  ED Course as of 04/08/23 2353   Sat Apr 08, 2023   1616 Awaiting ct [RL]      ED Course User Index  [RL] Guanako Lyles PA      /44 (BP Location: Right arm, Patient Position: Lying)   Pulse 65   Temp 97.8 °F (36.6 °C) (Oral)   Resp 21   Ht 157.5 cm (62\")   Wt 63.5 kg (140 lb)   SpO2 96%   BMI 25.61 kg/m²   Labs Reviewed   COMPREHENSIVE METABOLIC PANEL - Abnormal; Notable for the following components:       Result Value    Glucose 107 (*)     Creatinine 0.48 (*)     Sodium 131 (*)     Chloride 93 (*)     BUN/Creatinine Ratio 31.3 (*)     All other components within normal limits    Narrative:     GFR Normal >60  Chronic Kidney Disease <60  Kidney Failure <15    The GFR formula is only valid for adults with stable renal function between ages 18 and 70.   SINGLE HSTROPONIN T - Abnormal; Notable for the following components:    HS Troponin T 16 (*)     All other components within normal limits    " "Narrative:     High Sensitive Troponin T Reference Range:  <10.0 ng/L- Negative Female for AMI  <15.0 ng/L- Negative Male for AMI  >=10 - Abnormal Female indicating possible myocardial injury.  >=15 - Abnormal Male indicating possible myocardial injury.   Clinicians would have to utilize clinical acumen, EKG, Troponin, and serial changes to determine if it is an Acute Myocardial Infarction or myocardial injury due to an underlying chronic condition.        PROTIME-INR - Abnormal; Notable for the following components:    Protime 11.8 (*)     INR 1.16 (*)     All other components within normal limits   D-DIMER, QUANTITATIVE - Abnormal; Notable for the following components:    D-Dimer, Quantitative 1.55 (*)     All other components within normal limits    Narrative:     According to the assay 's published package insert, a normal (<0.50 mg/L (FEU)) D-dimer result in conjunction with a non-high clinical probability assessment, excludes deep vein thrombosis (DVT) and pulmonary embolism (PE) with high sensitivity.    D-dimer values increase with age and this can make VTE exclusion of an older population difficult. To address this, the [employer], based on best available evidence and recent guidelines, recommends that clinicians use age-adjusted D-dimer thresholds in patients greater than 50 years of age with: a) a low probability of PE who do not meet all Pulmonary Embolism Rule Out Criteria, or b) in those with intermediate probability of PE.   The formula for an age-adjusted D-dimer cut-off is \"age/100\".  For example, a 60 year old patient would have an age-adjusted cut-off of 0.60 mg/L (FEU) and an 80 year old 0.80 mg/L (FEU).   CBC WITH AUTO DIFFERENTIAL - Abnormal; Notable for the following components:    MCV 98.3 (*)     Neutrophil % 83.8 (*)     Lymphocyte % 5.8 (*)     Eosinophil % 0.1 (*)     Neutrophils, Absolute 7.30 (*)     Lymphocytes, Absolute 0.50 (*)     All other components " within normal limits   URINALYSIS W/ CULTURE IF INDICATED - Abnormal; Notable for the following components:    Specific Gravity, UA 1.076 (*)     Ketones, UA 40 mg/dL (2+) (*)     Blood, UA Large (3+) (*)     Protein, UA 30 mg/dL (1+) (*)     Leuk Esterase, UA Trace (*)     All other components within normal limits    Narrative:     In absence of clinical symptoms, the presence of pyuria, bacteria, and/or nitrites on the urinalysis result does not correlate with infection.   URINALYSIS, MICROSCOPIC ONLY - Abnormal; Notable for the following components:    RBC, UA 13-20 (*)     WBC, UA 6-12 (*)     Squamous Epithelial Cells, UA 3-6 (*)     All other components within normal limits   COVID-19 AND FLU A/B, NP SWAB IN TRANSPORT MEDIA 8-12 HR TAT - Normal    Narrative:     Fact sheet for providers: https://www.fda.gov/media/074522/download    Fact sheet for patients: https://www.fda.gov/media/051387/download    Test performed by PCR.   RESPIRATORY PANEL PCR W/ COVID-19 (SARS-COV-2) MAXIMINO/GREG/ISIAH/PAD/COR/MAD/[facility] IN-HOUSE, NP SWAB IN UTM/VTP, 3-4 HR TAT - Normal    Narrative:     In the setting of a positive respiratory panel with a viral infection PLUS a negative procalcitonin without other underlying concern for bacterial infection, consider observing off antibiotics or discontinuation of antibiotics and continue supportive care. If the respiratory panel is positive for atypical bacterial infection (Bordetella pertussis, Chlamydophila pneumoniae, or Mycoplasma pneumoniae), consider antibiotic de-escalation to target atypical bacterial infection.   BNP (IN-HOUSE) - Normal    Narrative:     Among patients with dyspnea, NT-proBNP is highly sensitive for the detection of acute congestive heart failure. In addition NT-proBNP of <300 pg/ml effectively rules out acute congestive heart failure with 99% negative predictive value.    Results may be falsely decreased if patient taking Biotin.     APTT - Normal   BLOOD CULTURE   BLOOD  CULTURE   URINE CULTURE   RAINBOW DRAW    Narrative:     The following orders were created for panel order [place] Draw.  Procedure                               Abnormality         Status                     ---------                               -----------         ------                     Green Top (Gel)[[id number]]                                  Final result               Lavender Top[[id number]]                                     Final result               Gold Top - SST[[id number]]                                   Final result               Light Blue Top[[id number]]                                   Final result                 Please view results for these tests on the individual orders.   CBC AND DIFFERENTIAL    Narrative:     The following orders were created for panel order CBC & Differential.  Procedure                               Abnormality         Status                     ---------                               -----------         ------                     CBC Auto Differential[[id number]]        Abnormal            Final result                 Please view results for these tests on the individual orders.   GREEN TOP   LAVENDER TOP   GOLD TOP - SST   LIGHT BLUE TOP     XR Chest 1 View    Result Date: 4/8/2023  Impression: 1. Stable cardiomegaly with linear bandlike atelectasis or scarring within the left midlung. 2. Stable right apical pleural parenchymal scarring. Electronically Signed: Kalin Yusuf  4/8/2023 3:56 PM EDT  Workstation ID: [id number]    CT Angiogram Chest Pulmonary Embolism    Result Date: 4/8/2023  1. No evidence of pulmonary emboli 2. Mild peribronchial wall thickening suggesting bronchitis 3. Minimal patchy groundglass opacity favored represent atelectasis 4. Stable cardiomegaly 5. Emphysema Recommend evaluation of patient history and risk factors to see if patient qualifies for low dose lung cancer screening based upon evidence of emphysema. Electronically Signed: Geovanny  Wyatt  4/8/2023 5:14 PM EDT  Workstation ID: [id number]                                         MDM       Differential diagnosis: ACS, pulmonary embolism, not meant to be an all-inclusive list.    Chart review:  Upon chart review, patient's last cardiology visit with Dr. Santillan on 3/13/2023 for essential hypertension, PAD, COPD and pericardial effusion in the past.  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  Patient is a former smoker.  Last echo 12/9/2019 showed mild pericardial effusion.  Last Lexiscan on 1/10/2020 was negative for ischemia.  Echo and stress test on 6/25/2021 both were normal. [facility] with 0.81/0.79 on the right.  And 0.79 on the left.  Continue medical management with aspirin, clopidogrel, bisoprolol hydrochlorothiazide, amlodipine, Crestor to help with blood pressure, dyspnea on exertion, edema, claudication, hypertension, dyslipidemia, PAD history of AAA repair as tolerated.  Patient is asymptomatic from claudication standpoint at the current time.    EKG: EKG independently interpreted by myself shows sinus rhythm 75 bpm, PVC, no ST elevation apparent.  Similar to previous study done on 11/19/2022 shows sinus rhythm 60 bpm. Findings confirmed by Dr. Po Perez.    Imaging: Chest x-ray independently interpreted by myself shows cardiomegaly otherwise no obvious local infiltrates of the lungs bilaterally, official radiology read shows stable cardiomegaly with linear bandlike atelectasis or scarring within the left midlung.  Stable right apical pleural parenchymal scarring.  CT PE protocol shows no evidence of pulmonary emboli.  Mild peribronchial wall thickening suggesting bronchitis.  Mild patchy groundglass opacities favored to represent atelectasis.  Stable cardiomegaly.  Emphysema.  Recommend evaluation of patient's history and risk factors to see if patient qualifies for a low-dose lung cancer screening upon evidence of  "emphysema.    Labs: Lab work independently interpreted by myself show COVID and flu swab negative.  Troponin slightly elevated at 16.  BNP normal.  D-dimer elevated 1.5.  Coags obtained.  CBC and CMP largely unremarkable for acute findings.    Vitals:  /44 (BP Location: Right arm, Patient Position: Lying)   Pulse 65   Temp 97.8 °F (36.6 °C) (Oral)   Resp 21   Ht 157.5 cm (62\")   Wt 63.5 kg (140 lb)   SpO2 96%   BMI 25.61 kg/m²     Medications given:    Medications   sodium chloride 0.9 % flush 10 mL (has no administration in time range)   sodium chloride 0.9 % flush 10 mL (has no administration in time range)   ipratropium-albuterol (DUO-NEB) nebulizer solution 3 mL (has no administration in time range)   amLODIPine (NORVASC) tablet 10 mg (has no administration in time range)   aspirin chewable tablet 81 mg (has no administration in time range)   clopidogrel (PLAVIX) tablet 75 mg (has no administration in time range)   HYDROcodone-acetaminophen (NORCO) 5-325 MG per tablet 1 tablet (1 tablet Oral Given 4/8/23 2048)   polyethylene glycol (MIRALAX) packet 17 g (has no administration in time range)   rosuvastatin (CRESTOR) tablet 10 mg (has no administration in time range)   budesonide-formoterol (SYMBICORT) 160-4.5 MCG/ACT inhaler 2 puff (2 puffs Inhalation Given 4/8/23 2102)   azithromycin (ZITHROMAX) tablet 500 mg (500 mg Oral Given 4/8/23 2048)     Followed by   azithromycin (ZITHROMAX) tablet 250 mg (has no administration in time range)   sodium chloride 0.9 % flush 3 mL (3 mL Intravenous Given 4/8/23 2049)   sodium chloride 0.9 % flush 3-10 mL (has no administration in time range)   sodium chloride 0.9 % infusion 40 mL (has no administration in time range)   Enoxaparin Sodium (LOVENOX) syringe 40 mg (40 mg Subcutaneous Given 4/8/23 2050)   nitroglycerin (NITROSTAT) SL tablet 0.4 mg (has no administration in time range)   acetaminophen (TYLENOL) tablet 650 mg (650 mg Oral Given 4/8/23 2048) "   ondansetron (ZOFRAN) injection 4 mg (has no administration in time range)   famotidine (PEPCID) tablet 40 mg (40 mg Oral Given 4/8/23 2049)   iopamidol (ISOVUE-370) 76 % injection 100 mL (100 mL Intravenous Given 4/8/23 1642)   ipratropium-albuterol (DUO-NEB) nebulizer solution 3 mL (3 mL Nebulization Given 4/8/23 1910)   methylPREDNISolone sodium succinate (SOLU-Medrol) injection 125 mg (125 mg Intravenous Given 4/8/23 1921)       Procedures:  Not indicated  MDM: Patient is an 83-year-old female comes in complaining of shortness of breath and was sent in by primary care office regarding patient's generalized weakness and shortness of breath and currently on antibiotics for UTI.  Patient unsure which antibiotics she is currently on.  IV established.  Lab work independently interpreted by myself show COVID and flu swab negative.  Troponin slightly elevated at 16.  BNP normal.  D-dimer elevated 1.5.  Coags obtained.  CBC and CMP largely unremarkable for acute findings.  Chest x-ray independently interpreted by myself shows cardiomegaly otherwise no obvious local infiltrates of the lungs bilaterally, official radiology read shows stable cardiomegaly with linear bandlike atelectasis or scarring within the left midlung.  Stable right apical pleural parenchymal scarring.  EKG independently interpreted by myself shows sinus rhythm 75 bpm, PVC, no ST elevation apparent.  Similar to previous study done on 11/19/2022 shows sinus rhythm 60 bpm. Findings confirmed by Dr. Po Perez.  Patient was hypoxic on room air at 85% SPO2.  Patient was given Solu-Medrol and DuoNeb treatment given patient's hypoxia and likely COPD exacerbation.  Given patient's increased oxygen demand patient would benefit from further breathing treatments and observation overnight.  Spoke with Dr. Cronin who agreed with further breathing treatments and to observe patient overnight in the hospital given course of the events today and findings.   Patient and family agreeable to plan.  Results and plan discussed with patient and is agreeable with plan.    Final diagnoses:   COPD with acute exacerbation   Acute respiratory failure with hypoxia       ED Disposition  ED Disposition     ED Disposition   Decision to Admit    Condition   --    Comment   Level of Care: Med/Surg [1]   Diagnosis: COPD with acute exacerbation [738466]   Admitting Physician: PAXTON CASPER [[id number]]   Attending Physician: PAXTON CASPER [[id number]]               No follow-up provider specified.       Medication List      No changes were made to your prescriptions during this visit.          Guanako Lyles PA  04/08/23 [id number]

## 2023-04-08 NOTE — Clinical Note
Level of Care: Telemetry [5]   Admitting Physician: PAXTON CASPER [[id number]]   Attending Physician: PAXTON CASPER [[id number]]

## 2023-04-08 NOTE — ED NOTES
Nursing report ED to floor  Becka Oliva  83 y.o.  female    HPI:   Chief Complaint   Patient presents with    Shortness of Breath     Soa , weakness  ,feeling very tired , pt is on antibiotics for UTI        Admitting doctor:   Dianna Cronin MD    Admitting diagnosis:   The primary encounter diagnosis was COPD with acute exacerbation. A diagnosis of Acute respiratory failure with hypoxia was also pertinent to this visit.    Code status:   Current Code Status       Date Active Code Status Order ID Comments User Context       Prior            Allergies:   Patient has no known allergies.    Isolation:  No active isolations     Fall Risk:  Fall Risk Assessment was completed, and patient is at moderate risk for falls.   Predictive Model Details         20 (Low) Factor Value    Calculated 4/8/2023 18:06 Age 83    Risk of Fall Model Musculoskeletal Assessment WDL     Active Peripheral IV Present     Imaging order in this encounter Present     Respiratory Rate 20     Skin Assessment WDL     Magnesium not on file     Diastolic BP 59     Financial Class Medicare     Drug Use No     Tobacco Use Quit     Chloride 93 mmol/L     Calcium 8.7 mg/dL     Bimal Scale not on file     Albumin 3.6 g/dL     Creatinine 0.48 mg/dL     Peripheral Vascular Assessment WDL     Gastrointestinal Assessment WDL     Number of Distinct Medication Classes administered 1     Total Bilirubin 0.6 mg/dL     Cardiac Assessment WDL     Days after Admission 0.147     ALT 14 U/L     Potassium 3.8 mmol/L         Weight:       04/08/23  1436   Weight: 63.5 kg (140 lb)       Intake and Output  No intake or output data in the 24 hours ending 04/08/23 1921    Diet:        Most recent vitals:   Vitals:    04/08/23 1901 04/08/23 1902 04/08/23 1910 04/08/23 1915   BP: 127/65      Pulse: 94  87 83   Resp:   20 20   Temp:       SpO2:  94% 94% 100%   Weight:       Height:           Active LDAs/IV Access:   Lines, Drains & Airways       Active LDAs       Name Placement  date Placement time Site Days    Peripheral IV 11/19/22 1547 Right Antecubital 11/19/22  1547  Antecubital  140    Peripheral IV 04/08/23 1523 Anterior;Left Forearm 04/08/23  1523  Forearm  less than 1                    Skin Condition:   Skin Assessments (last day)       Date/Time Skin WDL    04/08/23 18:05:38 WDL             Labs (abnormal labs have a star):   Labs Reviewed   COMPREHENSIVE METABOLIC PANEL - Abnormal; Notable for the following components:       Result Value    Glucose 107 (*)     Creatinine 0.48 (*)     Sodium 131 (*)     Chloride 93 (*)     BUN/Creatinine Ratio 31.3 (*)     All other components within normal limits    Narrative:     GFR Normal >60  Chronic Kidney Disease <60  Kidney Failure <15    The GFR formula is only valid for adults with stable renal function between ages 18 and 70.   SINGLE HSTROPONIN T - Abnormal; Notable for the following components:    HS Troponin T 16 (*)     All other components within normal limits    Narrative:     High Sensitive Troponin T Reference Range:  <10.0 ng/L- Negative Female for AMI  <15.0 ng/L- Negative Male for AMI  >=10 - Abnormal Female indicating possible myocardial injury.  >=15 - Abnormal Male indicating possible myocardial injury.   Clinicians would have to utilize clinical acumen, EKG, Troponin, and serial changes to determine if it is an Acute Myocardial Infarction or myocardial injury due to an underlying chronic condition.        PROTIME-INR - Abnormal; Notable for the following components:    Protime 11.8 (*)     INR 1.16 (*)     All other components within normal limits   D-DIMER, QUANTITATIVE - Abnormal; Notable for the following components:    D-Dimer, Quantitative 1.55 (*)     All other components within normal limits    Narrative:     According to the assay 's published package insert, a normal (<0.50 mg/L (FEU)) D-dimer result in conjunction with a non-high clinical probability assessment, excludes deep vein thrombosis (DVT)  "and pulmonary embolism (PE) with high sensitivity.    D-dimer values increase with age and this can make VTE exclusion of an older population difficult. To address this, the [employer], based on best available evidence and recent guidelines, recommends that clinicians use age-adjusted D-dimer thresholds in patients greater than 50 years of age with: a) a low probability of PE who do not meet all Pulmonary Embolism Rule Out Criteria, or b) in those with intermediate probability of PE.   The formula for an age-adjusted D-dimer cut-off is \"age/100\".  For example, a 60 year old patient would have an age-adjusted cut-off of 0.60 mg/L (FEU) and an 80 year old 0.80 mg/L (FEU).   CBC WITH AUTO DIFFERENTIAL - Abnormal; Notable for the following components:    MCV 98.3 (*)     Neutrophil % 83.8 (*)     Lymphocyte % 5.8 (*)     Eosinophil % 0.1 (*)     Neutrophils, Absolute 7.30 (*)     Lymphocytes, Absolute 0.50 (*)     All other components within normal limits   COVID-19 AND FLU A/B, NP SWAB IN TRANSPORT MEDIA 8-12 HR TAT - Normal    Narrative:     Fact sheet for providers: https://www.fda.gov/media/130428/download    Fact sheet for patients: https://www.fda.gov/media/282176/download    Test performed by PCR.   BNP (IN-HOUSE) - Normal    Narrative:     Among patients with dyspnea, NT-proBNP is highly sensitive for the detection of acute congestive heart failure. In addition NT-proBNP of <300 pg/ml effectively rules out acute congestive heart failure with 99% negative predictive value.    Results may be falsely decreased if patient taking Biotin.     APTT - Normal   RAINBOW DRAW    Narrative:     The following orders were created for panel order [place] Draw.  Procedure                               Abnormality         Status                     ---------                               -----------         ------                     Green Top (Gel)[[id number]]                                  Final result     "           Lavender Top[[id number]]                                     Final result               Gold Top - SST[[id number]]                                   Final result               Light Blue Top[[id number]]                                   Final result                 Please view results for these tests on the individual orders.   CBC AND DIFFERENTIAL    Narrative:     The following orders were created for panel order CBC & Differential.  Procedure                               Abnormality         Status                     ---------                               -----------         ------                     CBC Auto Differential[[id number]]        Abnormal            Final result                 Please view results for these tests on the individual orders.   GREEN TOP   LAVENDER TOP   GOLD TOP - SST   LIGHT BLUE TOP       LOC: Person, Place, Time, and Situation    Telemetry:  Telemetry    Cardiac Monitoring Ordered: no    EKG:   ECG 12 Lead Dyspnea   Preliminary Result   HEART RATE= 75  bpm   RR Interval= 808  ms   KS Interval= 159  ms   P Horizontal Axis= 13  deg   P Front Axis= 44  deg   QRSD Interval= 83  ms   QT Interval= 398  ms   QRS Axis= -61  deg   T Wave Axis= 18  deg   - ABNORMAL ECG -   Sinus rhythm   Ventricular premature complex   Inferior infarct, old   No previous ECG available for comparison   Electronically Signed By:    Date and Time of Study: 2023-04-08 14:49:09          Medications Given in the ED:   Medications   sodium chloride 0.9 % flush 10 mL (has no administration in time range)   sodium chloride 0.9 % flush 10 mL (has no administration in time range)   iopamidol (ISOVUE-370) 76 % injection 100 mL (100 mL Intravenous Given 4/8/23 1642)   ipratropium-albuterol (DUO-NEB) nebulizer solution 3 mL (3 mL Nebulization Given 4/8/23 1910)   methylPREDNISolone sodium succinate (SOLU-Medrol) injection 125 mg (125 mg Intravenous Given 4/8/23 1921)       Imaging results:  XR Chest 1 View    Result  Date: 2023  Impression: 1. Stable cardiomegaly with linear bandlike atelectasis or scarring within the left midlung. 2. Stable right apical pleural parenchymal scarring. Electronically Signed: Kalin Yusuf  2023 3:56 PM EDT  Workstation ID: [id number]    CT Angiogram Chest Pulmonary Embolism    Result Date: 2023  1. No evidence of pulmonary emboli 2. Mild peribronchial wall thickening suggesting bronchitis 3. Minimal patchy groundglass opacity favored represent atelectasis 4. Stable cardiomegaly 5. Emphysema Recommend evaluation of patient history and risk factors to see if patient qualifies for low dose lung cancer screening based upon evidence of emphysema. Electronically Signed: Geovanny Schneider  2023 5:14 PM EDT  Workstation ID: [id number]     Social issues:   Social History     Socioeconomic History    Marital status:    Tobacco Use    Smoking status: Former     Packs/day: 1.00     Years: 30.00     Pack years: 30.00     Types: Cigarettes     Quit date:      Years since quittin.2    Smokeless tobacco: Never   Vaping Use    Vaping Use: Never used   Substance and Sexual Activity    Alcohol use: No    Drug use: Never    Sexual activity: Defer       NIH Stroke Scale:  Interval: (not recorded)  1a. Level of Consciousness: (not recorded)  1b. LOC Questions: (not recorded)  1c. LOC Commands: (not recorded)  2. Best Gaze: (not recorded)  3. Visual: (not recorded)  4. Facial Palsy: (not recorded)  5a. Motor Arm, Left: (not recorded)  5b. Motor Arm, Right: (not recorded)  6a. Motor Leg, Left: (not recorded)  6b. Motor Leg, Right: (not recorded)  7. Limb Ataxia: (not recorded)  8. Sensory: (not recorded)  9. Best Language: (not recorded)  10. Dysarthria: (not recorded)  11. Extinction and Inattention (formerly Neglect): (not recorded)    Total (NIH Stroke Scale): (not recorded)     Additional notable assessment information:       Nursing report ED to floor:  Steph Kirkpatrick RN    04/08/23 19:21 EDT

## 2023-04-09 LAB
BACTERIA SPEC AEROBE CULT: NO GROWTH
QT INTERVAL: 398 MS

## 2023-04-09 PROCEDURE — 25010000002 ENOXAPARIN PER 10 MG: Performed by: INTERNAL MEDICINE

## 2023-04-09 PROCEDURE — 97162 PT EVAL MOD COMPLEX 30 MIN: CPT

## 2023-04-09 PROCEDURE — 94799 UNLISTED PULMONARY SVC/PX: CPT

## 2023-04-09 PROCEDURE — G0378 HOSPITAL OBSERVATION PER HR: HCPCS

## 2023-04-09 PROCEDURE — 63710000001 PREDNISONE PER 1 MG: Performed by: INTERNAL MEDICINE

## 2023-04-09 PROCEDURE — 25010000002 CEFTRIAXONE PER 250 MG: Performed by: INTERNAL MEDICINE

## 2023-04-09 RX ORDER — CHOLECALCIFEROL (VITAMIN D3) 125 MCG
5 CAPSULE ORAL NIGHTLY PRN
Status: DISCONTINUED | OUTPATIENT
Start: 2023-04-09 | End: 2023-04-11 | Stop reason: HOSPADM

## 2023-04-09 RX ORDER — PREDNISONE 20 MG/1
40 TABLET ORAL DAILY
Status: DISCONTINUED | OUTPATIENT
Start: 2023-04-09 | End: 2023-04-10

## 2023-04-09 RX ADMIN — PREDNISONE 40 MG: 20 TABLET ORAL at 12:51

## 2023-04-09 RX ADMIN — BUDESONIDE AND FORMOTEROL FUMARATE DIHYDRATE 2 PUFF: 160; 4.5 AEROSOL RESPIRATORY (INHALATION) at 07:43

## 2023-04-09 RX ADMIN — AMLODIPINE BESYLATE 10 MG: 5 TABLET ORAL at 08:00

## 2023-04-09 RX ADMIN — AZITHROMYCIN MONOHYDRATE 250 MG: 250 TABLET ORAL at 08:00

## 2023-04-09 RX ADMIN — POLYETHYLENE GLYCOL 3350 17 G: 17 POWDER, FOR SOLUTION ORAL at 07:58

## 2023-04-09 RX ADMIN — ROSUVASTATIN 10 MG: 10 TABLET, FILM COATED ORAL at 08:00

## 2023-04-09 RX ADMIN — FAMOTIDINE 40 MG: 20 TABLET, FILM COATED ORAL at 21:43

## 2023-04-09 RX ADMIN — CLOPIDOGREL BISULFATE 75 MG: 75 TABLET ORAL at 07:59

## 2023-04-09 RX ADMIN — Medication 3 ML: at 21:43

## 2023-04-09 RX ADMIN — Medication 3 ML: at 07:59

## 2023-04-09 RX ADMIN — CEFTRIAXONE 1 G: 1 INJECTION, POWDER, FOR SOLUTION INTRAMUSCULAR; INTRAVENOUS at 12:51

## 2023-04-09 RX ADMIN — ASPIRIN 81 MG CHEWABLE TABLET 81 MG: 81 TABLET CHEWABLE at 08:00

## 2023-04-09 RX ADMIN — HYDROCODONE BITARTRATE AND ACETAMINOPHEN 1 TABLET: 5; 325 TABLET ORAL at 08:00

## 2023-04-09 RX ADMIN — ENOXAPARIN SODIUM 40 MG: 100 INJECTION SUBCUTANEOUS at 16:42

## 2023-04-09 NOTE — PLAN OF CARE
Continue to monitor and assist pain.  Pt is complaining of pain but easy to manage with medication.   Problem: Adult Inpatient Plan of Care  Goal: Plan of Care Review  Outcome: Ongoing, Progressing  Flowsheets (Taken 4/9/2023 0230 by Dannielle Corbin, RN)  Progress: no change  Plan of Care Reviewed With: patient  Goal: Patient-Specific Goal (Individualized)  Outcome: Ongoing, Progressing  Goal: Absence of Hospital-Acquired Illness or Injury  Outcome: Ongoing, Progressing  Intervention: Identify and Manage Fall Risk  Flowsheets (Taken 4/9/2023 0734)  Safety Promotion/Fall Prevention:   safety round/check completed   assistive device/personal items within reach   clutter free environment maintained   fall prevention program maintained  Intervention: Prevent Skin Injury  Flowsheets (Taken 4/9/2023 0734)  Body Position: position changed independently  Skin Protection:   adhesive use limited   skin-to-skin areas padded  Intervention: Prevent and Manage VTE (Venous Thromboembolism) Risk  Flowsheets (Taken 4/9/2023 0734)  Activity Management: (up with assist) other (see comments)  VTE Prevention/Management: (see MAR) other (see comments)  Intervention: Prevent Infection  Flowsheets (Taken 4/9/2023 0734)  Infection Prevention:   personal protective equipment utilized   rest/sleep promoted   single patient room provided   hand hygiene promoted  Goal: Optimal Comfort and Wellbeing  Outcome: Ongoing, Progressing  Intervention: Monitor Pain and Promote Comfort  Flowsheets (Taken 4/9/2023 0734)  Pain Management Interventions:   see MAR   relaxation techniques promoted  Intervention: Provide Person-Centered Care  Flowsheets (Taken 4/9/2023 0734)  Trust Relationship/Rapport: care explained  Goal: Readiness for Transition of Care  Outcome: Ongoing, Progressing  Intervention: Mutually Develop Transition Plan  Flowsheets  Taken 4/8/2023 1949 by Karlie Wadsworth RN  Transportation Anticipated: family or friend will  provide  Patient/Family Anticipated Services at Transition: none  Patient/Family Anticipates Transition to: home  Taken 4/8/2023 1946 by Karlie Wadsworth RN  Equipment Currently Used at Home:   cane, straight   oxygen   noninvasive ventilator   bp cuff     Problem: Fall Injury Risk  Goal: Absence of Fall and Fall-Related Injury  Outcome: Ongoing, Progressing  Intervention: Identify and Manage Contributors  Flowsheets (Taken 4/9/2023 0734)  Medication Review/Management: medications reviewed  Intervention: Promote Injury-Free Environment  Flowsheets (Taken 4/9/2023 0734)  Safety Promotion/Fall Prevention:   safety round/check completed   assistive device/personal items within reach   clutter free environment maintained   fall prevention program maintained     Problem: Adjustment to Illness COPD (Chronic Obstructive Pulmonary Disease)  Goal: Optimal Chronic Illness Coping  Outcome: Ongoing, Progressing  Intervention: Support and Optimize Psychosocial Response  Flowsheets (Taken 4/9/2023 0734)  Supportive Measures: relaxation techniques promoted     Problem: Functional Ability Impaired COPD (Chronic Obstructive Pulmonary Disease)  Goal: Optimal Level of Functional [place]  Outcome: Ongoing, Progressing  Intervention: Optimize Functional Ability  Flowsheets (Taken 4/9/2023 0734)  Activity Management: (up with assist) other (see comments)     Problem: Infection COPD (Chronic Obstructive Pulmonary Disease)  Goal: Absence of Infection Signs and Symptoms  Outcome: Ongoing, Progressing     Problem: Oral Intake Inadequate COPD (Chronic Obstructive Pulmonary Disease)  Goal: Improved Nutrition Intake  Outcome: Ongoing, Progressing  Intervention: Promote and Optimize Nutrition  Flowsheets (Taken 4/9/2023 0734)  Oral Nutrition Promotion:   calorie-dense foods provided   calorie-dense liquids provided     Problem: Respiratory Compromise COPD (Chronic Obstructive Pulmonary Disease)  Goal: Effective Oxygenation and  Ventilation  Outcome: Ongoing, Progressing  Intervention: Promote Airway Secretion Clearance  Flowsheets (Taken 4/9/2023 0734)  Activity Management: (up with assist) other (see comments)  Intervention: Optimize Oxygenation and Ventilation  Flowsheets (Taken 4/9/2023 0734)  Airway/Ventilation Management: airway patency maintained     Problem: COPD (Chronic Obstructive Pulmonary Disease) Comorbidity  Goal: Maintenance of COPD Symptom Control  Outcome: Ongoing, Progressing  Intervention: Maintain COPD-Symptom Control  Flowsheets (Taken 4/9/2023 0734)  Supportive Measures: relaxation techniques promoted  Medication Review/Management: medications reviewed     Problem: Hypertension Comorbidity  Goal: Blood Pressure in Desired Range  Outcome: Ongoing, Progressing  Intervention: Maintain Blood Pressure Management  Flowsheets (Taken 4/9/2023 0734)  Medication Review/Management: medications reviewed   Goal Outcome Evaluation:

## 2023-04-09 NOTE — H&P
Patient Care Team:  Dianna Cronin MD as PCP - General (Internal Medicine)  Kyrie Campoverde MD as Consulting Physician (Cardiology)  Jasper Mcneill MD as Consulting Physician (General Surgery)  Sung Tamayo MD (Vascular Surgery)    Chief complaint Short of breath    Subjective     Patient is a 83 y.o. female with history of COPD who presents from home with 3-day history of progressive shortness of breath with cough.  She had been treated as an outpatient for UTI but was having worsening respiratory symptoms.  She normally wears her oxygen only when needed but has needed it consistently for the last several days.  On arrival to the emergency room her oxygen saturations were 85% on room air.  Urinalysis is not convincing for urinary tract infection.  Chest x-ray has questionable infiltrate left lower lung.    Review of Systems   Constitutional: Positive for activity change, appetite change, chills, fatigue and fever. Negative for diaphoresis.   HENT: Negative for facial swelling, sinus pressure and trouble swallowing.    Eyes: Negative for visual disturbance.   Respiratory: Positive for cough and shortness of breath. Negative for wheezing and stridor.    Cardiovascular: Negative for chest pain, palpitations and leg swelling.   Gastrointestinal: Negative for abdominal pain, constipation, diarrhea, nausea and vomiting.   Endocrine: Negative for polyuria.   Genitourinary: Negative for dysuria.   Musculoskeletal: Negative for arthralgias, back pain and gait problem.   Skin: Negative for rash and wound.   Allergic/Immunologic: Negative for immunocompromised state.   Neurological: Negative for light-headedness.   Psychiatric/Behavioral: Negative for confusion.          History  Past Medical History:   Diagnosis Date   • Aortic aneurysm    • COPD (chronic obstructive pulmonary disease)    • Hernia, inguinal, left    • Hyperlipidemia    • Hypertension    • Macular degeneration    • Peripheral edema    • PVD  (peripheral vascular disease)      Past Surgical History:   Procedure Laterality Date   • CARPAL TUNNEL RELEASE     • DESCENDING AORTIC ANEURYSM REPAIR W/ STENT     • INGUINAL HERNIA REPAIR Bilateral 2022    Procedure: INGUINAL HERNIA REPAIR LAPAROSCOPIC WITH DAVINCI ROBOT;  Surgeon: Jasper Mcneill MD;  Location: [facility] MAIN OR;  Service: Robotics - DaVinci;  Laterality: Bilateral;   • INTERVENTIONAL RADIOLOGY PROCEDURE N/A 2021    Procedure: Abdominal Aortagram with Runoff, possible PCI;  Surgeon: Kyrie Campoverde MD;  Location: [facility] CATH INVASIVE LOCATION;  Service: Cardiovascular;  Laterality: N/A;   • KNEE SURGERY     • OTHER SURGICAL HISTORY      STENT   • VASCULAR SURGERY      fem fem bypass     Family History   Problem Relation Age of Onset   • Cancer Other    • Diabetes Other    • Aneurysm Father    • Cancer Mother      Social History     Tobacco Use   • Smoking status: Former     Packs/day: 1.00     Years: 30.00     Pack years: 30.00     Types: Cigarettes     Quit date:      Years since quittin.2   • Smokeless tobacco: Never   Vaping Use   • Vaping Use: Never used   Substance Use Topics   • Alcohol use: No   • Drug use: Never     Medications Prior to Admission   Medication Sig Dispense Refill Last Dose   • albuterol sulfate  (90 Base) MCG/ACT inhaler Inhale 2 puffs Every 4 (Four) Hours As Needed for Wheezing.   2023   • amLODIPine (NORVASC) 10 MG tablet TAKE 1 TABLET BY MOUTH DAILY. 90 tablet 3 2023   • aspirin 81 MG chewable tablet Chew 1 tablet Daily.   2023   • bisoprolol-hydrochlorothiazide (ZIAC) 5-6.25 MG per tablet Take 1 tablet by mouth Daily.   2023   • cholecalciferol (VITAMIN D3) 25 MCG (1000 UT) tablet Take 2 tablets by mouth Daily. LAST 2023   • clopidogrel (PLAVIX) 75 MG tablet Take 1 tablet by mouth Daily.   2023   • HYDROcodone-acetaminophen (Norco) 5-325 MG per tablet Take 1 tablet by mouth Every 6 (Six) Hours As  Needed for Severe Pain. (Patient taking differently: Take 1 tablet by mouth Every 8 (Eight) Hours As Needed for Severe Pain.) 20 tablet 0 4/7/2023   • Multiple Vitamins-Minerals (PRESERVISION AREDS 2+MULTI VIT PO) Take 1 tablet by mouth 2 (Two) Times a Day. LD 11/9 4/7/2023   • nitrofurantoin, macrocrystal-monohydrate, (MACROBID) 100 MG capsule Take 1 capsule by mouth 2 (Two) Times a Day.   4/7/2023   • pyridoxine (VITAMIN B-6) 100 MG tablet tablet Take 1 tablet by mouth Daily. LD 11/9 4/7/2023   • rosuvastatin (CRESTOR) 10 MG tablet TAKE 1 TABLET BY MOUTH EVERY DAY 90 tablet 3 4/7/2023   • vitamin B-12 (CYANOCOBALAMIN) 1000 MCG tablet Take 2.5 tablets by mouth Daily. LAST DOSE 11/9 4/7/2023     Allergies:  Patient has no known allergies.    Objective     Vital Signs  Temp:  [97.6 °F (36.4 °C)-101.6 °F (38.7 °C)] 97.6 °F (36.4 °C)  Heart Rate:  [] 77  Resp:  [14-21] 14  BP: (103-134)/(44-69) 110/58     Physical Exam:      General Appearance:    Alert, cooperative, in no acute distress, chronically ill-appearing   Head:    Normocephalic, without obvious abnormality, atraumatic   Eyes:            Lids and lashes normal, conjunctivae and sclerae normal, no   icterus, no pallor, corneas clear, PERRLA   Ears:    Ears appear intact with no abnormalities noted   Throat:   No oral lesions, no thrush, oral mucosa moist   Neck:   No adenopathy, supple, trachea midline, no thyromegaly, no   carotid bruit, no JVD   Lungs:    Crackles left lower lobe    Heart:    Regular rhythm and normal rate, normal S1 and S2, no            murmur, no gallop, no rub, no click   Chest Wall:    No abnormalities observed   Abdomen:     Normal bowel sounds, no masses, no organomegaly, soft        non-tender, non-distended, no guarding, no rebound                tenderness   Extremities:   Moves all extremities well, no edema, no cyanosis, no             redness   Pulses:   Pulses palpable and equal bilaterally   Skin:   No bleeding,  bruising or rash   Lymph nodes:   No palpable adenopathy   Neurologic:   Cranial nerves 2 - 12 grossly intact, sensation intact, DTR       present and equal bilaterally       Results Review:     Imaging Results (Last 24 Hours)     Procedure Component Value Units Date/Time    CT Angiogram Chest Pulmonary Embolism [[id number]] Collected: 04/08/23 1705     Updated: 04/08/23 1716    Narrative:      CT ANGIOGRAM CHEST PULMONARY EMBOLISM    Date of Exam: 4/8/2023 4:12 PM EDT    Indication: Pulmonary embolism (PE) suspected, positive D-dimer.    Comparison: 11/19/2022    Technique: Axial CT images were obtained of the chest before and after the uneventful intravenous administration of iodinated contrast utilizing pulmonary embolism protocol.  Sagittal and coronal reconstructions were performed.  Automated exposure   control and iterative reconstruction methods were used.        FINDINGS:    [ ]    Pulmonary Arteries: Pulmonary arteries are adequately opacified for evaluation. No evidence of intraluminal filling defect to suggest pulmonary embolism. Main pulmonary artery is normal in caliber.    Mediastinum: Heart size appears stable. There is coronary artery calcification.    Obstructive changes are noted of the aorta and origin of great vessels. Mild ectasia of the ascending aorta again noted. No suspicious hilar or mediastinal adenopathy is noted. The esophagus tapers normally    Lungs/pleura: Dilation is limited by motion. Central airways appear grossly patent. There are some mild peribronchial wall thickening noted dependently. Emphysematous changes noted within the upper lung zone predominance.    Patchy groundglass and bandlike opacity on the right is favored represent atelectasis. Minimal dependent opacities at the bases noted bilaterally. There is apical scarring bilaterally.    Upper Abdomen: Limited images of the upper abdomen are unremarkable.    Soft tissues/Bones: No acute bone or soft tissue abnormality.       Impression:        1. No evidence of pulmonary emboli  2. Mild peribronchial wall thickening suggesting bronchitis  3. Minimal patchy groundglass opacity favored represent atelectasis  4. Stable cardiomegaly  5. Emphysema            Recommend evaluation of patient history and risk factors to see if patient qualifies for low dose lung cancer screening based upon evidence of emphysema.    Electronically Signed: Geovanny Wyatt    4/8/2023 5:14 PM EDT    Workstation ID: [id number]    XR Chest 1 View [[id number]] Collected: 04/08/23 1554     Updated: 04/08/23 1558    Narrative:      XR CHEST 1 VW    Date of Exam: 4/8/2023 3:32 PM EDT    Indication: CHF/COPD Protocol     Comparison: Chest radiograph dated 11/19/2022    Findings:  There is cardiomegaly. There is aortic arch atherosclerotic calcification. Pulmonary vascularity appears normal without evidence of pulmonary edema pattern. There is peripheral bandlike scarring or atelectasis within the left midlung. There is no   significant pleural effusion. There is no evidence of pneumothorax. There is right apical pleural parenchymal scarring. There are degenerative changes of the thoracic spine.      Impression:      Impression:  1. Stable cardiomegaly with linear bandlike atelectasis or scarring within the left midlung.  2. Stable right apical pleural parenchymal scarring.    Electronically Signed: Kalin Yusuf    4/8/2023 3:56 PM EDT    Workstation ID: [id number]           Lab Results (last 24 hours)     Procedure Component Value Units Date/Time    Respiratory Panel PCR w/COVID-19(SARS-CoV-2) MAXIMINO/GREG/ISIAH/PAD/COR/MAD/BIBIANA In-House, NP Swab in UTM/VTM, 3-4 HR TAT - Swab, Nasopharynx [[id number]]  (Normal) Collected: 04/08/23 2037    Specimen: Swab from Nasopharynx Updated: 04/08/23 2215     ADENOVIRUS, PCR Not Detected     Coronavirus 229E Not Detected     Coronavirus HKU1 Not Detected     Coronavirus NL63 Not Detected     Coronavirus OC43 Not Detected     COVID19 Not  Detected     Human Metapneumovirus Not Detected     Human Rhinovirus/Enterovirus Not Detected     Influenza A PCR Not Detected     Influenza B PCR Not Detected     Parainfluenza Virus 1 Not Detected     Parainfluenza Virus 2 Not Detected     Parainfluenza Virus 3 Not Detected     Parainfluenza Virus 4 Not Detected     RSV, PCR Not Detected     Bordetella pertussis pcr Not Detected     Bordetella parapertussis PCR Not Detected     Chlamydophila pneumoniae PCR Not Detected     Mycoplasma pneumo by PCR Not Detected    Narrative:      In the setting of a positive respiratory panel with a viral infection PLUS a negative procalcitonin without other underlying concern for bacterial infection, consider observing off antibiotics or discontinuation of antibiotics and continue supportive care. If the respiratory panel is positive for atypical bacterial infection (Bordetella pertussis, Chlamydophila pneumoniae, or Mycoplasma pneumoniae), consider antibiotic de-escalation to target atypical bacterial infection.    Urinalysis, Microscopic Only - Urine, Clean Catch [[id number]]  (Abnormal) Collected: 04/08/23 2037    Specimen: Urine, Clean Catch Updated: 04/08/23 2157     RBC, UA 13-20 /HPF      WBC, UA 6-12 /HPF      Bacteria, UA None Seen /HPF      Squamous Epithelial Cells, UA 3-6 /HPF      Hyaline Casts, UA None Seen /LPF      Methodology Manual Light Microscopy    Urine Culture - Urine, Urine, Clean Catch [[id number]] Collected: 04/08/23 2037    Specimen: Urine, Clean Catch Updated: 04/08/23 2156    Urinalysis With Culture If Indicated - Urine, Clean Catch [[id number]]  (Abnormal) Collected: 04/08/23 2037    Specimen: Urine, Clean Catch Updated: 04/08/23 2142     Color, UA Yellow     Appearance, UA Clear     pH, UA 5.5     Specific Gravity, UA 1.076     Glucose, UA Negative     Ketones, UA 40 mg/dL (2+)     Bilirubin, UA Negative     Blood, UA Large (3+)     Protein, UA 30 mg/dL (1+)     Leuk Esterase, UA Trace     Nitrite, UA  Negative     Urobilinogen, UA 1.0 E.U./dL    Narrative:      In absence of clinical symptoms, the presence of pyuria, bacteria, and/or nitrites on the urinalysis result does not correlate with infection.    Blood Culture - Blood, Arm, Left [[id number]] Collected: 04/08/23 2041    Specimen: Blood from Arm, Left Updated: 04/08/23 2127    Blood Culture - Blood, Arm, Right [[id number]] Collected: 04/08/23 2038    Specimen: Blood from Arm, Right Updated: 04/08/23 2126    [place] Draw [[id number]] Collected: 04/08/23 1518    Specimen: Blood Updated: 04/08/23 1631    Narrative:      The following orders were created for panel order [place] Draw.  Procedure                               Abnormality         Status                     ---------                               -----------         ------                     Green Top (Gel)[[id number]]                                  Final result               Lavender Top[[id number]]                                     Final result               Gold Top - SST[[id number]]                                   Final result               Light Blue Top[[id number]]                                   Final result                 Please view results for these tests on the individual orders.    Lavender Top [[id number]] Collected: 04/08/23 1518    Specimen: Blood Updated: 04/08/23 1631     Extra Tube hold for add-on     Comment: Auto resulted       Gold Top - SST [[id number]] Collected: 04/08/23 1518    Specimen: Blood Updated: 04/08/23 1631     Extra Tube Hold for add-ons.     Comment: Auto resulted.       Light Blue Top [[id number]] Collected: 04/08/23 1518    Specimen: Blood Updated: 04/08/23 1631     Extra Tube Hold for add-ons.     Comment: Auto resulted       Green Top (Gel) [[id number]] Collected: 04/08/23 1518    Specimen: Blood Updated: 04/08/23 1616    COVID-19 and FLU A/B PCR - Swab, Nasopharynx [[id number]]  (Normal) Collected: 04/08/23 1520    Specimen: Swab from Nasopharynx Updated:  04/08/23 1551     COVID19 Not Detected     Influenza A PCR Not Detected     Influenza B PCR Not Detected    Narrative:      Fact sheet for providers: https://www.fda.gov/media/146433/download    Fact sheet for patients: https://www.fda.gov/media/547430/download    Test performed by PCR.    Comprehensive Metabolic Panel [[id number]]  (Abnormal) Collected: 04/08/23 1518    Specimen: Blood Updated: 04/08/23 1550     Glucose 107 mg/dL      BUN 15 mg/dL      Creatinine 0.48 mg/dL      Sodium 131 mmol/L      Potassium 3.8 mmol/L      Chloride 93 mmol/L      CO2 25.0 mmol/L      Calcium 8.7 mg/dL      Total Protein 6.4 g/dL      Albumin 3.6 g/dL      ALT (SGPT) 14 U/L      AST (SGOT) 22 U/L      Alkaline Phosphatase 72 U/L      Total Bilirubin 0.6 mg/dL      Globulin 2.8 gm/dL      A/G Ratio 1.3 g/dL      BUN/Creatinine Ratio 31.3     Anion Gap 13.0 mmol/L      eGFR 93.5 mL/min/1.73     Narrative:      GFR Normal >60  Chronic Kidney Disease <60  Kidney Failure <15    The GFR formula is only valid for adults with stable renal function between ages 18 and 70.    Single High Sensitivity Troponin T [[id number]]  (Abnormal) Collected: 04/08/23 1518    Specimen: Blood Updated: 04/08/23 1550     HS Troponin T 16 ng/L     Narrative:      High Sensitive Troponin T Reference Range:  <10.0 ng/L- Negative Female for AMI  <15.0 ng/L- Negative Male for AMI  >=10 - Abnormal Female indicating possible myocardial injury.  >=15 - Abnormal Male indicating possible myocardial injury.   Clinicians would have to utilize clinical acumen, EKG, Troponin, and serial changes to determine if it is an Acute Myocardial Infarction or myocardial injury due to an underlying chronic condition.         BNP [[id number]]  (Normal) Collected: 04/08/23 1518    Specimen: Blood Updated: 04/08/23 1550     proBNP 1,681.0 pg/mL     Narrative:      Among patients with dyspnea, NT-proBNP is highly sensitive for the detection of acute congestive heart failure. In addition  "NT-proBNP of <300 pg/ml effectively rules out acute congestive heart failure with 99% negative predictive value.    Results may be falsely decreased if patient taking Biotin.      Protime-INR [[id number]]  (Abnormal) Collected: 04/08/23 1518    Specimen: Blood Updated: 04/08/23 1538     Protime 11.8 Seconds      INR 1.16    aPTT [[id number]]  (Normal) Collected: 04/08/23 1518    Specimen: Blood Updated: 04/08/23 1538     PTT 28.6 seconds     D-dimer, Quantitative [[id number]]  (Abnormal) Collected: 04/08/23 1518    Specimen: Blood Updated: 04/08/23 1538     D-Dimer, Quantitative 1.55 mg/L (FEU)     Narrative:      According to the assay 's published package insert, a normal (<0.50 mg/L (FEU)) D-dimer result in conjunction with a non-high clinical probability assessment, excludes deep vein thrombosis (DVT) and pulmonary embolism (PE) with high sensitivity.    D-dimer values increase with age and this can make VTE exclusion of an older population difficult. To address this, the [employer], based on best available evidence and recent guidelines, recommends that clinicians use age-adjusted D-dimer thresholds in patients greater than 50 years of age with: a) a low probability of PE who do not meet all Pulmonary Embolism Rule Out Criteria, or b) in those with intermediate probability of PE.   The formula for an age-adjusted D-dimer cut-off is \"age/100\".  For example, a 60 year old patient would have an age-adjusted cut-off of 0.60 mg/L (FEU) and an 80 year old 0.80 mg/L (FEU).    CBC & Differential [[id number]]  (Abnormal) Collected: 04/08/23 1518    Specimen: Blood Updated: 04/08/23 1526    Narrative:      The following orders were created for panel order CBC & Differential.  Procedure                               Abnormality         Status                     ---------                               -----------         ------                     CBC Auto Differential[[id number]]        " Abnormal            Final result                 Please view results for these tests on the individual orders.    CBC Auto Differential [[id number]]  (Abnormal) Collected: 04/08/23 1518    Specimen: Blood Updated: 04/08/23 1526     WBC 8.70 10*3/mm3      RBC 3.89 10*6/mm3      Hemoglobin 12.4 g/dL      Hematocrit 38.2 %      MCV 98.3 fL      MCH 32.0 pg      MCHC 32.5 g/dL      RDW 13.2 %      RDW-SD 45.1 fl      MPV 8.9 fL      Platelets 202 10*3/mm3      Neutrophil % 83.8 %      Lymphocyte % 5.8 %      Monocyte % 9.9 %      Eosinophil % 0.1 %      Basophil % 0.4 %      Neutrophils, Absolute 7.30 10*3/mm3      Lymphocytes, Absolute 0.50 10*3/mm3      Monocytes, Absolute 0.90 10*3/mm3      Eosinophils, Absolute 0.00 10*3/mm3      Basophils, Absolute 0.00 10*3/mm3      nRBC 0.0 /100 WBC            I reviewed the patient's new clinical results.    Assessment & Plan       COPD with acute exacerbation  -Symbicort, Mucinex, pulmonal oxygen, incentive spirometer  Left lower lobe pneumonia -Rocephin, Zithromax  UTI (?) -Appears to be contaminated sample;  Essential hypertension-amlodipine  Mixed hyperlipidemia-Crestor chronic   Peripheral arterial disease -Plavix    Famotidine for stress ulcer prophylaxis  Lovenox for DVT prophylaxis      I discussed the patient's findings and my recommendations with patient.     Dianna Cronin MD  04/09/23  11:48 EDT

## 2023-04-10 PROCEDURE — 94664 DEMO&/EVAL PT USE INHALER: CPT

## 2023-04-10 PROCEDURE — 94799 UNLISTED PULMONARY SVC/PX: CPT

## 2023-04-10 PROCEDURE — 25010000002 CEFTRIAXONE PER 250 MG: Performed by: INTERNAL MEDICINE

## 2023-04-10 PROCEDURE — 94761 N-INVAS EAR/PLS OXIMETRY MLT: CPT

## 2023-04-10 PROCEDURE — 25010000002 ENOXAPARIN PER 10 MG: Performed by: INTERNAL MEDICINE

## 2023-04-10 PROCEDURE — 63710000001 PREDNISONE PER 1 MG: Performed by: INTERNAL MEDICINE

## 2023-04-10 RX ORDER — PREDNISONE 20 MG/1
40 TABLET ORAL DAILY
Status: DISCONTINUED | OUTPATIENT
Start: 2023-04-11 | End: 2023-04-11 | Stop reason: HOSPADM

## 2023-04-10 RX ADMIN — CLOPIDOGREL BISULFATE 75 MG: 75 TABLET ORAL at 08:30

## 2023-04-10 RX ADMIN — POLYETHYLENE GLYCOL 3350 17 G: 17 POWDER, FOR SOLUTION ORAL at 08:30

## 2023-04-10 RX ADMIN — AMLODIPINE BESYLATE 10 MG: 5 TABLET ORAL at 08:30

## 2023-04-10 RX ADMIN — ASPIRIN 81 MG CHEWABLE TABLET 81 MG: 81 TABLET CHEWABLE at 08:30

## 2023-04-10 RX ADMIN — Medication 5 MG: at 21:11

## 2023-04-10 RX ADMIN — PREDNISONE 40 MG: 20 TABLET ORAL at 08:30

## 2023-04-10 RX ADMIN — BUDESONIDE AND FORMOTEROL FUMARATE DIHYDRATE 2 PUFF: 160; 4.5 AEROSOL RESPIRATORY (INHALATION) at 07:30

## 2023-04-10 RX ADMIN — CEFTRIAXONE 1 G: 1 INJECTION, POWDER, FOR SOLUTION INTRAMUSCULAR; INTRAVENOUS at 14:00

## 2023-04-10 RX ADMIN — AZITHROMYCIN MONOHYDRATE 250 MG: 250 TABLET ORAL at 08:30

## 2023-04-10 RX ADMIN — FAMOTIDINE 40 MG: 20 TABLET, FILM COATED ORAL at 21:07

## 2023-04-10 RX ADMIN — ENOXAPARIN SODIUM 40 MG: 100 INJECTION SUBCUTANEOUS at 15:12

## 2023-04-10 RX ADMIN — Medication 3 ML: at 08:30

## 2023-04-10 RX ADMIN — Medication 3 ML: at 21:07

## 2023-04-10 RX ADMIN — ROSUVASTATIN 10 MG: 10 TABLET, FILM COATED ORAL at 08:30

## 2023-04-10 NOTE — PROGRESS NOTES
LOS: 0 days   Patient Care Team:  Dianna Cronin MD as PCP - General (Internal Medicine)  Kyrie Campoverde MD as Consulting Physician (Cardiology)  Jasper Mcneill MD as Consulting Physician (General Surgery)  Sung Tamayo MD (Vascular Surgery)    Subjective     Patient still feels tired with cough    Review of Systems   Constitutional: Positive for activity change, appetite change and fatigue.   HENT: Positive for congestion.    Respiratory: Positive for cough and shortness of breath.    Cardiovascular: Negative.    Gastrointestinal: Negative.    Genitourinary: Negative.    Musculoskeletal: Negative.    Neurological: Positive for weakness.   Psychiatric/Behavioral: Negative.            Objective     Vital Signs  Temp:  [97.5 °F (36.4 °C)-98.4 °F (36.9 °C)] 97.5 °F (36.4 °C)  Heart Rate:  [70-97] 80  Resp:  [15-24] 15  BP: (105-146)/(53-89) 146/78      Physical Exam  Vitals reviewed.   Constitutional:       Appearance: She is ill-appearing.   HENT:      Head: Normocephalic and atraumatic.      Right Ear: External ear normal.      Nose: Nose normal.      Mouth/Throat:      Mouth: Mucous membranes are moist.   Eyes:      General:         Right eye: No discharge.         Left eye: No discharge.   Cardiovascular:      Rate and Rhythm: Normal rate and regular rhythm.      Pulses: Normal pulses.      Heart sounds: Normal heart sounds.   Pulmonary:      Effort: Pulmonary effort is normal.      Breath sounds: Wheezing present.   Abdominal:      General: Bowel sounds are normal.      Palpations: Abdomen is soft.   Musculoskeletal:         General: No tenderness. Normal range of motion.      Cervical back: Normal range of motion.      Left lower leg: No edema.   Skin:     General: Skin is warm and dry.   Neurological:      Mental Status: She is alert and oriented to person, place, and time.   Psychiatric:         Behavior: Behavior normal.              Results Review:    Lab Results (last 24 hours)      Procedure Component Value Units Date/Time    Urine Culture - Urine, Urine, Clean Catch [[id number]]  (Normal) Collected: 04/08/23 2037    Specimen: Urine, Clean Catch Updated: 04/09/23 2152     Urine Culture No growth    Blood Culture - Blood, Arm, Left [[id number]]  (Normal) Collected: 04/08/23 2041    Specimen: Blood from Arm, Left Updated: 04/09/23 2131     Blood Culture No growth at 24 hours    Blood Culture - Blood, Arm, Right [[id number]]  (Normal) Collected: 04/08/23 2038    Specimen: Blood from Arm, Right Updated: 04/09/23 2131     Blood Culture No growth at 24 hours           Imaging Results (Last 24 Hours)     ** No results found for the last 24 hours. **               I reviewed the patient's new clinical results.    Medication Review:   Scheduled Meds:amLODIPine, 10 mg, Oral, Daily  aspirin, 81 mg, Oral, Daily  azithromycin, 250 mg, Oral, Daily  budesonide-formoterol, 2 puff, Inhalation, BID - RT  cefTRIAXone, 1 g, Intravenous, Q24H  clopidogrel, 75 mg, Oral, Daily  enoxaparin, 40 mg, Subcutaneous, Daily  famotidine, 40 mg, Oral, Nightly  polyethylene glycol, 17 g, Oral, Daily  predniSONE, 40 mg, Oral, Daily  rosuvastatin, 10 mg, Oral, Daily  sodium chloride, 3 mL, Intravenous, Q12H      Continuous Infusions:   PRN Meds:.•  acetaminophen  •  HYDROcodone-acetaminophen  •  ipratropium-albuterol  •  melatonin  •  nitroglycerin  •  ondansetron  •  [COMPLETED] Insert Peripheral IV **AND** sodium chloride  •  sodium chloride  •  sodium chloride  •  sodium chloride     Interval History:    Assessment & Plan     COPD with acute exacerbation  -Symbicort, Mucinex, pulmonary oxygen, incentive spirometer  Left lower lobe pneumonia -Rocephin, Zithromax  Essential hypertension-amlodipine  Mixed hyperlipidemia-Crestor chronic   Peripheral arterial disease -Plavix     Famotidine for stress ulcer prophylaxis  Lovenox for DVT prophylaxis    Plan for disposition:Home tomorrow if improved    Adwoa Armstrong  RADHA  04/10/23  12:12 EDT

## 2023-04-10 NOTE — PLAN OF CARE
Goal Outcome Evaluation:   Patient has had no complaints of pain during shift. Patient resting in bed during shift. Patient requiring 2L of oxygen PRN.

## 2023-04-10 NOTE — THERAPY EVALUATION
Patient Name: Becka Oliva  : 1940    MRN: [id number]                              Today's Date: 2023       Admit Date: 2023    Visit Dx:     ICD-10-CM ICD-9-CM   1. COPD with acute exacerbation  J44.1 491.21   2. Acute respiratory failure with hypoxia  J96.01 518.81     Patient Active Problem List   Diagnosis   • Atherosclerotic peripheral vascular disease (HCC)   • Benign hypertensive heart disease   • Chronic kidney disease   • COPD (chronic obstructive pulmonary disease) (HCC)   • Dyspnea on exertion   • History of aortic aneurysm   • Hypertension   • Pericardial effusion   • Primary localized osteoarthritis   • Shortness of breath   • Acute UTI   • Pyelonephritis   • Generalized weakness   • PVD (peripheral vascular disease) with claudication   • Dyslipidemia   • Essential hypertension   • PAD (peripheral artery disease)   • Left inguinal hernia   • Dyspnea, unspecified type   • COPD with acute exacerbation     Past Medical History:   Diagnosis Date   • Aortic aneurysm    • COPD (chronic obstructive pulmonary disease)    • Hernia, inguinal, left    • Hyperlipidemia    • Hypertension    • Macular degeneration    • Peripheral edema    • PVD (peripheral vascular disease)      Past Surgical History:   Procedure Laterality Date   • CARPAL TUNNEL RELEASE     • DESCENDING AORTIC ANEURYSM REPAIR W/ STENT     • INGUINAL HERNIA REPAIR Bilateral 2022    Procedure: INGUINAL HERNIA REPAIR LAPAROSCOPIC WITH [employer] ROBOT;  Surgeon: Jasper Mcneill MD;  Location: [facility] MAIN OR;  Service: Robotics - DaVinci;  Laterality: Bilateral;   • INTERVENTIONAL RADIOLOGY PROCEDURE N/A 2021    Procedure: Abdominal Aortagram with Runoff, possible PCI;  Surgeon: Kyrie Campoverde MD;  Location: [facility] CATH INVASIVE LOCATION;  Service: Cardiovascular;  Laterality: N/A;   • KNEE SURGERY     • OTHER SURGICAL HISTORY      STENT   • VASCULAR SURGERY      fem fem bypass      General Information     Row  Name 04/09/23 2200          Physical Therapy Time and Intention    Document Type evaluation  -PG     Mode of Treatment physical therapy  -PG     Row Name 04/09/23 2200          General Information    Patient Profile Reviewed yes  -PG     Prior Level of Function --  Ind in mobility using a cane.  -PG     Existing Precautions/Restrictions no known precautions/restrictions  -PG     Barriers to Rehab none identified  -PG     Row Name 04/09/23 2200          Living Environment    People in Home alone  -PG     Row Name 04/09/23 2200          Home Main Entrance    Number of Stairs, Main Entrance twelve  -PG     Stair Railings, Main Entrance railings safe and in good condition  -PG     Row Name 04/09/23 2200          Cognition    Orientation Status (Cognition) oriented x 4  -PG     Row Name 04/09/23 2200          Safety Issues, Functional Mobility    Safety Issues Affecting Function (Mobility) safety precaution awareness;impulsivity;awareness of need for assistance  -PG     Impairments Affecting Function (Mobility) balance;postural/trunk control;endurance/activity tolerance;strength  -PG           User Key  (r) = Recorded By, (t) = Taken By, (c) = Cosigned By    Initials Name Provider Type    PG Cammie Alonso PT Physical Therapist               Mobility     Row Name 04/09/23 2201          Bed Mobility    Bed Mobility bed mobility (all) activities  -PG     All Activities, [place] (Bed Mobility) contact guard  -PG     Assistive Device (Bed Mobility) head of bed elevated  -PG     Row Name 04/09/23 2201          Sit-Stand Transfer    Sit-Stand [place] (Transfers) contact guard  -PG     Row Name 04/09/23 2201          Gait/Stairs (Locomotion)    [place] Level (Gait) contact guard  -PG     Distance in Feet (Gait) 30ft  -PG           User Key  (r) = Recorded By, (t) = Taken By, (c) = Cosigned By    Initials Name Provider Type    Cammie Trejo PT Physical Therapist               Obj/Interventions     Row Name  04/09/23 2202          Range of Motion Comprehensive    General Range of Motion bilateral upper extremity ROM WFL  -PG     Row Name 04/09/23 2202          Strength Comprehensive (MMT)    General Manual Muscle Testing (MMT) Assessment upper extremity strength deficits identified;lower extremity strength deficits identified  -PG     Comment, General Manual Muscle Testing (MMT) Assessment BLE grossly 3+/5  -PG     Row Name 04/09/23 2202          Balance    Balance Assessment sitting static balance;sitting dynamic balance;sit to stand dynamic balance;standing dynamic balance  -PG     Static Sitting Balance standby assist  -PG     Dynamic Sitting Balance contact guard  -PG     Sit to Stand Dynamic Balance contact guard  -PG     Dynamic Standing Balance contact guard  -PG           User Key  (r) = Recorded By, (t) = Taken By, (c) = Cosigned By    Initials Name Provider Type    PG Cammie Alonso, PT Physical Therapist               Goals/Plan     Row Name 04/09/23 2210          Bed Mobility Goal 1 (PT)    Activity/Assistive Device (Bed Mobility Goal 1, PT) bed mobility activities, all  -PG     Hall Level/Cues Needed (Bed Mobility Goal 1, PT) modified independence  -PG     Time Frame (Bed Mobility Goal 1, PT) long term goal (LTG);2 weeks  -PG     Row Name 04/09/23 2210          Transfer Goal 1 (PT)    Activity/Assistive Device (Transfer Goal 1, PT) transfers, all  -PG     Hall Level/Cues Needed (Transfer Goal 1, PT) modified independence  -PG     Time Frame (Transfer Goal 1, PT) long term goal (LTG);2 weeks  -PG     Row Name 04/09/23 2210          Gait Training Goal 1 (PT)    Activity/Assistive Device (Gait Training Goal 1, PT) gait (walking locomotion)  -PG     Hall Level (Gait Training Goal 1, PT) modified independence  -PG     Distance (Gait Training Goal 1, PT) 200ft  -PG     Time Frame (Gait Training Goal 1, PT) long term goal (LTG);2 weeks  -PG     Row Name 04/09/23 2210          Stairs Goal 1  (PT)    Activity/Assistive Device (Stairs Goal 1, PT) stairs, all skills  -PG     [place] Level/Cues Needed (Stairs Goal 1, PT) modified independence  -PG     Number of Stairs (Stairs Goal 1, PT) 12  -PG     Time Frame (Stairs Goal 1, PT) long term goal (LTG);2 weeks  -PG     Row Name 04/09/23 2210          Therapy Assessment/Plan (PT)    Planned Therapy Interventions (PT) balance training;bed mobility training;gait training;home exercise program;neuromuscular re-education;patient/family education;postural re-education;transfer training;stretching;strengthening;stair training;ROM (range of motion)  -PG           User Key  (r) = Recorded By, (t) = Taken By, (c) = Cosigned By    Initials Name Provider Type    PG Cammie Alonso, PT Physical Therapist               Clinical Impression     Row Name 04/09/23 2206          Pain    Pretreatment Pain Rating 0/10 - no pain  -PG     Posttreatment Pain Rating 0/10 - no pain  -PG     Row Name 04/09/23 2206          Plan of Care Review    Plan of Care Reviewed With patient  -PG     Progress improving  -PG     Outcome Evaluation Patient 82 yo female admitted to the hospital with the complaint of Shortness of breath. Recent Dx includes COPD with acute exacerbation, Left lower lobe pneumonia, Essential hypertension, PAD. At baseline, patient resides alone in a house and is independent in mobility and ADLs using a cane. Her children provides transportation when needed. As per today's evaluation, patient required CGA for Bed Mobilituy, transfers and ambulation of around 30ft. PT recommend HHPT at d/c.  -PG     Row Name 04/09/23 2206          Therapy Assessment/Plan (PT)    Patient/Family Therapy Goals Statement (PT) to improve mobility  -PG     Rehab Potential (PT) good, to achieve stated therapy goals  -PG     Criteria for Skilled Interventions Met (PT) yes;skilled treatment is necessary  -PG     Therapy Frequency (PT) 3 times/wk  -PG     Predicted Duration of Therapy  Intervention (PT) until d/c  -PG     Row Name 04/09/23 2206          Vital Signs    Pretreatment Heart Rate (beats/min) 82  -PG     Posttreatment Heart Rate (beats/min) 88  -PG     Pretreatment Resp Rate (breaths/min) 14  -PG     Posttreatment Resp Rate (breaths/min) 16  -PG     Pre SpO2 (%) 92  -PG     O2 Delivery Pre Treatment room air  -PG     Post SpO2 (%) 92  -PG     O2 Delivery Post Treatment room air  -PG     Row Name 04/09/23 2206          Positioning and Restraints    Pre-Treatment Position in bed  -PG     Post Treatment Position bed  -PG     In Bed notified nsg;call light within reach;encouraged to call for assist  -PG           User Key  (r) = Recorded By, (t) = Taken By, (c) = Cosigned By    Initials Name Provider Type    Cammie Trejo PT Physical Therapist               Outcome Measures     Row Name 04/09/23 2211          How much help from another person do you currently need...    Turning from your back to your side while in flat bed without using bedrails? 4  -PG     Moving from lying on back to sitting on the side of a flat bed without bedrails? 3  -PG     Moving to and from a bed to a chair (including a wheelchair)? 3  -PG     Standing up from a chair using your arms (e.g., wheelchair, bedside chair)? 4  -PG     Climbing 3-5 steps with a railing? 3  -PG     To walk in hospital room? 4  -PG     AM-PAC 6 Clicks Score (PT) 21  -PG     Highest level of mobility 6 --> Walked 10 steps or more  -PG     Row Name 04/09/23 2211          Functional Assessment    Outcome Measure Options AM-PAC 6 Clicks Basic Mobility (PT)  -PG           User Key  (r) = Recorded By, (t) = Taken By, (c) = Cosigned By    Initials Name Provider Type    Cammie Trejo PT Physical Therapist                             Physical Therapy Education     Title: PT OT SLP Therapies (Done)     Topic: Physical Therapy (Done)     Point: Mobility training (Done)     Learning Progress Summary           Patient Acceptance, E, VU by PG  at 4/9/2023 2211                   Point: Precautions (Done)     Learning Progress Summary           Patient Acceptance, E, VU by PG at 4/9/2023 2211                               User Key     Initials Effective Dates Name Provider Type Discipline    PG 09/01/22 -  Cammie Alonso PT Physical Therapist PT              PT Recommendation and Plan  Planned Therapy Interventions (PT): balance training, bed mobility training, gait training, home exercise program, neuromuscular re-education, patient/family education, postural re-education, transfer training, stretching, strengthening, stair training, ROM (range of motion)  Plan of Care Reviewed With: patient  Progress: improving  Outcome Evaluation: Patient 84 yo female admitted to the hospital with the complaint of Shortness of breath. Recent Dx includes COPD with acute exacerbation, Left lower lobe pneumonia, Essential hypertension, PAD. At baseline, patient resides alone in a house and is independent in mobility and ADLs using a cane. Her children provides transportation when needed. As per today's evaluation, patient required CGA for Bed Mobilituy, transfers and ambulation of around 30ft. PT recommend HHPT at d/c.     Time Calculation:    PT Charges     Row Name 04/09/23 2212             Time Calculation    Start Time 1652  -PG      Stop Time 1706  -PG      Time Calculation (min) 14 min  -PG      PT Received On 04/09/23  -PG      PT - Next Appointment 04/10/23  -PG      PT Goal Re-Cert Due Date 04/23/23  -PG         Time Calculation- PT    Total Timed Code Minutes- PT 0 minute(s)  -PG            User Key  (r) = Recorded By, (t) = Taken By, (c) = Cosigned By    Initials Name Provider Type    PG Cammie Alonso PT Physical Therapist              Therapy Charges for Today     Code Description Service Date Service Provider Modifiers Qty    [id number] HC PT EVAL MOD COMPLEXITY 3 4/9/2023 Cammie Alonso, PT GP 1          PT G-Codes  Outcome Measure Options: AM-PAC 6  Clicks Basic Mobility (PT)  AM-PAC 6 Clicks Score (PT): 21  PT Discharge Summary  Anticipated Discharge Disposition (PT): home with assist, home with home health    Cammie Alonso, PT  4/9/2023

## 2023-04-10 NOTE — PLAN OF CARE
Goal Outcome Evaluation: Progressing, improved    Patient A&Ox4, pleasantly confused at times. Able to make needs known. On room air at night. Required 2l o2 via NC at nighttime related to dropping occasionally into the high   80's. Patient reports she feels much better than upon admission  and believes her condition to be improving. Patient requires stand by assist related to somewhat unstable when walking. Patient utilizes cane when walking that she brought in from home. Patient left resting quietly in bed. Bed locked, low, clwr, fall precautions in place

## 2023-04-10 NOTE — CASE MANAGEMENT/SOCIAL WORK
Discharge Planning Assessment  [facility]     Patient Name: Becka Oliva  MRN: [id number]  Today's Date: 4/10/2023    Admit Date: 4/8/2023    Plan: Routine home. Home O2 PRN w/ [employer].   Discharge Needs Assessment     Row Name 04/10/23 1352       Living Environment    People in Home alone    Current Living Arrangements home    Potentially Unsafe Housing Conditions none    Primary Care Provided by self    Provides Primary Care For no one    Family Caregiver if Needed child(honorio), adult    Family Caregiver Names Daughter-Jaci    Quality of Family Relationships helpful;involved;supportive    Able to Return to Prior Arrangements yes       Resource/Environmental Concerns    Resource/Environmental Concerns none    Transportation Concerns none       Transition Planning    Patient/Family Anticipates Transition to home    Patient/Family Anticipated Services at Transition none    Transportation Anticipated family or friend will provide       Discharge Needs Assessment    Readmission Within the Last 30 Days no previous admission in last 30 days    Equipment Currently Used at Home cane, quad tip;oxygen;nebulizer    Concerns to be Addressed medication    Concerns Comments Trelegy inhaler cost    Anticipated Changes Related to Illness none    Equipment Needed After Discharge none    Provided Post Acute Provider List? N/A    Provided Post Acute Provider Quality & Resource List? N/A               Discharge Plan     Row Name 04/10/23 0297       Plan    Plan Routine home. Home O2 PRN w/ [employer].    Patient/Family in Agreement with Plan yes    Plan Comments CM met with patient and daughter Jaci at bedside. Pt lives at home alone and is independent with ADL's; does not drive. PCP and pharmacy confirmed- pt is agreeable to [facility] program. DME includes cane, nebulizer, and 2L O2 PRN through [employer]. Pt is not current with any HHC/PT services. Pt reports her Trelegy inhaler is expensive. CM looked into  Trelegy website- pt/daughter will need to complete online request and/or create account. Daughter to provide transportation at discharge. No other needs or services identified. Reviewed MOON letter/obs status, obtained signature, and copy provided.              Expected Discharge Date and Time     Expected Discharge Date Expected Discharge Time    Apr 11, 2023          Demographic Summary     Row Name 04/10/23 1352       General Information    Admission Type observation    Arrived From emergency department    Required Notices Provided Observation Status Notice    Referral Source admission list    Reason for Consult discharge planning;care coordination/care conference    Preferred Language English       Contact Information    Permission Granted to Share Info With                Functional Status     Row Name 04/10/23 1352       Functional Status    Usual Activity Tolerance moderate    Current Activity Tolerance moderate       Functional Status, IADL    Medications independent    Meal Preparation independent    Housekeeping independent    Laundry independent    Shopping independent               Patient Forms     Row Name 04/10/23 1408       Patient Forms    Patient Observation Letter Delivered  SPEAR 4/10 per CM    Delivered to Support person    Method of delivery In person              Met with patient in room wearing PPE: mask, face shield/goggles.      Maintained distance greater than six feet and spent less than 15 minutes in the room.      Megan Naegele, RN      Office Phone: [phone]  Office Cell: [phone]

## 2023-04-10 NOTE — PLAN OF CARE
Goal Outcome Evaluation:  Plan of Care Reviewed With: patient        Progress: improving  Outcome Evaluation: Patient 82 yo female admitted to the hospital with the complaint of Shortness of breath. Recent Dx includes COPD with acute exacerbation, Left lower lobe pneumonia, Essential hypertension, PAD. At baseline, patient resides alone in a house and is independent in mobility and ADLs using a cane. Her children provides transportation when needed. As per today's evaluation, patient required CGA for Bed Mobilituy, transfers and ambulation of around 30ft. PT recommend HHPT at d/c.

## 2023-04-11 ENCOUNTER — READMISSION MANAGEMENT (OUTPATIENT)
Dept: CALL CENTER | Facility: HOSPITAL | Age: 83
End: 2023-04-11
Payer: MEDICARE

## 2023-04-11 VITALS
OXYGEN SATURATION: 95 % | BODY MASS INDEX: 28.07 KG/M2 | WEIGHT: 152.56 LBS | DIASTOLIC BLOOD PRESSURE: 61 MMHG | TEMPERATURE: 97.9 F | SYSTOLIC BLOOD PRESSURE: 138 MMHG | RESPIRATION RATE: 16 BRPM | HEART RATE: 92 BPM | HEIGHT: 62 IN

## 2023-04-11 PROBLEM — R09.02 COPD WITH HYPOXIA: Status: ACTIVE | Noted: 2020-01-16

## 2023-04-11 LAB
ANION GAP SERPL CALCULATED.3IONS-SCNC: 11 MMOL/L (ref 5–15)
BASOPHILS # BLD AUTO: 0 10*3/MM3 (ref 0–0.2)
BASOPHILS NFR BLD AUTO: 0.3 % (ref 0–1.5)
BUN SERPL-MCNC: 15 MG/DL (ref 8–23)
BUN/CREAT SERPL: 34.1 (ref 7–25)
CALCIUM SPEC-SCNC: 8.6 MG/DL (ref 8.6–10.5)
CHLORIDE SERPL-SCNC: 102 MMOL/L (ref 98–107)
CO2 SERPL-SCNC: 26 MMOL/L (ref 22–29)
CREAT SERPL-MCNC: 0.44 MG/DL (ref 0.57–1)
DEPRECATED RDW RBC AUTO: 49 FL (ref 37–54)
EGFRCR SERPLBLD CKD-EPI 2021: 95.5 ML/MIN/1.73
EOSINOPHIL # BLD AUTO: 0 10*3/MM3 (ref 0–0.4)
EOSINOPHIL NFR BLD AUTO: 0.2 % (ref 0.3–6.2)
ERYTHROCYTE [DISTWIDTH] IN BLOOD BY AUTOMATED COUNT: 13.5 % (ref 12.3–15.4)
GLUCOSE SERPL-MCNC: 109 MG/DL (ref 65–99)
HCT VFR BLD AUTO: 36.7 % (ref 34–46.6)
HGB BLD-MCNC: 12.2 G/DL (ref 12–15.9)
LYMPHOCYTES # BLD AUTO: 1.4 10*3/MM3 (ref 0.7–3.1)
LYMPHOCYTES NFR BLD AUTO: 14.1 % (ref 19.6–45.3)
MCH RBC QN AUTO: 32.9 PG (ref 26.6–33)
MCHC RBC AUTO-ENTMCNC: 33.4 G/DL (ref 31.5–35.7)
MCV RBC AUTO: 98.5 FL (ref 79–97)
MONOCYTES # BLD AUTO: 1 10*3/MM3 (ref 0.1–0.9)
MONOCYTES NFR BLD AUTO: 10.2 % (ref 5–12)
NEUTROPHILS NFR BLD AUTO: 7.6 10*3/MM3 (ref 1.7–7)
NEUTROPHILS NFR BLD AUTO: 75.2 % (ref 42.7–76)
NRBC BLD AUTO-RTO: 0 /100 WBC (ref 0–0.2)
PLATELET # BLD AUTO: 249 10*3/MM3 (ref 140–450)
PMV BLD AUTO: 9.1 FL (ref 6–12)
POTASSIUM SERPL-SCNC: 3.9 MMOL/L (ref 3.5–5.2)
RBC # BLD AUTO: 3.72 10*6/MM3 (ref 3.77–5.28)
SODIUM SERPL-SCNC: 139 MMOL/L (ref 136–145)
WBC NRBC COR # BLD: 10.1 10*3/MM3 (ref 3.4–10.8)

## 2023-04-11 PROCEDURE — 63710000001 PREDNISONE PER 1 MG: Performed by: NURSE PRACTITIONER

## 2023-04-11 PROCEDURE — 94664 DEMO&/EVAL PT USE INHALER: CPT

## 2023-04-11 PROCEDURE — 94761 N-INVAS EAR/PLS OXIMETRY MLT: CPT

## 2023-04-11 PROCEDURE — 94799 UNLISTED PULMONARY SVC/PX: CPT

## 2023-04-11 PROCEDURE — 80048 BASIC METABOLIC PNL TOTAL CA: CPT | Performed by: INTERNAL MEDICINE

## 2023-04-11 PROCEDURE — 85025 COMPLETE CBC W/AUTO DIFF WBC: CPT | Performed by: INTERNAL MEDICINE

## 2023-04-11 RX ORDER — IPRATROPIUM BROMIDE AND ALBUTEROL SULFATE 2.5; .5 MG/3ML; MG/3ML
3 SOLUTION RESPIRATORY (INHALATION) EVERY 4 HOURS PRN
Qty: 360 ML | Refills: 0 | Status: SHIPPED | OUTPATIENT
Start: 2023-04-11

## 2023-04-11 RX ORDER — HYDROCODONE BITARTRATE AND ACETAMINOPHEN 5; 325 MG/1; MG/1
1 TABLET ORAL EVERY 8 HOURS PRN
COMMUNITY

## 2023-04-11 RX ORDER — PREDNISONE 10 MG/1
TABLET ORAL
Qty: 12 TABLET | Refills: 0 | Status: SHIPPED | OUTPATIENT
Start: 2023-04-11

## 2023-04-11 RX ORDER — CEFDINIR 300 MG/1
300 CAPSULE ORAL 2 TIMES DAILY
Qty: 6 CAPSULE | Refills: 0 | Status: SHIPPED | OUTPATIENT
Start: 2023-04-11 | End: 2023-04-14

## 2023-04-11 RX ADMIN — ROSUVASTATIN 10 MG: 10 TABLET, FILM COATED ORAL at 08:20

## 2023-04-11 RX ADMIN — POLYETHYLENE GLYCOL 3350 17 G: 17 POWDER, FOR SOLUTION ORAL at 08:20

## 2023-04-11 RX ADMIN — ASPIRIN 81 MG CHEWABLE TABLET 81 MG: 81 TABLET CHEWABLE at 08:21

## 2023-04-11 RX ADMIN — AMLODIPINE BESYLATE 10 MG: 5 TABLET ORAL at 08:20

## 2023-04-11 RX ADMIN — BUDESONIDE AND FORMOTEROL FUMARATE DIHYDRATE 2 PUFF: 160; 4.5 AEROSOL RESPIRATORY (INHALATION) at 07:45

## 2023-04-11 RX ADMIN — Medication 3 ML: at 08:21

## 2023-04-11 RX ADMIN — CLOPIDOGREL BISULFATE 75 MG: 75 TABLET ORAL at 08:21

## 2023-04-11 RX ADMIN — PREDNISONE 40 MG: 20 TABLET ORAL at 08:20

## 2023-04-11 NOTE — DISCHARGE SUMMARY
Date of Discharge:  4/11/2023    Discharge Diagnosis:   **COPD with acute exacerbation [J44.1]   COPD with hypoxia [J44.9, R09.02]   Atherosclerotic peripheral vascular disease (HCC) [I70.209]   Chronic kidney disease [N18.9]   Hypertension [I10]       Presenting Problem/History of Present Illness  Active Hospital Problems    Diagnosis  POA   • **COPD with acute exacerbation [J44.1]  Yes   • COPD with hypoxia [J44.9, R09.02]  Yes   • Atherosclerotic peripheral vascular disease (HCC) [I70.209]  Yes   • Chronic kidney disease [N18.9]  Yes   • Hypertension [I10]  Yes      Resolved Hospital Problems   No resolved problems to display.          Hospital Course  Patient is a 83 y.o. female with history of COPD and peripheral vascular disease who presented with cough, fever, shortness of breath.  She had an oxygen requirement and clinical evidence of left lower lobe pneumonia.  Respiratory panel and blood cultures were negative.  She was treated for community-acquired pneumonia with Rocephin and Zithromax.  She improved significantly during her stay and was able to wean off of oxygen.  She will complete a course of oral antibiotics, prednisone taper and use DuoNebs as needed at home.  She has home oxygen that she wears at night and during the day as needed.  She has previously arranged follow-up in our office later this month.    Procedures Performed         Consults:   Consults     Date and Time Order Name Status Description    4/8/2023  5:58 PM Family Medicine Consult            Pertinent Test Results:    Lab Results (most recent)     Procedure Component Value Units Date/Time    Basic Metabolic Panel [[id number]]  (Abnormal) Collected: 04/11/23 0204    Specimen: Blood Updated: 04/11/23 0350     Glucose 109 mg/dL      BUN 15 mg/dL      Creatinine 0.44 mg/dL      Sodium 139 mmol/L      Potassium 3.9 mmol/L      Comment: Slight hemolysis detected by analyzer. Results may be affected.        Chloride 102 mmol/L      CO2 26.0  mmol/L      Calcium 8.6 mg/dL      BUN/Creatinine Ratio 34.1     Anion Gap 11.0 mmol/L      eGFR 95.5 mL/min/1.73     Narrative:      GFR Normal >60  Chronic Kidney Disease <60  Kidney Failure <15    The GFR formula is only valid for adults with stable renal function between ages 18 and 70.    CBC & Differential [[id number]]  (Abnormal) Collected: 04/11/23 0204    Specimen: Blood Updated: 04/11/23 0329    Narrative:      The following orders were created for panel order CBC & Differential.  Procedure                               Abnormality         Status                     ---------                               -----------         ------                     CBC Auto Differential[[id number]]        Abnormal            Final result                 Please view results for these tests on the individual orders.    CBC Auto Differential [[id number]]  (Abnormal) Collected: 04/11/23 0204    Specimen: Blood Updated: 04/11/23 0329     WBC 10.10 10*3/mm3      RBC 3.72 10*6/mm3      Hemoglobin 12.2 g/dL      Hematocrit 36.7 %      MCV 98.5 fL      MCH 32.9 pg      MCHC 33.4 g/dL      RDW 13.5 %      RDW-SD 49.0 fl      MPV 9.1 fL      Platelets 249 10*3/mm3      Neutrophil % 75.2 %      Lymphocyte % 14.1 %      Monocyte % 10.2 %      Eosinophil % 0.2 %      Basophil % 0.3 %      Neutrophils, Absolute 7.60 10*3/mm3      Lymphocytes, Absolute 1.40 10*3/mm3      Monocytes, Absolute 1.00 10*3/mm3      Eosinophils, Absolute 0.00 10*3/mm3      Basophils, Absolute 0.00 10*3/mm3      nRBC 0.0 /100 WBC     Blood Culture - Blood, Arm, Right [[id number]]  (Normal) Collected: 04/08/23 2038    Specimen: Blood from Arm, Right Updated: 04/10/23 2131     Blood Culture No growth at 2 days    Blood Culture - Blood, Arm, Left [[id number]]  (Normal) Collected: 04/08/23 2041    Specimen: Blood from Arm, Left Updated: 04/10/23 2131     Blood Culture No growth at 2 days    Urine Culture - Urine, Urine, Clean Catch [[id number]]  (Normal) Collected:  04/08/23 2037    Specimen: Urine, Clean Catch Updated: 04/09/23 2152     Urine Culture No growth    Respiratory Panel PCR w/COVID-19(SARS-CoV-2) [facility]/GREG/ISIAH/[facility] In-House, NP Swab in UTM/VTM, 3-4 HR TAT - Swab, Nasopharynx [[id number]]  (Normal) Collected: 04/08/23 2037    Specimen: Swab from Nasopharynx Updated: 04/08/23 2215     ADENOVIRUS, PCR Not Detected     Coronavirus 229E Not Detected     Coronavirus HKU1 Not Detected     Coronavirus NL63 Not Detected     Coronavirus OC43 Not Detected     COVID19 Not Detected     Human Metapneumovirus Not Detected     Human Rhinovirus/Enterovirus Not Detected     Influenza A PCR Not Detected     Influenza B PCR Not Detected     Parainfluenza Virus 1 Not Detected     Parainfluenza Virus 2 Not Detected     Parainfluenza Virus 3 Not Detected     Parainfluenza Virus 4 Not Detected     RSV, PCR Not Detected     Bordetella pertussis pcr Not Detected     Bordetella parapertussis PCR Not Detected     Chlamydophila pneumoniae PCR Not Detected     Mycoplasma pneumo by PCR Not Detected    Narrative:      In the setting of a positive respiratory panel with a viral infection PLUS a negative procalcitonin without other underlying concern for bacterial infection, consider observing off antibiotics or discontinuation of antibiotics and continue supportive care. If the respiratory panel is positive for atypical bacterial infection (Bordetella pertussis, Chlamydophila pneumoniae, or Mycoplasma pneumoniae), consider antibiotic de-escalation to target atypical bacterial infection.    Urinalysis, Microscopic Only - Urine, Clean Catch [[id number]]  (Abnormal) Collected: 04/08/23 2037    Specimen: Urine, Clean Catch Updated: 04/08/23 2157     RBC, UA 13-20 /HPF      WBC, UA 6-12 /HPF      Bacteria, UA None Seen /HPF      Squamous Epithelial Cells, UA 3-6 /HPF      Hyaline Casts, UA None Seen /LPF      Methodology Manual Light Microscopy    Urinalysis With Culture If Indicated - Urine,  Clean Catch [[id number]]  (Abnormal) Collected: 04/08/23 2037    Specimen: Urine, Clean Catch Updated: 04/08/23 2142     Color, UA Yellow     Appearance, UA Clear     pH, UA 5.5     Specific Gravity, UA 1.076     Glucose, UA Negative     Ketones, UA 40 mg/dL (2+)     Bilirubin, UA Negative     Blood, UA Large (3+)     Protein, UA 30 mg/dL (1+)     Leuk Esterase, UA Trace     Nitrite, UA Negative     Urobilinogen, UA 1.0 E.U./dL    Narrative:      In absence of clinical symptoms, the presence of pyuria, bacteria, and/or nitrites on the urinalysis result does not correlate with infection.    [facility] Draw [[id number]] Collected: 04/08/23 1518    Specimen: Blood Updated: 04/08/23 1631    Narrative:      The following orders were created for panel order [facility] Draw.  Procedure                               Abnormality         Status                     ---------                               -----------         ------                     Green Top (Gel)[[id number]]                                  Final result               Lavender Top[[id number]]                                     Final result               Gold Top - SST[[id number]]                                   Final result               Light Blue Top[[id number]]                                   Final result                 Please view results for these tests on the individual orders.    Lavender Top [[id number]] Collected: 04/08/23 1518    Specimen: Blood Updated: 04/08/23 1631     Extra Tube hold for add-on     Comment: Auto resulted       Gold Top - SST [[id number]] Collected: 04/08/23 1518    Specimen: Blood Updated: 04/08/23 1631     Extra Tube Hold for add-ons.     Comment: Auto resulted.       Light Blue Top [[id number]] Collected: 04/08/23 1518    Specimen: Blood Updated: 04/08/23 1631     Extra Tube Hold for add-ons.     Comment: Auto resulted       Green Top (Gel) [[id number]] Collected: 04/08/23 1518    Specimen: Blood Updated: 04/08/23 1616    COVID-19 and  FLU A/B PCR - Swab, Nasopharynx [[id number]]  (Normal) Collected: 04/08/23 1520    Specimen: Swab from Nasopharynx Updated: 04/08/23 1551     COVID19 Not Detected     Influenza A PCR Not Detected     Influenza B PCR Not Detected    Narrative:      Fact sheet for providers: https://www.fda.gov/media/695301/download    Fact sheet for patients: https://www.fda.gov/media/249787/download    Test performed by PCR.    Comprehensive Metabolic Panel [[id number]]  (Abnormal) Collected: 04/08/23 1518    Specimen: Blood Updated: 04/08/23 1550     Glucose 107 mg/dL      BUN 15 mg/dL      Creatinine 0.48 mg/dL      Sodium 131 mmol/L      Potassium 3.8 mmol/L      Chloride 93 mmol/L      CO2 25.0 mmol/L      Calcium 8.7 mg/dL      Total Protein 6.4 g/dL      Albumin 3.6 g/dL      ALT (SGPT) 14 U/L      AST (SGOT) 22 U/L      Alkaline Phosphatase 72 U/L      Total Bilirubin 0.6 mg/dL      Globulin 2.8 gm/dL      A/G Ratio 1.3 g/dL      BUN/Creatinine Ratio 31.3     Anion Gap 13.0 mmol/L      eGFR 93.5 mL/min/1.73     Narrative:      GFR Normal >60  Chronic Kidney Disease <60  Kidney Failure <15    The GFR formula is only valid for adults with stable renal function between ages 18 and 70.    Single High Sensitivity Troponin T [[id number]]  (Abnormal) Collected: 04/08/23 1518    Specimen: Blood Updated: 04/08/23 1550     HS Troponin T 16 ng/L     Narrative:      High Sensitive Troponin T Reference Range:  <10.0 ng/L- Negative Female for AMI  <15.0 ng/L- Negative Male for AMI  >=10 - Abnormal Female indicating possible myocardial injury.  >=15 - Abnormal Male indicating possible myocardial injury.   Clinicians would have to utilize clinical acumen, EKG, Troponin, and serial changes to determine if it is an Acute Myocardial Infarction or myocardial injury due to an underlying chronic condition.         BNP [[id number]]  (Normal) Collected: 04/08/23 1518    Specimen: Blood Updated: 04/08/23 1550     proBNP 1,681.0 pg/mL     Narrative:       "Among patients with dyspnea, NT-proBNP is highly sensitive for the detection of acute congestive heart failure. In addition NT-proBNP of <300 pg/ml effectively rules out acute congestive heart failure with 99% negative predictive value.    Results may be falsely decreased if patient taking Biotin.      Protime-INR [[id number]]  (Abnormal) Collected: 04/08/23 1518    Specimen: Blood Updated: 04/08/23 1538     Protime 11.8 Seconds      INR 1.16    aPTT [[id number]]  (Normal) Collected: 04/08/23 1518    Specimen: Blood Updated: 04/08/23 1538     PTT 28.6 seconds     D-dimer, Quantitative [[id number]]  (Abnormal) Collected: 04/08/23 1518    Specimen: Blood Updated: 04/08/23 1538     D-Dimer, Quantitative 1.55 mg/L (FEU)     Narrative:      According to the assay 's published package insert, a normal (<0.50 mg/L (FEU)) D-dimer result in conjunction with a non-high clinical probability assessment, excludes deep vein thrombosis (DVT) and pulmonary embolism (PE) with high sensitivity.    D-dimer values increase with age and this can make VTE exclusion of an older population difficult. To address this, the [employer], based on best available evidence and recent guidelines, recommends that clinicians use age-adjusted D-dimer thresholds in patients greater than 50 years of age with: a) a low probability of PE who do not meet all Pulmonary Embolism Rule Out Criteria, or b) in those with intermediate probability of PE.   The formula for an age-adjusted D-dimer cut-off is \"age/100\".  For example, a 60 year old patient would have an age-adjusted cut-off of 0.60 mg/L (FEU) and an 80 year old 0.80 mg/L (FEU).    CBC & Differential [[id number]]  (Abnormal) Collected: 04/08/23 1518    Specimen: Blood Updated: 04/08/23 1526    Narrative:      The following orders were created for panel order CBC & Differential.  Procedure                               Abnormality         Status                     ---------  "                              -----------         ------                     CBC Auto Differential[[id number]]        Abnormal            Final result                 Please view results for these tests on the individual orders.    CBC Auto Differential [[id number]]  (Abnormal) Collected: 04/08/23 1518    Specimen: Blood Updated: 04/08/23 1526     WBC 8.70 10*3/mm3      RBC 3.89 10*6/mm3      Hemoglobin 12.4 g/dL      Hematocrit 38.2 %      MCV 98.3 fL      MCH 32.0 pg      MCHC 32.5 g/dL      RDW 13.2 %      RDW-SD 45.1 fl      MPV 8.9 fL      Platelets 202 10*3/mm3      Neutrophil % 83.8 %      Lymphocyte % 5.8 %      Monocyte % 9.9 %      Eosinophil % 0.1 %      Basophil % 0.4 %      Neutrophils, Absolute 7.30 10*3/mm3      Lymphocytes, Absolute 0.50 10*3/mm3      Monocytes, Absolute 0.90 10*3/mm3      Eosinophils, Absolute 0.00 10*3/mm3      Basophils, Absolute 0.00 10*3/mm3      nRBC 0.0 /100 WBC            Results for orders placed during the hospital encounter of 06/25/21    Adult Transthoracic Echo Complete W/ Cont if Necessary Per Protocol    Interpretation Summary  Normal LV size and contractility EF of 70%  Normal RV size  Normal atrial size  Aortic valve, mitral valve, tricuspid valve appears structurally normal, mild aortic regurgitation seen.  Small pericardial effusion seen which appears organized and fibrinous., no signs of increased intrapericardial pressures.  Proximal aorta appears normal in size.              Condition on Discharge: Improved, stable    Vital Signs  Temp:  [96.8 °F (36 °C)-98.1 °F (36.7 °C)] 97.9 °F (36.6 °C)  Heart Rate:  [73-92] 92  Resp:  [14-22] 16  BP: (119-150)/(54-74) 138/61    Physical Exam:     General Appearance:    Alert, cooperative, in no acute distress   Head:    Normocephalic, without obvious abnormality, atraumatic   Eyes:            Lids and lashes normal, conjunctivae and sclerae normal, no   icterus, no pallor, corneas clear, PERRLA   Ears:    Ears appear intact  with no abnormalities noted   Throat:   No oral lesions, no thrush, oral mucosa moist   Neck:   No adenopathy, supple, trachea midline, no thyromegaly, no   carotid bruit, no JVD   Lungs:     Clear to auscultation,respirations regular, even and                  unlabored    Heart:    Regular rhythm and normal rate, normal S1 and S2, no            murmur, no gallop, no rub, no click   Chest Wall:    No abnormalities observed   Abdomen:     Normal bowel sounds, no masses, no organomegaly, soft        non-tender, non-distended, no guarding, no rebound                tenderness   Extremities:   Moves all extremities well, no edema, no cyanosis, no             redness   Pulses:   Pulses palpable and equal bilaterally   Skin:   No bleeding, bruising or rash   Lymph nodes:   No palpable adenopathy   Neurologic:   Cranial nerves 2 - 12 grossly intact, sensation intact, DTR       present and equal bilaterally       Discharge Disposition  Home or Self Care    Discharge Medications     Discharge Medications      New Medications      Instructions Start Date   cefdinir 300 MG capsule  Commonly known as: OMNICEF   300 mg, Oral, 2 Times Daily      ipratropium-albuterol 0.5-2.5 mg/3 ml nebulizer  Commonly known as: DUO-NEB   3 mL, Nebulization, Every 4 Hours PRN      predniSONE 10 MG tablet  Commonly known as: DELTASONE   3 po q day x 2 days, 2 po q day x 2 days, 1 po q day x 2 dayx         Continue These Medications      Instructions Start Date   albuterol sulfate  (90 Base) MCG/ACT inhaler  Commonly known as: PROVENTIL HFA;VENTOLIN HFA;PROAIR HFA   2 puffs, Inhalation, Every 4 Hours PRN      amLODIPine 10 MG tablet  Commonly known as: NORVASC   10 mg, Oral, Daily      aspirin 81 MG chewable tablet   81 mg, Oral, Daily      bisoprolol-hydrochlorothiazide 5-6.25 MG per tablet  Commonly known as: ZIAC   1 tablet, Oral, Daily      cholecalciferol 25 MCG (1000 UT) tablet  Commonly known as: VITAMIN D3   2,000 Units, Oral,  Daily, LAST 11/9      clopidogrel 75 MG tablet  Commonly known as: PLAVIX   75 mg, Oral, Daily      HYDROcodone-acetaminophen 5-325 MG per tablet  Commonly known as: NORCO   1 tablet, Oral, Every 8 Hours PRN      PRESERVISION AREDS 2+MULTI VIT PO   1 tablet, Oral, 2 Times Daily, LD 11/9      pyridoxine 100 MG tablet tablet  Commonly known as: VITAMIN B-6   100 mg, Oral, Daily, LD 11/9      rosuvastatin 10 MG tablet  Commonly known as: CRESTOR   TAKE 1 TABLET BY MOUTH EVERY DAY      vitamin B-12 1000 MCG tablet  Commonly known as: CYANOCOBALAMIN   2,500 mcg, Oral, Daily, LAST DOSE 11/9         Stop These Medications    nitrofurantoin (macrocrystal-monohydrate) 100 MG capsule  Commonly known as: MACROBID            Discharge Diet:     Activity at Discharge:     Follow-up Appointments  Future Appointments   Date Time Provider Department Center   9/13/2023  2:00 PM Shikha Dunn APRN [facility]   3/18/2024  1:00 PM Kyrie Campoverde MD [facility]     Additional Instructions for the Follow-ups that You Need to Schedule     Discharge Follow-up with PCP   As directed       Currently Documented PCP:    Dianna Cronin MD    PCP Phone Number:    [phone]     Follow Up Details: [facility] will contact you to schedule a follow up appointment.               Test Results Pending at Discharge  Pending Labs     Order Current Status    Blood Culture - Blood, Arm, Left Preliminary result    Blood Culture - Blood, Arm, Right Preliminary result           Dianna Cronin MD  04/11/23  15:35 EDT    Time: Discharge  25 min

## 2023-04-11 NOTE — PLAN OF CARE
Goal Outcome Evaluation:   Patient admitted alert 2-3 with confusion. Patient repeated same questions several times, redirection and guidance provided. Discharge is planned for today. Family was at bedside for some of shift.

## 2023-04-12 ENCOUNTER — READMISSION MANAGEMENT (OUTPATIENT)
Dept: CALL CENTER | Facility: HOSPITAL | Age: 83
End: 2023-04-12
Payer: MEDICARE

## 2023-04-12 RX ORDER — AMLODIPINE BESYLATE 10 MG/1
10 TABLET ORAL DAILY
Qty: 90 TABLET | Refills: 3 | Status: SHIPPED | OUTPATIENT
Start: 2023-04-12

## 2023-04-12 NOTE — TELEPHONE ENCOUNTER
Rx Refill Note  Requested Prescriptions     Pending Prescriptions Disp Refills   • amLODIPine (NORVASC) 10 MG tablet [Pharmacy Med Name: AMLODIPINE BESYLATE 10MG TABLETS] 90 tablet 3     Sig: TAKE 1 TABLET BY MOUTH DAILY      Last office visit with prescribing clinician: 3/13/2023   Last telemedicine visit with prescribing clinician: 9/13/2023   Next office visit with prescribing clinician: 3/18/2024                         Would you like a call back once the refill request has been completed: [] Yes [] No    If the office needs to give you a call back, can they leave a voicemail: [] Yes [] No    Delma Calvillo MA  04/12/23, 08:47 EDT

## 2023-04-12 NOTE — PROGRESS NOTES
Enter Query Response Below      Query Response: Pt had bacterial pneumonia, organism unknown.             If applicable, please update the problem list.       Patient: Becka Oliva        : 1940  Account: [id number]           Admit Date:         How to Respond to this query:       a. Click New Note     b. Answer query within the yellow box.                c. Update the Problem List, if applicable.      If you have any questions about this query contact me at: [email or web address]    ,       Patient with history of COPD admitted 2023 with COPD exacerbation and left lower lobe Pneumonia.   Treatment included:    IV Rocephin ,4/10,  IV Azithromycin , PO ,4/10  Discharged with Omnicef x 3 days    Can this further be clarified as:    -Gram negative pneumonia (excluding Haemophilus Influenzae)    -bacterial pneumonia    -other_____________________    -unable to determine    By submitting this query, we are merely seeking further clarification of documentation to accurately reflect all conditions that you are monitoring, evaluating, treating or that extend the hospitalization or utilize additional resources of care. Please utilize your independent clinical judgment when addressing the question(s) above.     This query and your response, once completed, will be entered into the legal medical record.    Sincerely,  Antonette Calle RN  Clinical Documentation Integrity Program

## 2023-04-12 NOTE — OUTREACH NOTE
COPD/PN Week 1 Survey    Flowsheet Row Responses   [facility] patient discharged from? [facility]   Does the patient have one of the following disease processes/diagnoses(primary or secondary)? COPD   Week 1 attempt successful? Yes   Call start time 1402   Call end time 1407   Discharge diagnosis COPD with acute exacerbation   Is patient permission given to speak with other caregiver? Yes   Person spoke with today (if not patient) and relationship Janis Dyer reviewed with patient/caregiver? Yes   Is the patient having any side effects they believe may be caused by any medication additions or changes? No   Does the patient have all medications ordered at discharge? Yes   Is the patient taking all medications as directed (includes completed medication regime)? Yes   Does the patient have a primary care provider?  Yes   Does the patient have an appointment with their PCP or specialist within 7 days of discharge? Yes   Has the patient kept scheduled appointments due by today? N/A   Has home health visited the patient within 72 hours of discharge? N/A   Pulse Ox monitoring None   Did the patient receive a copy of their discharge instructions? Yes   Nursing interventions Reviewed instructions with patient   What is the patient's perception of their health status since discharge? Improving   Nursing Interventions Nurse provided patient education   Are the patient's immunizations up to date?  Yes   Nursing interventions Educated on importance of maintaining up to date immunizations as advised by provider   If the patient is a current smoker, are they able to teach back resources for cessation? Not a smoker   Is the patient/caregiver able to teach back the hierarchy of who to call/visit for symptoms/problems? PCP, Specialist, Home health nurse, Urgent Care, ED, 911 Yes   Additional teach back comments Encouraged seeing pulmonologist and has a appt.   Is the patient able to teach back COPD zones? Yes   Nursing  interventions Education provided on various zones   Patient reports what zone on this call? Green Zone   Green Zone Reports doing well, Breathing without shortness of breath, Usual activity and exercise level, Usual amounts of cough and phlegm/mucous, Slept well last night, Appetite is good   Green Zone interventions: Take daily medications, Use oxygen as prescribed   Week 1 call completed? Yes   Is the patient interested in additional calls from an ambulatory ?  NOTE:  applies to high risk patients requiring additional follow-up. No   Wrap up additional comments Encouraged using medications as ordered during recovery.          Evelyn PINEDA - Registered Nurse

## 2023-04-12 NOTE — CASE MANAGEMENT/SOCIAL WORK
Case Management Discharge Note      Final Note: Routine home.    Selected Continued Care - Discharged on 4/11/2023 Admission date: 4/8/2023 - Discharge disposition: Home or Self Care     Transportation Services  Private: Car    Final Discharge Disposition Code: 01 - home or self-care

## 2023-04-12 NOTE — OUTREACH NOTE
Prep Survey    Flowsheet Row Responses   [facility] patient discharged from? [facility]   Is LACE score < 7 ? No   Eligibility Readm Mgmt   Discharge diagnosis COPD with acute exacerbation   Does the patient have one of the following disease processes/diagnoses(primary or secondary)? COPD   Does the patient have Home health ordered? No   Is there a DME ordered? No   Prep survey completed? Yes          Hamida PINEDA - Registered Nurse

## 2023-04-13 LAB
BACTERIA SPEC AEROBE CULT: NORMAL
BACTERIA SPEC AEROBE CULT: NORMAL

## 2023-05-04 ENCOUNTER — APPOINTMENT (OUTPATIENT)
Dept: GENERAL RADIOLOGY | Facility: HOSPITAL | Age: 83
DRG: 522 | End: 2023-05-04
Payer: MEDICARE

## 2023-05-04 ENCOUNTER — HOSPITAL ENCOUNTER (INPATIENT)
Facility: HOSPITAL | Age: 83
LOS: 3 days | Discharge: NURSING, REHAB OR LONG-TERM CARE | DRG: 522 | End: 2023-05-07
Attending: EMERGENCY MEDICINE | Admitting: INTERNAL MEDICINE
Payer: MEDICARE

## 2023-05-04 DIAGNOSIS — S72.002A CLOSED FRACTURE OF LEFT HIP, INITIAL ENCOUNTER: Primary | ICD-10-CM

## 2023-05-04 LAB
ANION GAP SERPL CALCULATED.3IONS-SCNC: 14 MMOL/L (ref 5–15)
APTT PPP: 24.2 SECONDS (ref 61–76.5)
BASOPHILS # BLD AUTO: 0 10*3/MM3 (ref 0–0.2)
BASOPHILS NFR BLD AUTO: 0.5 % (ref 0–1.5)
BUN SERPL-MCNC: 11 MG/DL (ref 8–23)
BUN/CREAT SERPL: 17.5 (ref 7–25)
CALCIUM SPEC-SCNC: 9.7 MG/DL (ref 8.6–10.5)
CHLORIDE SERPL-SCNC: 101 MMOL/L (ref 98–107)
CO2 SERPL-SCNC: 23 MMOL/L (ref 22–29)
CREAT SERPL-MCNC: 0.63 MG/DL (ref 0.57–1)
DEPRECATED RDW RBC AUTO: 52.5 FL (ref 37–54)
EGFRCR SERPLBLD CKD-EPI 2021: 87.6 ML/MIN/1.73
EOSINOPHIL # BLD AUTO: 0.1 10*3/MM3 (ref 0–0.4)
EOSINOPHIL NFR BLD AUTO: 0.7 % (ref 0.3–6.2)
ERYTHROCYTE [DISTWIDTH] IN BLOOD BY AUTOMATED COUNT: 14.9 % (ref 12.3–15.4)
GLUCOSE SERPL-MCNC: 113 MG/DL (ref 65–99)
HCT VFR BLD AUTO: 41.7 % (ref 34–46.6)
HGB BLD-MCNC: 13.5 G/DL (ref 12–15.9)
INR PPP: 0.99 (ref 0.93–1.1)
LYMPHOCYTES # BLD AUTO: 1.1 10*3/MM3 (ref 0.7–3.1)
LYMPHOCYTES NFR BLD AUTO: 12.9 % (ref 19.6–45.3)
MCH RBC QN AUTO: 32.9 PG (ref 26.6–33)
MCHC RBC AUTO-ENTMCNC: 32.4 G/DL (ref 31.5–35.7)
MCV RBC AUTO: 101.6 FL (ref 79–97)
MONOCYTES # BLD AUTO: 0.6 10*3/MM3 (ref 0.1–0.9)
MONOCYTES NFR BLD AUTO: 7.3 % (ref 5–12)
NEUTROPHILS NFR BLD AUTO: 6.6 10*3/MM3 (ref 1.7–7)
NEUTROPHILS NFR BLD AUTO: 78.6 % (ref 42.7–76)
NRBC BLD AUTO-RTO: 0 /100 WBC (ref 0–0.2)
PLATELET # BLD AUTO: 202 10*3/MM3 (ref 140–450)
PMV BLD AUTO: 8.5 FL (ref 6–12)
POTASSIUM SERPL-SCNC: 4.3 MMOL/L (ref 3.5–5.2)
PROTHROMBIN TIME: 10.6 SECONDS (ref 9.6–11.7)
QT INTERVAL: 415 MS
RBC # BLD AUTO: 4.1 10*6/MM3 (ref 3.77–5.28)
SODIUM SERPL-SCNC: 138 MMOL/L (ref 136–145)
WBC NRBC COR # BLD: 8.4 10*3/MM3 (ref 3.4–10.8)

## 2023-05-04 PROCEDURE — 71045 X-RAY EXAM CHEST 1 VIEW: CPT

## 2023-05-04 PROCEDURE — 99285 EMERGENCY DEPT VISIT HI MDM: CPT

## 2023-05-04 PROCEDURE — 25010000002 KETOROLAC TROMETHAMINE PER 15 MG: Performed by: INTERNAL MEDICINE

## 2023-05-04 PROCEDURE — 93005 ELECTROCARDIOGRAM TRACING: CPT | Performed by: INTERNAL MEDICINE

## 2023-05-04 PROCEDURE — 73502 X-RAY EXAM HIP UNI 2-3 VIEWS: CPT

## 2023-05-04 PROCEDURE — 25010000002 HYDROMORPHONE 1 MG/ML SOLUTION: Performed by: EMERGENCY MEDICINE

## 2023-05-04 PROCEDURE — 85610 PROTHROMBIN TIME: CPT | Performed by: EMERGENCY MEDICINE

## 2023-05-04 PROCEDURE — 85730 THROMBOPLASTIN TIME PARTIAL: CPT | Performed by: EMERGENCY MEDICINE

## 2023-05-04 PROCEDURE — 80048 BASIC METABOLIC PNL TOTAL CA: CPT | Performed by: EMERGENCY MEDICINE

## 2023-05-04 PROCEDURE — 25010000002 ONDANSETRON PER 1 MG: Performed by: EMERGENCY MEDICINE

## 2023-05-04 PROCEDURE — 85025 COMPLETE CBC W/AUTO DIFF WBC: CPT | Performed by: EMERGENCY MEDICINE

## 2023-05-04 RX ORDER — FAMOTIDINE 20 MG/1
40 TABLET, FILM COATED ORAL DAILY
Status: DISCONTINUED | OUTPATIENT
Start: 2023-05-05 | End: 2023-05-07 | Stop reason: HOSPADM

## 2023-05-04 RX ORDER — ROSUVASTATIN CALCIUM 10 MG/1
10 TABLET, COATED ORAL DAILY
COMMUNITY

## 2023-05-04 RX ORDER — IPRATROPIUM BROMIDE AND ALBUTEROL SULFATE 2.5; .5 MG/3ML; MG/3ML
3 SOLUTION RESPIRATORY (INHALATION) EVERY 4 HOURS PRN
Status: DISCONTINUED | OUTPATIENT
Start: 2023-05-04 | End: 2023-05-07 | Stop reason: HOSPADM

## 2023-05-04 RX ORDER — ONDANSETRON 2 MG/ML
4 INJECTION INTRAMUSCULAR; INTRAVENOUS ONCE
Status: COMPLETED | OUTPATIENT
Start: 2023-05-04 | End: 2023-05-04

## 2023-05-04 RX ORDER — KETOROLAC TROMETHAMINE 15 MG/ML
15 INJECTION, SOLUTION INTRAMUSCULAR; INTRAVENOUS ONCE
Status: COMPLETED | OUTPATIENT
Start: 2023-05-04 | End: 2023-05-04

## 2023-05-04 RX ORDER — SODIUM CHLORIDE 9 MG/ML
40 INJECTION, SOLUTION INTRAVENOUS AS NEEDED
Status: DISCONTINUED | OUTPATIENT
Start: 2023-05-04 | End: 2023-05-07 | Stop reason: HOSPADM

## 2023-05-04 RX ORDER — SODIUM CHLORIDE 0.9 % (FLUSH) 0.9 %
10 SYRINGE (ML) INJECTION AS NEEDED
Status: DISCONTINUED | OUTPATIENT
Start: 2023-05-04 | End: 2023-05-07 | Stop reason: HOSPADM

## 2023-05-04 RX ORDER — HYDRALAZINE HYDROCHLORIDE 20 MG/ML
10 INJECTION INTRAMUSCULAR; INTRAVENOUS EVERY 6 HOURS PRN
Status: DISCONTINUED | OUTPATIENT
Start: 2023-05-04 | End: 2023-05-07 | Stop reason: HOSPADM

## 2023-05-04 RX ORDER — AMLODIPINE BESYLATE 5 MG/1
10 TABLET ORAL DAILY
Status: DISCONTINUED | OUTPATIENT
Start: 2023-05-05 | End: 2023-05-07 | Stop reason: HOSPADM

## 2023-05-04 RX ORDER — ROSUVASTATIN CALCIUM 10 MG/1
10 TABLET, COATED ORAL DAILY
Status: DISCONTINUED | OUTPATIENT
Start: 2023-05-05 | End: 2023-05-07 | Stop reason: HOSPADM

## 2023-05-04 RX ORDER — LABETALOL HYDROCHLORIDE 5 MG/ML
10 INJECTION, SOLUTION INTRAVENOUS EVERY 6 HOURS PRN
Status: DISCONTINUED | OUTPATIENT
Start: 2023-05-04 | End: 2023-05-07 | Stop reason: HOSPADM

## 2023-05-04 RX ORDER — SODIUM CHLORIDE 0.9 % (FLUSH) 0.9 %
3-10 SYRINGE (ML) INJECTION AS NEEDED
Status: DISCONTINUED | OUTPATIENT
Start: 2023-05-04 | End: 2023-05-07 | Stop reason: HOSPADM

## 2023-05-04 RX ORDER — ONDANSETRON 2 MG/ML
4 INJECTION INTRAMUSCULAR; INTRAVENOUS EVERY 6 HOURS PRN
Status: DISCONTINUED | OUTPATIENT
Start: 2023-05-04 | End: 2023-05-07 | Stop reason: HOSPADM

## 2023-05-04 RX ORDER — SODIUM CHLORIDE 0.9 % (FLUSH) 0.9 %
3 SYRINGE (ML) INJECTION EVERY 12 HOURS SCHEDULED
Status: DISCONTINUED | OUTPATIENT
Start: 2023-05-04 | End: 2023-05-07 | Stop reason: HOSPADM

## 2023-05-04 RX ORDER — FLUTICASONE FUROATE, UMECLIDINIUM BROMIDE AND VILANTEROL TRIFENATATE 100; 62.5; 25 UG/1; UG/1; UG/1
1 POWDER RESPIRATORY (INHALATION)
COMMUNITY

## 2023-05-04 RX ORDER — AMLODIPINE BESYLATE 10 MG/1
10 TABLET ORAL DAILY
COMMUNITY

## 2023-05-04 RX ADMIN — HYDROMORPHONE HYDROCHLORIDE 0.5 MG: 1 INJECTION, SOLUTION INTRAMUSCULAR; INTRAVENOUS; SUBCUTANEOUS at 17:14

## 2023-05-04 RX ADMIN — HYDROMORPHONE HYDROCHLORIDE 0.5 MG: 1 INJECTION, SOLUTION INTRAMUSCULAR; INTRAVENOUS; SUBCUTANEOUS at 18:03

## 2023-05-04 RX ADMIN — ONDANSETRON 4 MG: 2 INJECTION INTRAMUSCULAR; INTRAVENOUS at 17:14

## 2023-05-04 RX ADMIN — KETOROLAC TROMETHAMINE 15 MG: 15 INJECTION, SOLUTION INTRAMUSCULAR; INTRAVENOUS at 18:57

## 2023-05-04 NOTE — Clinical Note
Level of Care: Med/Surg [1]   Diagnosis: Closed fracture of left hip, initial encounter [072160]   Certification: I certify that inpatient hospital services are medically necessary for greater than 2 midnights.

## 2023-05-04 NOTE — ED PROVIDER NOTES
Subjective   History of Present Illness  Chief complaint: Patient is a pleasant 83-year-old.  She states she tripped over a small low-lying step in the basement.  She landed on the concrete.  She landed all on her left hip.  She has not been able to ambulate since.  She is in severe pain.  She did not injure anything else.  She did not hit her head.  She did not lose consciousness.  This was not a syncopal event.  She describes a mechanical fall.  She has no chest pain or shortness of breath.  She is on Plavix.  She has not had hip replacement in the past.    Context:    Duration: Just prior to arrival    Timing: Sudden acute onset    Severity: Severe    Associated Symptoms:        PCP:  LMP:        Review of Systems   Respiratory: Negative.    Cardiovascular: Negative for chest pain.   Musculoskeletal: Positive for arthralgias.   Neurological: Negative for syncope and headaches.       Past Medical History:   Diagnosis Date   • Aortic aneurysm    • COPD (chronic obstructive pulmonary disease)    • Hernia, inguinal, left    • Hyperlipidemia    • Hypertension    • Macular degeneration    • Peripheral edema    • PVD (peripheral vascular disease)        No Known Allergies    Past Surgical History:   Procedure Laterality Date   • CARPAL TUNNEL RELEASE     • DESCENDING AORTIC ANEURYSM REPAIR W/ STENT     • INGUINAL HERNIA REPAIR Bilateral 11/16/2022    Procedure: INGUINAL HERNIA REPAIR LAPAROSCOPIC WITH DAVINCI ROBOT;  Surgeon: Jasper Mcneill MD;  Location: [facility] MAIN OR;  Service: Robotics - DaVinci;  Laterality: Bilateral;   • INTERVENTIONAL RADIOLOGY PROCEDURE N/A 07/28/2021    Procedure: Abdominal Aortagram with Runoff, possible PCI;  Surgeon: Kyrie Campoverde MD;  Location: [facility] CATH INVASIVE LOCATION;  Service: Cardiovascular;  Laterality: N/A;   • KNEE SURGERY  2020   • OTHER SURGICAL HISTORY      STENT   • VASCULAR SURGERY      fem fem bypass       Family History   Problem Relation Age of Onset   •  Cancer Other    • Diabetes Other    • Aneurysm Father    • Cancer Mother        Social History     Socioeconomic History   • Marital status:    Tobacco Use   • Smoking status: Former     Packs/day: 1.00     Years: 30.00     Pack years: 30.00     Types: Cigarettes     Quit date:      Years since quittin.3   • Smokeless tobacco: Never   Vaping Use   • Vaping Use: Never used   Substance and Sexual Activity   • Alcohol use: No   • Drug use: Never   • Sexual activity: Defer           Objective   Physical Exam  Vitals and nursing note reviewed.   Constitutional:       General: She is in acute distress.   HENT:      Head: Normocephalic and atraumatic.   Cardiovascular:      Rate and Rhythm: Normal rate.   Pulmonary:      Effort: Pulmonary effort is normal.      Breath sounds: Normal breath sounds.   Abdominal:      Tenderness: There is no abdominal tenderness.   Musculoskeletal:      Cervical back: No tenderness.   Skin:     General: Skin is warm and dry.   Neurological:      General: No focal deficit present.      Mental Status: She is alert and oriented to person, place, and time.      Sensory: No sensory deficit.   Psychiatric:      Comments: Patient is anxious and in a lot of pain         Procedures           ED Course            Results for orders placed or performed during the hospital encounter of 23   Basic Metabolic Panel    Specimen: Blood   Result Value Ref Range    Glucose 113 (H) 65 - 99 mg/dL    BUN 11 8 - 23 mg/dL    Creatinine 0.63 0.57 - 1.00 mg/dL    Sodium 138 136 - 145 mmol/L    Potassium 4.3 3.5 - 5.2 mmol/L    Chloride 101 98 - 107 mmol/L    CO2 23.0 22.0 - 29.0 mmol/L    Calcium 9.7 8.6 - 10.5 mg/dL    BUN/Creatinine Ratio 17.5 7.0 - 25.0    Anion Gap 14.0 5.0 - 15.0 mmol/L    eGFR 87.6 >60.0 mL/min/1.73   Protime-INR    Specimen: Blood   Result Value Ref Range    Protime 10.6 9.6 - 11.7 Seconds    INR 0.99 0.93 - 1.10   aPTT    Specimen: Blood   Result Value Ref Range    PTT  24.2 (L) 61.0 - 76.5 seconds   CBC Auto Differential    Specimen: Blood   Result Value Ref Range    WBC 8.40 3.40 - 10.80 10*3/mm3    RBC 4.10 3.77 - 5.28 10*6/mm3    Hemoglobin 13.5 12.0 - 15.9 g/dL    Hematocrit 41.7 34.0 - 46.6 %    .6 (H) 79.0 - 97.0 fL    MCH 32.9 26.6 - 33.0 pg    MCHC 32.4 31.5 - 35.7 g/dL    RDW 14.9 12.3 - 15.4 %    RDW-SD 52.5 37.0 - 54.0 fl    MPV 8.5 6.0 - 12.0 fL    Platelets 202 140 - 450 10*3/mm3    Neutrophil % 78.6 (H) 42.7 - 76.0 %    Lymphocyte % 12.9 (L) 19.6 - 45.3 %    Monocyte % 7.3 5.0 - 12.0 %    Eosinophil % 0.7 0.3 - 6.2 %    Basophil % 0.5 0.0 - 1.5 %    Neutrophils, Absolute 6.60 1.70 - 7.00 10*3/mm3    Lymphocytes, Absolute 1.10 0.70 - 3.10 10*3/mm3    Monocytes, Absolute 0.60 0.10 - 0.90 10*3/mm3    Eosinophils, Absolute 0.10 0.00 - 0.40 10*3/mm3    Basophils, Absolute 0.00 0.00 - 0.20 10*3/mm3    nRBC 0.0 0.0 - 0.2 /100 WBC     XR Hip With or Without Pelvis 2 - 3 View Left    Result Date: 5/4/2023  Impression: Displaced/impacted femoral neck fracture left hip. Electronically Signed: Karlie Russremberto  5/4/2023 5:53 PM EDT  Workstation ID: [id number]                                      Medical Decision Making  Patient was seen for her fall    Differential diagnosis includes but is not limited to hip fracture, hip dislocation, hip sprain, intracerebral hemorrhage    Patient did not hit her head.  She is on Plavix so CT scan of the brain was considered but not ordered secondary to no head trauma.  She states all of the pain and fall impact went to her left hip.  She did sustain left femoral neck fracture no signs of dislocation.  At this point will admit the patient to the service of Dr. Cronin.  Calls been placed and orders placed by Dr. Cronin's group for admission and orthopedic consultation.      Closed fracture of left hip, initial encounter: acute illness or injury  Amount and/or Complexity of Data Reviewed  Labs: ordered. Decision-making details documented in ED  Course.     Details: CBC and chemistry normal.  Radiology: ordered and independent interpretation performed.     Details: Reviewed by myself patient has left femoral neck fracture      Risk  Prescription drug management.  Parenteral controlled substances.  Decision regarding hospitalization.          Final diagnoses:   None   Left hip fracture    ED Disposition  ED Disposition     None          No follow-up provider specified.       Medication List      No changes were made to your prescriptions during this visit.          Eulalio Melo DO  05/04/23 5301

## 2023-05-05 ENCOUNTER — APPOINTMENT (OUTPATIENT)
Dept: GENERAL RADIOLOGY | Facility: HOSPITAL | Age: 83
DRG: 522 | End: 2023-05-05
Payer: MEDICARE

## 2023-05-05 ENCOUNTER — ANESTHESIA EVENT (OUTPATIENT)
Dept: PERIOP | Facility: HOSPITAL | Age: 83
DRG: 522 | End: 2023-05-05
Payer: MEDICARE

## 2023-05-05 ENCOUNTER — ANESTHESIA (OUTPATIENT)
Dept: PERIOP | Facility: HOSPITAL | Age: 83
DRG: 522 | End: 2023-05-05
Payer: MEDICARE

## 2023-05-05 LAB
ABO GROUP BLD: NORMAL
BACTERIA UR QL AUTO: ABNORMAL /HPF
BILIRUB UR QL STRIP: NEGATIVE
BLD GP AB SCN SERPL QL: NEGATIVE
CLARITY UR: CLEAR
COLOR UR: YELLOW
GLUCOSE UR STRIP-MCNC: NEGATIVE MG/DL
HGB UR QL STRIP.AUTO: ABNORMAL
HYALINE CASTS UR QL AUTO: ABNORMAL /LPF
KETONES UR QL STRIP: NEGATIVE
LEUKOCYTE ESTERASE UR QL STRIP.AUTO: NEGATIVE
NITRITE UR QL STRIP: NEGATIVE
PH UR STRIP.AUTO: 7.5 [PH] (ref 5–8)
PROT UR QL STRIP: NEGATIVE
RBC # UR STRIP: ABNORMAL /HPF
REF LAB TEST METHOD: ABNORMAL
RH BLD: POSITIVE
SP GR UR STRIP: 1.01 (ref 1–1.03)
SQUAMOUS #/AREA URNS HPF: ABNORMAL /HPF
T&S EXPIRATION DATE: NORMAL
UROBILINOGEN UR QL STRIP: ABNORMAL
WBC # UR STRIP: ABNORMAL /HPF

## 2023-05-05 PROCEDURE — 25010000002 CEFAZOLIN PER 500 MG: Performed by: NURSE ANESTHETIST, CERTIFIED REGISTERED

## 2023-05-05 PROCEDURE — 25010000002 SUGAMMADEX 200 MG/2ML SOLUTION: Performed by: NURSE ANESTHETIST, CERTIFIED REGISTERED

## 2023-05-05 PROCEDURE — 25010000002 PHENYLEPHRINE 10 MG/ML SOLUTION 5 ML VIAL: Performed by: NURSE ANESTHETIST, CERTIFIED REGISTERED

## 2023-05-05 PROCEDURE — 73552 X-RAY EXAM OF FEMUR 2/>: CPT

## 2023-05-05 PROCEDURE — 72170 X-RAY EXAM OF PELVIS: CPT

## 2023-05-05 PROCEDURE — 25010000002 FENTANYL CITRATE (PF) 100 MCG/2ML SOLUTION: Performed by: NURSE ANESTHETIST, CERTIFIED REGISTERED

## 2023-05-05 PROCEDURE — 0SRS019 REPLACEMENT OF LEFT HIP JOINT, FEMORAL SURFACE WITH METAL SYNTHETIC SUBSTITUTE, CEMENTED, OPEN APPROACH: ICD-10-PCS | Performed by: STUDENT IN AN ORGANIZED HEALTH CARE EDUCATION/TRAINING PROGRAM

## 2023-05-05 PROCEDURE — C1889 IMPLANT/INSERT DEVICE, NOC: HCPCS | Performed by: STUDENT IN AN ORGANIZED HEALTH CARE EDUCATION/TRAINING PROGRAM

## 2023-05-05 PROCEDURE — 25010000002 HYDROMORPHONE 1 MG/ML SOLUTION: Performed by: INTERNAL MEDICINE

## 2023-05-05 PROCEDURE — 86901 BLOOD TYPING SEROLOGIC RH(D): CPT

## 2023-05-05 PROCEDURE — 25010000002 ONDANSETRON PER 1 MG: Performed by: INTERNAL MEDICINE

## 2023-05-05 PROCEDURE — 25010000002 VANCOMYCIN 1 G RECONSTITUTED SOLUTION 1 EACH VIAL: Performed by: STUDENT IN AN ORGANIZED HEALTH CARE EDUCATION/TRAINING PROGRAM

## 2023-05-05 PROCEDURE — 86850 RBC ANTIBODY SCREEN: CPT | Performed by: INTERNAL MEDICINE

## 2023-05-05 PROCEDURE — C1776 JOINT DEVICE (IMPLANTABLE): HCPCS | Performed by: STUDENT IN AN ORGANIZED HEALTH CARE EDUCATION/TRAINING PROGRAM

## 2023-05-05 PROCEDURE — 86900 BLOOD TYPING SEROLOGIC ABO: CPT | Performed by: INTERNAL MEDICINE

## 2023-05-05 PROCEDURE — 86901 BLOOD TYPING SEROLOGIC RH(D): CPT | Performed by: INTERNAL MEDICINE

## 2023-05-05 PROCEDURE — 25010000002 HYDROMORPHONE 1 MG/ML SOLUTION: Performed by: STUDENT IN AN ORGANIZED HEALTH CARE EDUCATION/TRAINING PROGRAM

## 2023-05-05 PROCEDURE — 25010000002 CEFAZOLIN PER 500 MG: Performed by: STUDENT IN AN ORGANIZED HEALTH CARE EDUCATION/TRAINING PROGRAM

## 2023-05-05 PROCEDURE — 73502 X-RAY EXAM HIP UNI 2-3 VIEWS: CPT

## 2023-05-05 PROCEDURE — 25010000002 PROPOFOL 1000 MG/100ML EMULSION: Performed by: NURSE ANESTHETIST, CERTIFIED REGISTERED

## 2023-05-05 PROCEDURE — 81001 URINALYSIS AUTO W/SCOPE: CPT | Performed by: INTERNAL MEDICINE

## 2023-05-05 PROCEDURE — 86900 BLOOD TYPING SEROLOGIC ABO: CPT

## 2023-05-05 PROCEDURE — C1713 ANCHOR/SCREW BN/BN,TIS/BN: HCPCS | Performed by: STUDENT IN AN ORGANIZED HEALTH CARE EDUCATION/TRAINING PROGRAM

## 2023-05-05 PROCEDURE — 25010000002 VANCOMYCIN 1 G RECONSTITUTED SOLUTION 1 EACH VIAL: Performed by: NURSE ANESTHETIST, CERTIFIED REGISTERED

## 2023-05-05 DEVICE — DEV WND/CLS CONTRL TISS STRATAFIX SYMM PDS PLS CTX 60CM VIL: Type: IMPLANTABLE DEVICE | Site: HIP | Status: FUNCTIONAL

## 2023-05-05 DEVICE — SUT FW 5 W .5 CIR CUT NDL 48M AR7211: Type: IMPLANTABLE DEVICE | Site: HIP | Status: FUNCTIONAL

## 2023-05-05 DEVICE — POLARSTEM STANDARD CEMENTED 2
Type: IMPLANTABLE DEVICE | Site: HIP | Status: FUNCTIONAL
Brand: POLARSTEM

## 2023-05-05 DEVICE — OXINIUM FEMORAL HEAD 12/14 TAPER                                    28 MM +0
Type: IMPLANTABLE DEVICE | Site: HIP | Status: FUNCTIONAL
Brand: OXINIUM

## 2023-05-05 DEVICE — DEV CONTRL TISS STRATAFIX SPIRAL MNCRYL UD 3/0 PLS 30CM: Type: IMPLANTABLE DEVICE | Site: HIP | Status: FUNCTIONAL

## 2023-05-05 DEVICE — TANDEM BIPOLAR COBALT CHROME 48MM                                    OUTER DIAMETER 28MM INNER DIAMETER
Type: IMPLANTABLE DEVICE | Site: HIP | Status: FUNCTIONAL
Brand: TANDEM

## 2023-05-05 DEVICE — BONE PREPARATION KIT
Type: IMPLANTABLE DEVICE | Site: HIP | Status: FUNCTIONAL
Brand: BIOPREP

## 2023-05-05 DEVICE — IMPLANTABLE DEVICE: Type: IMPLANTABLE DEVICE | Site: HIP | Status: FUNCTIONAL

## 2023-05-05 DEVICE — CMT BONE PALACOS R HI/VISC 1X40: Type: IMPLANTABLE DEVICE | Site: HIP | Status: FUNCTIONAL

## 2023-05-05 RX ORDER — PHENYLEPHRINE HCL IN 0.9% NACL 1 MG/10 ML
SYRINGE (ML) INTRAVENOUS AS NEEDED
Status: DISCONTINUED | OUTPATIENT
Start: 2023-05-05 | End: 2023-05-05 | Stop reason: SURG

## 2023-05-05 RX ORDER — NALOXONE HCL 0.4 MG/ML
0.4 VIAL (ML) INJECTION AS NEEDED
Status: DISCONTINUED | OUTPATIENT
Start: 2023-05-05 | End: 2023-05-05 | Stop reason: HOSPADM

## 2023-05-05 RX ORDER — HYDRALAZINE HYDROCHLORIDE 20 MG/ML
5 INJECTION INTRAMUSCULAR; INTRAVENOUS
Status: DISCONTINUED | OUTPATIENT
Start: 2023-05-05 | End: 2023-05-05 | Stop reason: HOSPADM

## 2023-05-05 RX ORDER — ALBUTEROL SULFATE 2.5 MG/3ML
2.5 SOLUTION RESPIRATORY (INHALATION) ONCE AS NEEDED
Status: DISCONTINUED | OUTPATIENT
Start: 2023-05-05 | End: 2023-05-05 | Stop reason: HOSPADM

## 2023-05-05 RX ORDER — CLOPIDOGREL BISULFATE 75 MG/1
75 TABLET ORAL DAILY
Status: DISCONTINUED | OUTPATIENT
Start: 2023-05-06 | End: 2023-05-07 | Stop reason: HOSPADM

## 2023-05-05 RX ORDER — IPRATROPIUM BROMIDE AND ALBUTEROL SULFATE 2.5; .5 MG/3ML; MG/3ML
3 SOLUTION RESPIRATORY (INHALATION)
Status: DISCONTINUED | OUTPATIENT
Start: 2023-05-05 | End: 2023-05-05 | Stop reason: HOSPADM

## 2023-05-05 RX ORDER — PROPOFOL 10 MG/ML
INJECTION, EMULSION INTRAVENOUS AS NEEDED
Status: DISCONTINUED | OUTPATIENT
Start: 2023-05-05 | End: 2023-05-05 | Stop reason: SURG

## 2023-05-05 RX ORDER — OXYCODONE HYDROCHLORIDE 5 MG/1
10 TABLET ORAL EVERY 4 HOURS PRN
Status: DISCONTINUED | OUTPATIENT
Start: 2023-05-05 | End: 2023-05-07 | Stop reason: HOSPADM

## 2023-05-05 RX ORDER — DIPHENHYDRAMINE HYDROCHLORIDE 50 MG/ML
12.5 INJECTION INTRAMUSCULAR; INTRAVENOUS
Status: DISCONTINUED | OUTPATIENT
Start: 2023-05-05 | End: 2023-05-05 | Stop reason: HOSPADM

## 2023-05-05 RX ORDER — FENTANYL CITRATE 50 UG/ML
INJECTION, SOLUTION INTRAMUSCULAR; INTRAVENOUS AS NEEDED
Status: DISCONTINUED | OUTPATIENT
Start: 2023-05-05 | End: 2023-05-05 | Stop reason: SURG

## 2023-05-05 RX ORDER — DEXAMETHASONE SODIUM PHOSPHATE 4 MG/ML
8 INJECTION, SOLUTION INTRA-ARTICULAR; INTRALESIONAL; INTRAMUSCULAR; INTRAVENOUS; SOFT TISSUE ONCE AS NEEDED
Status: DISCONTINUED | OUTPATIENT
Start: 2023-05-05 | End: 2023-05-05 | Stop reason: HOSPADM

## 2023-05-05 RX ORDER — OXYCODONE HCL 10 MG/1
10 TABLET, FILM COATED, EXTENDED RELEASE ORAL ONCE
Status: COMPLETED | OUTPATIENT
Start: 2023-05-05 | End: 2023-05-05

## 2023-05-05 RX ORDER — SODIUM CHLORIDE, SODIUM LACTATE, POTASSIUM CHLORIDE, CALCIUM CHLORIDE 600; 310; 30; 20 MG/100ML; MG/100ML; MG/100ML; MG/100ML
INJECTION, SOLUTION INTRAVENOUS CONTINUOUS PRN
Status: DISCONTINUED | OUTPATIENT
Start: 2023-05-05 | End: 2023-05-05 | Stop reason: SURG

## 2023-05-05 RX ORDER — TRANEXAMIC ACID 10 MG/ML
1000 INJECTION, SOLUTION INTRAVENOUS ONCE
Status: COMPLETED | OUTPATIENT
Start: 2023-05-05 | End: 2023-05-05

## 2023-05-05 RX ORDER — CEFAZOLIN 2 G/1
INJECTION, POWDER, FOR SOLUTION INTRAMUSCULAR; INTRAVENOUS AS NEEDED
Status: DISCONTINUED | OUTPATIENT
Start: 2023-05-05 | End: 2023-05-05 | Stop reason: SURG

## 2023-05-05 RX ORDER — ACETAMINOPHEN 500 MG
1000 TABLET ORAL ONCE
Status: COMPLETED | OUTPATIENT
Start: 2023-05-05 | End: 2023-05-05

## 2023-05-05 RX ORDER — GLYCOPYRROLATE 0.2 MG/ML
INJECTION INTRAMUSCULAR; INTRAVENOUS AS NEEDED
Status: DISCONTINUED | OUTPATIENT
Start: 2023-05-05 | End: 2023-05-05 | Stop reason: SURG

## 2023-05-05 RX ORDER — ROCURONIUM BROMIDE 10 MG/ML
INJECTION, SOLUTION INTRAVENOUS AS NEEDED
Status: DISCONTINUED | OUTPATIENT
Start: 2023-05-05 | End: 2023-05-05 | Stop reason: SURG

## 2023-05-05 RX ORDER — FENTANYL CITRATE 50 UG/ML
50 INJECTION, SOLUTION INTRAMUSCULAR; INTRAVENOUS
Status: DISCONTINUED | OUTPATIENT
Start: 2023-05-05 | End: 2023-05-05 | Stop reason: HOSPADM

## 2023-05-05 RX ORDER — ONDANSETRON 2 MG/ML
4 INJECTION INTRAMUSCULAR; INTRAVENOUS ONCE AS NEEDED
Status: DISCONTINUED | OUTPATIENT
Start: 2023-05-05 | End: 2023-05-05 | Stop reason: HOSPADM

## 2023-05-05 RX ORDER — LIDOCAINE HYDROCHLORIDE 20 MG/ML
INJECTION, SOLUTION EPIDURAL; INFILTRATION; INTRACAUDAL; PERINEURAL AS NEEDED
Status: DISCONTINUED | OUTPATIENT
Start: 2023-05-05 | End: 2023-05-05 | Stop reason: SURG

## 2023-05-05 RX ORDER — ACETAMINOPHEN 500 MG
1000 TABLET ORAL EVERY 8 HOURS
Status: DISCONTINUED | OUTPATIENT
Start: 2023-05-05 | End: 2023-05-07 | Stop reason: HOSPADM

## 2023-05-05 RX ORDER — PROCHLORPERAZINE EDISYLATE 5 MG/ML
10 INJECTION INTRAMUSCULAR; INTRAVENOUS ONCE AS NEEDED
Status: DISCONTINUED | OUTPATIENT
Start: 2023-05-05 | End: 2023-05-05 | Stop reason: HOSPADM

## 2023-05-05 RX ORDER — EPHEDRINE SULFATE 5 MG/ML
5 INJECTION INTRAVENOUS ONCE AS NEEDED
Status: DISCONTINUED | OUTPATIENT
Start: 2023-05-05 | End: 2023-05-05 | Stop reason: HOSPADM

## 2023-05-05 RX ORDER — OXYCODONE HYDROCHLORIDE 5 MG/1
5 TABLET ORAL EVERY 4 HOURS PRN
Status: DISCONTINUED | OUTPATIENT
Start: 2023-05-05 | End: 2023-05-07 | Stop reason: HOSPADM

## 2023-05-05 RX ORDER — LABETALOL HYDROCHLORIDE 5 MG/ML
5 INJECTION, SOLUTION INTRAVENOUS
Status: DISCONTINUED | OUTPATIENT
Start: 2023-05-05 | End: 2023-05-05 | Stop reason: HOSPADM

## 2023-05-05 RX ORDER — SODIUM CHLORIDE, SODIUM LACTATE, POTASSIUM CHLORIDE, CALCIUM CHLORIDE 600; 310; 30; 20 MG/100ML; MG/100ML; MG/100ML; MG/100ML
20 INJECTION, SOLUTION INTRAVENOUS ONCE
Status: COMPLETED | OUTPATIENT
Start: 2023-05-05 | End: 2023-05-05

## 2023-05-05 RX ORDER — KETAMINE HCL IN NACL, ISO-OSM 100MG/10ML
SYRINGE (ML) INJECTION AS NEEDED
Status: DISCONTINUED | OUTPATIENT
Start: 2023-05-05 | End: 2023-05-05 | Stop reason: SURG

## 2023-05-05 RX ADMIN — SODIUM CHLORIDE, SODIUM LACTATE, POTASSIUM CHLORIDE, AND CALCIUM CHLORIDE: .6; .31; .03; .02 INJECTION, SOLUTION INTRAVENOUS at 11:22

## 2023-05-05 RX ADMIN — ONDANSETRON 4 MG: 2 INJECTION INTRAMUSCULAR; INTRAVENOUS at 12:58

## 2023-05-05 RX ADMIN — ACETAMINOPHEN 1000 MG: 500 TABLET, FILM COATED ORAL at 10:45

## 2023-05-05 RX ADMIN — OXYCODONE HYDROCHLORIDE 10 MG: 10 TABLET, FILM COATED, EXTENDED RELEASE ORAL at 10:45

## 2023-05-05 RX ADMIN — VANCOMYCIN HYDROCHLORIDE 1000 MG: 1 INJECTION, POWDER, LYOPHILIZED, FOR SOLUTION INTRAVENOUS at 10:29

## 2023-05-05 RX ADMIN — SUGAMMADEX 200 MG: 100 INJECTION, SOLUTION INTRAVENOUS at 13:12

## 2023-05-05 RX ADMIN — LIDOCAINE HYDROCHLORIDE 100 MG: 20 INJECTION, SOLUTION EPIDURAL; INFILTRATION; INTRACAUDAL; PERINEURAL at 11:28

## 2023-05-05 RX ADMIN — PROPOFOL INJECTABLE EMULSION 100 MCG/KG/MIN: 10 INJECTION, EMULSION INTRAVENOUS at 11:33

## 2023-05-05 RX ADMIN — TRANEXAMIC ACID 1000 MG: 10 INJECTION, SOLUTION INTRAVENOUS at 12:57

## 2023-05-05 RX ADMIN — PROPOFOL INJECTABLE EMULSION 70 MG: 10 INJECTION, EMULSION INTRAVENOUS at 11:28

## 2023-05-05 RX ADMIN — ROCURONIUM BROMIDE 20 MG: 50 INJECTION, SOLUTION INTRAVENOUS at 11:28

## 2023-05-05 RX ADMIN — TRANEXAMIC ACID 1000 MG: 10 INJECTION, SOLUTION INTRAVENOUS at 11:30

## 2023-05-05 RX ADMIN — VANCOMYCIN HYDROCHLORIDE 1 G: 1 INJECTION, POWDER, LYOPHILIZED, FOR SOLUTION INTRAVENOUS at 11:30

## 2023-05-05 RX ADMIN — SODIUM CHLORIDE, POTASSIUM CHLORIDE, SODIUM LACTATE AND CALCIUM CHLORIDE 20 ML/HR: 600; 310; 30; 20 INJECTION, SOLUTION INTRAVENOUS at 10:00

## 2023-05-05 RX ADMIN — Medication 100 MCG: at 12:40

## 2023-05-05 RX ADMIN — PHENYLEPHRINE HYDROCHLORIDE 0.25 MCG/KG/MIN: 10 INJECTION INTRAVENOUS at 11:31

## 2023-05-05 RX ADMIN — HYDROMORPHONE HYDROCHLORIDE 0.5 MG: 1 INJECTION, SOLUTION INTRAMUSCULAR; INTRAVENOUS; SUBCUTANEOUS at 00:32

## 2023-05-05 RX ADMIN — Medication 50 MG: at 11:28

## 2023-05-05 RX ADMIN — HYDROMORPHONE HYDROCHLORIDE 0.5 MG: 1 INJECTION, SOLUTION INTRAMUSCULAR; INTRAVENOUS; SUBCUTANEOUS at 08:15

## 2023-05-05 RX ADMIN — HYDROMORPHONE HYDROCHLORIDE 0.5 MG: 1 INJECTION, SOLUTION INTRAMUSCULAR; INTRAVENOUS; SUBCUTANEOUS at 21:39

## 2023-05-05 RX ADMIN — ACETAMINOPHEN 1000 MG: 500 TABLET, FILM COATED ORAL at 21:27

## 2023-05-05 RX ADMIN — CEFAZOLIN 2 G: 2 INJECTION, POWDER, FOR SOLUTION INTRAMUSCULAR; INTRAVENOUS at 21:27

## 2023-05-05 RX ADMIN — FENTANYL CITRATE 50 MCG: 50 INJECTION, SOLUTION INTRAMUSCULAR; INTRAVENOUS at 11:28

## 2023-05-05 RX ADMIN — Medication 3 ML: at 08:15

## 2023-05-05 RX ADMIN — HYDROMORPHONE HYDROCHLORIDE 0.5 MG: 1 INJECTION, SOLUTION INTRAMUSCULAR; INTRAVENOUS; SUBCUTANEOUS at 18:51

## 2023-05-05 RX ADMIN — Medication 3 ML: at 21:28

## 2023-05-05 RX ADMIN — CEFAZOLIN 2 G: 2 INJECTION, POWDER, FOR SOLUTION INTRAMUSCULAR; INTRAVENOUS at 11:31

## 2023-05-05 RX ADMIN — GLYCOPYRROLATE 0.2 MG: 0.2 INJECTION INTRAMUSCULAR; INTRAVENOUS at 11:31

## 2023-05-05 NOTE — CONSULTS
Orthopaedic & Sports Medicine Surgery  Dr. Guanako Harmon MD  [phone]    Orthopaedic Surgery  Consult Note      HPI:  Patient is a 83 y.o. female who presents with left hip pain after a same level fall.  She missed a step in the basement and fell on the left side on concrete and had immediate pain.  She was unable to ambulate.  She was brought to [facility] and was found to have a displaced left femoral neck fracture.  Orthopedic surgery was consulted.  She has a history of a right total knee replacement, but cannot tell me the surgeon.  No previous left hip surgery.  She does take Eliquis.  She has a history of COPD and peripheral vascular disease as well as hypertension hyperlipidemia.  She lives at home and takes care of herself.  Uses a cane sometimes for longer distances.  Daughter is with her at bedside.      MEDICAL HISTORY  Past Medical History:   Diagnosis Date   • Aortic aneurysm    • COPD (chronic obstructive pulmonary disease)    • Hernia, inguinal, left    • Hyperlipidemia    • Hypertension    • Macular degeneration    • Peripheral edema    • PVD (peripheral vascular disease)    ·   Past Surgical History:   Procedure Laterality Date   • CARPAL TUNNEL RELEASE     • DESCENDING AORTIC ANEURYSM REPAIR W/ STENT     • INGUINAL HERNIA REPAIR Bilateral 11/16/2022    Procedure: INGUINAL HERNIA REPAIR LAPAROSCOPIC WITH DAVINCI ROBOT;  Surgeon: Jasper Mcneill MD;  Location: [facility] MAIN OR;  Service: Robotics - DaVinci;  Laterality: Bilateral;   • INTERVENTIONAL RADIOLOGY PROCEDURE N/A 07/28/2021    Procedure: Abdominal Aortagram with Runoff, possible PCI;  Surgeon: Kyrie Campoverde MD;  Location: [facility] CATH INVASIVE LOCATION;  Service: Cardiovascular;  Laterality: N/A;   • KNEE SURGERY  2020   • OTHER SURGICAL HISTORY      STENT   • VASCULAR SURGERY      fem fem bypass   ·   Prior to Admission medications    Medication Sig Start Date End Date Taking? Authorizing Provider   albuterol sulfate  " (90 Base) MCG/ACT inhaler Inhale 2 puffs Every 4 (Four) Hours As Needed for Wheezing.   Yes César Vargas MD   amLODIPine (NORVASC) 10 MG tablet Take 1 tablet by mouth Daily.   Yes César Vargas MD   aspirin 81 MG chewable tablet Chew 1 tablet Daily.   Yes César Vargas MD   bisoprolol-hydrochlorothiazide (ZIAC) 5-6.25 MG per tablet Take 1 tablet by mouth Daily.   Yes César Vargas MD   cholecalciferol (VITAMIN D3) 25 MCG (1000 UT) tablet Take 2 tablets by mouth Daily.   Yes César Vargas MD   clopidogrel (PLAVIX) 75 MG tablet Take 1 tablet by mouth Daily.   Yes César Vargas MD   Fluticasone-Umeclidin-Vilant (Trelegy Ellipta) 100-62.5-25 MCG/ACT inhaler Inhale 1 puff Daily.   Yes César Vargas MD   HYDROcodone-acetaminophen (NORCO) 5-325 MG per tablet Take 1 tablet by mouth Every 8 (Eight) Hours As Needed.   Yes César Vargas MD   Multiple Vitamins-Minerals (PRESERVISION AREDS 2+MULTI VIT PO) Take 1 tablet by mouth 2 (Two) Times a Day.   Yes César Vargas MD   pyridoxine (VITAMIN B-6) 100 MG tablet tablet Take 1 tablet by mouth Daily.   Yes César Vargas MD   rosuvastatin (CRESTOR) 10 MG tablet Take 1 tablet by mouth Daily.   Yes César Vargas MD   vitamin B-12 (CYANOCOBALAMIN) 1000 MCG tablet Take 2.5 tablets by mouth Daily.   Yes César Vargas MD   ·   · No Known Allergies  ·   · There is no immunization history on file for this patient.  Social History     Tobacco Use   • Smoking status: Former     Packs/day: 1.00     Years: 30.00     Pack years: 30.00     Types: Cigarettes     Quit date:      Years since quittin.3   • Smokeless tobacco: Never   Substance Use Topics   • Alcohol use: No   ·    Social History     Substance and Sexual Activity   Drug Use Never   ·     VITALS: /56 (Patient Position: Lying)   Pulse 81   Temp 98.2 °F (36.8 °C) (Oral)   Resp 12   Ht 162.6 cm (64\")   Wt 68.4 kg " (150 lb 12.7 oz)   SpO2 93%   BMI 25.88 kg/m²  Body mass index is 25.88 kg/m².    PHYSICAL EXAM:   · CONSTITUTIONAL: No acute distress  · LUNGS: Equal chest rise, no shortness of air  · CARDIOVASCULAR: palpable peripheral pulses  · EXTREMITY:   · Left lower extremity  · Tenderness to Palpation: Tenderness to palpation at the Anterior and lateral hip.  No pain from mid thigh down.  No obvious incisions over hip and knee.  · Gross Deformity: Leg is shortened and internally rotated  · Pulses:  Brisk Capillary Refill  · Sensation: Intact to Saphenous, Sural, Deep Peroneal, Superficial Peroneal, and Tibial Nerves and grossly throughout extremity  · Motor: 5/5 EHL/FHL/TA/GS motor complexes     · Right lower extremity  · Tenderness to Palpation: No tenderness to palpation  · Gross Deformity: No Gross Deformity  · Pulses:  Brisk Capillary Refill  · Sensation: Intact to Saphenous, Sural, Deep Peroneal, Superficial Peroneal, and Tibial Nerves and grossly throughout extremity  · Motor: 5/5 EHL/FHL/TA/GS motor complexes   · Well-healed incision from over anterior knee from previous total knee replacement.    RADIOLOGY REVIEW:   XR Femur 2 View Left    Result Date: 5/5/2023  Impression: 1. Stable displaced acute left femoral neck fracture. 2. 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 Electronically Signed: Javier Sanchez  5/5/2023 9:59 AM EDT  Workstation ID: [id number]    XR Chest 1 View    Result Date: 5/4/2023  Impression: No acute cardiopulmonary abnormality. Electronically Signed: Karlie Lockett  5/4/2023 6:06 PM EDT  Workstation ID: [id number]    XR Pelvis 1 or 2 View    Result Date: 5/5/2023  Impression: 1. Stable displaced acute left femoral neck fracture. 2. 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 Electronically Signed: Javier Sanchez  5/5/2023 9:59 AM EDT  Workstation ID: [id number]    XR Hip With or Without Pelvis 2 - 3 View Left    Result Date: 5/4/2023  Impression: Displaced/impacted femoral neck fracture left hip. Electronically  Signed: Karlie Lockett  5/4/2023 5:53 PM EDT  Workstation ID: [id number]      LABS:   Results for the past 24 hours:   Recent Results (from the past 24 hour(s))   Basic Metabolic Panel    Collection Time: 05/04/23  5:10 PM    Specimen: Blood   Result Value Ref Range    Glucose 113 (H) 65 - 99 mg/dL    BUN 11 8 - 23 mg/dL    Creatinine 0.63 0.57 - 1.00 mg/dL    Sodium 138 136 - 145 mmol/L    Potassium 4.3 3.5 - 5.2 mmol/L    Chloride 101 98 - 107 mmol/L    CO2 23.0 22.0 - 29.0 mmol/L    Calcium 9.7 8.6 - 10.5 mg/dL    BUN/Creatinine Ratio 17.5 7.0 - 25.0    Anion Gap 14.0 5.0 - 15.0 mmol/L    eGFR 87.6 >60.0 mL/min/1.73   Protime-INR    Collection Time: 05/04/23  5:10 PM    Specimen: Blood   Result Value Ref Range    Protime 10.6 9.6 - 11.7 Seconds    INR 0.99 0.93 - 1.10   aPTT    Collection Time: 05/04/23  5:10 PM    Specimen: Blood   Result Value Ref Range    PTT 24.2 (L) 61.0 - 76.5 seconds   CBC Auto Differential    Collection Time: 05/04/23  5:10 PM    Specimen: Blood   Result Value Ref Range    WBC 8.40 3.40 - 10.80 10*3/mm3    RBC 4.10 3.77 - 5.28 10*6/mm3    Hemoglobin 13.5 12.0 - 15.9 g/dL    Hematocrit 41.7 34.0 - 46.6 %    .6 (H) 79.0 - 97.0 fL    MCH 32.9 26.6 - 33.0 pg    MCHC 32.4 31.5 - 35.7 g/dL    RDW 14.9 12.3 - 15.4 %    RDW-SD 52.5 37.0 - 54.0 fl    MPV 8.5 6.0 - 12.0 fL    Platelets 202 140 - 450 10*3/mm3    Neutrophil % 78.6 (H) 42.7 - 76.0 %    Lymphocyte % 12.9 (L) 19.6 - 45.3 %    Monocyte % 7.3 5.0 - 12.0 %    Eosinophil % 0.7 0.3 - 6.2 %    Basophil % 0.5 0.0 - 1.5 %    Neutrophils, Absolute 6.60 1.70 - 7.00 10*3/mm3    Lymphocytes, Absolute 1.10 0.70 - 3.10 10*3/mm3    Monocytes, Absolute 0.60 0.10 - 0.90 10*3/mm3    Eosinophils, Absolute 0.10 0.00 - 0.40 10*3/mm3    Basophils, Absolute 0.00 0.00 - 0.20 10*3/mm3    nRBC 0.0 0.0 - 0.2 /100 WBC   ECG 12 Lead Pre-Op / Pre-Procedure    Collection Time: 05/04/23  7:13 PM   Result Value Ref Range    QT Interval 415 ms   Urinalysis With Culture  If Indicated - Urine, Clean Catch    Collection Time: 05/05/23 12:30 AM    Specimen: Urine, Clean Catch   Result Value Ref Range    Color, UA Yellow Yellow, Straw    Appearance, UA Clear Clear    pH, UA 7.5 5.0 - 8.0    Specific Gravity, UA 1.015 1.005 - 1.030    Glucose, UA Negative Negative    Ketones, UA Negative Negative    Bilirubin, UA Negative Negative    Blood, UA Trace (A) Negative    Protein, UA Negative Negative    Leuk Esterase, UA Negative Negative    Nitrite, UA Negative Negative    Urobilinogen, UA 0.2 E.U./dL 0.2 - 1.0 E.U./dL   Urinalysis, Microscopic Only - Urine, Clean Catch    Collection Time: 05/05/23 12:30 AM    Specimen: Urine, Clean Catch   Result Value Ref Range    RBC, UA 3-5 (A) None Seen /HPF    WBC, UA 0-2 (A) None Seen /HPF    Bacteria, UA None Seen None Seen /HPF    Squamous Epithelial Cells, UA 0-2 None Seen, 0-2 /HPF    Hyaline Casts, UA 0-2 None Seen /LPF    Methodology Automated Microscopy    Type & Screen    Collection Time: 05/05/23 12:34 AM    Specimen: Blood   Result Value Ref Range    ABO Type O     RH type Positive     Antibody Screen Negative     T&S Expiration Date 5/8/2023 11:59:59 PM        IMPRESSION:  Patient is a 83 y.o. female with acute closed displaced left femoral neck fracture status post same level fall.    PLAN:   · Admited to: Dianna Cronin MD  · Diet: N.p.o. for surgery this morning.  · Weight Bearing: Nonweightbearing  · Labs: Reviewed and as above.  · Imaging: Left displaced femoral neck fracture.  · Anti-coagulation: Mechanical DVT ppx with SCD's, chemical DVT PPX: Can restart home Eliquis postoperative day 1  · Float Heels  · Incentive Spirometer  · Surgery: Left hip hemiarthroplasty for femoral neck fracture  · Consent: The risks and benefits of operative versus nonoperative treatment were discussed.  The patient/family elected to undergo operative treatment of their injury.  The risks discussed included but were not limited to malunion, nonunion,,  fracture, , dislocation, , hardware prominence,, loss of range of motion, stiffness,, Leg length discrepancy, periprosthetic fracture, possible risk for infection and need for multiple revision surgeries,,  and blood clots, MI, stroke, other medical complications, infection, damage to neurovascular structures, the need for further procedures, and risk of anesthesia..  No guarantees of outcome were made.  I discussed the high risk of morbidity mortality for hip fractures with the patient and daughter.  We discussed nonoperative treatment and that these are typically fixed at least even for pain control and for ambulation.  We also discussed the high rate of morbidity mortality even if hip fractures are fixed, with 30 to 40% mortality at 2 years.  They understand and wish to proceed.  · Disposition: Placement per primary after surgery.    Guanako Harmon MD  [facility]  Orthopaedic Surgery, Sports Medicine  [phone]

## 2023-05-05 NOTE — ANESTHESIA PROCEDURE NOTES
Airway  Urgency: elective    Date/Time: 5/5/2023 11:29 AM  Airway not difficult    General Information and Staff    Patient location during procedure: OR  Anesthesiologist: Jose Parker MD  CRNA/CAA: Lory Chawla CRNA    Indications and Patient Condition  Indications for airway management: airway protection    Preoxygenated: yes  MILS maintained throughout  Mask difficulty assessment: 0 - not attempted    Final Airway Details  Final airway type: endotracheal airway      Successful airway: ETT  Cuffed: yes   Successful intubation technique: video laryngoscopy  Facilitating devices/methods: intubating stylet  Endotracheal tube insertion site: oral  Blade: Toro  Blade size: 3  ETT size (mm): 7.0  Cormack-Lehane Classification: grade I - full view of glottis  Placement verified by: chest auscultation and capnometry   Cuff volume (mL): 7  Measured from: lips  Number of attempts at approach: 1  Assessment: lips, teeth, and gum same as pre-op and atraumatic intubation

## 2023-05-05 NOTE — PROGRESS NOTES
LOS: 1 day   Patient Care Team:  Dianna Cronin MD as PCP - General (Internal Medicine)  Kyrie Campoverde MD as Consulting Physician (Cardiology)  Jasper Mcneill MD as Consulting Physician (General Surgery)  Sung Tamayo MD (Vascular Surgery)    Subjective     Interval History: s/p left hip hemiarthroplasty earlier today    Patient Complaints: Appropriate left hip pain, lethargic after anesthesia    History taken from: patient    Review of Systems   Unable to perform ROS: Mental status change           Objective     Vital Signs  Temp:  [97.2 °F (36.2 °C)-98.6 °F (37 °C)] 97.2 °F (36.2 °C)  Heart Rate:  [57-84] 62  Resp:  [9-21] 17  BP: (101-150)/(33-80) 116/49  FiO2 (%):  [56 %-96 %] 96 %    Physical Exam:     General Appearance:    Lethargic, arouses briefly,  cooperative, in no acute distress,   Head:    Normocephalic, without obvious abnormality, atraumatic   Eyes:            Lids and lashes normal, conjunctivae and sclerae normal, no   icterus, no pallor, corneas clear, PERRLA   Ears:    Ears appear intact with no abnormalities noted   Throat:   No oral lesions, no thrush, oral mucosa moist   Neck:   No adenopathy, supple, trachea midline, no thyromegaly, no   carotid bruit, no JVD   Lungs:     Clear to auscultation,respirations regular, even and                  unlabored    Heart:    Regular rhythm and normal rate, normal S1 and S2, no            murmur, no gallop, no rub, no click   Chest Wall:    No abnormalities observed   Abdomen:     Normal bowel sounds, no masses, no organomegaly, soft        Non-tender non-distended, no guarding,   Extremities:   Left hip - dressing intact; palpable distal pulses   Pulses:   Pulses palpable and equal bilaterally   Skin:   No bleeding, bruising or rash   Lymph nodes:   No palpable adenopathy   Neurologic:   No obvious lateralizing deficits; gait not assessed.        Results Review:    Lab Results (last 24 hours)     Procedure Component Value Units  Date/Time    Urinalysis, Microscopic Only - Urine, Clean Catch [[id number]]  (Abnormal) Collected: 05/05/23 0030    Specimen: Urine, Clean Catch Updated: 05/05/23 0045     RBC, UA 3-5 /HPF      WBC, UA 0-2 /HPF      Comment: Urine culture not indicated.        Bacteria, UA None Seen /HPF      Squamous Epithelial Cells, UA 0-2 /HPF      Hyaline Casts, UA 0-2 /LPF      Methodology Automated Microscopy    Urinalysis With Culture If Indicated - Urine, Clean Catch [[id number]]  (Abnormal) Collected: 05/05/23 0030    Specimen: Urine, Clean Catch Updated: 05/05/23 0045     Color, UA Yellow     Appearance, UA Clear     pH, UA 7.5     Specific Gravity, UA 1.015     Glucose, UA Negative     Ketones, UA Negative     Bilirubin, UA Negative     Blood, UA Trace     Protein, UA Negative     Leuk Esterase, UA Negative     Nitrite, UA Negative     Urobilinogen, UA 0.2 E.U./dL    Narrative:      In absence of clinical symptoms, the presence of pyuria, bacteria, and/or nitrites on the urinalysis result does not correlate with infection.    Basic Metabolic Panel [[id number]]  (Abnormal) Collected: 05/04/23 1710    Specimen: Blood Updated: 05/04/23 1755     Glucose 113 mg/dL      BUN 11 mg/dL      Creatinine 0.63 mg/dL      Sodium 138 mmol/L      Potassium 4.3 mmol/L      Comment: Slight hemolysis detected by analyzer. Results may be affected.        Chloride 101 mmol/L      CO2 23.0 mmol/L      Calcium 9.7 mg/dL      BUN/Creatinine Ratio 17.5     Anion Gap 14.0 mmol/L      eGFR 87.6 mL/min/1.73     Narrative:      GFR Normal >60  Chronic Kidney Disease <60  Kidney Failure <15    The GFR formula is only valid for adults with stable renal function between ages 18 and 70.    aPTT [[id number]]  (Abnormal) Collected: 05/04/23 1710    Specimen: Blood Updated: 05/04/23 1734     PTT 24.2 seconds     Protime-INR [[id number]]  (Normal) Collected: 05/04/23 1710    Specimen: Blood Updated: 05/04/23 1734     Protime 10.6 Seconds      INR 0.99    CBC &  Differential [[id number]]  (Abnormal) Collected: 05/04/23 1710    Specimen: Blood Updated: 05/04/23 1720    Narrative:      The following orders were created for panel order CBC & Differential.  Procedure                               Abnormality         Status                     ---------                               -----------         ------                     CBC Auto Differential[[id number]]        Abnormal            Final result                 Please view results for these tests on the individual orders.    CBC Auto Differential [[id number]]  (Abnormal) Collected: 05/04/23 1710    Specimen: Blood Updated: 05/04/23 1720     WBC 8.40 10*3/mm3      RBC 4.10 10*6/mm3      Hemoglobin 13.5 g/dL      Hematocrit 41.7 %      .6 fL      MCH 32.9 pg      MCHC 32.4 g/dL      RDW 14.9 %      RDW-SD 52.5 fl      MPV 8.5 fL      Platelets 202 10*3/mm3      Neutrophil % 78.6 %      Lymphocyte % 12.9 %      Monocyte % 7.3 %      Eosinophil % 0.7 %      Basophil % 0.5 %      Neutrophils, Absolute 6.60 10*3/mm3      Lymphocytes, Absolute 1.10 10*3/mm3      Monocytes, Absolute 0.60 10*3/mm3      Eosinophils, Absolute 0.10 10*3/mm3      Basophils, Absolute 0.00 10*3/mm3      nRBC 0.0 /100 WBC            Imaging Results (Last 24 Hours)     Procedure Component Value Units Date/Time    XR Hip With or Without Pelvis 2 - 3 View Left [[id number]] Collected: 05/05/23 1446     Updated: 05/05/23 1449    Narrative:      XR HIP W OR WO PELVIS 2-3 VIEW LEFT    Date of Exam: 5/5/2023 2:32 PM EDT    Indication: Postop    Comparison: None available.    Findings/    Impression:      Impression:  Patient is status post left hip hemiarthroplasty. Postoperative subcutaneous emphysema is noted. Bones are demineralized. Visualized portion of the bony pelvis is unremarkable. Surgical clips project about the right inguinal region.    Electronically Signed: Gilmer Jacobs    5/5/2023 2:47 PM EDT    Workstation ID: [id number]    XR Femur 2 View Left  [[id number]] Collected: 05/05/23 0956     Updated: 05/05/23 1001    Narrative:      XR FEMUR 2 VW LEFT  XR PELVIS 1 OR 2 VW    Date of Exam: 5/5/2023 9:05 AM EDT    Indication: hip fracture    Comparison: Left hip radiographs 5/4/2023.    Findings:  There is redemonstration of an acute displaced left femoral neck fracture with approximately 23 mm superior displacement of the distal fragment. There is no pelvic fracture. The distal femur appears intact with mild tricompartmental osteophyte formation   at the knee. There is mild osteoarthritis at the both hips. There are vascular calcifications. There is evidence of prior stent graft repair of the aorta and iliac vasculature.      Impression:      Impression:    1. Stable displaced acute left femoral neck fracture.  2. 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    Electronically Signed: Javier Sanchez    5/5/2023 9:59 AM EDT    Workstation ID: [id number]    XR Pelvis 1 or 2 View [[id number]] Collected: 05/05/23 0956     Updated: 05/05/23 1001    Narrative:      XR FEMUR 2 VW LEFT  XR PELVIS 1 OR 2 VW    Date of Exam: 5/5/2023 9:05 AM EDT    Indication: hip fracture    Comparison: Left hip radiographs 5/4/2023.    Findings:  There is redemonstration of an acute displaced left femoral neck fracture with approximately 23 mm superior displacement of the distal fragment. There is no pelvic fracture. The distal femur appears intact with mild tricompartmental osteophyte formation   at the knee. There is mild osteoarthritis at the both hips. There are vascular calcifications. There is evidence of prior stent graft repair of the aorta and iliac vasculature.      Impression:      Impression:    1. Stable displaced acute left femoral neck fracture.  2. 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    Electronically Signed: Javier Sanchez    5/5/2023 9:59 AM EDT    Workstation ID: [id number]    XR Chest 1 View [[id number]] Collected: 05/04/23 1805     Updated: 05/04/23 1808    Narrative:      XR CHEST  1 VW    Date of Exam: 5/4/2023 5:48 PM EDT    Indication: pre op clearance fall.    Comparison: Chest x-ray 4/8/2023    Findings:  Lungs are hyperexpanded. There is cardiomegaly. There is no pneumothorax or pleural effusion. Stable scarring in the left midlung. No new focal pulmonary parenchymal opacity. No acute displaced rib fractures.      Impression:      Impression:  No acute cardiopulmonary abnormality.      Electronically Signed: Karlie Lockett    5/4/2023 6:06 PM EDT    Workstation ID: [id number]    XR Hip With or Without Pelvis 2 - 3 View Left [[id number]] Collected: 05/04/23 1752     Updated: 05/04/23 1755    Narrative:      XR HIP W OR WO PELVIS 2-3 VIEW LEFT    Date of Exam: 5/4/2023 5:48 PM EDT    Indication: trauma    Comparison: Left hip x-ray 7/25/2022 [facility]    Findings:  There is a displaced fracture of the femoral neck. The distal fracture fragment is superiorly displaced by 2.6 cm. There is osteopenia.      Impression:      Impression:  Displaced/impacted femoral neck fracture left hip.      Electronically Signed: Karlie Lockett    5/4/2023 5:53 PM EDT    Workstation ID: [id number]               I reviewed the patient's new clinical results.    Medication Review:   Scheduled Meds:acetaminophen, 1,000 mg, Oral, Q8H  amLODIPine, 10 mg, Oral, Daily  ceFAZolin, 2 g, Intravenous, Q8H  [START ON 5/6/2023] clopidogrel, 75 mg, Oral, Daily  famotidine, 40 mg, Oral, Daily  rosuvastatin, 10 mg, Oral, Daily  sodium chloride, 3 mL, Intravenous, Q12H      Continuous Infusions:niCARdipine, 5-15 mg/hr  phenylephrine, 0.5-3 mcg/kg/min      PRN Meds:.•  hydrALAZINE  •  HYDROmorphone  •  ipratropium-albuterol  •  labetalol  •  niCARdipine  •  ondansetron  •  oxyCODONE  •  oxyCODONE  •  phenylephrine  •  [COMPLETED] Insert Peripheral IV **AND** sodium chloride  •  sodium chloride  •  sodium chloride     Assessment & Plan       Closed fracture of left hip, initial encounter  - post op pathway per ortho; will need  skilled rehab  COPD  HTN  Mood disorder    Will resume home meds when able.  Start post-op DVT prophylaxis tomorrow.    Plan for disposition:Family requests [facility] or [facility] Cronin MD  05/05/23  15:51 EDT

## 2023-05-05 NOTE — ANESTHESIA PREPROCEDURE EVALUATION
Anesthesia Evaluation     Patient summary reviewed and Nursing notes reviewed   no history of anesthetic complications:  NPO Solid Status: > 8 hours  NPO Liquid Status: > 2 hours           Airway   Dental      Pulmonary    (+) a smoker Former, COPD, shortness of breath,   Cardiovascular     ECG reviewed  PT is on anticoagulation therapy    (+) hypertension, valvular problems/murmurs AI, dysrhythmias PAC, pericardial effusion, ISAACS, PVD, hyperlipidemia,       Neuro/Psych  (+) weakness,    GI/Hepatic/Renal/Endo    (+)   renal disease CRI,     Musculoskeletal     Abdominal    Substance History      OB/GYN          Other   arthritis,      ROS/Med Hx Other: Additional History:  COPD exacerbation with hypoxemia, claudication, hip fx, aortic aneurysm    Echo:  Normal LV size and contractility EF of 70%  Normal RV size  Normal atrial size  Aortic valve, mitral valve, tricuspid valve appears structurally normal, mild aortic regurgitation seen.  Small pericardial effusion seen which appears organized and fibrinous., no signs of increased intrapericardial pressures.  Proximal aorta appears normal in size.      Stress Test:  NUCLEAR IMAGIN.  There was  uniform uptake of Cardiolite both in resting and post stress images, no ischemia seen.  2.  Gated images reveal  normal LV size and contractility, LVEF of 67%     CONCLUSION:  1.  Lexiscan Cardiolite with normal perfusion, negative for ischemia.  2.  Normal wall motion.  LVEF of 67%.     RECOMMENDATIONS:     Clinical correlation recommended.      PSH:    OTHER SURGICAL HISTORY DESCENDING AORTIC ANEURYSM REPAIR W/ STENT  CARPAL TUNNEL RELEASE KNEE SURGERY  INTERVENTIONAL RADIOLOGY PROCEDURE VASCULAR SURGERY  INGUINAL HERNIA REPAIR                 Anesthesia Plan    ASA 3     ERAS Protocol and general   total IV anesthesia  (Patient identified; pre-operative vital signs, all relevant labs/studies, complete medical/surgical/anesthetic history, full medication list, full  allergy list, and NPO status obtained/reviewed; physical assessment performed; anesthetic options, side effects, potential complications, risks, and benefits discussed; questions answered; written anesthesia consent obtained; patient cleared for procedure; anesthesia machine and equipment checked and functioning)  intravenous induction     Anesthetic plan, risks, benefits, and alternatives have been provided, discussed and informed consent has been obtained with: patient.    Plan discussed with CRNA and CAA.        CODE STATUS:    Code Status (Patient has no pulse and is not breathing): CPR (Attempt to Resuscitate)  Medical Interventions (Patient has pulse or is breathing): Full Support

## 2023-05-05 NOTE — H&P
Patient Care Team:  Dianna Cronin MD as PCP - General (Internal Medicine)  Kyrie Campoverde MD as Consulting Physician (Cardiology)  Jasper Mcneill MD as Consulting Physician (General Surgery)  Sung Tamayo MD (Vascular Surgery)    Chief complaint left hip pain    Subjective     Patient is a 83 y.o. female who presents with complaints of left sided hip pain after tripping over a low lying step in the basement today. She landed on her left hip on concrete floor. She was unable to ambulate afterwards. She denies hitting her head or LOC. Denies any dizziness prior to the fall. Denies any chest pain, shortness of breath. Denies any prior hip surgeries. She reports taking Plavix  In the ER xray of left hip shows displaced/impacted femoral neck fracture of left hip. Ortho was consulted and will plan for possible procedure in the morning. Labs unremarkable, UA pending, CXR with no acute findings, EKG sinus rhythm.     Onset of symptoms was prior to arrival to ER     Review of Systems   Constitutional: Negative.    HENT: Negative.    Eyes: Negative.    Respiratory: Negative.    Cardiovascular: Negative.    Gastrointestinal: Negative.    Endocrine: Negative.    Genitourinary: Negative.    Musculoskeletal: Positive for arthralgias and gait problem.   Skin: Negative.    Psychiatric/Behavioral: Negative.           History  Past Medical History:   Diagnosis Date   • Aortic aneurysm    • COPD (chronic obstructive pulmonary disease)    • Hernia, inguinal, left    • Hyperlipidemia    • Hypertension    • Macular degeneration    • Peripheral edema    • PVD (peripheral vascular disease)      Past Surgical History:   Procedure Laterality Date   • CARPAL TUNNEL RELEASE     • DESCENDING AORTIC ANEURYSM REPAIR W/ STENT     • INGUINAL HERNIA REPAIR Bilateral 11/16/2022    Procedure: INGUINAL HERNIA REPAIR LAPAROSCOPIC WITH DAVINCI ROBOT;  Surgeon: Jasper Mcneill MD;  Location: [facility] MAIN OR;  Service: Robotics -  Marielle;  Laterality: Bilateral;   • INTERVENTIONAL RADIOLOGY PROCEDURE N/A 2021    Procedure: Abdominal Aortagram with Runoff, possible PCI;  Surgeon: Kyrie Campoverde MD;  Location: [facility] INVASIVE LOCATION;  Service: Cardiovascular;  Laterality: N/A;   • KNEE SURGERY     • OTHER SURGICAL HISTORY      STENT   • VASCULAR SURGERY      fem fem bypass     Family History   Problem Relation Age of Onset   • Cancer Other    • Diabetes Other    • Aneurysm Father    • Cancer Mother      Social History     Tobacco Use   • Smoking status: Former     Packs/day: 1.00     Years: 30.00     Pack years: 30.00     Types: Cigarettes     Quit date:      Years since quittin.3   • Smokeless tobacco: Never   Vaping Use   • Vaping Use: Never used   Substance Use Topics   • Alcohol use: No   • Drug use: Never     Medications Prior to Admission   Medication Sig Dispense Refill Last Dose   • albuterol sulfate  (90 Base) MCG/ACT inhaler Inhale 2 puffs Every 4 (Four) Hours As Needed for Wheezing.      • amLODIPine (NORVASC) 10 MG tablet Take 1 tablet by mouth Daily.      • aspirin 81 MG chewable tablet Chew 1 tablet Daily.   2023 at 1200   • bisoprolol-hydrochlorothiazide (ZIAC) 5-6.25 MG per tablet Take 1 tablet by mouth Daily.      • cholecalciferol (VITAMIN D3) 25 MCG (1000 UT) tablet Take 2 tablets by mouth Daily.      • clopidogrel (PLAVIX) 75 MG tablet Take 1 tablet by mouth Daily.   2023 at 1200   • Fluticasone-Umeclidin-Vilant (Trelegy Ellipta) 100-62.5-25 MCG/ACT inhaler Inhale 1 puff Daily.      • HYDROcodone-acetaminophen (NORCO) 5-325 MG per tablet Take 1 tablet by mouth Every 8 (Eight) Hours As Needed.      • Multiple Vitamins-Minerals (PRESERVISION AREDS 2+MULTI VIT PO) Take 1 tablet by mouth 2 (Two) Times a Day.      • pyridoxine (VITAMIN B-6) 100 MG tablet tablet Take 1 tablet by mouth Daily.      • rosuvastatin (CRESTOR) 10 MG tablet Take 1 tablet by mouth Daily.      • vitamin  B-12 (CYANOCOBALAMIN) 1000 MCG tablet Take 2.5 tablets by mouth Daily.        Allergies:  Patient has no known allergies.    Objective     Vital Signs  Temp:  [98 °F (36.7 °C)-98.3 °F (36.8 °C)] 98 °F (36.7 °C)  Heart Rate:  [75-84] 77  Resp:  [16-21] 16  BP: (101-150)/(64-80) 101/64     Physical Exam:      General Appearance:    Alert, cooperative, in no acute distress   Head:    Normocephalic, without obvious abnormality, atraumatic   Eyes:            Lids and lashes normal, conjunctivae and sclerae normal, no   icterus, no pallor, corneas clear, PERRLA   Ears:    Ears appear intact with no abnormalities noted   Throat:   No oral lesions, no thrush, oral mucosa moist   Neck:   No adenopathy, supple, trachea midline, no thyromegaly, no   carotid bruit, no JVD   Lungs:     Clear to auscultation,respirations regular, even and                  unlabored    Heart:    Regular rhythm and normal rate, normal S1 and S2, no            murmur, no gallop, no rub, no click   Chest Wall:    No abnormalities observed   Abdomen:     Normal bowel sounds, no masses, no organomegaly, soft        non-tender, non-distended, no guarding, no rebound                tenderness   Extremities:   Moves all extremities well, no edema, no cyanosis, no             redness   Pulses:   Pulses palpable and equal bilaterally   Skin:   No bleeding, bruising or rash   Lymph nodes:   No palpable adenopathy   Neurologic:   No focal deficits noted       Results Review:     Imaging Results (Last 24 Hours)     Procedure Component Value Units Date/Time    XR Chest 1 View [[id number]] Collected: 05/04/23 1805     Updated: 05/04/23 1808    Narrative:      XR CHEST 1 VW    Date of Exam: 5/4/2023 5:48 PM EDT    Indication: pre op clearance fall.    Comparison: Chest x-ray 4/8/2023    Findings:  Lungs are hyperexpanded. There is cardiomegaly. There is no pneumothorax or pleural effusion. Stable scarring in the left midlung. No new focal pulmonary parenchymal  opacity. No acute displaced rib fractures.      Impression:      Impression:  No acute cardiopulmonary abnormality.      Electronically Signed: Karlie Lockett    5/4/2023 6:06 PM EDT    Workstation ID: [id number]    XR Hip With or Without Pelvis 2 - 3 View Left [[id number]] Collected: 05/04/23 1752     Updated: 05/04/23 1755    Narrative:      XR HIP W OR WO PELVIS 2-3 VIEW LEFT    Date of Exam: 5/4/2023 5:48 PM EDT    Indication: trauma    Comparison: Left hip x-ray 7/25/2022 [facility]    Findings:  There is a displaced fracture of the femoral neck. The distal fracture fragment is superiorly displaced by 2.6 cm. There is osteopenia.      Impression:      Impression:  Displaced/impacted femoral neck fracture left hip.      Electronically Signed: Karlie Russremberto    5/4/2023 5:53 PM EDT    Workstation ID: [id number]           Lab Results (last 24 hours)     Procedure Component Value Units Date/Time    Basic Metabolic Panel [[id number]]  (Abnormal) Collected: 05/04/23 1710    Specimen: Blood Updated: 05/04/23 1755     Glucose 113 mg/dL      BUN 11 mg/dL      Creatinine 0.63 mg/dL      Sodium 138 mmol/L      Potassium 4.3 mmol/L      Comment: Slight hemolysis detected by analyzer. Results may be affected.        Chloride 101 mmol/L      CO2 23.0 mmol/L      Calcium 9.7 mg/dL      BUN/Creatinine Ratio 17.5     Anion Gap 14.0 mmol/L      eGFR 87.6 mL/min/1.73     Narrative:      GFR Normal >60  Chronic Kidney Disease <60  Kidney Failure <15    The GFR formula is only valid for adults with stable renal function between ages 18 and 70.    aPTT [[id number]]  (Abnormal) Collected: 05/04/23 1710    Specimen: Blood Updated: 05/04/23 1734     PTT 24.2 seconds     Protime-INR [[id number]]  (Normal) Collected: 05/04/23 1710    Specimen: Blood Updated: 05/04/23 1734     Protime 10.6 Seconds      INR 0.99    CBC & Differential [[id number]]  (Abnormal) Collected: 05/04/23 1710    Specimen: Blood Updated: 05/04/23 1720    Narrative:      The  following orders were created for panel order CBC & Differential.  Procedure                               Abnormality         Status                     ---------                               -----------         ------                     CBC Auto Differential[[id number]]        Abnormal            Final result                 Please view results for these tests on the individual orders.    CBC Auto Differential [[id number]]  (Abnormal) Collected: 05/04/23 1710    Specimen: Blood Updated: 05/04/23 1720     WBC 8.40 10*3/mm3      RBC 4.10 10*6/mm3      Hemoglobin 13.5 g/dL      Hematocrit 41.7 %      .6 fL      MCH 32.9 pg      MCHC 32.4 g/dL      RDW 14.9 %      RDW-SD 52.5 fl      MPV 8.5 fL      Platelets 202 10*3/mm3      Neutrophil % 78.6 %      Lymphocyte % 12.9 %      Monocyte % 7.3 %      Eosinophil % 0.7 %      Basophil % 0.5 %      Neutrophils, Absolute 6.60 10*3/mm3      Lymphocytes, Absolute 1.10 10*3/mm3      Monocytes, Absolute 0.60 10*3/mm3      Eosinophils, Absolute 0.10 10*3/mm3      Basophils, Absolute 0.00 10*3/mm3      nRBC 0.0 /100 WBC            I reviewed the patient's new clinical results.    Assessment & Plan       Closed fracture of left hip, initial encounter  -mechanical fall today  -Xray shows displaced/impacted femoral neck fracture left hip  -CXR with no acute findings  -EKG sinus rhythm rate 81  -UA pending  -Ortho consulted  -keep NPO after MN      DVT prophylaxis- SCD's  GI prophylaxis- pepcid    I discussed the patient's findings and my recommendations with patient.     Norma Esteban, APRN  05/04/23  23:54 EDT

## 2023-05-05 NOTE — CASE MANAGEMENT/SOCIAL WORK
Continued Stay Note  RAFI Mckinney     Patient Name: Becka Oliva  MRN: [id number]  Today's Date: 5/5/2023    Admit Date: 5/4/2023    Plan: NEEDS CM ASSESSMENT  OFF FLOOR ALL DAY FOR SURGERY   Discharge Plan     Row Name 05/05/23 1716       Plan    Plan NEEDS CM ASSESSMENT  OFF FLOOR ALL DAY FOR SURGERY                       Expected Discharge Date and Time     Expected Discharge Date Expected Discharge Time    May 7, 2023             Marcela Shore RN   Met with patient in room wearing PPE: mask, face shield/goggles, gloves, gown.      Maintained distance greater than six feet and spent less than 15 minutes in the room.

## 2023-05-05 NOTE — OP NOTE
Orthopaedic & Sports Medicine Surgery  Dr. Guanako Harmon MD  [phone]    PATIENT NAME: Becka Oliva  MRN: [id number]  : 1940 AGE: 83 y.o. GENDER: female  DATE OF OPERATION: 2023  PREOPERATIVE DIAGNOSIS: Left femoral Neck Fracture  POSTOPERATIVE DIAGNOSIS: Same  OPERATION PERFORMED: Left Hip Hemiarthroplasty  Surgical Approach: Posterior    SURGEON: Guanako Harmon MD  Circulator: Danie Vincent RN; Nissa Garcia RN  Scrub Person: Svetlana Bedolla; Ambar Bedolla  Vendor Representative: Devin Wadsworth  ANESTHESIA: General  ANTIBIOTICS: 2g Ancef, 1g Vancomycin   ASSISTANT: Devin Felix CFA was responsible for performing the following activities: Retraction, Suction, Irrigation, Suturing, Closing and Placing Dressing and their skilled assistance was necessary for the success of this case.     ESTIMATED BLOOD LOSS: 100cc  SPONGE AND NEEDLE COUNT: Correct  INDICATIONS:  This patient was noted to have a femoral neck fracture. Do to the patients age, medical comorbidities, and lifestyle, it was decided that a hemiarthroplasty was the best surgical choice. They elected to undergo hip hemiarthroplasty. The risks of surgery were discussed and included the risk of anesthesia, infection, damage to neurovascular structures, implant loosening/failure, fracture, hardware prominence, leg length discrepancies, dislocation, the need for further procedures, medical complications, damage to nerves and blood vessels, DVT, PE, MI, stroke, death, and others. No guarantees were made. The patient wished to proceed with surgery and a surgical consent was signed.  COMPONENTS:   Implant Name Type Inv. Item Serial No.  Lot No. LRB No. Used Action   DEV CONTRL TISS STRATAFIX SPIRAL MNCRYL UD 3/0 PLS 30CM - [id number] Implant DEV CONTRL TISS STRATAFIX SPIRAL MNCRYL UD 3/0 PLS 30CM  [employer]  DIV OF J [employer] J [id number] Left 1 Implanted   DEV WND/CLS CONTRL TISS STRATAFIX SYMM PDS PLS CTX 60CM RICH - [id number] Implant  DEV WND/CLS CONTRL TISS STRATAFIX SYMM PDS PLS CTX 60CM RICH  [employer]  DIV OF J AND J [id number] Left 1 Implanted   KT BONE BIO PREP 6EA C/S - [id number] Implant KT BONE BIO PREP 6EA C/S  TANIKA JOHN [id number] Left 1 Implanted   SUT FW 5 W .5 CIR CUT NDL 48M [id number] - [id number] Implant SUT FW 5 W .5 CIR CUT NDL 48M [id number]  ARTHRE[employer] [id number] Left 1 Implanted   STEM FEM/HIP POLARSTEM STD CMT 135DEG SZ2 - [id number] Implant STEM FEM/HIP POLARSTEM STD CMT 135DEG SZ2  [employer] [id number] Left 1 Implanted   SHLL BIPOL TANDEM COCR 86X48KX - [id number] Implant SHLL BIPOL TANDEM COCR [id number]  [employer] [id number] Left 1 Implanted   HD FEM/HIP OXINIUM TPR 12/14 28MM PLS0 - [id number] Implant HD FEM/HIP OXINIUM TPR 12/14 28MM PLS0  [employer] [id number] Left 1 Implanted   CMT BONE PALACOS R HI/VISC 1X40 - [id number] Implant CMT BONE PALACOS R HI/VISC 1X40  [facility] [id number] Left 2 Implanted   ·   · [employer] Polar stem size 2, standard offset, cemented  · Bipolar Construct  · 48 mm outer diameter head, 28 mm inner diameter head, +0 neck length    PERTINENT FINDINGS: Femoral Neck Fracture    DETAILS OF PROCEDURE:   The patient was met in the preoperative area. The site was marked. The consent and H&P were reviewed. The patient was then wheeled back to the operative suite and underwent anesthesia. The patient was positioned laterally using the pegboard. An axillary roll was placed under the down arm. Excess hair about the operative hip was removed using surgical clippers. Surgical alcohol was used to thoroughly clean the entire operative extremity.     The hip and leg were then prepped in the normal sterile fashion, which included ChloraPrep, multiple layers of sterile drapes, and surgical space suits for the entire operative team. New outer gloves were used by all sterile surgical team members after final draping. The surgical incision was marked. A surgical timeout was performed in which administration of  preoperative antibiotics and tranexamic acid, if not contraindicated, was confirmed.    A standard posterior approach to the hip was then utilized. Care was taken to avoid damage and dissection around the sciatic nerve throughout the case.  We identified the external rotators and capsule and took these down with Bovie cautery in a sleeve and tagged.  We identified the femoral neck fracture and retractors were placed around the femoral neck and a cleanup neck cut was then made using the saw, while ensuring all soft tissues were protected.  We then removed the femoral head and sized on the back table.  All bits of comminuted bone were removed.  The wound and acetabulum were irrigated to ensure being free of all debris.  We then trialed the femoral head to choose the appropriate sized implant as listed above.    We presented the femoral neck cut of the femur, removed excess soft tissue from the saddle to see the lateral extent of the neck, and and entered the femoral canal with a box-cutter starting device and then canal finder.  We then used the lateralizer rasp.  The femur was then broached until a reasonable fill had been achieved.  We checked with the suction tip around the inner cortex of the femur to make sure there were no perforations.  The hip was trialed and stability as well as leg length were assessed.  We took the hip through range of motion including extension with external rotation, position of sleep, and 90 degrees of flexion with internal rotation and the hip was stable.    CEMENT: Once trialing was completed, cement was mixed while the femoral canal was prepared. A cement restrictor was utilized. The real stem and head ball were opened and cemented in place. The hip was then reduced and stability was verified.     The hip was stable through all range of motion and leg lengths were equal .  We meticulously inspected the femoral neck to make sure that there is no calcar fracture.  The hip was irrigated  and let soak with Irrisept. The hip was then thoroughly irrigated with pulse lavage. An analgesic cocktail was injected about the hip ensuring to avoid the sciatic nerve. The capsule was closed with #5 FiberWire.  The sciatic nerve was checked again that it was free without damage.  The fascia was closed with a running barbed suture. The Subcutaneous tissue and skin were closed in layers. Finally the skin was closed, skin glue applied, and a sterile dressing was applied.     The patient was moved from the operative table to the bed.  An abduction pillow was placed.  The patient was taken to the recovery room in stable condition. There were no complications and the patient tolerated the procedure well.  I was present and scrubbed for all portions of the case.  All counts were correct at the end of the procedure.    Postoperative course:  Weight-bear as tolerated with posterior hip precautions  Will work with PT and OT  24 hours of postoperative antibiotics  DVT prophylaxis with: Mechanical SCDs in-house and chemical DVT prophylaxis starting tomorrow with restarting home Reg Harmon MD  [facility]  Orthopaedic Surgery, Sports Medicine  [phone]

## 2023-05-05 NOTE — ANESTHESIA POSTPROCEDURE EVALUATION
Patient: Becka Oliva    Procedure Summary     Date: 05/05/23 Room / Location: [facility] OR 12 / [facility] MAIN OR    Anesthesia Start: 1122 Anesthesia Stop: 1332    Procedure: HIP BIPOLAR CEMENTED (Left: Hip) Diagnosis:       Closed fracture of left hip, initial encounter      (Closed fracture of left hip, initial encounter [S72.002A])    Surgeons: Guanako Harmon MD Provider: Jose Parker MD    Anesthesia Type: ERAS Protocol, general with block ASA Status: 3          Anesthesia Type: ERAS Protocol, general with block    Vitals  Vitals Value Taken Time   /54 05/05/23 1350   Temp 98.5 °F (36.9 °C) 05/05/23 1328   Pulse 59 05/05/23 1351   Resp 9 05/05/23 1343   SpO2 92 % 05/05/23 1351   Vitals shown include unvalidated device data.        Post Anesthesia Care and Evaluation    Patient location during evaluation: PACU  Patient participation: complete - patient participated  Level of consciousness: awake  Pain scale: See nurse's notes for pain score.  Pain management: adequate    Airway patency: patent  Anesthetic complications: No anesthetic complications  PONV Status: none  Cardiovascular status: acceptable  Respiratory status: acceptable and spontaneous ventilation  Hydration status: acceptable    Comments: Patient seen and examined postoperatively; vital signs stable; SpO2 greater than or equal to 90%; cardiopulmonary status stable; nausea/vomiting adequately controlled; pain adequately controlled; no apparent anesthesia complications; patient discharged from anesthesia care when discharge criteria were met

## 2023-05-05 NOTE — BRIEF OP NOTE
HIP BIPOLAR CEMENTED  Progress Note    Becka Oliva  5/5/2023    Pre-op Diagnosis:   Closed fracture of left hip, initial encounter [S72.002A]       Post-Op Diagnosis Codes:     * Closed fracture of left hip, initial encounter [S72.002A]    Procedure/CPT® Codes:        Procedure(s):  Left HIP BIPOLAR CEMENTED        Surgeon(s):  Guanako Harmon MD    Anesthesia: General with Block    Staff:   Circulator: Danie Vincent RN; Nissa Garcia RN  Scrub Person: Svetlana Bedolla; Ambar Bedolla  Vendor Representative: Devin Wadsworth         Estimated Blood Loss: 100ml    Urine Voided: * No values recorded between 5/5/2023 11:22 AM and 5/5/2023 12:48 PM *    Specimens:                None          Drains:   [REMOVED] External Urinary Catheter (Removed)   Site Assessment Clean;Skin intact 04/10/23 1249   Application/Removal external catheter changed 04/10/23 0825   Collection Container Wall suction 04/11/23 0815   Securement Method Securing device 04/11/23 0815   Catheter care complete Yes 04/10/23 0825   Output (mL) 300 mL 04/11/23 0405       Findings: See dictated op report        Complications: None apparent at time of dictation    Devin Felix CFA  was responsible for performing the following activities: Retraction, Suction, Irrigation, Suturing, Closing and Placing Dressing and their skilled assistance was necessary for the success of this case.    Guanako Harmon MD     Date: 5/5/2023  Time: 13:05 EDT

## 2023-05-06 LAB
DEPRECATED RDW RBC AUTO: 51.2 FL (ref 37–54)
ERYTHROCYTE [DISTWIDTH] IN BLOOD BY AUTOMATED COUNT: 14 % (ref 12.3–15.4)
HCT VFR BLD AUTO: 32.5 % (ref 34–46.6)
HGB BLD-MCNC: 10.7 G/DL (ref 12–15.9)
MCH RBC QN AUTO: 32.6 PG (ref 26.6–33)
MCHC RBC AUTO-ENTMCNC: 32.9 G/DL (ref 31.5–35.7)
MCV RBC AUTO: 99.2 FL (ref 79–97)
PLATELET # BLD AUTO: 145 10*3/MM3 (ref 140–450)
PMV BLD AUTO: 9.1 FL (ref 6–12)
RBC # BLD AUTO: 3.28 10*6/MM3 (ref 3.77–5.28)
WBC NRBC COR # BLD: 10.1 10*3/MM3 (ref 3.4–10.8)

## 2023-05-06 PROCEDURE — 97166 OT EVAL MOD COMPLEX 45 MIN: CPT

## 2023-05-06 PROCEDURE — 94640 AIRWAY INHALATION TREATMENT: CPT

## 2023-05-06 PROCEDURE — 94799 UNLISTED PULMONARY SVC/PX: CPT

## 2023-05-06 PROCEDURE — 97116 GAIT TRAINING THERAPY: CPT

## 2023-05-06 PROCEDURE — 97535 SELF CARE MNGMENT TRAINING: CPT

## 2023-05-06 PROCEDURE — 97162 PT EVAL MOD COMPLEX 30 MIN: CPT

## 2023-05-06 PROCEDURE — 85027 COMPLETE CBC AUTOMATED: CPT | Performed by: STUDENT IN AN ORGANIZED HEALTH CARE EDUCATION/TRAINING PROGRAM

## 2023-05-06 PROCEDURE — 25010000002 CEFAZOLIN PER 500 MG: Performed by: STUDENT IN AN ORGANIZED HEALTH CARE EDUCATION/TRAINING PROGRAM

## 2023-05-06 PROCEDURE — 94664 DEMO&/EVAL PT USE INHALER: CPT

## 2023-05-06 RX ORDER — BUDESONIDE AND FORMOTEROL FUMARATE DIHYDRATE 160; 4.5 UG/1; UG/1
2 AEROSOL RESPIRATORY (INHALATION)
Status: DISCONTINUED | OUTPATIENT
Start: 2023-05-06 | End: 2023-05-07 | Stop reason: HOSPADM

## 2023-05-06 RX ORDER — ASPIRIN 325 MG
325 TABLET ORAL DAILY
Status: DISCONTINUED | OUTPATIENT
Start: 2023-05-06 | End: 2023-05-07 | Stop reason: HOSPADM

## 2023-05-06 RX ADMIN — ACETAMINOPHEN 1000 MG: 500 TABLET, FILM COATED ORAL at 10:38

## 2023-05-06 RX ADMIN — ROSUVASTATIN 10 MG: 10 TABLET, FILM COATED ORAL at 08:38

## 2023-05-06 RX ADMIN — CEFAZOLIN 2 G: 2 INJECTION, POWDER, FOR SOLUTION INTRAMUSCULAR; INTRAVENOUS at 03:35

## 2023-05-06 RX ADMIN — OXYCODONE 10 MG: 5 TABLET ORAL at 09:21

## 2023-05-06 RX ADMIN — BUDESONIDE AND FORMOTEROL FUMARATE DIHYDRATE 2 PUFF: 160; 4.5 AEROSOL RESPIRATORY (INHALATION) at 19:04

## 2023-05-06 RX ADMIN — CLOPIDOGREL BISULFATE 75 MG: 75 TABLET ORAL at 08:38

## 2023-05-06 RX ADMIN — Medication 3 ML: at 21:12

## 2023-05-06 RX ADMIN — ACETAMINOPHEN 1000 MG: 500 TABLET, FILM COATED ORAL at 03:35

## 2023-05-06 RX ADMIN — ASPIRIN 325 MG ORAL TABLET 325 MG: 325 PILL ORAL at 17:26

## 2023-05-06 RX ADMIN — ACETAMINOPHEN 1000 MG: 500 TABLET, FILM COATED ORAL at 19:37

## 2023-05-06 RX ADMIN — OXYCODONE 5 MG: 5 TABLET ORAL at 22:20

## 2023-05-06 RX ADMIN — FAMOTIDINE 40 MG: 20 TABLET, FILM COATED ORAL at 08:38

## 2023-05-06 RX ADMIN — AMLODIPINE BESYLATE 10 MG: 5 TABLET ORAL at 08:38

## 2023-05-06 RX ADMIN — Medication 3 ML: at 08:37

## 2023-05-06 NOTE — THERAPY EVALUATION
Patient Name: Becka Oliva  : 1940    MRN: [id number]                              Today's Date: 2023       Admit Date: 2023    Visit Dx:     ICD-10-CM ICD-9-CM   1. Closed fracture of left hip, initial encounter  S72.002A 820.8     Patient Active Problem List   Diagnosis   • Atherosclerotic peripheral vascular disease (HCC)   • Benign hypertensive heart disease   • Chronic kidney disease   • COPD with hypoxia   • Dyspnea on exertion   • History of aortic aneurysm   • Hypertension   • Pericardial effusion   • Primary localized osteoarthritis   • Shortness of breath   • Acute UTI   • Pyelonephritis   • Generalized weakness   • PVD (peripheral vascular disease) with claudication   • Dyslipidemia   • Essential hypertension   • PAD (peripheral artery disease)   • Left inguinal hernia   • Dyspnea, unspecified type   • COPD with acute exacerbation   • Closed fracture of left hip, initial encounter     Past Medical History:   Diagnosis Date   • Aortic aneurysm    • COPD (chronic obstructive pulmonary disease)    • Hernia, inguinal, left    • Hyperlipidemia    • Hypertension    • Macular degeneration    • Peripheral edema    • PVD (peripheral vascular disease)      Past Surgical History:   Procedure Laterality Date   • CARPAL TUNNEL RELEASE     • DESCENDING AORTIC ANEURYSM REPAIR W/ STENT     • INGUINAL HERNIA REPAIR Bilateral 2022    Procedure: INGUINAL HERNIA REPAIR LAPAROSCOPIC WITH DAVINCI ROBOT;  Surgeon: Jasper Mcneill MD;  Location: [facility] MAIN OR;  Service: Robotics - DaVinci;  Laterality: Bilateral;   • INTERVENTIONAL RADIOLOGY PROCEDURE N/A 2021    Procedure: Abdominal Aortagram with Runoff, possible PCI;  Surgeon: Kyrie Campoverde MD;  Location: [facility] CATH INVASIVE LOCATION;  Service: Cardiovascular;  Laterality: N/A;   • KNEE SURGERY     • OTHER SURGICAL HISTORY      STENT   • VASCULAR SURGERY      fem fem bypass      General Information     Row Name 23 1335           General Information    Prior Level of Function independent:;ADL's;all household mobility;dependent:;driving;min assist:;cooking;cleaning;home management;shopping;using stairs  uses cane. O2 at home PRN. Family assist pt going down her steps out of the side of her house. The front of her house does not have steps. Pt makes light meals for herself, mainly using microwave.  -     Existing Precautions/Restrictions fall;hip, posterior;left;oxygen therapy device and L/min  -     Barriers to Rehab previous functional deficit  -     Row Name 05/06/23 1335          Living Environment    People in Home alone  -     Row Name 05/06/23 1335          Home Main Entrance    Number of Stairs, Main Entrance none  -     Row Name 05/06/23 1335          Cognition    Orientation Status (Cognition) oriented x 4;verbal cues/prompts needed for orientation  -     Row Name 05/06/23 1335          Safety Issues, Functional Mobility    Safety Issues Affecting Function (Mobility) judgment;problem-solving;safety precaution awareness;sequencing abilities  -     Impairments Affecting Function (Mobility) balance;endurance/activity tolerance;visual/perceptual;pain;motor control;range of motion (ROM);postural/trunk control;sensation/sensory awareness;shortness of breath;strength  -           User Key  (r) = Recorded By, (t) = Taken By, (c) = Cosigned By    Initials Name Provider Type     Parris Ryan OT Occupational Therapist                 Mobility/ADL's     Row Name 05/06/23 1340          Bed Mobility    Bed Mobility supine-sit  -     Supine-Sit [place] (Bed Mobility) 2 person assist;maximum assist (25% patient effort)  -     Bed Mobility, Safety Issues decreased use of legs for bridging/pushing;decreased use of arms for pushing/pulling  -     Assistive Device (Bed Mobility) head of bed elevated;draw sheet;bed rails  -     Row Name 05/06/23 1340          Transfers    Transfers sit-stand transfer;bed-chair  transfer;stand-sit transfer  -[facility] Name 05/06/23 1340          Bed-Chair Transfer    Bed-Chair [place] (Transfers) 2 person assist;maximum assist (25% patient effort)  -     Assistive Device (Bed-Chair Transfers) walker, front-wheeled  -[facility] Name 05/06/23 1340          Sit-Stand Transfer    Sit-Stand [place] (Transfers) 2 person assist;moderate assist (50% patient effort)  -[facility] Name 05/06/23 1340          Stand-Sit Transfer    Stand-Sit [place] (Transfers) 2 person assist;moderate assist (50% patient effort)  -[facility] Name 05/06/23 1340          Activities of Daily Living    BADL Assessment/Intervention lower body dressing;toileting;feeding  -[facility] Name 05/06/23 1340          Lower Body Dressing Assessment/Training    [place] Level (Lower Body Dressing) don;socks;dependent (less than 25% patient effort)  -[facility]     Row Name 05/06/23 1340          Toileting Assessment/Training    [place] Level (Toileting) toileting skills;dependent (less than 25% patient effort)  -[facility]     Row Name 05/06/23 1340          Self-Feeding Assessment/Training    [place] Level (Feeding) feeding skills;set up  -           User Key  (r) = Recorded By, (t) = Taken By, (c) = Cosigned By    Initials Name Provider Type     Parris Ryan OT Occupational Therapist               Obj/Interventions     [facility] Name 05/06/23 1341          Sensory Assessment (Somatosensory)    Sensory Assessment (Somatosensory) left LE  -     Sensory Subjective Reports numbness  -[facility] Name 05/06/23 1341          Vision Assessment/Intervention    Visual Impairment/Limitations other (see comments)  macular degeneration  -[facility] Name 05/06/23 1341          Range of Motion Comprehensive    Comment, General Range of Motion hip prec. limit hip flex 50% on lt. Shld OA noted w/ crepitus.  -[facility] Name 05/06/23 1341          Strength Comprehensive (MMT)    Comment, General Manual Muscle Testing (MMT)  Assessment lle tomy with weight-bearing, UE weakness 2* OA and mild fever limit pt ability to push off arms to offload leg.  -           User Key  (r) = Recorded By, (t) = Taken By, (c) = Cosigned By    Initials Name Provider Type     Parris Ryan OT Occupational Therapist               Goals/Plan     Row Name 05/06/23 5905          Bed Mobility Goal 1 (OT)    Activity/Assistive Device (Bed Mobility Goal 1, OT) bed mobility activities, all  -     [place] Level/Cues Needed (Bed Mobility Goal 1, OT) minimum assist (75% or more patient effort)  -     Time Frame (Bed Mobility Goal 1, OT) 2 weeks  -     Row Name 05/06/23 6372          Transfer Goal 1 (OT)    Activity/Assistive Device (Transfer Goal 1, OT) transfers, all;walker, rolling  -     [place] Level/Cues Needed (Transfer Goal 1, OT) minimum assist (75% or more patient effort)  -     Time Frame (Transfer Goal 1, OT) 2 weeks  -     Row Name 05/06/23 3066          Dressing Goal 1 (OT)    Activity/Device (Dressing Goal 1, OT) dressing skills, all;reacher;long-handled shoe horn;sock-aid  -     [place]/Cues Needed (Dressing Goal 1, OT) moderate assist (50-74% patient effort)  -     Time Frame (Dressing Goal 1, OT) 2 weeks  -     Row Name 05/06/23 9295          Therapy Assessment/Plan (OT)    Planned Therapy Interventions (OT) activity tolerance training;adaptive equipment training;BADL retraining;functional balance retraining;occupation/activity based interventions;patient/caregiver education/training;transfer/mobility retraining;edema control/reduction;ROM/therapeutic exercise  -           User Key  (r) = Recorded By, (t) = Taken By, (c) = Cosigned By    Initials Name Provider Type     Parris Ryan OT Occupational Therapist               Clinical Impression     Row Name 05/06/23 7031          Pain Assessment    Additional Documentation Pain Scale: FACES Pre/Post-Treatment (Group)  -     Row Name 05/06/23 3724           Pain Scale: FACES Pre/Post-Treatment    Pain: FACES Scale, Pretreatment 2-->hurts little bit  -     Posttreatment Pain Rating 4-->hurts little more  -     Pain Location - Side/Orientation Left  -     Pain Location - hip  -     Row Name 05/06/23 1550 05/06/23 0771       Plan of Care Review    Plan of Care Reviewed With -- patient;daughter  -    Progress -- improving  -    Outcome Evaluation 83 y.o. female who presents with complaints of left sided hip pain after tripping over a low lying step in the basement today. ER xray of left hip shows displaced/impacted femoral neck fracture of left hip. On 5/5 pt underwent [facility]. She is WBAT with posterior hip precautions. Pt does not drive and has some family assist for some higher level IADL, but typically for functional mobility and ADL she is mod (I) with a cane. This date she was painful but able to get up to the chair with max (A) of 2 and RW> Pt will benefit from IP rehab to address her acute deficits in mobility, ADL skill, & pain.  - --    [facility] Name 05/06/23 9190          Therapy Assessment/Plan (OT)    Rehab Potential (OT) good, to achieve stated therapy goals  -     Criteria for Skilled Therapeutic Interventions Met (OT) skilled treatment is necessary  -     Therapy Frequency (OT) 5 times/wk  -     Predicted Duration of Therapy Intervention (OT) until d/c  -     Row Name 05/06/23 3130          Therapy Plan Review/Discharge Plan (OT)    Anticipated Discharge Disposition (OT) inpatient rehabilitation facility  -     Row Name 05/06/23 2420          Vital Signs    O2 Delivery Pre Treatment supplemental O2  -     O2 Delivery Intra Treatment supplemental O2  -     O2 Delivery Post Treatment supplemental O2  -     Pre Patient Position Supine  -     Intra Patient Position Standing  -     Post Patient Position Sitting  -     Row Name 05/06/23 6430          Positioning and Restraints    Pre-Treatment Position in bed  -     Post Treatment  Position chair  -     In Chair notified nsg;reclined;call light within reach;encouraged to call for assist;exit alarm on;legs elevated;pillow between legs  -           User Key  (r) = Recorded By, (t) = Taken By, (c) = Cosigned By    Initials Name Provider Type    Parris Shankar OT Occupational Therapist               Outcome Measures     Row Name 05/06/23 0847          How much help from another person do you currently need...    Turning from your back to your side while in flat bed without using bedrails? 2  -KS     Moving from lying on back to sitting on the side of a flat bed without bedrails? 2  -KS     Moving to and from a bed to a chair (including a wheelchair)? 1  -KS     Standing up from a chair using your arms (e.g., wheelchair, bedside chair)? 1  -KS     Climbing 3-5 steps with a railing? 1  -KS     To walk in hospital room? 1  -KS     AM-PAC 6 Clicks Score (PT) 8  -KS     Highest level of mobility 3 --> Sat at edge of bed  -KS           User Key  (r) = Recorded By, (t) = Taken By, (c) = Cosigned By    Initials Name Provider Type    Emily Helm, RN Registered Nurse                Occupational Therapy Education     Title: PT OT SLP Therapies (In Progress)     Topic: Occupational Therapy (In Progress)     Point: ADL training (Done)     Description:   Instruct learner(s) on proper safety adaptation and remediation techniques during self care or transfers.   Instruct in proper use of assistive devices.              Learning Progress Summary           Patient Acceptance, E,TB,D, VU,NR,DU by  at 5/6/2023 1607   Family Acceptance, E,TB,D, VU,NR,DU by  at 5/6/2023 1607                   Point: Home exercise program (Not Started)     Description:   Instruct learner(s) on appropriate technique for monitoring, assisting and/or progressing therapeutic exercises/activities.              Learner Progress:  Not documented in this visit.          Point: Precautions (Done)     Description:   Instruct  learner(s) on prescribed precautions during self-care and functional transfers.              Learning Progress Summary           Patient Acceptance, E,TB,D, VU,NR,DU by  at 5/6/2023 1607   Family Acceptance, E,TB,D, VU,NR,DU by  at 5/6/2023 1607                   Point: Body mechanics (Done)     Description:   Instruct learner(s) on proper positioning and spine alignment during self-care, functional mobility activities and/or exercises.              Learning Progress Summary           Patient Acceptance, E,TB,D, VU,NR,DU by  at 5/6/2023 1607   Family Acceptance, E,TB,D, VU,NR,DU by  at 5/6/2023 1607                               User Key     Initials Effective Dates Name Provider Type Discipline     06/16/21 -  Parris Ryan OT Occupational Therapist OT              OT Recommendation and Plan  Planned Therapy Interventions (OT): activity tolerance training, adaptive equipment training, BADL retraining, functional balance retraining, occupation/activity based interventions, patient/caregiver education/training, transfer/mobility retraining, edema control/reduction, ROM/therapeutic exercise  Therapy Frequency (OT): 5 times/wk  Plan of Care Review  Plan of Care Reviewed With: patient, daughter  Progress: improving  Outcome Evaluation: 83 y.o. female who presents with complaints of left sided hip pain after tripping over a low lying step in the basement today. ER xray of left hip shows displaced/impacted femoral neck fracture of left hip. On 5/5 pt underwent [facility]. She is WBAT with posterior hip precautions. Pt does not drive and has some family assist for some higher level IADL, but typically for functional mobility and ADL she is mod (I) with a cane. This date she was painful but able to get up to the chair with max (A) of 2 and RW> Pt will benefit from IP rehab to address her acute deficits in mobility, ADL skill, & pain.     Time Calculation:    Time Calculation- OT     Row Name 05/06/23 1602              Time Calculation- OT    OT Start Time 1136  -      OT Stop Time 1212  -      OT Time Calculation (min) 36 min  -      Total Timed Code Minutes- OT 10 minute(s)  -      OT Received On 05/06/23  -      OT - Next Appointment 05/08/23  -      OT Goal Re-Cert Due Date 05/20/23  -            User Key  (r) = Recorded By, (t) = Taken By, (c) = Cosigned By    Initials Name Provider Type     Parris Ryan OT Occupational Therapist              Therapy Charges for Today     Code Description Service Date Service Provider Modifiers Qty    [id number]  OT SELF CARE/MGMT/TRAIN EA 15 MIN 5/6/2023 Parris Ryan OT GO 1    [id number]  OT EVAL MOD COMPLEXITY 3 5/6/2023 Parris Ryan OT GO 1               Parris Ryan OT  5/6/2023

## 2023-05-06 NOTE — PLAN OF CARE
Goal Outcome Evaluation:      Patient able to make needs known. Pain managed per MAR. Dressing monitored for bleeding. Patients daughter at bed side and attentive to patients needs. Personal items and call light with in reach. Plan of care on going.

## 2023-05-06 NOTE — DISCHARGE PLACEMENT REQUEST
"Becka Dos Santos (83 y.o. Female)     Date of Birth   1940    Social Security Number       Address   [address] [place] [address] [place] [facility]    Home Phone   [phone]    MRN   [id number]       U[place]din   Lutheran    Marital Status                               Admission Date   5/4/23    Admission Type   Emergency    Admitting Provider   Dianna Cronin MD    Attending Provider   Dianna Cronin MD    Department, Room/Bed   [facility] SURGICAL INPATIENT, 4119/1       Discharge Date       Discharge Disposition       Discharge Destination                               Attending Provider: Dianna Cronin MD    Allergies: No Known Allergies    Isolation: None   Infection: None   Code Status: CPR    Ht: 162.6 cm (64\")   Wt: 68.4 kg (150 lb 12.7 oz)    Admission Cmt: None   Principal Problem: Closed fracture of left hip, initial encounter [S72.002A]                 Active Insurance as of 5/4/2023     Primary Coverage     Payor Plan Insurance Group Employer/Plan Group    MEDICARE MEDICARE A & B      Payor Plan Address Payor Plan Phone Number Payor Plan Fax Number Effective Dates    [address] [phone]  2/1/2004 - None Entered    [facility] [id number]       Subscriber Name Subscriber Birth Date Member ID       BECKA DOS SANTOS 1940 [id number]           Secondary Coverage     Payor Plan Insurance Group Employer/Plan Group    AARP MC SUP AARP HEALTH CARE OPTIONS      Payor Plan Address Payor Plan Phone Number Payor Plan Fax Number Effective Dates    [facility] [phone]  1/1/2019 - None Entered    [address]       [facility] [id number]       Subscriber Name Subscriber Birth Date Member ID       BECKA DOS SANTOS 1940 [id number]                 Emergency Contacts      (Rel.) Home Phone Work Phone Mobile Phone    MARYJO FENG (Daughter) -- -- [phone]    Louise,Janis (Daughter) -- -- [phone]               History & Physical      Norma Esteban, APRN at 05/04/23 " 2741     Attestation signed by Dianna Cronin MD at 05/05/23 1005    I have reviewed this documentation and agree.                      Patient Care Team:  Dianna Cronin MD as PCP - General (Internal Medicine)  Kyrie Campoverde MD as Consulting Physician (Cardiology)  Jasper Mcneill MD as Consulting Physician (General Surgery)  Sung Tamayo MD (Vascular Surgery)    Chief complaint left hip pain    Subjective      Patient is a 83 y.o. female who presents with complaints of left sided hip pain after tripping over a low lying step in the basement today. She landed on her left hip on concrete floor. She was unable to ambulate afterwards. She denies hitting her head or LOC. Denies any dizziness prior to the fall. Denies any chest pain, shortness of breath. Denies any prior hip surgeries. She reports taking Plavix  In the ER xray of left hip shows displaced/impacted femoral neck fracture of left hip. Ortho was consulted and will plan for possible procedure in the morning. Labs unremarkable, UA pending, CXR with no acute findings, EKG sinus rhythm.     Onset of symptoms was prior to arrival to ER     Review of Systems   Constitutional: Negative.    HENT: Negative.    Eyes: Negative.    Respiratory: Negative.    Cardiovascular: Negative.    Gastrointestinal: Negative.    Endocrine: Negative.    Genitourinary: Negative.    Musculoskeletal: Positive for arthralgias and gait problem.   Skin: Negative.    Psychiatric/Behavioral: Negative.           History  Past Medical History:   Diagnosis Date   • Aortic aneurysm    • COPD (chronic obstructive pulmonary disease)    • Hernia, inguinal, left    • Hyperlipidemia    • Hypertension    • Macular degeneration    • Peripheral edema    • PVD (peripheral vascular disease)      Past Surgical History:   Procedure Laterality Date   • CARPAL TUNNEL RELEASE     • DESCENDING AORTIC ANEURYSM REPAIR W/ STENT     • INGUINAL HERNIA REPAIR Bilateral 11/16/2022    Procedure: INGUINAL  HERNIA REPAIR LAPAROSCOPIC WITH DAVINCI ROBOT;  Surgeon: Jasper Mcneill MD;  Location: [facility] MAIN OR;  Service: Robotics - DaVinci;  Laterality: Bilateral;   • INTERVENTIONAL RADIOLOGY PROCEDURE N/A 2021    Procedure: Abdominal Aortagram with Runoff, possible PCI;  Surgeon: Kyrie Campoverde MD;  Location: [facility] CATH INVASIVE LOCATION;  Service: Cardiovascular;  Laterality: N/A;   • KNEE SURGERY     • OTHER SURGICAL HISTORY      STENT   • VASCULAR SURGERY      fem fem bypass     Family History   Problem Relation Age of Onset   • Cancer Other    • Diabetes Other    • Aneurysm Father    • Cancer Mother      Social History     Tobacco Use   • Smoking status: Former     Packs/day: 1.00     Years: 30.00     Pack years: 30.00     Types: Cigarettes     Quit date:      Years since quittin.3   • Smokeless tobacco: Never   Vaping Use   • Vaping Use: Never used   Substance Use Topics   • Alcohol use: No   • Drug use: Never     Medications Prior to Admission   Medication Sig Dispense Refill Last Dose   • albuterol sulfate  (90 Base) MCG/ACT inhaler Inhale 2 puffs Every 4 (Four) Hours As Needed for Wheezing.      • amLODIPine (NORVASC) 10 MG tablet Take 1 tablet by mouth Daily.      • aspirin 81 MG chewable tablet Chew 1 tablet Daily.   2023 at 1200   • bisoprolol-hydrochlorothiazide (ZIAC) 5-6.25 MG per tablet Take 1 tablet by mouth Daily.      • cholecalciferol (VITAMIN D3) 25 MCG (1000 UT) tablet Take 2 tablets by mouth Daily.      • clopidogrel (PLAVIX) 75 MG tablet Take 1 tablet by mouth Daily.   2023 at 1200   • Fluticasone-Umeclidin-Vilant (Trelegy Ellipta) 100-62.5-25 MCG/ACT inhaler Inhale 1 puff Daily.      • HYDROcodone-acetaminophen (NORCO) 5-325 MG per tablet Take 1 tablet by mouth Every 8 (Eight) Hours As Needed.      • Multiple Vitamins-Minerals (PRESERVISION AREDS 2+MULTI VIT PO) Take 1 tablet by mouth 2 (Two) Times a Day.      • pyridoxine (VITAMIN B-6) 100 MG tablet  tablet Take 1 tablet by mouth Daily.      • rosuvastatin (CRESTOR) 10 MG tablet Take 1 tablet by mouth Daily.      • vitamin B-12 (CYANOCOBALAMIN) 1000 MCG tablet Take 2.5 tablets by mouth Daily.        Allergies:  Patient has no known allergies.    Objective      Vital Signs  Temp:  [98 °F (36.7 °C)-98.3 °F (36.8 °C)] 98 °F (36.7 °C)  Heart Rate:  [75-84] 77  Resp:  [16-21] 16  BP: (101-150)/(64-80) 101/64     Physical Exam:      General Appearance:    Alert, cooperative, in no acute distress   Head:    Normocephalic, without obvious abnormality, atraumatic   Eyes:            Lids and lashes normal, conjunctivae and sclerae normal, no   icterus, no pallor, corneas clear, PERRLA   Ears:    Ears appear intact with no abnormalities noted   Throat:   No oral lesions, no thrush, oral mucosa moist   Neck:   No adenopathy, supple, trachea midline, no thyromegaly, no   carotid bruit, no JVD   Lungs:     Clear to auscultation,respirations regular, even and                  unlabored    Heart:    Regular rhythm and normal rate, normal S1 and S2, no            murmur, no gallop, no rub, no click   Chest Wall:    No abnormalities observed   Abdomen:     Normal bowel sounds, no masses, no organomegaly, soft        non-tender, non-distended, no guarding, no rebound                tenderness   Extremities:   Moves all extremities well, no edema, no cyanosis, no             redness   Pulses:   Pulses palpable and equal bilaterally   Skin:   No bleeding, bruising or rash   Lymph nodes:   No palpable adenopathy   Neurologic:   No focal deficits noted       Results Review:     Imaging Results (Last 24 Hours)     Procedure Component Value Units Date/Time    XR Chest 1 View [[id number]] Collected: 05/04/23 1805     Updated: 05/04/23 1808    Narrative:      XR CHEST 1 VW    Date of Exam: 5/4/2023 5:48 PM EDT    Indication: pre op clearance fall.    Comparison: Chest x-ray 4/8/2023    Findings:  Lungs are hyperexpanded. There is  cardiomegaly. There is no pneumothorax or pleural effusion. Stable scarring in the left midlung. No new focal pulmonary parenchymal opacity. No acute displaced rib fractures.      Impression:      Impression:  No acute cardiopulmonary abnormality.      Electronically Signed: Karlie Russremberto    5/4/2023 6:06 PM EDT    Workstation ID: [id number]    XR Hip With or Without Pelvis 2 - 3 View Left [[id number]] Collected: 05/04/23 1752     Updated: 05/04/23 1755    Narrative:      XR HIP W OR WO PELVIS 2-3 VIEW LEFT    Date of Exam: 5/4/2023 5:48 PM EDT    Indication: trauma    Comparison: Left hip x-ray 7/25/2022 [facility]    Findings:  There is a displaced fracture of the femoral neck. The distal fracture fragment is superiorly displaced by 2.6 cm. There is osteopenia.      Impression:      Impression:  Displaced/impacted femoral neck fracture left hip.      Electronically Signed: Karlie Russremberto    5/4/2023 5:53 PM EDT    Workstation ID: [id number]           Lab Results (last 24 hours)     Procedure Component Value Units Date/Time    Basic Metabolic Panel [[id number]]  (Abnormal) Collected: 05/04/23 1710    Specimen: Blood Updated: 05/04/23 1755     Glucose 113 mg/dL      BUN 11 mg/dL      Creatinine 0.63 mg/dL      Sodium 138 mmol/L      Potassium 4.3 mmol/L      Comment: Slight hemolysis detected by analyzer. Results may be affected.        Chloride 101 mmol/L      CO2 23.0 mmol/L      Calcium 9.7 mg/dL      BUN/Creatinine Ratio 17.5     Anion Gap 14.0 mmol/L      eGFR 87.6 mL/min/1.73     Narrative:      GFR Normal >60  Chronic Kidney Disease <60  Kidney Failure <15    The GFR formula is only valid for adults with stable renal function between ages 18 and 70.    aPTT [[id number]]  (Abnormal) Collected: 05/04/23 1710    Specimen: Blood Updated: 05/04/23 1734     PTT 24.2 seconds     Protime-INR [[id number]]  (Normal) Collected: 05/04/23 1710    Specimen: Blood Updated: 05/04/23 1734     Protime 10.6 Seconds      INR 0.99     CBC & Differential [[id number]]  (Abnormal) Collected: 05/04/23 1710    Specimen: Blood Updated: 05/04/23 1720    Narrative:      The following orders were created for panel order CBC & Differential.  Procedure                               Abnormality         Status                     ---------                               -----------         ------                     CBC Auto Differential[[id number]]        Abnormal            Final result                 Please view results for these tests on the individual orders.    CBC Auto Differential [[id number]]  (Abnormal) Collected: 05/04/23 1710    Specimen: Blood Updated: 05/04/23 1720     WBC 8.40 10*3/mm3      RBC 4.10 10*6/mm3      Hemoglobin 13.5 g/dL      Hematocrit 41.7 %      .6 fL      MCH 32.9 pg      MCHC 32.4 g/dL      RDW 14.9 %      RDW-SD 52.5 fl      MPV 8.5 fL      Platelets 202 10*3/mm3      Neutrophil % 78.6 %      Lymphocyte % 12.9 %      Monocyte % 7.3 %      Eosinophil % 0.7 %      Basophil % 0.5 %      Neutrophils, Absolute 6.60 10*3/mm3      Lymphocytes, Absolute 1.10 10*3/mm3      Monocytes, Absolute 0.60 10*3/mm3      Eosinophils, Absolute 0.10 10*3/mm3      Basophils, Absolute 0.00 10*3/mm3      nRBC 0.0 /100 WBC            I reviewed the patient's new clinical results.    Assessment & Plan       Closed fracture of left hip, initial encounter  -mechanical fall today  -Xray shows displaced/impacted femoral neck fracture left hip  -CXR with no acute findings  -EKG sinus rhythm rate 81  -UA pending  -Ortho consulted  -keep NPO after MN      DVT prophylaxis- SCD's  GI prophylaxis- pepcid    I discussed the patient's findings and my recommendations with patient.     Norma Esteban, APRN  05/04/23  23:54 EDT        Electronically signed by Dianna Cronin MD at 05/05/23 1009          Physician Progress Notes (last 24 hours)      Guanako Harmon MD at 05/06/23 1325                Orthopaedic & Sports Medicine Surgery  Dr. Guanako Harmon MD  [phone]  [phone]    Daily Progress Note    DEMOGRAPHICS:   · Becka Oliva   · Age:83 y.o.   · MRN:[id number]  · Admitted: 5/4/2023    PROCEDURE: 1 Day Post-Op s/p Left  Hip Hemiarthroplasty    HOSPITAL PROGRESS  · Subjective: Doing well.  Pain controlled.  Up to a chair and eating lunch.  Nasal cannula in place.  · Ambulation/Activity: Got up to a chair today.    VITALS:  Vitals:    05/06/23 0020 05/06/23 0538 05/06/23 0756 05/06/23 1129   BP: 110/65 96/56 122/63 118/69   BP Location: Left arm Left arm Left arm Left arm   Patient Position: Lying Lying Lying Lying   Pulse: 88 72 82 91   Resp: 15 16 17 18   Temp: 98.8 °F (37.1 °C) 98.3 °F (36.8 °C) 98.3 °F (36.8 °C) 100 °F (37.8 °C)   TempSrc: Oral Oral Oral Oral   SpO2: 90% 95% 96% 98%   Weight:       Height:           PHYSICAL EXAM:  · LUNGS: Equal chest rise, no shortness of air  · CARDIOVASCULAR: brisk capillary refill intact  · WOUND: Dressings clean, dry, and intact  · EXTREMITY: Operative Leg  · Pulses: brisk capillary refill intact  · Sensation: Sensation intact to light touch to the saphenous/sural/tibial/deep peroneal/superficial peroneal nerves, and grossly throughout the extremity.  · Motor: 5/5 EHL/FHL/TA/GS motor complexes    LABS:   Lab Results   Component Value Date    HGB 10.7 (L) 05/06/2023     Lab Results   Component Value Date    HCT 32.5 (L) 05/06/2023     Lab Results   Component Value Date    WBC 10.10 05/06/2023     Lab Results   Component Value Date    GLUCOSE 113 (H) 05/04/2023    CALCIUM 9.7 05/04/2023     05/04/2023    K 4.3 05/04/2023    CO2 23.0 05/04/2023     05/04/2023    BUN 11 05/04/2023    CREATININE 0.63 05/04/2023    EGFR 87.6 05/04/2023    BCR 17.5 05/04/2023    ANIONGAP 14.0 05/04/2023       ASSESSMENT: Patient is a 83 y.o. female who is 1 Day Post-Op s/p Left  Procedure(s):  HIP BIPOLAR CEMENTED, LEFT on 5/5/2023    PLAN:   · Weight Bearing: Weight Bearing As Tolerated , with posterior hip precautions  · Labs: Above lab  values review. Plan: Hematocrit stable, will monitor  · Diet: Okay for diet from orthopedic surgery standpoint  · PT/OT: To Mobilize  · Incentive spirometer, float heels  · Ice incision site  · Antibiotics: 24 hours post op antibiotics to be completed today  · DVT PPX: Sequential Compression Devices, Chemical DVT Ppx: Restarting home Eliquis today  · Post-Op Xray: Implants in good position and reduced, no obvious reyna-implant fracture  · Follow-Up: In office 2 weeks postop, please call the office at [phone]  to make appointment.  · Dressing: Please leave dressing in place for 7 days postop and then remove and cover with clean dressing.  Okay to shower postop day 5.  Do not submerge in a bathtub but okay to let soap and water rinse over the dressing and then pat dry as it is waterproof.  If water does leak in the dressing and a get soaked remove and cover with new dry dressing.  · Dispo: Placement per primary.  Please call or message with any questions or concerns.    Guanako Harmon MD  [facility]  Orthopaedic Surgery, Sports Medicine  [phone]         Electronically signed by Guanako Harmon MD at 05/06/23 1324     Dianna Cronin MD at 05/05/23 1790               LOS: 1 day   Patient Care Team:  Dianna Cronin MD as PCP - General (Internal Medicine)  Kyrie Campoverde MD as Consulting Physician (Cardiology)  Jasper Mcneill MD as Consulting Physician (General Surgery)  Sung Tamayo MD (Vascular Surgery)    Subjective     Interval History: s/p left hip hemiarthroplasty earlier today    Patient Complaints: Appropriate left hip pain, lethargic after anesthesia    History taken from: patient    Review of Systems   Unable to perform ROS: Mental status change           Objective     Vital Signs  Temp:  [97.2 °F (36.2 °C)-98.6 °F (37 °C)] 97.2 °F (36.2 °C)  Heart Rate:  [57-84] 62  Resp:  [9-21] 17  BP: (101-150)/(33-80) 116/49  FiO2 (%):  [56 %-96 %] 96 %    Physical Exam:     General  Appearance:    Lethargic, arouses briefly,  cooperative, in no acute distress,   Head:    Normocephalic, without obvious abnormality, atraumatic   Eyes:            Lids and lashes normal, conjunctivae and sclerae normal, no   icterus, no pallor, corneas clear, PERRLA   Ears:    Ears appear intact with no abnormalities noted   Throat:   No oral lesions, no thrush, oral mucosa moist   Neck:   No adenopathy, supple, trachea midline, no thyromegaly, no   carotid bruit, no JVD   Lungs:     Clear to auscultation,respirations regular, even and                  unlabored    Heart:    Regular rhythm and normal rate, normal S1 and S2, no            murmur, no gallop, no rub, no click   Chest Wall:    No abnormalities observed   Abdomen:     Normal bowel sounds, no masses, no organomegaly, soft        Non-tender non-distended, no guarding,   Extremities:   Left hip - dressing intact; palpable distal pulses   Pulses:   Pulses palpable and equal bilaterally   Skin:   No bleeding, bruising or rash   Lymph nodes:   No palpable adenopathy   Neurologic:   No obvious lateralizing deficits; gait not assessed.        Results Review:    Lab Results (last 24 hours)     Procedure Component Value Units Date/Time    Urinalysis, Microscopic Only - Urine, Clean Catch [[id number]]  (Abnormal) Collected: 05/05/23 0030    Specimen: Urine, Clean Catch Updated: 05/05/23 0045     RBC, UA 3-5 /HPF      WBC, UA 0-2 /HPF      Comment: Urine culture not indicated.        Bacteria, UA None Seen /HPF      Squamous Epithelial Cells, UA 0-2 /HPF      Hyaline Casts, UA 0-2 /LPF      Methodology Automated Microscopy    Urinalysis With Culture If Indicated - Urine, Clean Catch [[id number]]  (Abnormal) Collected: 05/05/23 0030    Specimen: Urine, Clean Catch Updated: 05/05/23 0045     Color, UA Yellow     Appearance, UA Clear     pH, UA 7.5     Specific Gravity, UA 1.015     Glucose, UA Negative     Ketones, UA Negative     Bilirubin, UA Negative     Blood, UA  Trace     Protein, UA Negative     Leuk Esterase, UA Negative     Nitrite, UA Negative     Urobilinogen, UA 0.2 E.U./dL    Narrative:      In absence of clinical symptoms, the presence of pyuria, bacteria, and/or nitrites on the urinalysis result does not correlate with infection.    Basic Metabolic Panel [[id number]]  (Abnormal) Collected: 05/04/23 1710    Specimen: Blood Updated: 05/04/23 1755     Glucose 113 mg/dL      BUN 11 mg/dL      Creatinine 0.63 mg/dL      Sodium 138 mmol/L      Potassium 4.3 mmol/L      Comment: Slight hemolysis detected by analyzer. Results may be affected.        Chloride 101 mmol/L      CO2 23.0 mmol/L      Calcium 9.7 mg/dL      BUN/Creatinine Ratio 17.5     Anion Gap 14.0 mmol/L      eGFR 87.6 mL/min/1.73     Narrative:      GFR Normal >60  Chronic Kidney Disease <60  Kidney Failure <15    The GFR formula is only valid for adults with stable renal function between ages 18 and 70.    aPTT [[id number]]  (Abnormal) Collected: 05/04/23 1710    Specimen: Blood Updated: 05/04/23 1734     PTT 24.2 seconds     Protime-INR [[id number]]  (Normal) Collected: 05/04/23 1710    Specimen: Blood Updated: 05/04/23 1734     Protime 10.6 Seconds      INR 0.99    CBC & Differential [[id number]]  (Abnormal) Collected: 05/04/23 1710    Specimen: Blood Updated: 05/04/23 1720    Narrative:      The following orders were created for panel order CBC & Differential.  Procedure                               Abnormality         Status                     ---------                               -----------         ------                     CBC Auto Differential[[id number]]        Abnormal            Final result                 Please view results for these tests on the individual orders.    CBC Auto Differential [[id number]]  (Abnormal) Collected: 05/04/23 1710    Specimen: Blood Updated: 05/04/23 1720     WBC 8.40 10*3/mm3      RBC 4.10 10*6/mm3      Hemoglobin 13.5 g/dL      Hematocrit 41.7 %      .6 fL       MCH 32.9 pg      MCHC 32.4 g/dL      RDW 14.9 %      RDW-SD 52.5 fl      MPV 8.5 fL      Platelets 202 10*3/mm3      Neutrophil % 78.6 %      Lymphocyte % 12.9 %      Monocyte % 7.3 %      Eosinophil % 0.7 %      Basophil % 0.5 %      Neutrophils, Absolute 6.60 10*3/mm3      Lymphocytes, Absolute 1.10 10*3/mm3      Monocytes, Absolute 0.60 10*3/mm3      Eosinophils, Absolute 0.10 10*3/mm3      Basophils, Absolute 0.00 10*3/mm3      nRBC 0.0 /100 WBC            Imaging Results (Last 24 Hours)     Procedure Component Value Units Date/Time    XR Hip With or Without Pelvis 2 - 3 View Left [[id number]] Collected: 05/05/23 1446     Updated: 05/05/23 1449    Narrative:      XR HIP W OR WO PELVIS 2-3 VIEW LEFT    Date of Exam: 5/5/2023 2:32 PM EDT    Indication: Postop    Comparison: None available.    Findings/    Impression:      Impression:  Patient is status post left hip hemiarthroplasty. Postoperative subcutaneous emphysema is noted. Bones are demineralized. Visualized portion of the bony pelvis is unremarkable. Surgical clips project about the right inguinal region.    Electronically Signed: Gilmer Jacobs    5/5/2023 2:47 PM EDT    Workstation ID: [id number]    XR Femur 2 View Left [[id number]] Collected: 05/05/23 0956     Updated: 05/05/23 1001    Narrative:      XR FEMUR 2 VW LEFT  XR PELVIS 1 OR 2 VW    Date of Exam: 5/5/2023 9:05 AM EDT    Indication: hip fracture    Comparison: Left hip radiographs 5/4/2023.    Findings:  There is redemonstration of an acute displaced left femoral neck fracture with approximately 23 mm superior displacement of the distal fragment. There is no pelvic fracture. The distal femur appears intact with mild tricompartmental osteophyte formation   at the knee. There is mild osteoarthritis at the both hips. There are vascular calcifications. There is evidence of prior stent graft repair of the aorta and iliac vasculature.      Impression:      Impression:    1. Stable displaced acute left  femoral neck fracture.  2. 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    Electronically Signed: Javier Daniel    5/5/2023 9:59 AM EDT    Workstation ID: [id number]    XR Pelvis 1 or 2 View [[id number]] Collected: 05/05/23 0956     Updated: 05/05/23 1001    Narrative:      XR FEMUR 2 VW LEFT  XR PELVIS 1 OR 2 VW    Date of Exam: 5/5/2023 9:05 AM EDT    Indication: hip fracture    Comparison: Left hip radiographs 5/4/2023.    Findings:  There is redemonstration of an acute displaced left femoral neck fracture with approximately 23 mm superior displacement of the distal fragment. There is no pelvic fracture. The distal femur appears intact with mild tricompartmental osteophyte formation   at the knee. There is mild osteoarthritis at the both hips. There are vascular calcifications. There is evidence of prior stent graft repair of the aorta and iliac vasculature.      Impression:      Impression:    1. Stable displaced acute left femoral neck fracture.  2. 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    Electronically Signed: Javier Sanchez    5/5/2023 9:59 AM EDT    Workstation ID: [id number]    XR Chest 1 View [[id number]] Collected: 05/04/23 1805     Updated: 05/04/23 1808    Narrative:      XR CHEST 1 VW    Date of Exam: 5/4/2023 5:48 PM EDT    Indication: pre op clearance fall.    Comparison: Chest x-ray 4/8/2023    Findings:  Lungs are hyperexpanded. There is cardiomegaly. There is no pneumothorax or pleural effusion. Stable scarring in the left midlung. No new focal pulmonary parenchymal opacity. No acute displaced rib fractures.      Impression:      Impression:  No acute cardiopulmonary abnormality.      Electronically Signed: Karlie Lockett    5/4/2023 6:06 PM EDT    Workstation ID: [id number]    XR Hip With or Without Pelvis 2 - 3 View Left [[id number]] Collected: 05/04/23 1752     Updated: 05/04/23 1755    Narrative:      XR HIP W OR WO PELVIS 2-3 VIEW LEFT    Date of Exam: 5/4/2023 5:48 PM EDT    Indication: trauma    Comparison:  Left hip x-ray 7/25/2022 [facility]    Findings:  There is a displaced fracture of the femoral neck. The distal fracture fragment is superiorly displaced by 2.6 cm. There is osteopenia.      Impression:      Impression:  Displaced/impacted femoral neck fracture left hip.      Electronically Signed: Karlie Lockett    5/4/2023 5:53 PM EDT    Workstation ID: [id number]               I reviewed the patient's new clinical results.    Medication Review:   Scheduled Meds:acetaminophen, 1,000 mg, Oral, Q8H  amLODIPine, 10 mg, Oral, Daily  ceFAZolin, 2 g, Intravenous, Q8H  [START ON 5/6/2023] clopidogrel, 75 mg, Oral, Daily  famotidine, 40 mg, Oral, Daily  rosuvastatin, 10 mg, Oral, Daily  sodium chloride, 3 mL, Intravenous, Q12H      Continuous Infusions:niCARdipine, 5-15 mg/hr  phenylephrine, 0.5-3 mcg/kg/min      PRN Meds:.•  hydrALAZINE  •  HYDROmorphone  •  ipratropium-albuterol  •  labetalol  •  niCARdipine  •  ondansetron  •  oxyCODONE  •  oxyCODONE  •  phenylephrine  •  [COMPLETED] Insert Peripheral IV **AND** sodium chloride  •  sodium chloride  •  sodium chloride     Assessment & Plan       Closed fracture of left hip, initial encounter  - post op pathway per ortho; will need skilled rehab  COPD  HTN  Mood disorder    Will resume home meds when able.  Start post-op DVT prophylaxis tomorrow.    Plan for disposition:Family requests [facility] or [facility]    Dianna Cronin MD  05/05/23  15:51 EDT          Electronically signed by Dianna Cronin MD at 05/05/23 3404       Physical Therapy Notes (last 24 hours)  Notes from 05/05/23 1352 through 05/06/23 1352   No notes exist for this encounter.         Occupational Therapy Notes (last 24 hours)  Notes from 05/05/23 1352 through 05/06/23 1352   No notes exist for this encounter.

## 2023-05-06 NOTE — PLAN OF CARE
Goal Outcome Evaluation:         Pt is up in chair. Daughter at bedside. C/o of mild pain. Treated per MAR. Dressing to hip intact. Maintain hip precautions. VSS. Plan to discharge to [facility].

## 2023-05-06 NOTE — PLAN OF CARE
Goal Outcome Evaluation:  Plan of Care Reviewed With: patient, daughter        Progress: improving  Outcome Evaluation: 83 y.o. female who presents with complaints of left sided hip pain after tripping over a low lying step in the basement today. ER xray of left hip shows displaced/impacted femoral neck fracture of left hip. On 5/5 pt underwent AUSTIN. She is WBAT with posterior hip precautions. Pt does not drive and has some family assist for some higher level IADL, but typically for functional mobility and ADL she is mod (I) with a cane. This date she was painful but able to get up to the chair with max (A) of 2 and RW> Pt will benefit from IP rehab to address her acute deficits in mobility, ADL skill, & pain.

## 2023-05-06 NOTE — PROGRESS NOTES
Orthopaedic & Sports Medicine Surgery  Dr. Guanako Harmon MD  [phone]    Daily Progress Note    DEMOGRAPHICS:   · Becka Oliva   · Age:83 y.o.   · MRN:[id number]  · Admitted: 5/4/2023    PROCEDURE: 1 Day Post-Op s/p Left  Hip Hemiarthroplasty    HOSPITAL PROGRESS  · Subjective: Doing well.  Pain controlled.  Up to a chair and eating lunch.  Nasal cannula in place.  · Ambulation/Activity: Got up to a chair today.    VITALS:  Vitals:    05/06/23 0020 05/06/23 0538 05/06/23 0756 05/06/23 1129   BP: 110/65 96/56 122/63 118/69   BP Location: Left arm Left arm Left arm Left arm   Patient Position: Lying Lying Lying Lying   Pulse: 88 72 82 91   Resp: 15 16 17 18   Temp: 98.8 °F (37.1 °C) 98.3 °F (36.8 °C) 98.3 °F (36.8 °C) 100 °F (37.8 °C)   TempSrc: Oral Oral Oral Oral   SpO2: 90% 95% 96% 98%   Weight:       Height:           PHYSICAL EXAM:  · LUNGS: Equal chest rise, no shortness of air  · CARDIOVASCULAR: brisk capillary refill intact  · WOUND: Dressings clean, dry, and intact  · EXTREMITY: Operative Leg  · Pulses: brisk capillary refill intact  · Sensation: Sensation intact to light touch to the saphenous/sural/tibial/deep peroneal/superficial peroneal nerves, and grossly throughout the extremity.  · Motor: 5/5 EHL/FHL/TA/GS motor complexes    LABS:   Lab Results   Component Value Date    HGB 10.7 (L) 05/06/2023     Lab Results   Component Value Date    HCT 32.5 (L) 05/06/2023     Lab Results   Component Value Date    WBC 10.10 05/06/2023     Lab Results   Component Value Date    GLUCOSE 113 (H) 05/04/2023    CALCIUM 9.7 05/04/2023     05/04/2023    K 4.3 05/04/2023    CO2 23.0 05/04/2023     05/04/2023    BUN 11 05/04/2023    CREATININE 0.63 05/04/2023    EGFR 87.6 05/04/2023    BCR 17.5 05/04/2023    ANIONGAP 14.0 05/04/2023       ASSESSMENT: Patient is a 83 y.o. female who is 1 Day Post-Op s/p Left  Procedure(s):  HIP BIPOLAR CEMENTED, LEFT on 5/5/2023    PLAN:   · Weight Bearing: Weight Bearing  As Tolerated , with posterior hip precautions  · Labs: Above lab values review. Plan: Hematocrit stable, will monitor  · Diet: Okay for diet from orthopedic surgery standpoint  · PT/OT: To Mobilize  · Incentive spirometer, float heels  · Ice incision site  · Antibiotics: 24 hours post op antibiotics to be completed today  · DVT PPX: Sequential Compression Devices, Chemical DVT Ppx: Restarting home Eliquis today  · Post-Op Xray: Implants in good position and reduced, no obvious reyna-implant fracture  · Follow-Up: In office 2 weeks postop, please call the office at [phone]  to make appointment.  · Dressing: Please leave dressing in place for 7 days postop and then remove and cover with clean dressing.  Okay to shower postop day 5.  Do not submerge in a bathtub but okay to let soap and water rinse over the dressing and then pat dry as it is waterproof.  If water does leak in the dressing and a get soaked remove and cover with new dry dressing.  · Dispo: Placement per primary.  Please call or message with any questions or concerns.    Guanako Harmon MD  [facility]  Orthopaedic Surgery, Sports Medicine  [phone]

## 2023-05-06 NOTE — PROGRESS NOTES
LOS: 2 days   Patient Care Team:  Dianna Cronin MD as PCP - General (Internal Medicine)  Kyrie Campoverde MD as Consulting Physician (Cardiology)  Jasper Mcneill MD as Consulting Physician (General Surgery)  Sung Tamayo MD (Vascular Surgery)    Subjective     Interval History: Improving; able to get up to chair with therapist today.  Bearing weight on left hip    Patient Complaints: Appropriate left hip pain, no new complaints    History taken from: patient    Review of Systems   Constitutional: Positive for activity change. Negative for appetite change, chills, diaphoresis, fatigue and fever.   HENT: Negative for facial swelling.    Eyes: Negative for visual disturbance.   Respiratory: Negative for cough and shortness of breath.    Cardiovascular: Negative for chest pain, palpitations and leg swelling.   Gastrointestinal: Negative for constipation, diarrhea, nausea and vomiting.   Genitourinary: Negative for dysuria.   Musculoskeletal: Positive for arthralgias, gait problem and joint swelling.   Skin: Positive for wound. Negative for rash.   Neurological: Negative for dizziness, light-headedness and headaches.   Psychiatric/Behavioral: Negative for confusion.           Objective     Vital Signs  Temp:  [98.2 °F (36.8 °C)-100 °F (37.8 °C)] 100 °F (37.8 °C)  Heart Rate:  [68-91] 91  Resp:  [14-18] 18  BP: ()/(56-81) 118/69    Physical Exam:     General Appearance:    Alert, cooperative, in no acute distress,   Head:    Normocephalic, without obvious abnormality, atraumatic   Eyes:            Lids and lashes normal, conjunctivae and sclerae normal, no   icterus, no pallor, corneas clear, PERRLA   Ears:    Ears appear intact with no abnormalities noted   Throat:   No oral lesions, no thrush, oral mucosa moist   Neck:   No adenopathy, supple, trachea midline, no thyromegaly, no   carotid bruit, no JVD   Lungs:     Clear to auscultation,respirations regular, even and                  unlabored     Heart:    Regular rhythm and normal rate, normal S1 and S2, no            murmur, no gallop, no rub, no click   Chest Wall:    No abnormalities observed   Abdomen:     Normal bowel sounds, no masses, no organomegaly, soft        Non-tender non-distended, no guarding,   Extremities:  Left hip incision is clean and dry   Pulses:   Pulses palpable and equal bilaterally   Skin:   No bleeding, bruising or rash   Lymph nodes:   No palpable adenopathy   Neurologic:   Cranial nerves 2 - 12 grossly intact, sensation intact, DTR       present and equal bilaterally        Results Review:    Lab Results (last 24 hours)     Procedure Component Value Units Date/Time    CBC (No Diff) [[id number]]  (Abnormal) Collected: 05/06/23 0420    Specimen: Blood Updated: 05/06/23 0516     WBC 10.10 10*3/mm3      RBC 3.28 10*6/mm3      Hemoglobin 10.7 g/dL      Comment: Result checked          Hematocrit 32.5 %      MCV 99.2 fL      MCH 32.6 pg      MCHC 32.9 g/dL      RDW 14.0 %      RDW-SD 51.2 fl      MPV 9.1 fL      Platelets 145 10*3/mm3            Imaging Results (Last 24 Hours)     ** No results found for the last 24 hours. **               I reviewed the patient's new clinical results.    Medication Review:   Scheduled Meds:acetaminophen, 1,000 mg, Oral, Q8H  amLODIPine, 10 mg, Oral, Daily  aspirin, 325 mg, Oral, Daily  clopidogrel, 75 mg, Oral, Daily  famotidine, 40 mg, Oral, Daily  rosuvastatin, 10 mg, Oral, Daily  sodium chloride, 3 mL, Intravenous, Q12H      Continuous Infusions:niCARdipine, 5-15 mg/hr  phenylephrine, 0.5-3 mcg/kg/min      PRN Meds:.•  hydrALAZINE  •  HYDROmorphone  •  ipratropium-albuterol  •  labetalol  •  niCARdipine  •  ondansetron  •  oxyCODONE  •  oxyCODONE  •  phenylephrine  •  [COMPLETED] Insert Peripheral IV **AND** sodium chloride  •  sodium chloride  •  sodium chloride     Assessment & Plan       Closed fracture of left hip, initial encounter  -Postop day 1 status post left hip replacement; continue  physical therapy; she is on Plavix and full-strength aspirin for DVT prophylaxis.  She is not on Eliquis as a maintenance drug.    Essential hypertension-amlodipine  Hyperlipidemia-rosuvastatin  Postoperative anemia-mild; monitor  COPD-Symbicort    Will need skilled rehab.  Encouraged incentive spirometry    SCD's for DVT prophy  Famotidine for stress ulcer prophy        Plan for disposition:[facility] or [facility] per pt request    Dianna Cronin MD  05/06/23  15:22 EDT

## 2023-05-06 NOTE — CASE MANAGEMENT/SOCIAL WORK
Discharge Planning Assessment  [facility]     Patient Name: Becka Oliva  MRN: [id number]  Today's Date: 5/6/2023    Admit Date: 5/4/2023    Plan: [facility]- accepted, no precert required.   Discharge Needs Assessment     Row Name 05/06/23 1426       Living Environment    People in Home alone    Current Living Arrangements home    Primary Care Provided by self    Provides Primary Care For no one, unable/limited ability to care for self    Family Caregiver if Needed child(honorio), adult    Quality of Family Relationships helpful;involved       Resource/Environmental Concerns    Resource/Environmental Concerns none    Transportation Concerns none       Transition Planning    Patient/Family Anticipates Transition to inpatient rehabilitation facility    Patient/Family Anticipated Services at Transition rehabilitation services    Transportation Anticipated family or friend will provide       Discharge Needs Assessment    Readmission Within the Last 30 Days current reason for admission unrelated to previous admission    Equipment Currently Used at Home oxygen;cane, quad    Concerns to be Addressed discharge planning    Anticipated Changes Related to Illness inability to care for self    Equipment Needed After Discharge none               Discharge Plan     Row Name 05/06/23 1428       Plan    Plan [facility]- accepted, no precert required.    Patient/Family in Agreement with Plan yes    Provided Post Acute Provider List? Yes    Post Acute Provider List Nursing Home;Inpatient Rehab    Delivered To Patient    Method of Delivery In person    Plan Comments Pt lives alone with daughter assist with meds. pt does not drives.  I with adls.  Pcp and pharm verified.  discussed need for rehab. obtained choice of [facility], notified liavanessa Palencia who accepted.              Continued Care and Services - Admitted Since 5/4/2023     Destination Coordination complete.    Service Provider Request Status Selected Services Address Phone Fax Patient Preferred     [facility]  [facility] [address] [facility] [facility] [place] [address] [phone] --              Expected Discharge Date and Time     Expected Discharge Date Expected Discharge Time    May 7, 2023          Demographic Summary     Row Name 05/06/23 1422       General Information    Admission Type inpatient    Arrived From emergency department    Referral Source admission list    Reason for Consult discharge planning    Preferred Language English               Functional Status     Row Name 05/06/23 1425       Functional Status    Usual Activity Tolerance moderate    Current Activity Tolerance fair       Functional Status, IADL    Medications assistive person    Meal Preparation assistive person    Housekeeping assistive person    Laundry assistive person    Shopping assistive person                  Octavia Barrow RN

## 2023-05-07 VITALS
OXYGEN SATURATION: 95 % | HEART RATE: 99 BPM | SYSTOLIC BLOOD PRESSURE: 146 MMHG | WEIGHT: 150.79 LBS | DIASTOLIC BLOOD PRESSURE: 79 MMHG | BODY MASS INDEX: 25.74 KG/M2 | HEIGHT: 64 IN | RESPIRATION RATE: 16 BRPM | TEMPERATURE: 97.7 F

## 2023-05-07 LAB
DEPRECATED RDW RBC AUTO: 49 FL (ref 37–54)
ERYTHROCYTE [DISTWIDTH] IN BLOOD BY AUTOMATED COUNT: 14 % (ref 12.3–15.4)
HCT VFR BLD AUTO: 30.9 % (ref 34–46.6)
HGB BLD-MCNC: 10 G/DL (ref 12–15.9)
MCH RBC QN AUTO: 32.6 PG (ref 26.6–33)
MCHC RBC AUTO-ENTMCNC: 32.3 G/DL (ref 31.5–35.7)
MCV RBC AUTO: 101 FL (ref 79–97)
PLATELET # BLD AUTO: 133 10*3/MM3 (ref 140–450)
PMV BLD AUTO: 8.8 FL (ref 6–12)
RBC # BLD AUTO: 3.05 10*6/MM3 (ref 3.77–5.28)
WBC NRBC COR # BLD: 9.4 10*3/MM3 (ref 3.4–10.8)

## 2023-05-07 PROCEDURE — 94799 UNLISTED PULMONARY SVC/PX: CPT

## 2023-05-07 PROCEDURE — 85027 COMPLETE CBC AUTOMATED: CPT | Performed by: STUDENT IN AN ORGANIZED HEALTH CARE EDUCATION/TRAINING PROGRAM

## 2023-05-07 RX ORDER — POLYETHYLENE GLYCOL 3350 17 G/17G
17 POWDER, FOR SOLUTION ORAL DAILY
Start: 2023-05-08

## 2023-05-07 RX ORDER — POLYETHYLENE GLYCOL 3350 17 G/17G
17 POWDER, FOR SOLUTION ORAL DAILY
Status: DISCONTINUED | OUTPATIENT
Start: 2023-05-07 | End: 2023-05-07 | Stop reason: HOSPADM

## 2023-05-07 RX ORDER — ASPIRIN 325 MG
325 TABLET ORAL DAILY
Start: 2023-05-08

## 2023-05-07 RX ORDER — IPRATROPIUM BROMIDE AND ALBUTEROL SULFATE 2.5; .5 MG/3ML; MG/3ML
3 SOLUTION RESPIRATORY (INHALATION) EVERY 4 HOURS PRN
Qty: 360 ML
Start: 2023-05-07

## 2023-05-07 RX ORDER — FAMOTIDINE 40 MG/1
40 TABLET, FILM COATED ORAL DAILY
Start: 2023-05-08

## 2023-05-07 RX ORDER — AMOXICILLIN 250 MG
2 CAPSULE ORAL 2 TIMES DAILY PRN
Status: DISCONTINUED | OUTPATIENT
Start: 2023-05-07 | End: 2023-05-07 | Stop reason: HOSPADM

## 2023-05-07 RX ORDER — OXYCODONE HYDROCHLORIDE 10 MG/1
10 TABLET ORAL EVERY 4 HOURS PRN
Refills: 0
Start: 2023-05-07 | End: 2023-05-12

## 2023-05-07 RX ORDER — OXYCODONE HYDROCHLORIDE 5 MG/1
5 TABLET ORAL EVERY 4 HOURS PRN
Refills: 0
Start: 2023-05-07 | End: 2023-05-12

## 2023-05-07 RX ORDER — AMOXICILLIN 250 MG
2 CAPSULE ORAL 2 TIMES DAILY PRN
Start: 2023-05-07

## 2023-05-07 RX ORDER — ACETAMINOPHEN 500 MG
1000 TABLET ORAL EVERY 8 HOURS
Start: 2023-05-07

## 2023-05-07 RX ADMIN — OXYCODONE 10 MG: 5 TABLET ORAL at 14:00

## 2023-05-07 RX ADMIN — CLOPIDOGREL BISULFATE 75 MG: 75 TABLET ORAL at 08:34

## 2023-05-07 RX ADMIN — AMLODIPINE BESYLATE 10 MG: 5 TABLET ORAL at 08:34

## 2023-05-07 RX ADMIN — ACETAMINOPHEN 1000 MG: 500 TABLET, FILM COATED ORAL at 04:35

## 2023-05-07 RX ADMIN — POLYETHYLENE GLYCOL 3350 17 G: 17 POWDER, FOR SOLUTION ORAL at 11:23

## 2023-05-07 RX ADMIN — OXYCODONE 5 MG: 5 TABLET ORAL at 04:58

## 2023-05-07 RX ADMIN — Medication 3 ML: at 08:34

## 2023-05-07 RX ADMIN — ACETAMINOPHEN 1000 MG: 500 TABLET, FILM COATED ORAL at 11:00

## 2023-05-07 RX ADMIN — ROSUVASTATIN 10 MG: 10 TABLET, FILM COATED ORAL at 08:34

## 2023-05-07 RX ADMIN — BUDESONIDE AND FORMOTEROL FUMARATE DIHYDRATE 2 PUFF: 160; 4.5 AEROSOL RESPIRATORY (INHALATION) at 08:14

## 2023-05-07 RX ADMIN — OXYCODONE 10 MG: 5 TABLET ORAL at 09:02

## 2023-05-07 RX ADMIN — FAMOTIDINE 40 MG: 20 TABLET, FILM COATED ORAL at 08:34

## 2023-05-07 RX ADMIN — ASPIRIN 325 MG ORAL TABLET 325 MG: 325 PILL ORAL at 08:34

## 2023-05-07 NOTE — DISCHARGE SUMMARY
Date of Discharge:  5/7/2023    Discharge Diagnosis:   **Closed fracture of left hip, initial encounter [S72.002A]   Essential hypertension [I10]   Atherosclerotic peripheral vascular disease (HCC) [I70.209]   COPD with hypoxia [J44.9, R09.02]   Hypertension [I10]   History of aortic aneurysm [Z86.79]       Presenting Problem/History of Present Illness  Active Hospital Problems    Diagnosis  POA   • **Closed fracture of left hip, initial encounter [S72.002A]  Yes   • Essential hypertension [I10]  Yes   • Atherosclerotic peripheral vascular disease (HCC) [I70.209]  Yes   • COPD with hypoxia [J44.9, R09.02]  Yes   • Hypertension [I10]  Yes   • History of aortic aneurysm [Z86.79]  Not Applicable      Resolved Hospital Problems   No resolved problems to display.          Hospital Course  Patient is a 83 y.o. female with history of COPD, hypertension and peripheral artery disease who presented with left hip pain after a mechanical fall at home.  She had a displaced left hip fracture.  She underwent total hip replacement.  Postoperative course was unremarkable.  At the time of discharge she is tolerating regular diet.  She is at her baseline mental status.  She is bearing weight with 2 person assist.  She is being transferred to [facility] as she is not able to return home at this time.    Instructions from Dr. Harmon, orthopedist:  • Follow-Up: In office 2 weeks postop, please call the office at [phone]  to make appointment.  • Dressing: Please leave dressing in place for 7 days postop and then remove and cover with clean dressing.  Okay to shower postop day 5.  Do not submerge in a bathtub but okay to let soap and water rinse over the dressing and then pat dry as it is waterproof.  If water does leak in the dressing and a get soaked remove and cover with new dry dressing.  • Dispo: Placement per primary.  Please call or message with any questions or concerns.    Procedures  Performed    Procedure(s):  HIP BIPOLAR CEMENTED  -------------------       Consults:   Consults     Date and Time Order Name Status Description    5/4/2023  6:10 PM Ortho (on-call MD unless specified) Completed     5/4/2023  5:41 PM Family Medicine Consult            Pertinent Test Results:    Lab Results (most recent)     Procedure Component Value Units Date/Time    CBC (No Diff) [[id number]]  (Abnormal) Collected: 05/07/23 0125    Specimen: Blood Updated: 05/07/23 0148     WBC 9.40 10*3/mm3      RBC 3.05 10*6/mm3      Hemoglobin 10.0 g/dL      Hematocrit 30.9 %      .0 fL      MCH 32.6 pg      MCHC 32.3 g/dL      RDW 14.0 %      RDW-SD 49.0 fl      MPV 8.8 fL      Platelets 133 10*3/mm3     CBC (No Diff) [[id number]]  (Abnormal) Collected: 05/06/23 0420    Specimen: Blood Updated: 05/06/23 0516     WBC 10.10 10*3/mm3      RBC 3.28 10*6/mm3      Hemoglobin 10.7 g/dL      Comment: Result checked          Hematocrit 32.5 %      MCV 99.2 fL      MCH 32.6 pg      MCHC 32.9 g/dL      RDW 14.0 %      RDW-SD 51.2 fl      MPV 9.1 fL      Platelets 145 10*3/mm3     Urinalysis, Microscopic Only - Urine, Clean Catch [[id number]]  (Abnormal) Collected: 05/05/23 0030    Specimen: Urine, Clean Catch Updated: 05/05/23 0045     RBC, UA 3-5 /HPF      WBC, UA 0-2 /HPF      Comment: Urine culture not indicated.        Bacteria, UA None Seen /HPF      Squamous Epithelial Cells, UA 0-2 /HPF      Hyaline Casts, UA 0-2 /LPF      Methodology Automated Microscopy    Urinalysis With Culture If Indicated - Urine, Clean Catch [[id number]]  (Abnormal) Collected: 05/05/23 0030    Specimen: Urine, Clean Catch Updated: 05/05/23 0045     Color, UA Yellow     Appearance, UA Clear     pH, UA 7.5     Specific Gravity, UA 1.015     Glucose, UA Negative     Ketones, UA Negative     Bilirubin, UA Negative     Blood, UA Trace     Protein, UA Negative     Leuk Esterase, UA Negative     Nitrite, UA Negative     Urobilinogen, UA 0.2 E.U./dL     Narrative:      In absence of clinical symptoms, the presence of pyuria, bacteria, and/or nitrites on the urinalysis result does not correlate with infection.    Basic Metabolic Panel [[id number]]  (Abnormal) Collected: 05/04/23 1710    Specimen: Blood Updated: 05/04/23 1755     Glucose 113 mg/dL      BUN 11 mg/dL      Creatinine 0.63 mg/dL      Sodium 138 mmol/L      Potassium 4.3 mmol/L      Comment: Slight hemolysis detected by analyzer. Results may be affected.        Chloride 101 mmol/L      CO2 23.0 mmol/L      Calcium 9.7 mg/dL      BUN/Creatinine Ratio 17.5     Anion Gap 14.0 mmol/L      eGFR 87.6 mL/min/1.73     Narrative:      GFR Normal >60  Chronic Kidney Disease <60  Kidney Failure <15    The GFR formula is only valid for adults with stable renal function between ages 18 and 70.    aPTT [[id number]]  (Abnormal) Collected: 05/04/23 1710    Specimen: Blood Updated: 05/04/23 1734     PTT 24.2 seconds     Protime-INR [[id number]]  (Normal) Collected: 05/04/23 1710    Specimen: Blood Updated: 05/04/23 1734     Protime 10.6 Seconds      INR 0.99    CBC & Differential [[id number]]  (Abnormal) Collected: 05/04/23 1710    Specimen: Blood Updated: 05/04/23 1720    Narrative:      The following orders were created for panel order CBC & Differential.  Procedure                               Abnormality         Status                     ---------                               -----------         ------                     CBC Auto Differential[[id number]]        Abnormal            Final result                 Please view results for these tests on the individual orders.    CBC Auto Differential [[id number]]  (Abnormal) Collected: 05/04/23 1710    Specimen: Blood Updated: 05/04/23 1720     WBC 8.40 10*3/mm3      RBC 4.10 10*6/mm3      Hemoglobin 13.5 g/dL      Hematocrit 41.7 %      .6 fL      MCH 32.9 pg      MCHC 32.4 g/dL      RDW 14.9 %      RDW-SD 52.5 fl      MPV 8.5 fL      Platelets 202 10*3/mm3       Neutrophil % 78.6 %      Lymphocyte % 12.9 %      Monocyte % 7.3 %      Eosinophil % 0.7 %      Basophil % 0.5 %      Neutrophils, Absolute 6.60 10*3/mm3      Lymphocytes, Absolute 1.10 10*3/mm3      Monocytes, Absolute 0.60 10*3/mm3      Eosinophils, Absolute 0.10 10*3/mm3      Basophils, Absolute 0.00 10*3/mm3      nRBC 0.0 /100 WBC            Results for orders placed during the hospital encounter of 06/25/21    Adult Transthoracic Echo Complete W/ Cont if Necessary Per Protocol    Interpretation Summary  Normal LV size and contractility EF of 70%  Normal RV size  Normal atrial size  Aortic valve, mitral valve, tricuspid valve appears structurally normal, mild aortic regurgitation seen.  Small pericardial effusion seen which appears organized and fibrinous., no signs of increased intrapericardial pressures.  Proximal aorta appears normal in size.              Condition on Discharge:  Stable    Vital Signs  Temp:  [97.7 °F (36.5 °C)-99.5 °F (37.5 °C)] 97.7 °F (36.5 °C)  Heart Rate:  [77-99] 99  Resp:  [16-22] 16  BP: (103-146)/(61-79) 146/79    Physical Exam:     General Appearance:    Alert, cooperative, in no acute distress   Head:    Normocephalic, without obvious abnormality, atraumatic   Eyes:            Lids and lashes normal, conjunctivae and sclerae normal, no   icterus, no pallor, corneas clear, PERRLA   Ears:    Ears appear intact with no abnormalities noted   Throat:   No oral lesions, no thrush, oral mucosa moist   Neck:   No adenopathy, supple, trachea midline, no thyromegaly, no   carotid bruit, no JVD   Lungs:     Clear to auscultation,respirations regular, even and                  unlabored    Heart:    Regular rhythm and normal rate, normal S1 and S2, no            murmur, no gallop, no rub, no click   Chest Wall:    No abnormalities observed   Abdomen:     Normal bowel sounds, no masses, no organomegaly, soft        non-tender, non-distended, no guarding, no rebound                tenderness    Extremities:   Left hip incision -clean and dry   Pulses:   Pulses palpable and equal bilaterally   Skin:   No bleeding, bruising or rash   Lymph nodes:   No palpable adenopathy   Neurologic:   Cranial nerves 2 - 12 grossly intact, sensation intact, DTR       present and equal bilaterally       Discharge Disposition  Rehab Facility or Unit (DC - External)    Discharge Medications     Discharge Medications      New Medications      Instructions Start Date   acetaminophen 500 MG tablet  Commonly known as: TYLENOL   1,000 mg, Oral, Every 8 Hours      aspirin 325 MG tablet  Replaces: aspirin 81 MG chewable tablet   325 mg, Oral, Daily   Start Date: May 8, 2023     famotidine 40 MG tablet  Commonly known as: PEPCID   40 mg, Oral, Daily   Start Date: May 8, 2023     oxyCODONE 5 MG immediate release tablet  Commonly known as: ROXICODONE   5 mg, Oral, Every 4 Hours PRN      oxyCODONE 10 MG tablet  Commonly known as: ROXICODONE   10 mg, Oral, Every 4 Hours PRN      polyethylene glycol 17 g packet  Commonly known as: MIRALAX   17 g, Oral, Daily   Start Date: May 8, 2023     sennosides-docusate 8.6-50 MG per tablet  Commonly known as: PERICOLACE   2 tablets, Oral, 2 Times Daily PRN         Continue These Medications      Instructions Start Date   albuterol sulfate  (90 Base) MCG/ACT inhaler  Commonly known as: PROVENTIL HFA;VENTOLIN HFA;PROAIR HFA   2 puffs, Inhalation, Every 4 Hours PRN      amLODIPine 10 MG tablet  Commonly known as: NORVASC   10 mg, Oral, Daily      bisoprolol-hydrochlorothiazide 5-6.25 MG per tablet  Commonly known as: ZIAC   1 tablet, Oral, Daily      cholecalciferol 25 MCG (1000 UT) tablet  Commonly known as: VITAMIN D3   2,000 Units, Oral, Daily      clopidogrel 75 MG tablet  Commonly known as: PLAVIX   75 mg, Oral, Daily      ipratropium-albuterol 0.5-2.5 mg/3 ml nebulizer  Commonly known as: DUO-NEB   3 mL, Nebulization, Every 4 Hours PRN      PRESERVISION AREDS 2+MULTI VIT PO   1 tablet,  Oral, 2 Times Daily      pyridoxine 100 MG tablet tablet  Commonly known as: VITAMIN B-6   100 mg, Oral, Daily      rosuvastatin 10 MG tablet  Commonly known as: CRESTOR   10 mg, Oral, Daily      Trelegy Ellipta 100-62.5-25 MCG/ACT inhaler  Generic drug: Fluticasone-Umeclidin-Vilant   1 puff, Inhalation, Daily - RT      vitamin B-12 1000 MCG tablet  Commonly known as: CYANOCOBALAMIN   2,500 mcg, Oral, Daily         Stop These Medications    aspirin 81 MG chewable tablet  Replaced by: aspirin 325 MG tablet     HYDROcodone-acetaminophen 5-325 MG per tablet  Commonly known as: NORCO            Discharge Diet:   Diet Instructions     Diet: Regular/House Diet; Regular Texture (IDDSI 7); Thin (IDDSI 0)      Discharge Diet: Regular/House Diet    Texture: Regular Texture (IDDSI 7)    Fluid Consistency: Thin (IDDSI 0)          Activity at Discharge:   Activity Instructions     Up WIth Assist            Follow-up Appointments  Future Appointments   Date Time Provider Department Center   9/13/2023  2:00 PM Shikha Dunn APRN [facility]   3/18/2024  1:00 PM Kyrie Campoverde MD [facility]     Additional Instructions for the Follow-ups that You Need to Schedule     Discharge Follow-up with PCP   As directed       Currently Documented PCP:    Dianna Cronin MD    PCP Phone Number:    [phone]     Follow Up Details: Call for appointment after being released from rehab         Discharge Follow-up with Specified Provider: Dr. Eden Rendon [phone]; 2 Weeks   As directed      To: Dr. Eden Rendon [phone]    Follow Up: 2 Weeks               Test Results Pending at Discharge       Dianna Cronin MD  05/07/23  11:57 EDT    Time: Discharge 25 min

## 2023-05-07 NOTE — CASE MANAGEMENT/SOCIAL WORK
Case Management Discharge Note      Final Note: [facility]- with [employer] [facility].  to be returned to [facility] this week    Provided Post Acute Provider List?: Yes  Post Acute Provider List: Nursing Home, Inpatient Rehab  Delivered To: Patient  Method of Delivery: In person    Selected Continued Care - Discharged on 5/7/2023 Admission date: 5/4/2023 - Discharge disposition: Rehab Facility or Unit (DC - External)    Destination Coordination complete.    Service Provider Selected Services Address Phone Fax Patient Preferred    [facility] [address] [place] [address] [phone] --           Final Discharge Disposition Code: 62 - inpatient rehab facility

## 2023-05-07 NOTE — DISCHARGE INSTRUCTIONS
Weight Bearing: Weight Bearing As Tolerated , with posterior hip precautions  Labs: Above lab values review. Plan: Hematocrit stable, will monitor  Diet: Okay for diet from orthopedic surgery standpoint  PT/OT: To Mobilize  Incentive spirometer, float heels  Ice incision site  Antibiotics: 24 hours post op antibiotics to be completed today  DVT PPX: Sequential Compression Devices, Chemical DVT Ppx: Restarting home Eliquis today  Post-Op Xray: Implants in good position and reduced, no obvious reyna-implant fracture  Follow-Up: In office 2 weeks postop, please call the office at [phone]  to make appointment.  Dressing: Please leave dressing in place for 7 days postop and then remove and cover with clean dressing.  Okay to shower postop day 5.  Do not submerge in a bathtub but okay to let soap and water rinse over the dressing and then pat dry as it is waterproof.  If water does leak in the dressing and a get soaked remove and cover with new dry dressing.  Dispo: Placement per primary.  Please call or message with any questions or concerns.

## 2023-05-07 NOTE — PLAN OF CARE
Goal Outcome Evaluation:  Plan of Care Reviewed With: patient        Progress: improving  Outcome Evaluation: Patient 84 yo female admitted to the hospital with the complaint of left hip pain after fall. Xray shows displaced/impacted femoral neck fracture left hip. Patient underwent Left Hip Hemiarthroplasty on 05/05/23. Patient is allowed to do WBAT. At baseline, patient resides alone in a house and is independent in mobility and ADLs using a cane. Her daughter provides transportation when needed. As per today's evaluation, patient required Max Ax2 for Bed Mobility, Mod Ax2 and RW for transfers and Max Ax2 and RW for ambulation 2 ft. PT recommend IRF at d/c to return to [facility].

## 2023-05-07 NOTE — THERAPY EVALUATION
Patient Name: Becka Oliva  : 1940    MRN: [id number]                              Today's Date: 2023       Admit Date: 2023    Visit Dx:     ICD-10-CM ICD-9-CM   1. Closed fracture of left hip, initial encounter  S72.002A 820.8     Patient Active Problem List   Diagnosis   • Atherosclerotic peripheral vascular disease (HCC)   • Benign hypertensive heart disease   • Chronic kidney disease   • COPD with hypoxia   • Dyspnea on exertion   • History of aortic aneurysm   • Hypertension   • Pericardial effusion   • Primary localized osteoarthritis   • Shortness of breath   • Acute UTI   • Pyelonephritis   • Generalized weakness   • PVD (peripheral vascular disease) with claudication   • Dyslipidemia   • Essential hypertension   • PAD (peripheral artery disease)   • Left inguinal hernia   • Dyspnea, unspecified type   • COPD with acute exacerbation   • Closed fracture of left hip, initial encounter     Past Medical History:   Diagnosis Date   • Aortic aneurysm    • COPD (chronic obstructive pulmonary disease)    • Hernia, inguinal, left    • Hyperlipidemia    • Hypertension    • Macular degeneration    • Peripheral edema    • PVD (peripheral vascular disease)      Past Surgical History:   Procedure Laterality Date   • CARPAL TUNNEL RELEASE     • DESCENDING AORTIC ANEURYSM REPAIR W/ STENT     • INGUINAL HERNIA REPAIR Bilateral 2022    Procedure: INGUINAL HERNIA REPAIR LAPAROSCOPIC WITH DAVINCI ROBOT;  Surgeon: Jasper Mcneill MD;  Location: [facility] MAIN OR;  Service: Robotics - DaVinci;  Laterality: Bilateral;   • INTERVENTIONAL RADIOLOGY PROCEDURE N/A 2021    Procedure: Abdominal Aortagram with Runoff, possible PCI;  Surgeon: Kyrie Campoverde MD;  Location: [facility] CATH INVASIVE LOCATION;  Service: Cardiovascular;  Laterality: N/A;   • KNEE SURGERY     • OTHER SURGICAL HISTORY      STENT   • VASCULAR SURGERY      fem fem bypass      General Information     Row Name 23 2217           Physical Therapy Time and Intention    Document Type evaluation  -PG     Mode of Treatment physical therapy  -PG     Row Name 05/06/23 2217          General Information    Patient Profile Reviewed yes  -PG     Prior Level of Function independent:  -PG     Existing Precautions/Restrictions fall;oxygen therapy device and L/min;hip;hip, posterior  -PG     Barriers to Rehab none identified  -PG     Row Name 05/06/23 2217          Living Environment    People in Home alone  -PG     Row Name 05/06/23 2217          Cognition    Orientation Status (Cognition) oriented x 4;verbal cues/prompts needed for orientation  -PG     Row Name 05/06/23 2217          Safety Issues, Functional Mobility    Safety Issues Affecting Function (Mobility) safety precaution awareness  -PG     Impairments Affecting Function (Mobility) balance;postural/trunk control;endurance/activity tolerance;strength;pain  -PG           User Key  (r) = Recorded By, (t) = Taken By, (c) = Cosigned By    Initials Name Provider Type    PG Cammie Alonso, PT Physical Therapist               Mobility     Row Name 05/06/23 2218          Bed Mobility    Bed Mobility bed mobility (all) activities  -PG     All Activities, [place] (Bed Mobility) maximum assist (25% patient effort);2 person assist  -PG     Row Name 05/06/23 2218          Sit-Stand Transfer    Sit-Stand [place] (Transfers) 2 person assist;moderate assist (50% patient effort)  -PG     Assistive Device (Sit-Stand Transfers) walker, front-wheeled  -PG     Row Name 05/06/23 2218          Gait/Stairs (Locomotion)    [place] Level (Gait) maximum assist (25% patient effort);2 person assist  -PG     Assistive Device (Gait) walker, front-wheeled  -PG     Distance in Feet (Gait) 2FT  -PG     Row Name 05/06/23 2218          Mobility    Extremity Weight-bearing Status left lower extremity  -PG     Left Lower Extremity (Weight-bearing Status) weight-bearing as tolerated (WBAT)  -PG           User  Key  (r) = Recorded By, (t) = Taken By, (c) = Cosigned By    Initials Name Provider Type    Cammie Trejo PT Physical Therapist               Obj/Interventions     Row Name 05/06/23 2220          Strength Comprehensive (MMT)    General Manual Muscle Testing (MMT) Assessment lower extremity strength deficits identified  -PG     Row Name 05/06/23 2220          Balance    Balance Assessment sitting dynamic balance;sit to stand dynamic balance;standing dynamic balance  -PG     Dynamic Sitting Balance maximum assist  -PG     Sit to Stand Dynamic Balance moderate assist  -PG     Dynamic Standing Balance maximum assist;2-person assist  -PG     Position/Device Used, Standing Balance walker, rolling  -PG           User Key  (r) = Recorded By, (t) = Taken By, (c) = Cosigned By    Initials Name Provider Type    Cammie Trejo PT Physical Therapist               Goals/Plan     Row Name 05/06/23 2227          Bed Mobility Goal 1 (PT)    Activity/Assistive Device (Bed Mobility Goal 1, PT) bed mobility activities, all  -PG     [place] Level/Cues Needed (Bed Mobility Goal 1, PT) minimum assist (75% or more patient effort)  -PG     Time Frame (Bed Mobility Goal 1, PT) long term goal (LTG);2 weeks  -PG     Row Name 05/06/23 2227          Transfer Goal 1 (PT)    Activity/Assistive Device (Transfer Goal 1, PT) transfers, all  -PG     [place] Level/Cues Needed (Transfer Goal 1, PT) minimum assist (75% or more patient effort)  -PG     Time Frame (Transfer Goal 1, PT) long term goal (LTG);2 weeks  -PG     Row Name 05/06/23 2227          Gait Training Goal 1 (PT)    Activity/Assistive Device (Gait Training Goal 1, PT) gait (walking locomotion)  -PG     [place] Level (Gait Training Goal 1, PT) minimum assist (75% or more patient effort)  -PG     Distance (Gait Training Goal 1, PT) [id number]  -PG     Time Frame (Gait Training Goal 1, PT) long term goal (LTG);2 weeks  -PG     Row Name 05/06/23 2227          Therapy  Assessment/Plan (PT)    Planned Therapy Interventions (PT) balance training;bed mobility training;gait training;home exercise program;neuromuscular re-education;postural re-education;transfer training;patient/family education;stretching;strengthening;ROM (range of motion)  -PG           User Key  (r) = Recorded By, (t) = Taken By, (c) = Cosigned By    Initials Name Provider Type    PG Cammie Alonso, PT Physical Therapist               Clinical Impression     Row Name 05/06/23 2221          Pain    Pretreatment Pain Rating 2/10  -PG     Posttreatment Pain Rating 5/10  -PG     Pain Location - Side/Orientation Left  -PG     Pain Location - hip  -PG     Row Name 05/06/23 2221          Plan of Care Review    Plan of Care Reviewed With patient  -PG     Progress improving  -PG     Outcome Evaluation Patient 84 yo female admitted to the hospital with the complaint of left hip pain after fall. Xray shows displaced/impacted femoral neck fracture left hip. Patient underwent Left Hip Hemiarthroplasty on 05/05/23. Patient is allowed to do WBAT. At baseline, patient resides alone in a house and is independent in mobility and ADLs using a cane. Her daughter provides transportation when needed. As per today's evaluation, patient required Max Ax2 for Bed Mobility, Mod Ax2 and RW for transfers and Max Ax2 and RW for ambulation 2 ft. PT recommend IRF at d/c to return to PLOF.  -PG     Row Name 05/06/23 2221          Therapy Assessment/Plan (PT)    Patient/Family Therapy Goals Statement (PT) To return to PLOF  -PG     Rehab Potential (PT) good, to achieve stated therapy goals  -PG     Criteria for Skilled Interventions Met (PT) yes;skilled treatment is necessary  -PG     Therapy Frequency (PT) 5 times/wk  -PG     Predicted Duration of Therapy Intervention (PT) until d/c  -PG     Row Name 05/06/23 2221          Positioning and Restraints    Pre-Treatment Position in bed  -PG     Post Treatment Position chair  -PG     In Chair notified  nsg;call light within reach;encouraged to call for assist;exit alarm on  -PG           User Key  (r) = Recorded By, (t) = Taken By, (c) = Cosigned By    Initials Name Provider Type    PG Cammie Alonso PT Physical Therapist               Outcome Measures     Row Name 05/06/23 2228          How much help from another person do you currently need...    Turning from your back to your side while in flat bed without using bedrails? 1  -PG     Moving from lying on back to sitting on the side of a flat bed without bedrails? 1  -PG     Moving to and from a bed to a chair (including a wheelchair)? 1  -PG     Standing up from a chair using your arms (e.g., wheelchair, bedside chair)? 2  -PG     Climbing 3-5 steps with a railing? 1  -PG     To walk in hospital room? 1  -PG     AM-PAC 6 Clicks Score (PT) 7  -PG     Highest level of mobility 2 --> Bed activities/dependent transfer  -PG     Row Name 05/06/23 2228          Functional Assessment    Outcome Measure Options AM-PAC 6 Clicks Basic Mobility (PT)  -PG           User Key  (r) = Recorded By, (t) = Taken By, (c) = Cosigned By    Initials Name Provider Type    PG Cammie Alonso PT Physical Therapist                             Physical Therapy Education     Title: PT OT SLP Therapies (In Progress)     Topic: Physical Therapy (Done)     Point: Mobility training (Done)     Learning Progress Summary           Patient Acceptance, E, VU by PG at 5/6/2023 2229                   Point: Body mechanics (Done)     Learning Progress Summary           Patient Acceptance, E, VU by PG at 5/6/2023 2229                   Point: Precautions (Done)     Learning Progress Summary           Patient Acceptance, E, VU by PG at 5/6/2023 2229                               User Key     Initials Effective Dates Name Provider Type Discipline    PG 09/01/22 -  Cammie Alonso PT Physical Therapist PT              PT Recommendation and Plan  Planned Therapy Interventions (PT): balance training, bed  mobility training, gait training, home exercise program, neuromuscular re-education, postural re-education, transfer training, patient/family education, stretching, strengthening, ROM (range of motion)  Plan of Care Reviewed With: patient  Progress: improving  Outcome Evaluation: Patient 82 yo female admitted to the hospital with the complaint of left hip pain after fall. Xray shows displaced/impacted femoral neck fracture left hip. Patient underwent Left Hip Hemiarthroplasty on 05/05/23. Patient is allowed to do WBAT. At baseline, patient resides alone in a house and is independent in mobility and ADLs using a cane. Her daughter provides transportation when needed. As per today's evaluation, patient required Max Ax2 for Bed Mobility, Mod Ax2 and RW for transfers and Max Ax2 and RW for ambulation 2 ft. PT recommend IRF at d/c to return to [facility].     Time Calculation:    PT Charges     Row Name 05/06/23 2229             Time Calculation    Start Time 1136  -PG      Stop Time 1212  -PG      Time Calculation (min) 36 min  -PG      PT Received On 05/06/23  -PG      PT - Next Appointment 05/08/23  -PG      PT Goal Re-Cert Due Date 05/20/23  -PG         Time Calculation- PT    Total Timed Code Minutes- PT 10 minute(s)  -PG            User Key  (r) = Recorded By, (t) = Taken By, (c) = Cosigned By    Initials Name Provider Type    PG Cammie Alonso PT Physical Therapist              Therapy Charges for Today     Code Description Service Date Service Provider Modifiers Qty    71178975874 HC PT EVAL MOD COMPLEXITY 4 5/6/2023 Cammie Alonso, PT GP 1    39366307030 HC GAIT TRAINING EA 15 MIN 5/6/2023 Cammie Alonso PT GP 1          PT G-Codes  Outcome Measure Options: AM-PAC 6 Clicks Basic Mobility (PT)  AM-PAC 6 Clicks Score (PT): 7  PT Discharge Summary  Anticipated Discharge Disposition (PT): inpatient rehabilitation facility    Cammie Alonso PT  5/6/2023

## 2023-05-07 NOTE — PLAN OF CARE
Goal Outcome Evaluation:      Patient able to make needs known. VSS. 4L per NC. Pain treated with PRN pain medication. Patient ambulated from chair to bed. External cath changed. Daughter at bedside and attentive to patients needs. Personal items and call light with in reach. Plan of care on going.

## 2023-05-21 LAB — QT INTERVAL: 415 MS

## 2023-06-01 ENCOUNTER — TRANSCRIBE ORDERS (OUTPATIENT)
Dept: ADMINISTRATIVE | Facility: HOSPITAL | Age: 83
End: 2023-06-01
Payer: MEDICARE

## 2023-06-01 ENCOUNTER — HOSPITAL ENCOUNTER (OUTPATIENT)
Dept: CARDIOLOGY | Facility: HOSPITAL | Age: 83
Discharge: HOME | End: 2023-06-01
Admitting: STUDENT IN AN ORGANIZED HEALTH CARE EDUCATION/TRAINING PROGRAM

## 2023-06-01 DIAGNOSIS — M79.661 PAIN IN BOTH LOWER LEGS: ICD-10-CM

## 2023-06-01 DIAGNOSIS — I82.409 RECURRENT DEEP VEIN THROMBOSIS: ICD-10-CM

## 2023-06-01 DIAGNOSIS — R60.9 SWELLING: ICD-10-CM

## 2023-06-01 DIAGNOSIS — R09.89 SUSPECTED DEEP VEIN THROMBOSIS (DVT): ICD-10-CM

## 2023-06-01 DIAGNOSIS — R60.9 SWELLING: Primary | ICD-10-CM

## 2023-06-01 DIAGNOSIS — M79.662 PAIN IN BOTH LOWER LEGS: ICD-10-CM

## 2023-06-01 LAB

## 2023-06-01 PROCEDURE — 93970 EXTREMITY STUDY: CPT

## 2023-06-08 RX ORDER — CLOPIDOGREL BISULFATE 75 MG/1
75 TABLET ORAL DAILY
Qty: 90 TABLET | Refills: 3 | Status: SHIPPED | OUTPATIENT
Start: 2023-06-08

## 2023-06-08 NOTE — TELEPHONE ENCOUNTER
Rx Refill Note  Requested Prescriptions     Pending Prescriptions Disp Refills    clopidogrel (PLAVIX) 75 MG tablet [Pharmacy Med Name: CLOPIDOGREL 75MG TABLETS] 90 tablet 3     Sig: TAKE 1 TABLET BY MOUTH DAILY      Last office visit with prescribing clinician: 3/13/2023   Last telemedicine visit with prescribing clinician: Visit date not found   Next office visit with prescribing clinician: 3/18/2024                         Would you like a call back once the refill request has been completed: [] Yes [] No    If the office needs to give you a call back, can they leave a voicemail: [] Yes [] No    Deedee Cruz MA  06/08/23, 16:42 EDT

## 2023-06-09 ENCOUNTER — TELEPHONE (OUTPATIENT)
Dept: CARDIOLOGY | Facility: CLINIC | Age: 83
End: 2023-06-09
Payer: MEDICARE

## 2023-06-09 NOTE — TELEPHONE ENCOUNTER
Caller: MARYJO FENG    Relationship to patient: Emergency Contact    Best call back number: [phone]    Chief complaint: REQUESTING FOLLOW UP APPOINTMENT    Type of visit: FOLLOW UP    Requested date: ASAP    If rescheduling, when is the original appointment: 9/13/23    Additional notes:PATIENT IS EXPERIENCING SWELLING. PLEASE REACH OUT FOR SCHEDULING A SOONER APPOINTMENT.

## 2023-06-18 NOTE — PROGRESS NOTES
Subjective:     Encounter Date:06/19/2023      Patient ID: Becka Oliva is a 83 y.o. female.    Chief Complaint and history of present illness:     Follow-up for PAD, hypertension, pericardial effusion    History of Present Illness  :     Ms. Becka Oliva is a 83 y.o. female with past medical history of.      # Pericardial effusion  # hypertension  # COPD  #PAD, AAA , endograft repair with by common iliac stent  # former smoker     Here for  follow-up.  Patient denies any chest pain or shortness of breath.  Patient had a partial left hip replacement on 5/5/2023.  PCP put her on Lasix for edema.  Had negative venous Dopplers.    Patient's arterial blood pressure is 129/53, heart rate 72, O2 sat of 94% on room air.       Labs from 5/13/2 019 reveal normal CMP, BNP 55, cholesterol 217, HDL 66, LDL not to goal at 140.  Repeat labs 9/2/2020 reveal hemoglobin A1c of 5.6.  Chem-7 unremarkable.  Labs from 3/26/2021 reveal Chem-7 with a glucose of 112, calcium of 8, hemoglobin of 11.1.  Labs from 3/23/2021 revealed proBNP of 668.  Labs from 7/26/2021 revealed normal Chem-7 and CBC.  Labs from 9/28/2021 reveal cholesterol 67, triglycerides 57, HDL 32, LDL 24.  Labs from 3/8/2023 revealed normal CMP.  Lipid profile with cholesterol 165, triglycerides 80, HDL 74, LDL 76.  Labs from 5/23/2023 reveal BMP with normal BUN/creatinine and potassium.       Patient had an echocardiogram 12-2-19 which showed mild pericardial effusion.  Patient had Lexiscan Cardiolite 1/10/2020 which was negative for ischemia     Patient underwent echocardiogram and Lexiscan Cardiolite 6/25/2021 both of which were normal.    HERBERT done at [place] abnormal with 0.81/0.79 on the right.  And 0.79 on the left.        ASSESSMENT:     #Edema  -Hypertension  #Pericardial effusion  # hypertension  # COPD, kyphoscoliosis, former smoker  # PAD, AAA repair     PLAN:    Patient is getting a venous Doppler today.  We will follow-up.  Patient is on Lasix.  We  will check an echocardiogram to assess LV function and right heart function.  Continue medical management with bisoprolol hydrochlorothiazide, amlodipine, clopidogrel, furosemide, rosuvastatin to help with blood pressure, hypertension, dyslipidemia, PAD tolerated.  We will check lab in 2 weeks to make sure Lasix is not causing any side effects.  We will follow-up in nurse practitioner Shikha schmitt after the labs and echo and venous Dopplers.    Echocardiogram 1/10/19 reveals normal function PA pressures of 30-40 with mild AI/MR/TR and moderate size pericardial effusion, repeat echo 6/1/2019 revealed small pericardial effusion, LVEF of 70%  Lexiscan Cardiolite 6/25/2021 was negative for ischemia EF of 67%  Echocardiogram 6/25/2021 reviewed and interpreted by me reveals normal LV systolic function with LVEF of 70% with small pericardial effusion.         Procedures EKG done 11/19/2022 reviewed/interpreted by me reveals sinus rhythm with rate of 60 bpm with incomplete right bundle branch block and inferior Q waves.        Procedures    Copied text in this portion of the note has been reviewed and is accurate as of 6/19/2023  The following portions of the patient's history were reviewed and updated as appropriate: allergies, current medications, past family history, past medical history, past social history, past surgical history and problem list.    Assessment:         MDM       Diagnosis Plan   1. Edema leg  Adult Transthoracic Echo Complete W/ Cont if Necessary Per Protocol    Basic Metabolic Panel    BNP      2. Essential hypertension  Adult Transthoracic Echo Complete W/ Cont if Necessary Per Protocol    Basic Metabolic Panel    BNP      3. PAD (peripheral artery disease)  Adult Transthoracic Echo Complete W/ Cont if Necessary Per Protocol    Basic Metabolic Panel    BNP      4. Panlobular emphysema  Adult Transthoracic Echo Complete W/ Cont if Necessary Per Protocol    Basic Metabolic Panel    BNP      5.  Dyslipidemia  Adult Transthoracic Echo Complete W/ Cont if Necessary Per Protocol    Basic Metabolic Panel    BNP      6. Pericardial effusion  Adult Transthoracic Echo Complete W/ Cont if Necessary Per Protocol    Basic Metabolic Panel    BNP             Plan:               Past Medical History:  Past Medical History:   Diagnosis Date    Aortic aneurysm     COPD (chronic obstructive pulmonary disease)     Hernia, inguinal, left     Hyperlipidemia     Hypertension     Macular degeneration     Peripheral edema     PVD (peripheral vascular disease)      Past Surgical History:  Past Surgical History:   Procedure Laterality Date    CARPAL TUNNEL RELEASE      DESCENDING AORTIC ANEURYSM REPAIR W/ STENT      HIP BIPOLAR REPLACEMENT Left 5/5/2023    Procedure: HIP BIPOLAR CEMENTED;  Surgeon: Guanako Harmon MD;  Location: [facility] MAIN OR;  Service: Orthopedics;  Laterality: Left;    INGUINAL HERNIA REPAIR Bilateral 11/16/2022    Procedure: INGUINAL HERNIA REPAIR LAPAROSCOPIC WITH DAVINCI ROBOT;  Surgeon: Jasper Mcneill MD;  Location: [facility] MAIN OR;  Service: Robotics - DaVinci;  Laterality: Bilateral;    INTERVENTIONAL RADIOLOGY PROCEDURE N/A 07/28/2021    Procedure: Abdominal Aortagram with Runoff, possible PCI;  Surgeon: Kyrie Campoverde MD;  Location: [facility] CATH INVASIVE LOCATION;  Service: Cardiovascular;  Laterality: N/A;    KNEE SURGERY  2020    OTHER SURGICAL HISTORY      STENT    VASCULAR SURGERY      fem fem bypass      Allergies:  No Known Allergies  Home Meds:  Current Meds:     Current Outpatient Medications:     acetaminophen (TYLENOL) 500 MG tablet, Take 2 tablets by mouth Every 8 (Eight) Hours., Disp: , Rfl:     albuterol sulfate  (90 Base) MCG/ACT inhaler, Inhale 2 puffs Every 4 (Four) Hours As Needed for Wheezing., Disp: , Rfl:     amLODIPine (NORVASC) 10 MG tablet, Take 1 tablet by mouth Daily., Disp: , Rfl:     aspirin 325 MG tablet, Take 1 tablet by mouth Daily., Disp: , Rfl:      bisoprolol-hydrochlorothiazide (ZIAC) 5-6.25 MG per tablet, Take 1 tablet by mouth Daily., Disp: , Rfl:     cholecalciferol (VITAMIN D3) 25 MCG (1000 UT) tablet, Take 2 tablets by mouth Daily., Disp: , Rfl:     clopidogrel (PLAVIX) 75 MG tablet, TAKE 1 TABLET BY MOUTH DAILY, Disp: 90 tablet, Rfl: 3    Fluticasone-Umeclidin-Vilant (Trelegy Ellipta) 100-62.5-25 MCG/ACT inhaler, Inhale 1 puff Daily., Disp: , Rfl:     furosemide (LASIX) 40 MG tablet, Take 1 tablet by mouth Daily., Disp: , Rfl:     HYDROcodone-acetaminophen (NORCO) 5-325 MG per tablet, Take 1-2 tablets by mouth Every 6 (Six) Hours As Needed for Severe Pain., Disp: , Rfl:     ipratropium-albuterol (DUO-NEB) 0.5-2.5 mg/3 ml nebulizer, Take 3 mL by nebulization Every 4 (Four) Hours As Needed for Shortness of Air or Wheezing., Disp: 360 mL, Rfl:     Multiple Vitamins-Minerals (PRESERVISION AREDS 2+MULTI VIT PO), Take 1 tablet by mouth 2 (Two) Times a Day., Disp: , Rfl:     potassium chloride (K-DUR,KLOR-CON) 20 MEQ CR tablet, Take 1 tablet by mouth Daily., Disp: , Rfl:     pyridoxine (VITAMIN B-6) 100 MG tablet tablet, Take 1 tablet by mouth Daily., Disp: , Rfl:     rosuvastatin (CRESTOR) 10 MG tablet, Take 1 tablet by mouth Daily., Disp: , Rfl:     sennosides-docusate (PERICOLACE) 8.6-50 MG per tablet, Take 2 tablets by mouth 2 (Two) Times a Day As Needed for Constipation., Disp: , Rfl:     tamsulosin (FLOMAX) 0.4 MG capsule 24 hr capsule, Take 1 capsule by mouth Daily., Disp: , Rfl:     vitamin B-12 (CYANOCOBALAMIN) 1000 MCG tablet, Take 2.5 tablets by mouth Daily., Disp: , Rfl:   Social History:   Social History     Tobacco Use    Smoking status: Former     Packs/day: 1.00     Years: 30.00     Pack years: 30.00     Types: Cigarettes     Quit date:      Years since quittin.4     Passive exposure: Past    Smokeless tobacco: Never   Substance Use Topics    Alcohol use: No      Family History:  Family History   Problem Relation Age of Onset    Cancer  "Mother     Aneurysm Father     No Known Problems Sister     No Known Problems Brother     No Known Problems Maternal Aunt     No Known Problems Maternal Uncle     No Known Problems Paternal Aunt     No Known Problems Paternal Uncle     No Known Problems Maternal Grandmother     No Known Problems Maternal Grandfather     No Known Problems Paternal Grandmother     No Known Problems Paternal Grandfather     Cancer Other     Diabetes Other     Anemia Neg Hx     Arrhythmia Neg Hx     Asthma Neg Hx     Clotting disorder Neg Hx     Fainting Neg Hx     Heart attack Neg Hx     Heart disease Neg Hx     Heart failure Neg Hx     Hyperlipidemia Neg Hx     Hypertension Neg Hx               Review of Systems   Constitutional: Positive for malaise/fatigue.   Cardiovascular:  Positive for leg swelling. Negative for chest pain, dyspnea on exertion and palpitations.   Respiratory:  Positive for shortness of breath. Negative for cough.    Gastrointestinal:  Negative for abdominal pain, nausea and vomiting.   Neurological:  Positive for dizziness, light-headedness and numbness. Negative for focal weakness and headaches.   All other systems reviewed and are negative.  All other systems are negative         Objective:     Physical Exam  /53 (BP Location: Right arm, Patient Position: Sitting, Cuff Size: Adult)   Pulse 72   Ht 162.6 cm (64\")   Wt 70.3 kg (155 lb)   SpO2 94%   BMI 26.61 kg/m²   General:  Appears in no acute distress  Eyes: Sclera is anicteric,  conjunctiva is clear   HEENT:  No JVD.  No carotid bruits  Respiratory: Respirations regular and unlabored at rest.  Clear to auscultation  Cardiovascular: S1,S2 Regular rate and rhythm. No murmur, rub or gallop auscultated.   Extremities: No digital clubbing or cyanosis.  Left greater than right edema present.  Skin: Color pink. Skin warm and dry to touch. No rashes  No xanthoma  Neuro: Alert and awake.  Walks with a walker    Lab Reviewed:         Kyrie Campoverde, " "MD  6/19/2023 14:58 EDT      EMR Dragon/Transcription:   \"Dictated utilizing Dragon dictation\".        "

## 2023-06-19 ENCOUNTER — OFFICE VISIT (OUTPATIENT)
Dept: CARDIOLOGY | Facility: CLINIC | Age: 83
End: 2023-06-19
Payer: MEDICARE

## 2023-06-19 VITALS
DIASTOLIC BLOOD PRESSURE: 53 MMHG | OXYGEN SATURATION: 94 % | HEART RATE: 72 BPM | WEIGHT: 155 LBS | BODY MASS INDEX: 26.46 KG/M2 | HEIGHT: 64 IN | SYSTOLIC BLOOD PRESSURE: 129 MMHG

## 2023-06-19 DIAGNOSIS — I10 ESSENTIAL HYPERTENSION: ICD-10-CM

## 2023-06-19 DIAGNOSIS — E78.5 DYSLIPIDEMIA: ICD-10-CM

## 2023-06-19 DIAGNOSIS — I73.9 PAD (PERIPHERAL ARTERY DISEASE): ICD-10-CM

## 2023-06-19 DIAGNOSIS — I31.39 PERICARDIAL EFFUSION: ICD-10-CM

## 2023-06-19 DIAGNOSIS — R60.0 EDEMA LEG: Primary | ICD-10-CM

## 2023-06-19 DIAGNOSIS — J43.1 PANLOBULAR EMPHYSEMA: ICD-10-CM

## 2023-06-19 PROCEDURE — 1159F MED LIST DOCD IN RCRD: CPT | Performed by: INTERNAL MEDICINE

## 2023-06-19 PROCEDURE — 3078F DIAST BP <80 MM HG: CPT | Performed by: INTERNAL MEDICINE

## 2023-06-19 PROCEDURE — 3074F SYST BP LT 130 MM HG: CPT | Performed by: INTERNAL MEDICINE

## 2023-06-19 PROCEDURE — 99214 OFFICE O/P EST MOD 30 MIN: CPT | Performed by: INTERNAL MEDICINE

## 2023-06-19 PROCEDURE — 1160F RVW MEDS BY RX/DR IN RCRD: CPT | Performed by: INTERNAL MEDICINE

## 2023-06-19 RX ORDER — TAMSULOSIN HYDROCHLORIDE 0.4 MG/1
1 CAPSULE ORAL DAILY
COMMUNITY

## 2023-06-19 RX ORDER — HYDROCODONE BITARTRATE AND ACETAMINOPHEN 5; 325 MG/1; MG/1
1-2 TABLET ORAL EVERY 6 HOURS PRN
COMMUNITY

## 2023-06-19 RX ORDER — POTASSIUM CHLORIDE 20 MEQ/1
20 TABLET, EXTENDED RELEASE ORAL DAILY
COMMUNITY

## 2023-06-19 RX ORDER — FUROSEMIDE 40 MG/1
40 TABLET ORAL DAILY
COMMUNITY

## 2023-07-05 PROBLEM — R60.0 BILATERAL LOWER EXTREMITY EDEMA: Status: ACTIVE | Noted: 2023-07-05

## 2023-08-04 ENCOUNTER — TELEPHONE (OUTPATIENT)
Dept: CARDIOLOGY | Facility: CLINIC | Age: 83
End: 2023-08-04
Payer: MEDICARE

## 2023-08-04 RX ORDER — BISOPROLOL FUMARATE AND HYDROCHLOROTHIAZIDE 5; 6.25 MG/1; MG/1
1 TABLET ORAL DAILY
Status: CANCELLED | OUTPATIENT
Start: 2023-08-04

## 2023-08-04 RX ORDER — BISOPROLOL FUMARATE AND HYDROCHLOROTHIAZIDE 5; 6.25 MG/1; MG/1
1 TABLET ORAL DAILY
Qty: 90 TABLET | Refills: 1 | Status: SHIPPED | OUTPATIENT
Start: 2023-08-04

## 2023-09-14 RX ORDER — ROSUVASTATIN CALCIUM 10 MG/1
TABLET, COATED ORAL
Qty: 90 TABLET | Refills: 1 | OUTPATIENT
Start: 2023-09-14

## 2023-09-14 RX ORDER — ROSUVASTATIN CALCIUM 10 MG/1
TABLET, COATED ORAL
Qty: 90 TABLET | Refills: 1 | Status: SHIPPED | OUTPATIENT
Start: 2023-09-14

## 2023-09-14 NOTE — TELEPHONE ENCOUNTER
Rx Refill Note  Requested Prescriptions     Pending Prescriptions Disp Refills    rosuvastatin (CRESTOR) 10 MG tablet [Pharmacy Med Name: ROSUVASTATIN 10MG TABLETS] 90 tablet 1     Sig: TAKE 1 TABLET BY MOUTH EVERY DAY      Last office visit with prescribing clinician: 6/19/2023   Last telemedicine visit with prescribing clinician: Visit date not found   Next office visit with prescribing clinician: 12/21/2023                         Would you like a call back once the refill request has been completed: [] Yes [] No    If the office needs to give you a call back, can they leave a voicemail: [] Yes [] No    Delma Calvillo MA  09/14/23, 13:35 EDT

## 2023-09-14 NOTE — TELEPHONE ENCOUNTER
Rx Refill Note  Requested Prescriptions     Pending Prescriptions Disp Refills    rosuvastatin (CRESTOR) 10 MG tablet [Pharmacy Med Name: ROSUVASTATIN 10MG TABLETS] 90 tablet      Sig: TAKE 1 TABLET BY MOUTH EVERY DAY      Last office visit with prescribing clinician: 6/19/2023   Last telemedicine visit with prescribing clinician: Visit date not found   Next office visit with prescribing clinician: 12/21/2023                         Would you like a call back once the refill request has been completed: [] Yes [] No    If the office needs to give you a call back, can they leave a voicemail: [] Yes [] No    Delma Calvillo MA  09/14/23, 13:22 EDT

## 2024-01-07 NOTE — PROGRESS NOTES
Subjective:     Encounter Date:01/08/2024      Patient ID: Becka Oliva is a 83 y.o. female.    Chief Complaint and history of present illness:     Follow-up for PAD, hypertension, pericardial effusion     History of Present Illness  :     Ms. Becka Oliva has past medical history of.      # Pericardial effusion  #.Hypertension  # COPD  #.PAD, AAA , endograft repair with by common iliac stent  # former smoker     Here for  follow-up.  Patient is complaining of dyspnea on exertion especially with stairs.  Denies any chest pain.  Patient had a fall and hip replacement 5/5/2023.  PCP put her on Lasix for edema.  Had negative venous Dopplers.     Patient's arterial blood pressure is 130/74, heart rate 70, O2 sat of 92% on room air        Labs from 5/13/2 019 reveal normal CMP, BNP 55, cholesterol 217, HDL 66, LDL not to goal at 140.  Repeat labs 9/2/2020 reveal hemoglobin A1c of 5.6.  Chem-7 unremarkable.  Labs from 3/26/2021 reveal Chem-7 with a glucose of 112, calcium of 8, hemoglobin of 11.1.  Labs from 3/23/2021 revealed proBNP of 668.  Labs from 7/26/2021 revealed normal Chem-7 and CBC.  Labs from 9/28/2021 reveal cholesterol 67, triglycerides 57, HDL 32, LDL 24.  Labs from 3/8/2023 revealed normal CMP.  Lipid profile with cholesterol 165, triglycerides 80, HDL 74, LDL 76.  Labs from 5/23/2023 reveal BMP with normal BUN/creatinine and potassium.        Patient had an echocardiogram 12-2-19 which showed mild pericardial effusion.  Patient had Lexiscan Cardiolite 1/10/2020 which was negative for ischemia     Patient underwent echocardiogram and Lexiscan Cardiolite 6/25/2021 both of which were normal.     HERBERT done at [facility] abnormal with 0.81/0.79 on the right.  And 0.79 on the left.        ASSESSMENT:     # Dyspnea on exertion  #Edema  #Hypertension  #Pericardial effusion  # COPD, kyphoscoliosis, former smoker  # PAD, AAA repair     PLAN:     Reviewed EKG results with patient  Patient's dyspnea could be  anginal equivalent we will schedule a stress test, patient says she cannot walk due to musculoskeletal reasons we will do Lexiscan Cardiolite..  Continue medical management with bisoprolol hydrochlorothiazide, amlodipine, clopidogrel, aspirin, furosemide, rosuvastatin to help with blood pressure, hypertension, dyslipidemia, PAD tolerated.       Echocardiogram 1/10/19 reveals normal function PA pressures of 30-40 with mild AI/MR/TR and moderate size pericardial effusion, repeat echo 6/1/2019 revealed small pericardial effusion, LVEF of 70%  Lexiscan Cardiolite 6/25/2021 was negative for ischemia EF of 67%  Echocardiogram 6/25/2021 reviewed and interpreted by me reveals normal LV systolic function with LVEF of 70% with small pericardial effusion.  Echo 7/5/2023 reveals EF of 60 to 65%           ECG 12 Lead    Date/Time: 1/8/2024 2:57 PM  Performed by: Kyrie Campoverde MD    Authorized by: Kyrie Campoverde MD  Comparison: compared with previous ECG from 11/19/2022  Comparison to previous ECG: EKG done today reviewed/interpreted by me reveals sinus rhythm at the rate of 67 bpm with occasional PACs, Q waves in V1 V2 suggestive of anteroseptal MI, nonspecific ST-T changes.  No new change compared EKG from 11/19/2022          Copied text in this portion of the note has been reviewed and is accurate as of 1/8/2024  The following portions of the patient's history were reviewed and updated as appropriate: allergies, current medications, past family history, past medical history, past social history, past surgical history and problem list.    Assessment:         [facility]       Diagnosis Plan   1. Dyspnea on exertion  Stress Test With Myocardial Perfusion One Day      2. Essential hypertension  Stress Test With Myocardial Perfusion One Day      3. PAD (peripheral artery disease)  Stress Test With Myocardial Perfusion One Day      4. Panlobular emphysema  Stress Test With Myocardial Perfusion One Day      5.  Dyslipidemia  Stress Test With Myocardial Perfusion One Day             Plan:               Past Medical History:  Past Medical History:   Diagnosis Date    Aortic aneurysm     COPD (chronic obstructive pulmonary disease)     Hernia, inguinal, left     Hyperlipidemia     Hypertension     Macular degeneration     Peripheral edema     PVD (peripheral vascular disease)      Past Surgical History:  Past Surgical History:   Procedure Laterality Date    CARPAL TUNNEL RELEASE      DESCENDING AORTIC ANEURYSM REPAIR W/ STENT      HIP BIPOLAR REPLACEMENT Left 5/5/2023    Procedure: HIP BIPOLAR CEMENTED;  Surgeon: Guanako Harmon MD;  Location: [facility] MAIN OR;  Service: Orthopedics;  Laterality: Left;    INGUINAL HERNIA REPAIR Bilateral 11/16/2022    Procedure: INGUINAL HERNIA REPAIR LAPAROSCOPIC WITH DAVINCI ROBOT;  Surgeon: Jasper Mcneill MD;  Location: [facility] MAIN OR;  Service: Robotics - DaVinci;  Laterality: Bilateral;    INTERVENTIONAL RADIOLOGY PROCEDURE N/A 07/28/2021    Procedure: Abdominal Aortagram with Runoff, possible PCI;  Surgeon: Kyrie Campoverde MD;  Location: [facility] CATH INVASIVE LOCATION;  Service: Cardiovascular;  Laterality: N/A;    KNEE SURGERY  2020    OTHER SURGICAL HISTORY      STENT    VASCULAR SURGERY      fem fem bypass      Allergies:  No Known Allergies  Home Meds:  Current Meds:     Current Outpatient Medications:     acetaminophen (TYLENOL) 500 MG tablet, Take 2 tablets by mouth Every 8 (Eight) Hours., Disp: , Rfl:     albuterol sulfate  (90 Base) MCG/ACT inhaler, Inhale 2 puffs Every 4 (Four) Hours As Needed for Wheezing., Disp: , Rfl:     amLODIPine (NORVASC) 10 MG tablet, Take 1 tablet by mouth Daily., Disp: , Rfl:     aspirin 325 MG tablet, Take 1 tablet by mouth Daily. (Patient taking differently: Take 81 mg by mouth Daily.), Disp: , Rfl:     bisoprolol-hydrochlorothiazide (ZIAC) 5-6.25 MG per tablet, TAKE 1 TABLET BY MOUTH DAILY, Disp: 90 tablet, Rfl: 1    cholecalciferol  (VITAMIN D3) 25 MCG (1000 UT) tablet, Take 2 tablets by mouth Daily., Disp: , Rfl:     clopidogrel (PLAVIX) 75 MG tablet, TAKE 1 TABLET BY MOUTH DAILY, Disp: 90 tablet, Rfl: 3    Fluticasone-Umeclidin-Vilant (Trelegy Ellipta) 100-62.5-25 MCG/ACT inhaler, Inhale 1 puff Daily., Disp: , Rfl:     furosemide (LASIX) 40 MG tablet, Take 1 tablet by mouth Daily., Disp: , Rfl:     HYDROcodone-acetaminophen (NORCO) 5-325 MG per tablet, Take 1-2 tablets by mouth Every 6 (Six) Hours As Needed for Severe Pain., Disp: , Rfl:     Multiple Vitamins-Minerals (PRESERVISION AREDS 2+MULTI VIT PO), Take 1 tablet by mouth 2 (Two) Times a Day., Disp: , Rfl:     potassium chloride (K-DUR,KLOR-CON) 20 MEQ CR tablet, Take 1 tablet by mouth Daily., Disp: , Rfl:     pyridoxine (VITAMIN B-6) 100 MG tablet tablet, Take 1 tablet by mouth Daily., Disp: , Rfl:     rosuvastatin (CRESTOR) 10 MG tablet, TAKE 1 TABLET BY MOUTH EVERY DAY, Disp: 90 tablet, Rfl: 1    tamsulosin (FLOMAX) 0.4 MG capsule 24 hr capsule, Take 1 capsule by mouth Daily., Disp: , Rfl:     Vibegron (Gemtesa) 75 MG tablet, Take  by mouth., Disp: , Rfl:     vitamin B-12 (CYANOCOBALAMIN) 1000 MCG tablet, Take 2.5 tablets by mouth Daily., Disp: , Rfl:     ipratropium-albuterol (DUO-NEB) 0.5-2.5 mg/3 ml nebulizer, Take 3 mL by nebulization Every 4 (Four) Hours As Needed for Shortness of Air or Wheezing. (Patient not taking: Reported on 2024), Disp: 360 mL, Rfl:     sennosides-docusate (PERICOLACE) 8.6-50 MG per tablet, Take 2 tablets by mouth 2 (Two) Times a Day As Needed for Constipation., Disp: , Rfl:   Social History:   Social History     Tobacco Use    Smoking status: Former     Packs/day: 1.00     Years: 30.00     Additional pack years: 0.00     Total pack years: 30.00     Types: Cigarettes     Quit date:      Years since quittin.0     Passive exposure: Past    Smokeless tobacco: Never   Substance Use Topics    Alcohol use: No      Family History:  Family History  "  Problem Relation Age of Onset    Cancer Mother     Aneurysm Father     No Known Problems Sister     No Known Problems Brother     No Known Problems Maternal Aunt     No Known Problems Maternal Uncle     No Known Problems Paternal Aunt     No Known Problems Paternal Uncle     No Known Problems Maternal Grandmother     No Known Problems Maternal Grandfather     No Known Problems Paternal Grandmother     No Known Problems Paternal Grandfather     Cancer Other     Diabetes Other     Anemia Neg Hx     Arrhythmia Neg Hx     Asthma Neg Hx     Clotting disorder Neg Hx     Fainting Neg Hx     Heart attack Neg Hx     Heart disease Neg Hx     Heart failure Neg Hx     Hyperlipidemia Neg Hx     Hypertension Neg Hx               Review of Systems   Constitutional: Negative for malaise/fatigue.   Cardiovascular:  Positive for leg swelling. Negative for chest pain and palpitations.   Respiratory:  Positive for shortness of breath.    Skin:  Negative for rash.   Neurological:  Negative for dizziness, light-headedness and numbness.     All other systems are negative         Objective:     Physical Exam  /74   Pulse 70   Ht 162.6 cm (64\")   Wt 71.7 kg (158 lb)   SpO2 92%   BMI 27.12 kg/m²   General:  Appears in no acute distress  Eyes: Sclera is anicteric,  conjunctiva is clear   HEENT:  No JVD.  No carotid bruits  Respiratory: Respirations regular and unlabored at rest.  Clear to auscultation  Cardiovascular: S1,S2 Regular rate and rhythm. .   Extremities: No digital clubbing or cyanosis, no edema  Skin: Color pink. Skin warm and dry to touch. No rashes  No xanthoma  Neuro: Alert and awake.    Lab Reviewed:         Kyrie Campoverde MD  1/8/2024 15:06 EST      EMR Dragon/Transcription:   \"Dictated utilizing Dragon dictation\".        "

## 2024-01-08 ENCOUNTER — OFFICE VISIT (OUTPATIENT)
Dept: CARDIOLOGY | Facility: CLINIC | Age: 84
End: 2024-01-08
Payer: MEDICARE

## 2024-01-08 VITALS
HEIGHT: 64 IN | OXYGEN SATURATION: 92 % | SYSTOLIC BLOOD PRESSURE: 130 MMHG | HEART RATE: 70 BPM | WEIGHT: 158 LBS | BODY MASS INDEX: 26.98 KG/M2 | DIASTOLIC BLOOD PRESSURE: 74 MMHG

## 2024-01-08 DIAGNOSIS — R06.09 DYSPNEA ON EXERTION: Primary | ICD-10-CM

## 2024-01-08 DIAGNOSIS — E78.5 DYSLIPIDEMIA: ICD-10-CM

## 2024-01-08 DIAGNOSIS — I73.9 PAD (PERIPHERAL ARTERY DISEASE): ICD-10-CM

## 2024-01-08 DIAGNOSIS — I10 ESSENTIAL HYPERTENSION: ICD-10-CM

## 2024-01-08 DIAGNOSIS — J43.1 PANLOBULAR EMPHYSEMA: ICD-10-CM

## 2024-01-08 PROCEDURE — 99214 OFFICE O/P EST MOD 30 MIN: CPT | Performed by: INTERNAL MEDICINE

## 2024-01-08 PROCEDURE — 3078F DIAST BP <80 MM HG: CPT | Performed by: INTERNAL MEDICINE

## 2024-01-08 PROCEDURE — 93000 ELECTROCARDIOGRAM COMPLETE: CPT | Performed by: INTERNAL MEDICINE

## 2024-01-08 PROCEDURE — 1160F RVW MEDS BY RX/DR IN RCRD: CPT | Performed by: INTERNAL MEDICINE

## 2024-01-08 PROCEDURE — 1159F MED LIST DOCD IN RCRD: CPT | Performed by: INTERNAL MEDICINE

## 2024-01-08 PROCEDURE — 3075F SYST BP GE 130 - 139MM HG: CPT | Performed by: INTERNAL MEDICINE

## 2024-01-08 RX ORDER — VIBEGRON 75 MG/1
TABLET, FILM COATED ORAL
COMMUNITY

## 2024-02-12 RX ORDER — BISOPROLOL FUMARATE AND HYDROCHLOROTHIAZIDE 5; 6.25 MG/1; MG/1
1 TABLET ORAL DAILY
Qty: 90 TABLET | Refills: 3 | Status: SHIPPED | OUTPATIENT
Start: 2024-02-12

## 2024-02-22 ENCOUNTER — HOSPITAL ENCOUNTER (OUTPATIENT)
Dept: CARDIOLOGY | Facility: HOSPITAL | Age: 84
Discharge: HOME | End: 2024-02-22
Payer: MEDICARE

## 2024-02-22 ENCOUNTER — OFFICE VISIT (OUTPATIENT)
Dept: CARDIOLOGY | Facility: CLINIC | Age: 84
End: 2024-02-22
Payer: MEDICARE

## 2024-02-22 VITALS
HEIGHT: 64 IN | WEIGHT: 158 LBS | BODY MASS INDEX: 26.98 KG/M2 | HEART RATE: 63 BPM | DIASTOLIC BLOOD PRESSURE: 74 MMHG | SYSTOLIC BLOOD PRESSURE: 142 MMHG

## 2024-02-22 DIAGNOSIS — I31.39 PERICARDIAL EFFUSION: ICD-10-CM

## 2024-02-22 DIAGNOSIS — J43.1 PANLOBULAR EMPHYSEMA: ICD-10-CM

## 2024-02-22 DIAGNOSIS — I10 ESSENTIAL HYPERTENSION: ICD-10-CM

## 2024-02-22 DIAGNOSIS — I10 ESSENTIAL HYPERTENSION: Primary | ICD-10-CM

## 2024-02-22 DIAGNOSIS — E78.5 DYSLIPIDEMIA: ICD-10-CM

## 2024-02-22 DIAGNOSIS — I73.9 PAD (PERIPHERAL ARTERY DISEASE): ICD-10-CM

## 2024-02-22 DIAGNOSIS — R06.09 DYSPNEA ON EXERTION: ICD-10-CM

## 2024-02-22 LAB
BH CV REST NUCLEAR ISOTOPE DOSE: 11 MCI
BH CV STRESS COMMENTS STAGE 1: NORMAL
BH CV STRESS DOSE REGADENOSON STAGE 1: 0.4
BH CV STRESS DURATION MIN STAGE 1: 0
BH CV STRESS DURATION SEC STAGE 1: 10
BH CV STRESS NUCLEAR ISOTOPE DOSE: 33 MCI
BH CV STRESS PROTOCOL 1: NORMAL
BH CV STRESS RECOVERY BP: NORMAL MMHG
BH CV STRESS RECOVERY HR: 79 BPM
BH CV STRESS STAGE 1: 1
LV EF NUC BP: 82 %
MAXIMAL PREDICTED HEART RATE: 135 BPM
STRESS BASELINE BP: NORMAL MMHG
STRESS BASELINE HR: 63 BPM
STRESS TARGET HR: 115 BPM

## 2024-02-22 PROCEDURE — 0 TECHNETIUM SESTAMIBI: Performed by: INTERNAL MEDICINE

## 2024-02-22 PROCEDURE — 93017 CV STRESS TEST TRACING ONLY: CPT

## 2024-02-22 PROCEDURE — A9500 TC99M SESTAMIBI: HCPCS | Performed by: INTERNAL MEDICINE

## 2024-02-22 PROCEDURE — 1160F RVW MEDS BY RX/DR IN RCRD: CPT | Performed by: INTERNAL MEDICINE

## 2024-02-22 PROCEDURE — 25010000002 REGADENOSON 0.4 MG/5ML SOLUTION: Performed by: INTERNAL MEDICINE

## 2024-02-22 PROCEDURE — 3077F SYST BP >= 140 MM HG: CPT | Performed by: INTERNAL MEDICINE

## 2024-02-22 PROCEDURE — 3078F DIAST BP <80 MM HG: CPT | Performed by: INTERNAL MEDICINE

## 2024-02-22 PROCEDURE — 78452 HT MUSCLE IMAGE SPECT MULT: CPT

## 2024-02-22 PROCEDURE — 1159F MED LIST DOCD IN RCRD: CPT | Performed by: INTERNAL MEDICINE

## 2024-02-22 PROCEDURE — 99214 OFFICE O/P EST MOD 30 MIN: CPT | Performed by: INTERNAL MEDICINE

## 2024-02-22 RX ORDER — REGADENOSON 0.08 MG/ML
0.4 INJECTION, SOLUTION INTRAVENOUS
Status: COMPLETED | OUTPATIENT
Start: 2024-02-22 | End: 2024-02-22

## 2024-02-22 RX ADMIN — TECHNETIUM TC 99M SESTAMIBI 1 DOSE: 1 INJECTION INTRAVENOUS at 13:16

## 2024-02-22 RX ADMIN — TECHNETIUM TC 99M SESTAMIBI 1 DOSE: 1 INJECTION INTRAVENOUS at 12:30

## 2024-02-22 RX ADMIN — REGADENOSON 0.4 MG: 0.08 INJECTION, SOLUTION INTRAVENOUS at 13:16

## 2024-02-22 NOTE — PROGRESS NOTES
Subjective:     Encounter Date:02/22/2024      Patient ID: Becka Oliva is a 84 y.o. female.    Chief Complaint and history of present illness:     Follow-up for PAD, hypertension, pericardial effusion     History of Present Illness  :     Ms. Becka Oliva has past medical history of.      # Pericardial effusion  #.Hypertension  # COPD  #.PAD, AAA , endograft repair with by common iliac stent  # former smoker     Here for  follow-up.  Patient is complaining of dyspnea on exertion especially with stairs.  Denies any chest pain.  Patient had a fall and hip replacement 5/5/2023.  PCP put her on Lasix for edema.  Had negative venous Dopplers.  Patient underwent Lexiscan Cardiolite which was normal is here for follow-up.     Patient's arterial blood pressure Is 142/74, heart rate 63 bpm.  Patient is holding her medications for stress test today.        Labs from 5/13/2 019 reveal normal CMP, BNP 55, cholesterol 217, HDL 66, LDL not to goal at 140.  Repeat labs 9/2/2020 reveal hemoglobin A1c of 5.6.  Chem-7 unremarkable.  Labs from 3/26/2021 reveal Chem-7 with a glucose of 112, calcium of 8, hemoglobin of 11.1.  Labs from 3/23/2021 revealed proBNP of 668.  Labs from 7/26/2021 revealed normal Chem-7 and CBC.  Labs from 9/28/2021 reveal cholesterol 67, triglycerides 57, HDL 32, LDL 24.  Labs from 3/8/2023 revealed normal CMP.  Lipid profile with cholesterol 165, triglycerides 80, HDL 74, LDL 76.  Labs from 5/23/2023 reveal BMP with normal BUN/creatinine and potassium.        Patient had an echocardiogram 12-2-19 which showed mild pericardial effusion.  Patient had Lexiscan Cardiolite 1/10/2020 which was negative for ischemia     Patient underwent echocardiogram and Lexiscan Cardiolite 6/25/2021 both of which were normal.     HERBERT done at [place] abnormal with 0.81/0.79 on the right.  And 0.79 on the left.        ASSESSMENT:     # Dyspnea on exertion  #Edema  #Hypertension  #Pericardial effusion  # COPD,  kyphoscoliosis, former smoker  # PAD, AAA repair     PLAN:     Reviewed stress test results with patient.  Continue medical management with bisoprolol hydrochlorothiazide, amlodipine, clopidogrel, aspirin, furosemide, rosuvastatin to help with blood pressure, hypertension, dyslipidemia, PAD tolerated.        Echocardiogram 1/10/19 reveals normal function PA pressures of 30-40 with mild AI/MR/TR and moderate size pericardial effusion, repeat echo 6/1/2019 revealed small pericardial effusion, LVEF of 70%  Lexiscan Cardiolite 6/25/2021 was negative for ischemia EF of 67%  Echocardiogram 6/25/2021 reviewed and interpreted by me reveals normal LV systolic function with LVEF of 70% with small pericardial effusion.  Echo 7/5/2023 reveals EF of 60 to 65%        Procedures    EKG done 1/8/2024 reviewed/interpreted by me reveals sinus rhythm with a rate of 67 bpm with Q waves in V1 V2    Lexiscan Cardiolite performed 2/22/2024 is negative for ischemia.    Copied text in this portion of the note has been reviewed and is accurate as of 2/22/2024  The following portions of the patient's history were reviewed and updated as appropriate: allergies, current medications, past family history, past medical history, past social history, past surgical history and problem list.    Assessment:         [facility]       Diagnosis Plan   1. Essential hypertension        2. PAD (peripheral artery disease)        3. Panlobular emphysema        4. Dyslipidemia        5. Pericardial effusion               Plan:               Past Medical History:  Past Medical History:   Diagnosis Date    Aortic aneurysm     COPD (chronic obstructive pulmonary disease)     Hernia, inguinal, left     Hyperlipidemia     Hypertension     Macular degeneration     Peripheral edema     PVD (peripheral vascular disease)      Past Surgical History:  Past Surgical History:   Procedure Laterality Date    CARPAL TUNNEL RELEASE      DESCENDING AORTIC ANEURYSM REPAIR W/ STENT       HIP BIPOLAR REPLACEMENT Left 5/5/2023    Procedure: HIP BIPOLAR CEMENTED;  Surgeon: Guanako Harmon MD;  Location: [facility] MAIN OR;  Service: Orthopedics;  Laterality: Left;    INGUINAL HERNIA REPAIR Bilateral 11/16/2022    Procedure: INGUINAL HERNIA REPAIR LAPAROSCOPIC WITH DAVINCI ROBOT;  Surgeon: Jasper Mcneill MD;  Location: [facility] MAIN OR;  Service: Robotics - DaVinci;  Laterality: Bilateral;    INTERVENTIONAL RADIOLOGY PROCEDURE N/A 07/28/2021    Procedure: Abdominal Aortagram with Runoff, possible PCI;  Surgeon: Kyrie Campoverde MD;  Location: [facility] CATH INVASIVE LOCATION;  Service: Cardiovascular;  Laterality: N/A;    KNEE SURGERY  2020    OTHER SURGICAL HISTORY      STENT    VASCULAR SURGERY      fem fem bypass      Allergies:  No Known Allergies  Home Meds:  Current Meds:     Current Outpatient Medications:     acetaminophen (TYLENOL) 500 MG tablet, Take 2 tablets by mouth Every 8 (Eight) Hours., Disp: , Rfl:     albuterol sulfate  (90 Base) MCG/ACT inhaler, Inhale 2 puffs Every 4 (Four) Hours As Needed for Wheezing., Disp: , Rfl:     amLODIPine (NORVASC) 10 MG tablet, Take 1 tablet by mouth Daily., Disp: , Rfl:     aspirin 325 MG tablet, Take 1 tablet by mouth Daily. (Patient taking differently: Take 81 mg by mouth Daily.), Disp: , Rfl:     bisoprolol-hydrochlorothiazide (ZIAC) 5-6.25 MG per tablet, TAKE 1 TABLET BY MOUTH DAILY, Disp: 90 tablet, Rfl: 3    cholecalciferol (VITAMIN D3) 25 MCG (1000 UT) tablet, Take 2 tablets by mouth Daily., Disp: , Rfl:     clopidogrel (PLAVIX) 75 MG tablet, TAKE 1 TABLET BY MOUTH DAILY, Disp: 90 tablet, Rfl: 3    Fluticasone-Umeclidin-Vilant (Trelegy Ellipta) 100-62.5-25 MCG/ACT inhaler, Inhale 1 puff Daily., Disp: , Rfl:     furosemide (LASIX) 40 MG tablet, Take 1 tablet by mouth Daily., Disp: , Rfl:     HYDROcodone-acetaminophen (NORCO) 5-325 MG per tablet, Take 1-2 tablets by mouth Every 6 (Six) Hours As Needed for Severe Pain., Disp: , Rfl:      ipratropium-albuterol (DUO-NEB) 0.5-2.5 mg/3 ml nebulizer, Take 3 mL by nebulization Every 4 (Four) Hours As Needed for Shortness of Air or Wheezing., Disp: 360 mL, Rfl:     Multiple Vitamins-Minerals (PRESERVISION AREDS 2+MULTI VIT PO), Take 1 tablet by mouth 2 (Two) Times a Day., Disp: , Rfl:     potassium chloride (K-DUR,KLOR-CON) 20 MEQ CR tablet, Take 1 tablet by mouth Daily., Disp: , Rfl:     pyridoxine (VITAMIN B-6) 100 MG tablet tablet, Take 1 tablet by mouth Daily., Disp: , Rfl:     rosuvastatin (CRESTOR) 10 MG tablet, TAKE 1 TABLET BY MOUTH EVERY DAY, Disp: 90 tablet, Rfl: 1    sennosides-docusate (PERICOLACE) 8.6-50 MG per tablet, Take 2 tablets by mouth 2 (Two) Times a Day As Needed for Constipation., Disp: , Rfl:     tamsulosin (FLOMAX) 0.4 MG capsule 24 hr capsule, Take 1 capsule by mouth Daily., Disp: , Rfl:     Vibegron (Gemtesa) 75 MG tablet, Take  by mouth., Disp: , Rfl:     vitamin B-12 (CYANOCOBALAMIN) 1000 MCG tablet, Take 2.5 tablets by mouth Daily., Disp: , Rfl:   No current facility-administered medications for this visit.  Social History:   Social History     Tobacco Use    Smoking status: Former     Packs/day: 1.00     Years: 30.00     Additional pack years: 0.00     Total pack years: 30.00     Types: Cigarettes     Quit date:      Years since quittin.1     Passive exposure: Past    Smokeless tobacco: Never   Substance Use Topics    Alcohol use: No      Family History:  Family History   Problem Relation Age of Onset    Cancer Mother     Aneurysm Father     No Known Problems Sister     No Known Problems Brother     No Known Problems Maternal Aunt     No Known Problems Maternal Uncle     No Known Problems Paternal Aunt     No Known Problems Paternal Uncle     No Known Problems Maternal Grandmother     No Known Problems Maternal Grandfather     No Known Problems Paternal Grandmother     No Known Problems Paternal Grandfather     Cancer Other     Diabetes Other     Anemia Neg Hx      "Arrhythmia Neg Hx     Asthma Neg Hx     Clotting disorder Neg Hx     Fainting Neg Hx     Heart attack Neg Hx     Heart disease Neg Hx     Heart failure Neg Hx     Hyperlipidemia Neg Hx     Hypertension Neg Hx               Review of Systems   Cardiovascular:  Negative for chest pain, leg swelling and palpitations.   Respiratory:  Negative for shortness of breath.    Neurological:  Negative for dizziness and numbness.     All other systems are negative         Objective:     Physical Exam  /74 (BP Location: Left arm, Patient Position: Sitting, Cuff Size: Adult)   Pulse 63   Ht 162.6 cm (64\")   Wt 71.7 kg (158 lb)   BMI 27.12 kg/m²   General:  Appears in no acute distress  Eyes: Sclera is anicteric,  conjunctiva is clear   HEENT:  No JVD.  No carotid bruits  Respiratory: Respirations regular and unlabored at rest.  Clear to auscultation  Cardiovascular: S1,S2 Regular rate and rhythm. .   Extremities: No digital clubbing or cyanosis, no edema  Skin: Color pink. Skin warm and dry to touch. No rashes  No xanthoma  Neuro: Alert and awake.    Lab Reviewed:         Kyrie Campoverde MD  2/22/2024 14:45 EST      EMR Dragon/Transcription:   \"Dictated utilizing Dragon dictation\".        "

## 2024-03-25 RX ORDER — ROSUVASTATIN CALCIUM 10 MG/1
TABLET, COATED ORAL
Qty: 90 TABLET | Refills: 3 | Status: SHIPPED | OUTPATIENT
Start: 2024-03-25

## 2024-03-25 NOTE — TELEPHONE ENCOUNTER
Rx Refill Note  Requested Prescriptions     Pending Prescriptions Disp Refills    rosuvastatin (CRESTOR) 10 MG tablet [Pharmacy Med Name: ROSUVASTATIN 10MG TABLETS] 90 tablet 1     Sig: TAKE 1 TABLET BY MOUTH EVERY DAY      Last office visit with prescribing clinician: 2/22/2024   Last telemedicine visit with prescribing clinician: Visit date not found   Next office visit with prescribing clinician: 2/24/2025                         Would you like a call back once the refill request has been completed: [] Yes [] No    If the office needs to give you a call back, can they leave a voicemail: [] Yes [] No    Delma Calvillo MA  03/25/24, 14:58 EDT

## 2024-05-15 NOTE — ED NOTES
Pt being assisted to [facility].      Prior, CORBIN Willingham  03/24/20 4485    
Pt c/o feeling weak and tired x about 2 weeks. Fever at triage. Pt has also had a slight productive cough.     Hamida Ramirez, RN  03/24/20 0986    
Pt daughter called inquiring about pt admission diagnosis. Daughter updated and informed and aware no visitors allowed at this time.      , CORBIN Willingham  03/24/20 7478    
Pt placed on 2L o2 nasal cannula.     Hamida Ramirez, RN  03/24/20 0966    
RT called for treatments and Neelam Coyle  03/24/20 0931    
Detail Level: Detailed
Render Note In Bullet Format When Appropriate: No
Duration Of Freeze Thaw-Cycle (Seconds): 0
Show Aperture Variable?: Yes
Post-Care Instructions: I reviewed with the patient in detail post-care instructions. Patient is to wear sunprotection, and avoid picking at any of the treated lesions. Pt may apply Vaseline to crusted or scabbing areas.
Consent: The patient's consent was obtained including but not limited to risks of crusting, scabbing, blistering, scarring, darker or lighter pigmentary change, recurrence, incomplete removal and infection.

## 2024-06-07 RX ORDER — AMLODIPINE BESYLATE 10 MG/1
10 TABLET ORAL DAILY
Qty: 90 TABLET | Refills: 3 | Status: SHIPPED | OUTPATIENT
Start: 2024-06-07

## 2024-06-07 RX ORDER — CLOPIDOGREL BISULFATE 75 MG/1
75 TABLET ORAL DAILY
Qty: 90 TABLET | Refills: 3 | Status: SHIPPED | OUTPATIENT
Start: 2024-06-07

## 2024-06-07 NOTE — TELEPHONE ENCOUNTER
Rx Refill Note  Requested Prescriptions     Pending Prescriptions Disp Refills    amLODIPine (NORVASC) 10 MG tablet [Pharmacy Med Name: AMLODIPINE BESYLATE 10MG TABLETS] 90 tablet 3     Sig: TAKE 1 TABLET BY MOUTH DAILY    clopidogrel (PLAVIX) 75 MG tablet [Pharmacy Med Name: CLOPIDOGREL 75MG TABLETS] 90 tablet 3     Sig: TAKE 1 TABLET BY MOUTH DAILY      Last office visit with prescribing clinician: 2/22/2024   Last telemedicine visit with prescribing clinician: Visit date not found   Next office visit with prescribing clinician: 2/24/2025                         Would you like a call back once the refill request has been completed: [] Yes [] No    If the office needs to give you a call back, can they leave a voicemail: [] Yes [] No    Maryann Suh MA  06/07/24, 14:46 EDT

## 2024-08-14 ENCOUNTER — TRANSCRIBE ORDERS (OUTPATIENT)
Dept: ADMINISTRATIVE | Facility: HOSPITAL | Age: 84
End: 2024-08-14
Payer: MEDICARE

## 2024-08-14 ENCOUNTER — TELEPHONE (OUTPATIENT)
Dept: CARDIOLOGY | Facility: CLINIC | Age: 84
End: 2024-08-14
Payer: MEDICARE

## 2024-08-14 ENCOUNTER — HOSPITAL ENCOUNTER (OUTPATIENT)
Dept: GENERAL RADIOLOGY | Facility: HOSPITAL | Age: 84
Discharge: HOME | End: 2024-08-14
Admitting: INTERNAL MEDICINE
Payer: MEDICARE

## 2024-08-14 ENCOUNTER — OFFICE (OUTPATIENT)
Dept: URBAN - METROPOLITAN AREA CLINIC 64 | Facility: CLINIC | Age: 84
End: 2024-08-14
Payer: MEDICARE

## 2024-08-14 VITALS
WEIGHT: 185 LBS | HEART RATE: 76 BPM | HEIGHT: 64 IN | DIASTOLIC BLOOD PRESSURE: 80 MMHG | SYSTOLIC BLOOD PRESSURE: 132 MMHG

## 2024-08-14 DIAGNOSIS — R15.9 FULL INCONTINENCE OF FECES: ICD-10-CM

## 2024-08-14 DIAGNOSIS — R13.10 DYSPHAGIA, UNSPECIFIED TYPE: Primary | ICD-10-CM

## 2024-08-14 DIAGNOSIS — R13.10 DYSPHAGIA, UNSPECIFIED: ICD-10-CM

## 2024-08-14 DIAGNOSIS — R13.10 DYSPHAGIA, UNSPECIFIED TYPE: ICD-10-CM

## 2024-08-14 DIAGNOSIS — R15.1 FECAL SOILING: ICD-10-CM

## 2024-08-14 DIAGNOSIS — R15.1 FECAL SMEARING: ICD-10-CM

## 2024-08-14 PROCEDURE — 74018 RADEX ABDOMEN 1 VIEW: CPT

## 2024-08-14 PROCEDURE — 99204 OFFICE O/P NEW MOD 45 MIN: CPT | Performed by: INTERNAL MEDICINE

## 2024-08-14 RX ORDER — OMEPRAZOLE 20 MG/1
CAPSULE, DELAYED RELEASE ORAL
Qty: 90 | Refills: 0 | Status: ACTIVE

## 2024-08-14 NOTE — TELEPHONE ENCOUNTER
FACILITY: [facility]  DR: MACI CEE  PHONE: [phone]  FAX: [phone]  PROCEDURE: EGD  SCHEDULED: 9/5/2024  MEDS TO HOLD: PLAVIX FOR 5 DAYS    PLACED ON SLICK DING'S DESK FOR REVIEW.

## 2024-09-09 ENCOUNTER — OFFICE (OUTPATIENT)
Age: 84
End: 2024-09-09

## 2024-09-09 ENCOUNTER — OFFICE (OUTPATIENT)
Dept: URBAN - METROPOLITAN AREA CLINIC 64 | Facility: CLINIC | Age: 84
End: 2024-09-09

## 2024-09-09 VITALS
WEIGHT: 160 LBS | DIASTOLIC BLOOD PRESSURE: 63 MMHG | WEIGHT: 160 LBS | HEIGHT: 64 IN | DIASTOLIC BLOOD PRESSURE: 63 MMHG | HEIGHT: 64 IN | HEART RATE: 61 BPM | HEIGHT: 64 IN | WEIGHT: 160 LBS | HEART RATE: 61 BPM | HEART RATE: 61 BPM | WEIGHT: 160 LBS | DIASTOLIC BLOOD PRESSURE: 63 MMHG | HEART RATE: 61 BPM | HEIGHT: 64 IN | SYSTOLIC BLOOD PRESSURE: 135 MMHG | HEART RATE: 61 BPM | DIASTOLIC BLOOD PRESSURE: 63 MMHG | HEIGHT: 64 IN | HEART RATE: 61 BPM | WEIGHT: 160 LBS | SYSTOLIC BLOOD PRESSURE: 135 MMHG | WEIGHT: 160 LBS | HEIGHT: 64 IN | SYSTOLIC BLOOD PRESSURE: 135 MMHG | DIASTOLIC BLOOD PRESSURE: 63 MMHG | SYSTOLIC BLOOD PRESSURE: 135 MMHG | DIASTOLIC BLOOD PRESSURE: 63 MMHG | HEIGHT: 64 IN | DIASTOLIC BLOOD PRESSURE: 63 MMHG | SYSTOLIC BLOOD PRESSURE: 135 MMHG | SYSTOLIC BLOOD PRESSURE: 135 MMHG | SYSTOLIC BLOOD PRESSURE: 135 MMHG | WEIGHT: 160 LBS | HEART RATE: 61 BPM

## 2024-09-09 DIAGNOSIS — R15.9 FULL INCONTINENCE OF FECES: ICD-10-CM

## 2024-09-09 DIAGNOSIS — K59.00 CONSTIPATION, UNSPECIFIED: ICD-10-CM

## 2024-09-09 PROCEDURE — 99214 OFFICE O/P EST MOD 30 MIN: CPT

## 2024-09-18 ENCOUNTER — HOSPITAL ENCOUNTER (EMERGENCY)
Facility: HOSPITAL | Age: 84
Discharge: HOME | End: 2024-09-18
Attending: EMERGENCY MEDICINE
Payer: MEDICARE

## 2024-09-18 ENCOUNTER — APPOINTMENT (OUTPATIENT)
Dept: CT IMAGING | Facility: HOSPITAL | Age: 84
End: 2024-09-18
Payer: MEDICARE

## 2024-09-18 ENCOUNTER — APPOINTMENT (OUTPATIENT)
Dept: CARDIOLOGY | Facility: HOSPITAL | Age: 84
End: 2024-09-18
Payer: MEDICARE

## 2024-09-18 VITALS
DIASTOLIC BLOOD PRESSURE: 72 MMHG | SYSTOLIC BLOOD PRESSURE: 141 MMHG | WEIGHT: 159.39 LBS | RESPIRATION RATE: 18 BRPM | HEART RATE: 71 BPM | TEMPERATURE: 98.5 F | HEIGHT: 64 IN | OXYGEN SATURATION: 92 % | BODY MASS INDEX: 27.21 KG/M2

## 2024-09-18 DIAGNOSIS — R53.83 OTHER FATIGUE: ICD-10-CM

## 2024-09-18 DIAGNOSIS — R19.7 DIARRHEA, UNSPECIFIED TYPE: Primary | ICD-10-CM

## 2024-09-18 LAB
ALBUMIN SERPL-MCNC: 4.1 G/DL (ref 3.5–5.2)
ALBUMIN/GLOB SERPL: 1.5 G/DL
ALP SERPL-CCNC: 68 U/L (ref 39–117)
ALT SERPL W P-5'-P-CCNC: 9 U/L (ref 1–33)
ANION GAP SERPL CALCULATED.3IONS-SCNC: 8.6 MMOL/L (ref 5–15)
AST SERPL-CCNC: 23 U/L (ref 1–32)
BASOPHILS # BLD AUTO: 0.05 10*3/MM3 (ref 0–0.2)
BASOPHILS NFR BLD AUTO: 0.7 % (ref 0–1.5)
BH CV LOWER VASCULAR LEFT COMMON FEMORAL AUGMENT: NORMAL
BH CV LOWER VASCULAR LEFT COMMON FEMORAL COMPETENT: NORMAL
BH CV LOWER VASCULAR LEFT COMMON FEMORAL COMPRESS: NORMAL
BH CV LOWER VASCULAR LEFT COMMON FEMORAL PHASIC: NORMAL
BH CV LOWER VASCULAR LEFT COMMON FEMORAL SPONT: NORMAL
BH CV LOWER VASCULAR LEFT DISTAL FEMORAL COMPRESS: NORMAL
BH CV LOWER VASCULAR LEFT GASTRONEMIUS COMPRESS: NORMAL
BH CV LOWER VASCULAR LEFT GREATER SAPH AK COMPRESS: NORMAL
BH CV LOWER VASCULAR LEFT GREATER SAPH BK COMPRESS: NORMAL
BH CV LOWER VASCULAR LEFT LESSER SAPH COMPRESS: NORMAL
BH CV LOWER VASCULAR LEFT MID FEMORAL AUGMENT: NORMAL
BH CV LOWER VASCULAR LEFT MID FEMORAL COMPETENT: NORMAL
BH CV LOWER VASCULAR LEFT MID FEMORAL COMPRESS: NORMAL
BH CV LOWER VASCULAR LEFT MID FEMORAL PHASIC: NORMAL
BH CV LOWER VASCULAR LEFT MID FEMORAL SPONT: NORMAL
BH CV LOWER VASCULAR LEFT PERONEAL COMPRESS: NORMAL
BH CV LOWER VASCULAR LEFT POPLITEAL AUGMENT: NORMAL
BH CV LOWER VASCULAR LEFT POPLITEAL COMPETENT: NORMAL
BH CV LOWER VASCULAR LEFT POPLITEAL COMPRESS: NORMAL
BH CV LOWER VASCULAR LEFT POPLITEAL PHASIC: NORMAL
BH CV LOWER VASCULAR LEFT POPLITEAL SPONT: NORMAL
BH CV LOWER VASCULAR LEFT POSTERIOR TIBIAL COMPRESS: NORMAL
BH CV LOWER VASCULAR LEFT PROFUNDA FEMORAL COMPRESS: NORMAL
BH CV LOWER VASCULAR LEFT PROXIMAL FEMORAL COMPRESS: NORMAL
BH CV LOWER VASCULAR LEFT SAPHENOFEMORAL JUNCTION COMPRESS: NORMAL
BH CV LOWER VASCULAR RIGHT COMMON FEMORAL AUGMENT: NORMAL
BH CV LOWER VASCULAR RIGHT COMMON FEMORAL COMPETENT: NORMAL
BH CV LOWER VASCULAR RIGHT COMMON FEMORAL COMPRESS: NORMAL
BH CV LOWER VASCULAR RIGHT COMMON FEMORAL PHASIC: NORMAL
BH CV LOWER VASCULAR RIGHT COMMON FEMORAL SPONT: NORMAL
BH CV VAS PRELIMINARY FINDINGS SCRIPTING: 1
BILIRUB SERPL-MCNC: 0.5 MG/DL (ref 0–1.2)
BUN SERPL-MCNC: 11 MG/DL (ref 8–23)
BUN/CREAT SERPL: 18.3 (ref 7–25)
CALCIUM SPEC-SCNC: 9.8 MG/DL (ref 8.6–10.5)
CHLORIDE SERPL-SCNC: 100 MMOL/L (ref 98–107)
CO2 SERPL-SCNC: 32.4 MMOL/L (ref 22–29)
CREAT SERPL-MCNC: 0.6 MG/DL (ref 0.57–1)
DEPRECATED RDW RBC AUTO: 48.3 FL (ref 37–54)
EGFRCR SERPLBLD CKD-EPI 2021: 88.1 ML/MIN/1.73
EOSINOPHIL # BLD AUTO: 0.12 10*3/MM3 (ref 0–0.4)
EOSINOPHIL NFR BLD AUTO: 1.7 % (ref 0.3–6.2)
ERYTHROCYTE [DISTWIDTH] IN BLOOD BY AUTOMATED COUNT: 13 % (ref 12.3–15.4)
GLOBULIN UR ELPH-MCNC: 2.7 GM/DL
GLUCOSE SERPL-MCNC: 95 MG/DL (ref 65–99)
HCT VFR BLD AUTO: 43.8 % (ref 34–46.6)
HGB BLD-MCNC: 13.8 G/DL (ref 12–15.9)
HOLD SPECIMEN: NORMAL
IMM GRANULOCYTES # BLD AUTO: 0.02 10*3/MM3 (ref 0–0.05)
IMM GRANULOCYTES NFR BLD AUTO: 0.3 % (ref 0–0.5)
LIPASE SERPL-CCNC: 21 U/L (ref 13–60)
LYMPHOCYTES # BLD AUTO: 1.33 10*3/MM3 (ref 0.7–3.1)
LYMPHOCYTES NFR BLD AUTO: 18.4 % (ref 19.6–45.3)
MCH RBC QN AUTO: 31.9 PG (ref 26.6–33)
MCHC RBC AUTO-ENTMCNC: 31.5 G/DL (ref 31.5–35.7)
MCV RBC AUTO: 101.4 FL (ref 79–97)
MONOCYTES # BLD AUTO: 0.66 10*3/MM3 (ref 0.1–0.9)
MONOCYTES NFR BLD AUTO: 9.2 % (ref 5–12)
NEUTROPHILS NFR BLD AUTO: 5.03 10*3/MM3 (ref 1.7–7)
NEUTROPHILS NFR BLD AUTO: 69.7 % (ref 42.7–76)
NRBC BLD AUTO-RTO: 0 /100 WBC (ref 0–0.2)
PLATELET # BLD AUTO: 240 10*3/MM3 (ref 140–450)
PMV BLD AUTO: 10.3 FL (ref 6–12)
POTASSIUM SERPL-SCNC: 4.3 MMOL/L (ref 3.5–5.2)
PROT SERPL-MCNC: 6.8 G/DL (ref 6–8.5)
RBC # BLD AUTO: 4.32 10*6/MM3 (ref 3.77–5.28)
SODIUM SERPL-SCNC: 141 MMOL/L (ref 136–145)
T4 FREE SERPL-MCNC: 1.17 NG/DL (ref 0.93–1.7)
TSH SERPL DL<=0.05 MIU/L-ACNC: 1.3 UIU/ML (ref 0.27–4.2)
WBC NRBC COR # BLD AUTO: 7.21 10*3/MM3 (ref 3.4–10.8)

## 2024-09-18 PROCEDURE — 93971 EXTREMITY STUDY: CPT | Performed by: SURGERY

## 2024-09-18 PROCEDURE — 80053 COMPREHEN METABOLIC PANEL: CPT | Performed by: EMERGENCY MEDICINE

## 2024-09-18 PROCEDURE — 99284 EMERGENCY DEPT VISIT MOD MDM: CPT

## 2024-09-18 PROCEDURE — 85025 COMPLETE CBC W/AUTO DIFF WBC: CPT | Performed by: EMERGENCY MEDICINE

## 2024-09-18 PROCEDURE — 84439 ASSAY OF FREE THYROXINE: CPT | Performed by: EMERGENCY MEDICINE

## 2024-09-18 PROCEDURE — 36415 COLL VENOUS BLD VENIPUNCTURE: CPT

## 2024-09-18 PROCEDURE — 84443 ASSAY THYROID STIM HORMONE: CPT | Performed by: EMERGENCY MEDICINE

## 2024-09-18 PROCEDURE — 93971 EXTREMITY STUDY: CPT

## 2024-09-18 PROCEDURE — 74176 CT ABD & PELVIS W/O CONTRAST: CPT

## 2024-09-18 PROCEDURE — 83690 ASSAY OF LIPASE: CPT | Performed by: EMERGENCY MEDICINE

## 2024-09-18 RX ORDER — SODIUM CHLORIDE 0.9 % (FLUSH) 0.9 %
10 SYRINGE (ML) INJECTION AS NEEDED
Status: DISCONTINUED | OUTPATIENT
Start: 2024-09-18 | End: 2024-09-18 | Stop reason: HOSPADM

## 2024-11-05 ENCOUNTER — TELEPHONE (OUTPATIENT)
Dept: CARDIOLOGY | Facility: CLINIC | Age: 84
End: 2024-11-05
Payer: MEDICARE

## 2024-11-05 NOTE — TELEPHONE ENCOUNTER
Caller: MARYJO FENG    Relationship to patient: Emergency Contact    Best call back number:  [phone]    Patient is needing: PATIENT HAS BEEN HAVING DIZZINES FOR THE LAST MONTH BUT IT HAS BEEN GETTING WORSE. SHE'S CAME CLOSE TO FALLING A FEW TIMES. REQUESTING TO MOVE HER SAN[facility]A APPOINTMENT UP SOONER.

## 2024-11-06 ENCOUNTER — OFFICE VISIT (OUTPATIENT)
Dept: CARDIOLOGY | Facility: CLINIC | Age: 84
End: 2024-11-06
Payer: MEDICARE

## 2024-11-06 VITALS
DIASTOLIC BLOOD PRESSURE: 71 MMHG | SYSTOLIC BLOOD PRESSURE: 125 MMHG | HEART RATE: 75 BPM | OXYGEN SATURATION: 92 % | BODY MASS INDEX: 27.31 KG/M2 | HEIGHT: 64 IN | WEIGHT: 160 LBS

## 2024-11-06 DIAGNOSIS — R42 DIZZINESS: Primary | ICD-10-CM

## 2024-11-06 DIAGNOSIS — I10 ESSENTIAL HYPERTENSION: ICD-10-CM

## 2024-11-06 DIAGNOSIS — E78.5 DYSLIPIDEMIA: ICD-10-CM

## 2024-11-06 DIAGNOSIS — I31.39 PERICARDIAL EFFUSION: ICD-10-CM

## 2024-11-06 DIAGNOSIS — J43.1 PANLOBULAR EMPHYSEMA: ICD-10-CM

## 2024-11-06 DIAGNOSIS — I73.9 PAD (PERIPHERAL ARTERY DISEASE): ICD-10-CM

## 2024-11-06 PROCEDURE — 93000 ELECTROCARDIOGRAM COMPLETE: CPT | Performed by: INTERNAL MEDICINE

## 2024-11-06 PROCEDURE — 1159F MED LIST DOCD IN RCRD: CPT | Performed by: INTERNAL MEDICINE

## 2024-11-06 PROCEDURE — 3078F DIAST BP <80 MM HG: CPT | Performed by: INTERNAL MEDICINE

## 2024-11-06 PROCEDURE — 1160F RVW MEDS BY RX/DR IN RCRD: CPT | Performed by: INTERNAL MEDICINE

## 2024-11-06 PROCEDURE — 3074F SYST BP LT 130 MM HG: CPT | Performed by: INTERNAL MEDICINE

## 2024-11-06 PROCEDURE — 99214 OFFICE O/P EST MOD 30 MIN: CPT | Performed by: INTERNAL MEDICINE

## 2024-11-06 RX ORDER — MECLIZINE HYDROCHLORIDE 25 MG/1
25 TABLET ORAL 3 TIMES DAILY PRN
COMMUNITY

## 2024-11-06 NOTE — PROGRESS NOTES
Subjective:     Encounter Date:11/06/2024      Patient ID: Becka Oliva is a 84 y.o. female.    Chief Complaint and history of present illness:       Follow-up for PAD, hypertension, pericardial effusion     History of Present Illness  :     Ms. Becka Oliva has past medical history of.      # Pericardial effusion  #.Hypertension  # COPD  #.PAD, AAA , endograft repair with by common iliac stent  #.  Hip replacement 5/5/2023  # former smoker     Here for  follow-up.  Patient is complaining of recurrent dizziness.  She gets dizzy when she turns from left side to right side in her bed.  Sometimes no aggravating or relieving factors.  Sometimes with standing up.  Patient says she is currently lightheaded and dizzy.  I checked her blood pressure and heart rate and orthostatics which are normal.  BP sitting was 121/71 with heart rate of 70 with standing BP was 122/81 with heart rate 72 bpm.     Patient's arterial blood pressure Is 1/24/1972, heart rate 75 bpm.        Labs from 5/13/2 019 reveal normal CMP, BNP 55, cholesterol 217, HDL 66, LDL not to goal at 140.  Repeat labs 9/2/2020 reveal hemoglobin A1c of 5.6.  Chem-7 unremarkable.  Labs from 3/26/2021 reveal Chem-7 with a glucose of 112, calcium of 8, hemoglobin of 11.1.  Labs from 3/23/2021 revealed proBNP of 668.  Labs from 7/26/2021 revealed normal Chem-7 and CBC.  Labs from 9/28/2021 reveal cholesterol 67, triglycerides 57, HDL 32, LDL 24.  Labs from 3/8/2023 revealed normal CMP.  Lipid profile with cholesterol 165, triglycerides 80, HDL 74, LDL 76.  Labs from 5/23/2023 reveal BMP with normal BUN/creatinine and potassium.        Patient had an echocardiogram 12-2-19 which showed mild pericardial effusion.  Patient had Lexiscan Cardiolite 1/10/2020 which was negative for ischemia     Patient underwent echocardiogram and Lexiscan Cardiolite 6/25/2021 both of which were normal.     [facility] with 0.81/0.79 on the right.  And 0.79 on the  left.    Carotid Dopplers done 10/23/2024 bilateral tortuous carotids with no evidence of stenosis        ASSESSMENT:     # recurrent dizziness  #Hypertension  #Pericardial effusion  # COPD, kyphoscoliosis, former smoker  # PAD, AAA repair     PLAN:     Reviewed EKG results with patient.  Patient's dizziness lightheadedness do not appear to be related to cardiac etiology.  Patient is currently dizzy and lightheaded but has normal heart rate blood pressure and EKG.  Patient recently had carotid Dopplers 10/23/2024 which revealed tortuous carotids with less than 50% bilateral.  Will defer to primary team to evaluate other etiologies and help patient.  Continue medical management with bisoprolol hydrochlorothiazide, amlodipine, clopidogrel, aspirin, furosemide, rosuvastatin to help with blood pressure, hypertension, dyslipidemia, PAD tolerated.        Echocardiogram 1/10/19 reveals normal function PA pressures of 30-40 with mild AI/MR/TR and moderate size pericardial effusion, repeat echo 6/1/2019 revealed small pericardial effusion, LVEF of 70%  Lexiscan Cardiolite 6/25/2021 was negative for ischemia EF of 67%  Echocardiogram 6/25/2021 reviewed and interpreted by me reveals normal LV systolic function with LVEF of 70% with small pericardial effusion.  Echo 7/5/2023 reveals EF of 60 to 65%        Procedures        Lexiscan Cardiolite performed 2/22/2024 is negative for ischemia.           ECG 12 Lead    Date/Time: 11/6/2024 12:35 PM  Performed by: Kyrie Campoverde MD    Authorized by: Kyrie Campoverde MD  Comparison: compared with previous ECG from 1/8/2024  Comparison to previous ECG: EKG done today reviewed/interpreted by me reveals sinus rhythm with rate of 71 bpm with sinus arrhythmia, no acute ST-T changes, no significant change compared to EKG from 1/8/2024          Copied text in this portion of the note has been reviewed and is accurate as of 11/6/2024  The following portions of the patient's  history were reviewed and updated as appropriate: allergies, current medications, past family history, past medical history, past social history, past surgical history and problem list.    Assessment:         [facility]       Diagnosis Plan   1. Dizziness        2. Essential hypertension        3. Pericardial effusion        4. Panlobular emphysema        5. Dyslipidemia        6. PAD (peripheral artery disease)               Plan:               Past Medical History:  Past Medical History:   Diagnosis Date    Aortic aneurysm     COPD (chronic obstructive pulmonary disease)     Hernia, inguinal, left     Hyperlipidemia     Hypertension     Macular degeneration     Peripheral edema     PVD (peripheral vascular disease)      Past Surgical History:  Past Surgical History:   Procedure Laterality Date    CARPAL TUNNEL RELEASE      DESCENDING AORTIC ANEURYSM REPAIR W/ STENT      HIP BIPOLAR REPLACEMENT Left 5/5/2023    Procedure: HIP BIPOLAR CEMENTED;  Surgeon: Guanako Harmon MD;  Location: [facility] MAIN OR;  Service: Orthopedics;  Laterality: Left;    INGUINAL HERNIA REPAIR Bilateral 11/16/2022    Procedure: INGUINAL HERNIA REPAIR LAPAROSCOPIC WITH DAVINCI ROBOT;  Surgeon: Jasper Mcneill MD;  Location: [facility] MAIN OR;  Service: Robotics - DaVinci;  Laterality: Bilateral;    INTERVENTIONAL RADIOLOGY PROCEDURE N/A 07/28/2021    Procedure: Abdominal Aortagram with Runoff, possible PCI;  Surgeon: Kyrie Campoverde MD;  Location: [facility] CATH INVASIVE LOCATION;  Service: Cardiovascular;  Laterality: N/A;    KNEE SURGERY  2020    OTHER SURGICAL HISTORY      STENT    VASCULAR SURGERY      fem fem bypass      Allergies:  No Known Allergies  Home Meds:  Current Meds:     Current Outpatient Medications:     amLODIPine (NORVASC) 10 MG tablet, TAKE 1 TABLET BY MOUTH DAILY, Disp: 90 tablet, Rfl: 3    aspirin 325 MG tablet, Take 1 tablet by mouth Daily. (Patient taking differently: Take 81 mg by mouth Daily.), Disp: , Rfl:      bisoprolol-hydrochlorothiazide (ZIAC) 5-6.25 MG per tablet, TAKE 1 TABLET BY MOUTH DAILY, Disp: 90 tablet, Rfl: 3    cholecalciferol (VITAMIN D3) 25 MCG (1000 UT) tablet, Take 2 tablets by mouth Daily., Disp: , Rfl:     clopidogrel (PLAVIX) 75 MG tablet, TAKE 1 TABLET BY MOUTH DAILY, Disp: 90 tablet, Rfl: 3    Fluticasone-Umeclidin-Vilant (Trelegy Ellipta) 100-62.5-25 MCG/ACT inhaler, Inhale 1 puff Daily., Disp: , Rfl:     furosemide (LASIX) 40 MG tablet, Take 1 tablet by mouth Daily., Disp: , Rfl:     HYDROcodone-acetaminophen (NORCO) 5-325 MG per tablet, Take 1-2 tablets by mouth Every 6 (Six) Hours As Needed for Severe Pain., Disp: , Rfl:     ipratropium-albuterol (DUO-NEB) 0.5-2.5 mg/3 ml nebulizer, Take 3 mL by nebulization Every 4 (Four) Hours As Needed for Shortness of Air or Wheezing., Disp: 360 mL, Rfl:     meclizine (ANTIVERT) 25 MG tablet, Take 1 tablet by mouth 3 (Three) Times a Day As Needed for Dizziness., Disp: , Rfl:     omeprazole (priLOSEC) 20 MG capsule, Take 1 capsule by mouth Daily., Disp: , Rfl:     potassium chloride (K-DUR,KLOR-CON) 20 MEQ CR tablet, Take 1 tablet by mouth Daily., Disp: , Rfl:     pyridoxine (VITAMIN B-6) 100 MG tablet tablet, Take 1 tablet by mouth Daily., Disp: , Rfl:     rosuvastatin (CRESTOR) 10 MG tablet, TAKE 1 TABLET BY MOUTH EVERY DAY, Disp: 90 tablet, Rfl: 3    vitamin B-12 (CYANOCOBALAMIN) 1000 MCG tablet, Take 2.5 tablets by mouth Daily., Disp: , Rfl:     acetaminophen (TYLENOL) 500 MG tablet, Take 2 tablets by mouth Every 8 (Eight) Hours., Disp: , Rfl:     albuterol sulfate  (90 Base) MCG/ACT inhaler, Inhale 2 puffs Every 4 (Four) Hours As Needed for Wheezing., Disp: , Rfl:     Multiple Vitamins-Minerals (PRESERVISION AREDS 2+MULTI VIT PO), Take 1 tablet by mouth 2 (Two) Times a Day., Disp: , Rfl:     sennosides-docusate (PERICOLACE) 8.6-50 MG per tablet, Take 2 tablets by mouth 2 (Two) Times a Day As Needed for Constipation., Disp: , Rfl:     tamsulosin  "(FLOMAX) 0.4 MG capsule 24 hr capsule, Take 1 capsule by mouth Daily., Disp: , Rfl:     Vibegron (Gemtesa) 75 MG tablet, Take  by mouth., Disp: , Rfl:   Social History:   Social History     Tobacco Use    Smoking status: Former     Current packs/day: 0.00     Average packs/day: 1 pack/day for 30.0 years (30.0 ttl pk-yrs)     Types: Cigarettes     Start date:      Quit date:      Years since quittin.8     Passive exposure: Past    Smokeless tobacco: Never   Substance Use Topics    Alcohol use: No      Family History:  Family History   Problem Relation Age of Onset    Cancer Mother     Aneurysm Father     No Known Problems Sister     No Known Problems Brother     No Known Problems Maternal Aunt     No Known Problems Maternal Uncle     No Known Problems Paternal Aunt     No Known Problems Paternal Uncle     No Known Problems Maternal Grandmother     No Known Problems Maternal Grandfather     No Known Problems Paternal Grandmother     No Known Problems Paternal Grandfather     Cancer Other     Diabetes Other     Anemia Neg Hx     Arrhythmia Neg Hx     Asthma Neg Hx     Clotting disorder Neg Hx     Fainting Neg Hx     Heart attack Neg Hx     Heart disease Neg Hx     Heart failure Neg Hx     Hyperlipidemia Neg Hx     Hypertension Neg Hx               Review of Systems   Constitutional: Negative for malaise/fatigue.   Cardiovascular:  Positive for leg swelling. Negative for chest pain and palpitations.   Respiratory:  Negative for shortness of breath.    Skin:  Negative for rash.   Neurological:  Positive for dizziness and light-headedness. Negative for numbness.     All other systems are negative         Objective:     Physical Exam  /71   Pulse 75   Ht 162.6 cm (64\")   Wt 72.6 kg (160 lb)   SpO2 92%   BMI 27.46 kg/m²   General:  Appears in no acute distress  Eyes: Sclera is anicteric,  conjunctiva is clear   HEENT:  No JVD.  No carotid bruits  Respiratory: Respirations regular and unlabored at " "rest.  Clear to auscultation  Cardiovascular: S1,S2 Regular rate and rhythm. .   Extremities: No digital clubbing or cyanosis, no edema  Skin: Color pink. Skin warm and dry to touch. No rashes  No xanthoma  Neuro: Alert and awake.    Lab Reviewed:         Kyrie Campoverde MD  11/6/2024 12:37 EST      EMR Dragon/Transcription:   \"Dictated utilizing Dragon dictation\".        "

## 2024-12-09 ENCOUNTER — HOSPITAL ENCOUNTER (EMERGENCY)
Facility: HOSPITAL | Age: 84
Discharge: HOME | End: 2024-12-09
Admitting: EMERGENCY MEDICINE
Payer: MEDICARE

## 2024-12-09 ENCOUNTER — APPOINTMENT (OUTPATIENT)
Dept: GENERAL RADIOLOGY | Facility: HOSPITAL | Age: 84
End: 2024-12-09
Payer: MEDICARE

## 2024-12-09 VITALS
RESPIRATION RATE: 16 BRPM | SYSTOLIC BLOOD PRESSURE: 115 MMHG | WEIGHT: 160.05 LBS | TEMPERATURE: 98.3 F | OXYGEN SATURATION: 93 % | HEIGHT: 64 IN | HEART RATE: 65 BPM | DIASTOLIC BLOOD PRESSURE: 67 MMHG | BODY MASS INDEX: 27.33 KG/M2

## 2024-12-09 DIAGNOSIS — R05.1 ACUTE COUGH: Primary | ICD-10-CM

## 2024-12-09 DIAGNOSIS — R63.0 DECREASED APPETITE: ICD-10-CM

## 2024-12-09 LAB
ANION GAP SERPL CALCULATED.3IONS-SCNC: 9 MMOL/L (ref 5–15)
BACTERIA UR QL AUTO: NORMAL /HPF
BASOPHILS # BLD AUTO: 0.03 10*3/MM3 (ref 0–0.2)
BASOPHILS NFR BLD AUTO: 0.2 % (ref 0–1.5)
BILIRUB UR QL STRIP: NEGATIVE
BUN SERPL-MCNC: 27 MG/DL (ref 8–23)
BUN/CREAT SERPL: 32.9 (ref 7–25)
CALCIUM SPEC-SCNC: 9.2 MG/DL (ref 8.6–10.5)
CHLORIDE SERPL-SCNC: 97 MMOL/L (ref 98–107)
CLARITY UR: CLEAR
CO2 SERPL-SCNC: 34 MMOL/L (ref 22–29)
COLOR UR: YELLOW
CREAT SERPL-MCNC: 0.82 MG/DL (ref 0.57–1)
DEPRECATED RDW RBC AUTO: 45.6 FL (ref 37–54)
EGFRCR SERPLBLD CKD-EPI 2021: 70.2 ML/MIN/1.73
EOSINOPHIL # BLD AUTO: 0.07 10*3/MM3 (ref 0–0.4)
EOSINOPHIL NFR BLD AUTO: 0.5 % (ref 0.3–6.2)
ERYTHROCYTE [DISTWIDTH] IN BLOOD BY AUTOMATED COUNT: 12.6 % (ref 12.3–15.4)
GLUCOSE SERPL-MCNC: 131 MG/DL (ref 65–99)
GLUCOSE UR STRIP-MCNC: NEGATIVE MG/DL
HCT VFR BLD AUTO: 48.1 % (ref 34–46.6)
HGB BLD-MCNC: 15.4 G/DL (ref 12–15.9)
HGB UR QL STRIP.AUTO: NEGATIVE
HYALINE CASTS UR QL AUTO: NORMAL /LPF
IMM GRANULOCYTES # BLD AUTO: 0.18 10*3/MM3 (ref 0–0.05)
IMM GRANULOCYTES NFR BLD AUTO: 1.2 % (ref 0–0.5)
KETONES UR QL STRIP: ABNORMAL
LEUKOCYTE ESTERASE UR QL STRIP.AUTO: ABNORMAL
LYMPHOCYTES # BLD AUTO: 1.6 10*3/MM3 (ref 0.7–3.1)
LYMPHOCYTES NFR BLD AUTO: 10.8 % (ref 19.6–45.3)
MCH RBC QN AUTO: 31.5 PG (ref 26.6–33)
MCHC RBC AUTO-ENTMCNC: 32 G/DL (ref 31.5–35.7)
MCV RBC AUTO: 98.4 FL (ref 79–97)
MONOCYTES # BLD AUTO: 1.23 10*3/MM3 (ref 0.1–0.9)
MONOCYTES NFR BLD AUTO: 8.3 % (ref 5–12)
NEUTROPHILS NFR BLD AUTO: 11.69 10*3/MM3 (ref 1.7–7)
NEUTROPHILS NFR BLD AUTO: 79 % (ref 42.7–76)
NITRITE UR QL STRIP: NEGATIVE
NRBC BLD AUTO-RTO: 0 /100 WBC (ref 0–0.2)
PH UR STRIP.AUTO: 6.5 [PH] (ref 5–8)
PLATELET # BLD AUTO: 303 10*3/MM3 (ref 140–450)
PMV BLD AUTO: 10.1 FL (ref 6–12)
POTASSIUM SERPL-SCNC: 4 MMOL/L (ref 3.5–5.2)
PROCALCITONIN SERPL-MCNC: 0.05 NG/ML (ref 0–0.25)
PROT UR QL STRIP: NEGATIVE
RBC # BLD AUTO: 4.89 10*6/MM3 (ref 3.77–5.28)
RBC # UR STRIP: NORMAL /HPF
REF LAB TEST METHOD: NORMAL
SODIUM SERPL-SCNC: 140 MMOL/L (ref 136–145)
SP GR UR STRIP: 1.02 (ref 1–1.03)
SQUAMOUS #/AREA URNS HPF: NORMAL /HPF
UROBILINOGEN UR QL STRIP: ABNORMAL
WBC # UR STRIP: NORMAL /HPF
WBC NRBC COR # BLD AUTO: 14.8 10*3/MM3 (ref 3.4–10.8)

## 2024-12-09 PROCEDURE — 80048 BASIC METABOLIC PNL TOTAL CA: CPT | Performed by: NURSE PRACTITIONER

## 2024-12-09 PROCEDURE — 99283 EMERGENCY DEPT VISIT LOW MDM: CPT

## 2024-12-09 PROCEDURE — 84145 PROCALCITONIN (PCT): CPT | Performed by: NURSE PRACTITIONER

## 2024-12-09 PROCEDURE — 25810000003 SODIUM CHLORIDE 0.9 % SOLUTION: Performed by: NURSE PRACTITIONER

## 2024-12-09 PROCEDURE — 85025 COMPLETE CBC W/AUTO DIFF WBC: CPT | Performed by: NURSE PRACTITIONER

## 2024-12-09 PROCEDURE — 71046 X-RAY EXAM CHEST 2 VIEWS: CPT

## 2024-12-09 PROCEDURE — 81001 URINALYSIS AUTO W/SCOPE: CPT | Performed by: NURSE PRACTITIONER

## 2024-12-09 RX ADMIN — SODIUM CHLORIDE 500 ML: 9 INJECTION, SOLUTION INTRAVENOUS at 19:52

## 2024-12-09 NOTE — ED PROVIDER NOTES
Subjective   Provider in Triage Note  Presents with complaints of cough and congestion x 1 week.  States she was improving and then yesterday started with productive cough again.  No fever.  Wanted make sure she did not have pneumonia.    Due to significant overcrowding in the emergency department patient was initially seen and evaluated in triage.  Provider in triage recommended patient placement in the treatment area to initiate therapy and movement to an ER bed as soon as possible.   Orders placed; medications will be deferred to main provider per protocol.        History of Present Illness  Patient is an 84-year-old female presents emergency department for evaluation,     I agree with the above    Patient reports has been generally weak, no appetite, she is concerned she may have a UTI.  She does report she has some dizziness with position changes    She is concerned she may have a urinary tract infection.    No vomiting diarrhea.  No chest pain.  No shortness of breath.  No abdominal pain.  No episodes of syncope.  Review of Systems    Past Medical History:   Diagnosis Date    Aortic aneurysm     COPD (chronic obstructive pulmonary disease)     Hernia, inguinal, left     Hyperlipidemia     Hypertension     Macular degeneration     Peripheral edema     PVD (peripheral vascular disease)        No Known Allergies    Past Surgical History:   Procedure Laterality Date    CARPAL TUNNEL RELEASE      DESCENDING AORTIC ANEURYSM REPAIR W/ STENT      HIP BIPOLAR REPLACEMENT Left 5/5/2023    Procedure: HIP BIPOLAR CEMENTED;  Surgeon: Guanako Harmon MD;  Location: [facility] MAIN OR;  Service: Orthopedics;  Laterality: Left;    INGUINAL HERNIA REPAIR Bilateral 11/16/2022    Procedure: INGUINAL HERNIA REPAIR LAPAROSCOPIC WITH DAVINCI ROBOT;  Surgeon: Jasper Mcneill MD;  Location: [facility] MAIN OR;  Service: Robotics - DaVinci;  Laterality: Bilateral;    INTERVENTIONAL RADIOLOGY PROCEDURE N/A 07/28/2021    Procedure: Abdominal  Aortagram with Runoff, possible PCI;  Surgeon: Kyrie Campoverde MD;  Location: [facility] CATH INVASIVE LOCATION;  Service: Cardiovascular;  Laterality: N/A;    KNEE SURGERY  2020    OTHER SURGICAL HISTORY      STENT    VASCULAR SURGERY      fem fem bypass       Family History   Problem Relation Age of Onset    Cancer Mother     Aneurysm Father     No Known Problems Sister     No Known Problems Brother     No Known Problems Maternal Aunt     No Known Problems Maternal Uncle     No Known Problems Paternal Aunt     No Known Problems Paternal Uncle     No Known Problems Maternal Grandmother     No Known Problems Maternal Grandfather     No Known Problems Paternal Grandmother     No Known Problems Paternal Grandfather     Cancer Other     Diabetes Other     Anemia Neg Hx     Arrhythmia Neg Hx     Asthma Neg Hx     Clotting disorder Neg Hx     Fainting Neg Hx     Heart attack Neg Hx     Heart disease Neg Hx     Heart failure Neg Hx     Hyperlipidemia Neg Hx     Hypertension Neg Hx        Social History     Socioeconomic History    Marital status:    Tobacco Use    Smoking status: Former     Current packs/day: 0.00     Average packs/day: 1 pack/day for 30.0 years (30.0 ttl pk-yrs)     Types: Cigarettes     Start date:      Quit date:      Years since quittin.9     Passive exposure: Past    Smokeless tobacco: Never   Vaping Use    Vaping status: Never Used   Substance and Sexual Activity    Alcohol use: No    Drug use: Never    Sexual activity: Defer           Objective   Physical Exam  Vitals and nursing note reviewed.   Constitutional:       Appearance: Normal appearance. She is not toxic-appearing.   HENT:      Head: Normocephalic and atraumatic.      Mouth/Throat:      Mouth: Mucous membranes are moist.      Pharynx: Oropharynx is clear.   Eyes:      Extraocular Movements: Extraocular movements intact.      Conjunctiva/sclera: Conjunctivae normal.      Pupils: Pupils are equal, round, and  reactive to light.   Cardiovascular:      Rate and Rhythm: Normal rate and regular rhythm.      Heart sounds: Normal heart sounds. No murmur heard.     No friction rub. No gallop.   Pulmonary:      Breath sounds: Normal breath sounds. No wheezing, rhonchi or rales.   Chest:      Chest wall: No tenderness.   Skin:     General: Skin is warm and dry.      Capillary Refill: Capillary refill takes less than 2 seconds.   Neurological:      General: No focal deficit present.      Mental Status: She is alert and oriented to person, place, and time.         Procedures           ED Course  ED Course as of 12/10/24 0026   Mon Dec 09, 2024   1849 WBC(!): 14.80  Previously on steroids 2 weeks ago [LB]   2133 Pt drinking. No emesis here.  [LB]      ED Course User Index  [LB] Janel Amaya APRN                                                       Medical Decision Making  Chart Review: ED visit 9/18/2024 for constipation and diarrhea.  Cardiology was 11/6/2024 for recurrent dizziness, hypertension, pericardial effusion, COPD, PAD and AAA repair      Pt was Placed on appropriate monitoring.  Differential diagnoses considered for patient presentation, this list is not all inclusive of diagnoses considered:electrolyte Imbalance, pneumonia, UTI  Patient presents to the ED for the above complaint, underwent the above, exam and workup.  Patient does have a leukocytosis, 14.8.  Nonspecific finding.  Pro-Candido is normal.  No laboratory findings concerning for UTI.  BMP reviewed.  Patient was hydrated, she is not having emesis.  She is able to ambulate with steady nontoxic gait.  Feel she may discharge from the ED and continue outpatient follow-up    Considertion was given for admission, however patient has reassuring exam and workup in the ed and appears appropriate for discharge home and continued outpatient care.     We discussed my findings, plan of care, discharge instructions, the importance of follow up with their PCP/ and  or specialist for repeat evaluation and to discuss any abnormal findings in labs or imaging that warrant further outpatient evaluation. We discussed that although a definitive diagnosis is not always found in the ED, it is believed emergent conditions have been ruled out, and patient is safe for discharge at this time.  We discussed return precautions for the emergency department.  Patient verbalizes understandings, and agrees with current plan of care.      Note Disclaimer: At [facility], we believe that sharing information builds trust and better relationships. You are receiving this note because you recently visited [facility]. It is possible you will see health information before a provider has talked with you about it. This kind of information can be easy to misunderstand. To help you fully understand what it means for your health, we urge you to discuss this note with your provider  Note dictated utilizing Dragon Dictation.  Appropriate PPE worn during patient interactions.        Final diagnoses:   Acute cough   Decreased appetite       ED Disposition  ED Disposition       ED Disposition   Discharge    Condition   Stable    Comment   --               Janes Rosario NP  [address] [facility] [address] [phone]          [place] EMERGENCY DEPARTMENT  [address] [facility] [address] [phone]             Medication List        Changed      aspirin 325 MG tablet  Take 1 tablet by mouth Daily.  What changed: how much to take                 Janel Amaya, RADHA  12/10/24 0027

## 2024-12-09 NOTE — ED NOTES
Patient evaluated by provider and will return to waiting room with Intravenous line in place.  Patient has been instructed not to inject anything into IV, or leave premises with line in place. Patient pending further evaluation, treatment, testing / monitoring.

## 2024-12-10 ENCOUNTER — HOSPITAL ENCOUNTER (OUTPATIENT)
Facility: HOSPITAL | Age: 84
Setting detail: OBSERVATION
Discharge: HOME | End: 2024-12-12
Attending: INTERNAL MEDICINE | Admitting: FAMILY MEDICINE
Payer: MEDICARE

## 2024-12-10 ENCOUNTER — APPOINTMENT (OUTPATIENT)
Dept: GENERAL RADIOLOGY | Facility: HOSPITAL | Age: 84
End: 2024-12-10
Payer: MEDICARE

## 2024-12-10 DIAGNOSIS — U07.1 COVID-19: ICD-10-CM

## 2024-12-10 DIAGNOSIS — R42 DIZZINESS: ICD-10-CM

## 2024-12-10 DIAGNOSIS — R53.1 WEAKNESS: Primary | ICD-10-CM

## 2024-12-10 LAB
ALBUMIN SERPL-MCNC: 4 G/DL (ref 3.5–5.2)
ALBUMIN/GLOB SERPL: 1.3 G/DL
ALP SERPL-CCNC: 60 U/L (ref 39–117)
ALT SERPL W P-5'-P-CCNC: 17 U/L (ref 1–33)
ANION GAP SERPL CALCULATED.3IONS-SCNC: 10.8 MMOL/L (ref 5–15)
AST SERPL-CCNC: 25 U/L (ref 1–32)
B PARAPERT DNA SPEC QL NAA+PROBE: NOT DETECTED
B PERT DNA SPEC QL NAA+PROBE: NOT DETECTED
BASOPHILS # BLD AUTO: 0.01 10*3/MM3 (ref 0–0.2)
BASOPHILS NFR BLD AUTO: 0.1 % (ref 0–1.5)
BILIRUB SERPL-MCNC: 0.5 MG/DL (ref 0–1.2)
BUN SERPL-MCNC: 22 MG/DL (ref 8–23)
BUN/CREAT SERPL: 38.6 (ref 7–25)
C PNEUM DNA NPH QL NAA+NON-PROBE: NOT DETECTED
CALCIUM SPEC-SCNC: 9.4 MG/DL (ref 8.6–10.5)
CHLORIDE SERPL-SCNC: 100 MMOL/L (ref 98–107)
CO2 SERPL-SCNC: 28.2 MMOL/L (ref 22–29)
CREAT SERPL-MCNC: 0.57 MG/DL (ref 0.57–1)
DEPRECATED RDW RBC AUTO: 46.7 FL (ref 37–54)
EGFRCR SERPLBLD CKD-EPI 2021: 89.2 ML/MIN/1.73
EOSINOPHIL # BLD AUTO: 0.15 10*3/MM3 (ref 0–0.4)
EOSINOPHIL NFR BLD AUTO: 1.2 % (ref 0.3–6.2)
ERYTHROCYTE [DISTWIDTH] IN BLOOD BY AUTOMATED COUNT: 12.7 % (ref 12.3–15.4)
FLUAV RNA RESP QL NAA+PROBE: NOT DETECTED
FLUAV SUBTYP SPEC NAA+PROBE: NOT DETECTED
FLUBV RNA ISLT QL NAA+PROBE: NOT DETECTED
FLUBV RNA RESP QL NAA+PROBE: NOT DETECTED
GEN 5 1HR TROPONIN T REFLEX: 11 NG/L
GLOBULIN UR ELPH-MCNC: 3 GM/DL
GLUCOSE SERPL-MCNC: 102 MG/DL (ref 65–99)
HADV DNA SPEC NAA+PROBE: NOT DETECTED
HCOV 229E RNA SPEC QL NAA+PROBE: NOT DETECTED
HCOV HKU1 RNA SPEC QL NAA+PROBE: NOT DETECTED
HCOV NL63 RNA SPEC QL NAA+PROBE: NOT DETECTED
HCOV OC43 RNA SPEC QL NAA+PROBE: NOT DETECTED
HCT VFR BLD AUTO: 50.4 % (ref 34–46.6)
HGB BLD-MCNC: 16 G/DL (ref 12–15.9)
HMPV RNA NPH QL NAA+NON-PROBE: NOT DETECTED
HPIV1 RNA ISLT QL NAA+PROBE: NOT DETECTED
HPIV2 RNA SPEC QL NAA+PROBE: NOT DETECTED
HPIV3 RNA NPH QL NAA+PROBE: NOT DETECTED
HPIV4 P GENE NPH QL NAA+PROBE: NOT DETECTED
IMM GRANULOCYTES # BLD AUTO: 0.2 10*3/MM3 (ref 0–0.05)
IMM GRANULOCYTES NFR BLD AUTO: 1.6 % (ref 0–0.5)
LYMPHOCYTES # BLD AUTO: 1.96 10*3/MM3 (ref 0.7–3.1)
LYMPHOCYTES NFR BLD AUTO: 15.4 % (ref 19.6–45.3)
M PNEUMO IGG SER IA-ACNC: NOT DETECTED
MCH RBC QN AUTO: 31.4 PG (ref 26.6–33)
MCHC RBC AUTO-ENTMCNC: 31.7 G/DL (ref 31.5–35.7)
MCV RBC AUTO: 98.8 FL (ref 79–97)
MONOCYTES # BLD AUTO: 0.97 10*3/MM3 (ref 0.1–0.9)
MONOCYTES NFR BLD AUTO: 7.6 % (ref 5–12)
NEUTROPHILS NFR BLD AUTO: 74.1 % (ref 42.7–76)
NEUTROPHILS NFR BLD AUTO: 9.44 10*3/MM3 (ref 1.7–7)
NRBC BLD AUTO-RTO: 0 /100 WBC (ref 0–0.2)
NT-PROBNP SERPL-MCNC: 463 PG/ML (ref 0–1800)
PLATELET # BLD AUTO: 294 10*3/MM3 (ref 140–450)
PMV BLD AUTO: 10 FL (ref 6–12)
POTASSIUM SERPL-SCNC: 4.1 MMOL/L (ref 3.5–5.2)
PROT SERPL-MCNC: 7 G/DL (ref 6–8.5)
RBC # BLD AUTO: 5.1 10*6/MM3 (ref 3.77–5.28)
RHINOVIRUS RNA SPEC NAA+PROBE: NOT DETECTED
RSV RNA NPH QL NAA+NON-PROBE: NOT DETECTED
RSV RNA RESP QL NAA+PROBE: NOT DETECTED
SARS-COV-2 RNA RESP QL NAA+PROBE: DETECTED
SARS-COV-2 RNA RESP QL NAA+PROBE: DETECTED
SODIUM SERPL-SCNC: 139 MMOL/L (ref 136–145)
TROPONIN T NUMERIC DELTA: -1 NG/L
TROPONIN T SERPL HS-MCNC: 12 NG/L
WBC NRBC COR # BLD AUTO: 12.73 10*3/MM3 (ref 3.4–10.8)

## 2024-12-10 PROCEDURE — 84484 ASSAY OF TROPONIN QUANT: CPT | Performed by: NURSE PRACTITIONER

## 2024-12-10 PROCEDURE — 85025 COMPLETE CBC W/AUTO DIFF WBC: CPT | Performed by: NURSE PRACTITIONER

## 2024-12-10 PROCEDURE — 71045 X-RAY EXAM CHEST 1 VIEW: CPT

## 2024-12-10 PROCEDURE — 87637 SARSCOV2&INF A&B&RSV AMP PRB: CPT

## 2024-12-10 PROCEDURE — 99285 EMERGENCY DEPT VISIT HI MDM: CPT

## 2024-12-10 PROCEDURE — 0202U NFCT DS 22 TRGT SARS-COV-2: CPT | Performed by: NURSE PRACTITIONER

## 2024-12-10 PROCEDURE — 80053 COMPREHEN METABOLIC PANEL: CPT | Performed by: NURSE PRACTITIONER

## 2024-12-10 PROCEDURE — 83880 ASSAY OF NATRIURETIC PEPTIDE: CPT | Performed by: NURSE PRACTITIONER

## 2024-12-10 PROCEDURE — 93005 ELECTROCARDIOGRAM TRACING: CPT | Performed by: NURSE PRACTITIONER

## 2024-12-10 PROCEDURE — 36415 COLL VENOUS BLD VENIPUNCTURE: CPT

## 2024-12-10 NOTE — Clinical Note
Level of Care: Telemetry [5]   Admitting Physician: PAXTON CASPER [[id number]]   Attending Physician: PAXTON CASPER [[id number]]

## 2024-12-10 NOTE — DISCHARGE INSTRUCTIONS
Follow-up with primary care provider, if you not have a primary care provider please utilize patient connection as above to call and establish [facility] [phone]  Return to the ED for new or worsening symptoms

## 2024-12-11 PROBLEM — U07.1 COVID-19: Status: ACTIVE | Noted: 2024-12-11

## 2024-12-11 LAB
ANION GAP SERPL CALCULATED.3IONS-SCNC: 10.1 MMOL/L (ref 5–15)
BASOPHILS # BLD AUTO: 0.02 10*3/MM3 (ref 0–0.2)
BASOPHILS NFR BLD AUTO: 0.2 % (ref 0–1.5)
BUN SERPL-MCNC: 19 MG/DL (ref 8–23)
BUN/CREAT SERPL: 32.8 (ref 7–25)
CALCIUM SPEC-SCNC: 8.7 MG/DL (ref 8.6–10.5)
CHLORIDE SERPL-SCNC: 100 MMOL/L (ref 98–107)
CO2 SERPL-SCNC: 29.9 MMOL/L (ref 22–29)
CREAT SERPL-MCNC: 0.58 MG/DL (ref 0.57–1)
DEPRECATED RDW RBC AUTO: 46.1 FL (ref 37–54)
EGFRCR SERPLBLD CKD-EPI 2021: 88.8 ML/MIN/1.73
EOSINOPHIL # BLD AUTO: 0.16 10*3/MM3 (ref 0–0.4)
EOSINOPHIL NFR BLD AUTO: 1.3 % (ref 0.3–6.2)
ERYTHROCYTE [DISTWIDTH] IN BLOOD BY AUTOMATED COUNT: 12.8 % (ref 12.3–15.4)
GLUCOSE SERPL-MCNC: 174 MG/DL (ref 65–99)
HCT VFR BLD AUTO: 43.9 % (ref 34–46.6)
HGB BLD-MCNC: 14.4 G/DL (ref 12–15.9)
IMM GRANULOCYTES # BLD AUTO: 0.16 10*3/MM3 (ref 0–0.05)
IMM GRANULOCYTES NFR BLD AUTO: 1.3 % (ref 0–0.5)
LYMPHOCYTES # BLD AUTO: 1.93 10*3/MM3 (ref 0.7–3.1)
LYMPHOCYTES NFR BLD AUTO: 15.7 % (ref 19.6–45.3)
MCH RBC QN AUTO: 32.4 PG (ref 26.6–33)
MCHC RBC AUTO-ENTMCNC: 32.8 G/DL (ref 31.5–35.7)
MCV RBC AUTO: 98.7 FL (ref 79–97)
MONOCYTES # BLD AUTO: 0.86 10*3/MM3 (ref 0.1–0.9)
MONOCYTES NFR BLD AUTO: 7 % (ref 5–12)
NEUTROPHILS NFR BLD AUTO: 74.5 % (ref 42.7–76)
NEUTROPHILS NFR BLD AUTO: 9.18 10*3/MM3 (ref 1.7–7)
NRBC BLD AUTO-RTO: 0 /100 WBC (ref 0–0.2)
PLATELET # BLD AUTO: 266 10*3/MM3 (ref 140–450)
PMV BLD AUTO: 10.5 FL (ref 6–12)
POTASSIUM SERPL-SCNC: 3.3 MMOL/L (ref 3.5–5.2)
POTASSIUM SERPL-SCNC: 4.5 MMOL/L (ref 3.5–5.2)
QT INTERVAL: 394 MS
QTC INTERVAL: 408 MS
RBC # BLD AUTO: 4.45 10*6/MM3 (ref 3.77–5.28)
SODIUM SERPL-SCNC: 140 MMOL/L (ref 136–145)
WBC NRBC COR # BLD AUTO: 12.31 10*3/MM3 (ref 3.4–10.8)

## 2024-12-11 PROCEDURE — 80048 BASIC METABOLIC PNL TOTAL CA: CPT

## 2024-12-11 PROCEDURE — G0378 HOSPITAL OBSERVATION PER HR: HCPCS

## 2024-12-11 PROCEDURE — 95992 CANALITH REPOSITIONING PROC: CPT

## 2024-12-11 PROCEDURE — 96372 THER/PROPH/DIAG INJ SC/IM: CPT

## 2024-12-11 PROCEDURE — 25810000003 SODIUM CHLORIDE 0.9 % SOLUTION

## 2024-12-11 PROCEDURE — 84132 ASSAY OF SERUM POTASSIUM: CPT | Performed by: FAMILY MEDICINE

## 2024-12-11 PROCEDURE — 85025 COMPLETE CBC W/AUTO DIFF WBC: CPT

## 2024-12-11 PROCEDURE — 97162 PT EVAL MOD COMPLEX 30 MIN: CPT

## 2024-12-11 PROCEDURE — 25810000003 SODIUM CHLORIDE 0.9 % SOLUTION: Performed by: FAMILY MEDICINE

## 2024-12-11 PROCEDURE — 25010000002 ENOXAPARIN PER 10 MG

## 2024-12-11 RX ORDER — AMOXICILLIN 250 MG
2 CAPSULE ORAL 2 TIMES DAILY PRN
Status: DISCONTINUED | OUTPATIENT
Start: 2024-12-11 | End: 2024-12-12 | Stop reason: HOSPADM

## 2024-12-11 RX ORDER — BISACODYL 5 MG/1
5 TABLET, DELAYED RELEASE ORAL DAILY PRN
Status: DISCONTINUED | OUTPATIENT
Start: 2024-12-11 | End: 2024-12-12 | Stop reason: HOSPADM

## 2024-12-11 RX ORDER — HYDROCODONE BITARTRATE AND ACETAMINOPHEN 5; 325 MG/1; MG/1
1 TABLET ORAL EVERY 6 HOURS PRN
Status: DISCONTINUED | OUTPATIENT
Start: 2024-12-11 | End: 2024-12-12 | Stop reason: HOSPADM

## 2024-12-11 RX ORDER — ONDANSETRON 4 MG/1
4 TABLET, ORALLY DISINTEGRATING ORAL EVERY 6 HOURS PRN
Status: DISCONTINUED | OUTPATIENT
Start: 2024-12-11 | End: 2024-12-12 | Stop reason: HOSPADM

## 2024-12-11 RX ORDER — SODIUM CHLORIDE 9 MG/ML
50 INJECTION, SOLUTION INTRAVENOUS CONTINUOUS
Status: DISPENSED | OUTPATIENT
Start: 2024-12-11 | End: 2024-12-11

## 2024-12-11 RX ORDER — OXYBUTYNIN CHLORIDE 10 MG/1
10 TABLET, EXTENDED RELEASE ORAL DAILY
Status: DISCONTINUED | OUTPATIENT
Start: 2024-12-11 | End: 2024-12-12 | Stop reason: HOSPADM

## 2024-12-11 RX ORDER — CLOPIDOGREL BISULFATE 75 MG/1
75 TABLET ORAL DAILY
Status: DISCONTINUED | OUTPATIENT
Start: 2024-12-11 | End: 2024-12-12 | Stop reason: HOSPADM

## 2024-12-11 RX ORDER — SODIUM CHLORIDE 0.9 % (FLUSH) 0.9 %
10 SYRINGE (ML) INJECTION AS NEEDED
Status: DISCONTINUED | OUTPATIENT
Start: 2024-12-11 | End: 2024-12-12 | Stop reason: HOSPADM

## 2024-12-11 RX ORDER — ENOXAPARIN SODIUM 100 MG/ML
40 INJECTION SUBCUTANEOUS EVERY 24 HOURS
Status: DISCONTINUED | OUTPATIENT
Start: 2024-12-11 | End: 2024-12-12 | Stop reason: HOSPADM

## 2024-12-11 RX ORDER — BISOPROLOL FUMARATE 5 MG/1
5 TABLET, FILM COATED ORAL
Status: DISCONTINUED | OUTPATIENT
Start: 2024-12-11 | End: 2024-12-12 | Stop reason: HOSPADM

## 2024-12-11 RX ORDER — BISACODYL 10 MG
10 SUPPOSITORY, RECTAL RECTAL DAILY PRN
Status: DISCONTINUED | OUTPATIENT
Start: 2024-12-11 | End: 2024-12-12 | Stop reason: HOSPADM

## 2024-12-11 RX ORDER — POLYETHYLENE GLYCOL 3350 17 G/17G
17 POWDER, FOR SOLUTION ORAL DAILY PRN
Status: DISCONTINUED | OUTPATIENT
Start: 2024-12-11 | End: 2024-12-12 | Stop reason: HOSPADM

## 2024-12-11 RX ORDER — ASPIRIN 81 MG/1
81 TABLET ORAL DAILY
Status: DISCONTINUED | OUTPATIENT
Start: 2024-12-11 | End: 2024-12-12 | Stop reason: HOSPADM

## 2024-12-11 RX ORDER — MECLIZINE HYDROCHLORIDE 25 MG/1
25 TABLET ORAL 3 TIMES DAILY PRN
Status: DISCONTINUED | OUTPATIENT
Start: 2024-12-11 | End: 2024-12-12 | Stop reason: HOSPADM

## 2024-12-11 RX ORDER — TAMSULOSIN HYDROCHLORIDE 0.4 MG/1
0.4 CAPSULE ORAL DAILY
Status: DISCONTINUED | OUTPATIENT
Start: 2024-12-11 | End: 2024-12-12 | Stop reason: HOSPADM

## 2024-12-11 RX ORDER — AMLODIPINE BESYLATE 5 MG/1
10 TABLET ORAL DAILY
Status: DISCONTINUED | OUTPATIENT
Start: 2024-12-11 | End: 2024-12-12 | Stop reason: HOSPADM

## 2024-12-11 RX ORDER — PANTOPRAZOLE SODIUM 40 MG/1
40 TABLET, DELAYED RELEASE ORAL
Status: DISCONTINUED | OUTPATIENT
Start: 2024-12-11 | End: 2024-12-12 | Stop reason: HOSPADM

## 2024-12-11 RX ORDER — ROSUVASTATIN CALCIUM 10 MG/1
10 TABLET, COATED ORAL DAILY
Status: DISCONTINUED | OUTPATIENT
Start: 2024-12-11 | End: 2024-12-12 | Stop reason: HOSPADM

## 2024-12-11 RX ORDER — IPRATROPIUM BROMIDE AND ALBUTEROL SULFATE 2.5; .5 MG/3ML; MG/3ML
3 SOLUTION RESPIRATORY (INHALATION) EVERY 4 HOURS PRN
Status: DISCONTINUED | OUTPATIENT
Start: 2024-12-11 | End: 2024-12-12 | Stop reason: HOSPADM

## 2024-12-11 RX ORDER — ACETAMINOPHEN 325 MG/1
650 TABLET ORAL EVERY 4 HOURS PRN
Status: DISCONTINUED | OUTPATIENT
Start: 2024-12-11 | End: 2024-12-12 | Stop reason: HOSPADM

## 2024-12-11 RX ORDER — ONDANSETRON 2 MG/ML
4 INJECTION INTRAMUSCULAR; INTRAVENOUS EVERY 6 HOURS PRN
Status: DISCONTINUED | OUTPATIENT
Start: 2024-12-11 | End: 2024-12-12 | Stop reason: HOSPADM

## 2024-12-11 RX ORDER — POTASSIUM CHLORIDE 1500 MG/1
40 TABLET, EXTENDED RELEASE ORAL EVERY 4 HOURS
Status: COMPLETED | OUTPATIENT
Start: 2024-12-11 | End: 2024-12-11

## 2024-12-11 RX ORDER — NITROGLYCERIN 0.4 MG/1
0.4 TABLET SUBLINGUAL
Status: DISCONTINUED | OUTPATIENT
Start: 2024-12-11 | End: 2024-12-12 | Stop reason: HOSPADM

## 2024-12-11 RX ORDER — SODIUM CHLORIDE 0.9 % (FLUSH) 0.9 %
10 SYRINGE (ML) INJECTION EVERY 12 HOURS SCHEDULED
Status: DISCONTINUED | OUTPATIENT
Start: 2024-12-11 | End: 2024-12-12 | Stop reason: HOSPADM

## 2024-12-11 RX ORDER — SODIUM CHLORIDE 9 MG/ML
40 INJECTION, SOLUTION INTRAVENOUS AS NEEDED
Status: DISCONTINUED | OUTPATIENT
Start: 2024-12-11 | End: 2024-12-12 | Stop reason: HOSPADM

## 2024-12-11 RX ADMIN — Medication 10 ML: at 09:42

## 2024-12-11 RX ADMIN — ROSUVASTATIN 10 MG: 10 TABLET, FILM COATED ORAL at 09:41

## 2024-12-11 RX ADMIN — ENOXAPARIN SODIUM 40 MG: 100 INJECTION SUBCUTANEOUS at 16:45

## 2024-12-11 RX ADMIN — ASPIRIN 81 MG: 81 TABLET, COATED ORAL at 09:41

## 2024-12-11 RX ADMIN — BISOPROLOL FUMARATE 5 MG: 5 TABLET ORAL at 09:41

## 2024-12-11 RX ADMIN — CLOPIDOGREL BISULFATE 75 MG: 75 TABLET ORAL at 09:41

## 2024-12-11 RX ADMIN — PANTOPRAZOLE SODIUM 40 MG: 40 TABLET, DELAYED RELEASE ORAL at 05:24

## 2024-12-11 RX ADMIN — AMLODIPINE BESYLATE 10 MG: 5 TABLET ORAL at 09:41

## 2024-12-11 RX ADMIN — SODIUM CHLORIDE 50 ML/HR: 9 INJECTION, SOLUTION INTRAVENOUS at 02:23

## 2024-12-11 RX ADMIN — Medication 10 ML: at 02:20

## 2024-12-11 RX ADMIN — TAMSULOSIN HYDROCHLORIDE 0.4 MG: 0.4 CAPSULE ORAL at 09:41

## 2024-12-11 RX ADMIN — POTASSIUM CHLORIDE 40 MEQ: 1500 TABLET, EXTENDED RELEASE ORAL at 12:07

## 2024-12-11 RX ADMIN — SODIUM CHLORIDE 500 ML: 9 INJECTION, SOLUTION INTRAVENOUS at 12:07

## 2024-12-11 RX ADMIN — OXYBUTYNIN CHLORIDE 10 MG: 10 TABLET, EXTENDED RELEASE ORAL at 09:41

## 2024-12-11 RX ADMIN — POTASSIUM CHLORIDE 40 MEQ: 1500 TABLET, EXTENDED RELEASE ORAL at 09:41

## 2024-12-11 NOTE — ED NOTES
"Nursing report ED to floor  Becka Oliva  84 y.o.  female    HPI:   Chief Complaint   Patient presents with    Dizziness     Pt reports general weakness, dizziness, low BP and SOA for past 2 days, pt was seen here yesterday for same sx with no new dx.       Admitting doctor:   Dianna Cronin MD    Admitting diagnosis:   The primary encounter diagnosis was Weakness. Diagnoses of Dizziness and COVID-19 were also pertinent to this visit.    Code status:   Current Code Status       Date Active Code Status Order ID Comments User Context       Prior            Allergies:   Patient has no known allergies.    Isolation:  Enhanced Droplet/Contact      Fall Risk:  Fall Risk Assessment was completed, and patient is at moderate risk for falls.   Predictive Model Details         9 (Low) Factor Value    Calculated 12/11/2024 00:33 Age 84    Risk of Fall Model Active Peripheral IV Present     Imaging order in this encounter Present     Respiratory Rate 18     Magnesium not on file     Bimal Scale not on file     Chloride 100 mmol/L     Diastolic BP 84     Creatinine 0.57 mg/dL     Albumin 4 g/dL     Total Bilirubin 0.5 mg/dL     Days after Admission 0.179     Calcium 9.4 mg/dL     Tobacco Use Quit     ALT 17 U/L     Potassium 4.1 mmol/L         Weight:       12/10/24  1701   Weight: 70.5 kg (155 lb 6.8 oz)       Intake and Output  No intake or output data in the 24 hours ending 12/11/24 0034    Diet:        Most recent vitals:   Vitals:    12/10/24 1701   BP: 148/84   BP Location: Left arm   Patient Position: Sitting   Pulse: 70   Resp: 18   Temp: 98.3 °F (36.8 °C)   TempSrc: Oral   SpO2: 93%   Weight: 70.5 kg (155 lb 6.8 oz)   Height: 162.6 cm (64\")       Active LDAs/IV Access:   Lines, Drains & Airways       Active LDAs       Name Placement date Placement time Site Days    Peripheral IV Right Antecubital --  --  Antecubital  --    External Urinary Catheter --  --  --  --                    Skin Condition:   Skin Assessments (last " day)       None             Labs (abnormal labs have a star):   Labs Reviewed   COVID-19/FLUA&B/RSV, NP SWAB IN TRANSPORT MEDIA 1 HR TAT - Abnormal; Notable for the following components:       Result Value    COVID19 Detected (*)     All other components within normal limits    Narrative:     Fact sheet for providers: https://www.fda.gov/media/744744/download    Fact sheet for patients: https://www.fda.gov/media/097582/download    Test performed by PCR.   RESPIRATORY PANEL PCR W/ COVID-19 (SARS-COV-2), NP SWAB IN UTM/VTP, 2 HR TAT - Abnormal; Notable for the following components:    COVID19 Detected (*)     All other components within normal limits    Narrative:     In the setting of a positive respiratory panel with a viral infection PLUS a negative procalcitonin without other underlying concern for bacterial infection, consider observing off antibiotics or discontinuation of antibiotics and continue supportive care. If the respiratory panel is positive for atypical bacterial infection (Bordetella pertussis, Chlamydophila pneumoniae, or Mycoplasma pneumoniae), consider antibiotic de-escalation to target atypical bacterial infection.   COMPREHENSIVE METABOLIC PANEL - Abnormal; Notable for the following components:    Glucose 102 (*)     BUN/Creatinine Ratio 38.6 (*)     All other components within normal limits    Narrative:     GFR Categories in Chronic Kidney Disease (CKD)      GFR Category          GFR (mL/min/1.73)    Interpretation  G1                     90 or greater         Normal or high (1)  G2                      60-89                Mild decrease (1)  G3a                   45-59                Mild to moderate decrease  G3b                   30-44                Moderate to severe decrease  G4                    15-29                Severe decrease  G5                    14 or less           Kidney failure          (1)In the absence of evidence of kidney disease, neither GFR category G1 or G2 fulfill  the criteria for CKD.    eGFR calculation 2021 CKD-EPI creatinine equation, which does not include race as a factor   CBC WITH AUTO DIFFERENTIAL - Abnormal; Notable for the following components:    WBC 12.73 (*)     Hemoglobin 16.0 (*)     Hematocrit 50.4 (*)     MCV 98.8 (*)     Lymphocyte % 15.4 (*)     Immature Grans % 1.6 (*)     Neutrophils, Absolute 9.44 (*)     Monocytes, Absolute 0.97 (*)     Immature Grans, Absolute 0.20 (*)     All other components within normal limits   TROPONIN - Normal    Narrative:     High Sensitive Troponin T Reference Range:  <14.0 ng/L- Negative Female for AMI  <22.0 ng/L- Negative Male for AMI  >=14 - Abnormal Female indicating possible myocardial injury.  >=22 - Abnormal Male indicating possible myocardial injury.   Clinicians would have to utilize clinical acumen, EKG, Troponin, and serial changes to determine if it is an Acute Myocardial Infarction or myocardial injury due to an underlying chronic condition.        BNP (IN-HOUSE) - Normal    Narrative:     This assay is used as an aid in the diagnosis of individuals suspected of having heart failure. It can be used as an aid in the diagnosis of acute decompensated heart failure (ADHF) in patients presenting with signs and symptoms of ADHF to the emergency department (ED). In addition, NT-proBNP of <300 pg/mL indicates ADHF is not likely.    Age Range Result Interpretation  NT-proBNP Concentration (pg/mL:      <50             Positive            >450                   Gray                 300-450                    Negative             <300    50-75           Positive            >900                  Gray                300-900                  Negative            <300      >75             Positive            >1800                  Gray                300-1800                  Negative            <300   HIGH SENSITIVITIY TROPONIN T 1HR - Normal    Narrative:     High Sensitive Troponin T Reference Range:  <14.0 ng/L- Negative  Female for AMI  <22.0 ng/L- Negative Male for AMI  >=14 - Abnormal Female indicating possible myocardial injury.  >=22 - Abnormal Male indicating possible myocardial injury.   Clinicians would have to utilize clinical acumen, EKG, Troponin, and serial changes to determine if it is an Acute Myocardial Infarction or myocardial injury due to an underlying chronic condition.        CBC AND DIFFERENTIAL    Narrative:     The following orders were created for panel order CBC & Differential.  Procedure                               Abnormality         Status                     ---------                               -----------         ------                     CBC Auto Differential[[id number]]        Abnormal            Final result                 Please view results for these tests on the individual orders.       LOC: Person, Place, Time, and Situation    Telemetry:  Telemetry    Cardiac Monitoring Ordered: no    EKG:   ECG 12 Lead Other; dizziness   Preliminary Result   HEART RATE=66  bpm   RR Xmsfzugd=731  ms   MN Gddlkfsd=954  ms   P Horizontal Axis=-14  deg   P Front Axis=23  deg   QRSD Interval=90  ms   QT Thwlcoix=527  ms   MTaW=099  ms   QRS Axis=-58  deg   T Wave Axis=-43  deg   - ABNORMAL ECG -   Sinus rhythm   Atrial premature complex   Left anterior fascicular block   Low voltage, precordial leads   Nonspecific T abnormalities, diffuse leads   Date and Time of Study:2024-12-10 19:52:17          Medications Given in the ED:   Medications - No data to display    Imaging results:  XR Chest 1 View    Result Date: 12/10/2024  Impression: 1.Stable chronic findings without acute process identified. Electronically Signed: Rudi Araiza MD  12/10/2024 9:40 PM EST  Workstation ID: [id number]    XR Chest 2 View    Result Date: 12/9/2024  Impression: No acute cardiopulmonary disease. Electronically Signed: Cristopher Jolley MD  12/9/2024 5:40 PM EST  Workstation ID: [id number]     Social issues:   Social History      Socioeconomic History    Marital status:    Tobacco Use    Smoking status: Former     Current packs/day: 0.00     Average packs/day: 1 pack/day for 30.0 years (30.0 ttl pk-yrs)     Types: Cigarettes     Start date:      Quit date: 2010     Years since quittin.9     Passive exposure: Past    Smokeless tobacco: Never   Vaping Use    Vaping status: Never Used   Substance and Sexual Activity    Alcohol use: No    Drug use: Never    Sexual activity: Defer       NIH Stroke Scale:  Interval: (not recorded)  1a. Level of Consciousness: (not recorded)  1b. LOC Questions: (not recorded)  1c. LOC Commands: (not recorded)  2. Best Gaze: (not recorded)  3. Visual: (not recorded)  4. Facial Palsy: (not recorded)  5a. Motor Arm, Left: (not recorded)  5b. Motor Arm, Right: (not recorded)  6a. Motor Leg, Left: (not recorded)  6b. Motor Leg, Right: (not recorded)  7. Limb Ataxia: (not recorded)  8. Sensory: (not recorded)  9. Best Language: (not recorded)  10. Dysarthria: (not recorded)  11. Extinction and Inattention (formerly Neglect): (not recorded)    Total (NIH Stroke Scale): (not recorded)     Additional notable assessment information:     Nursing report ED to floor:  Kerri Erickson RN   24 00:34 EST

## 2024-12-11 NOTE — ED PROVIDER NOTES
Provider in Triage Note  Patient is an 84-year-old female presents the emergency department complaint of dizziness and generalized weakness.    Reports shortness of breath, low blood pressure at home    No nausea vomiting diarrhea.  No episodes of syncope    Seen here in the ED yesterday for similar symptoms.  UA was negative.  Chest x-ray negative. Pt  had covid 19 2 weeks ago    Chart Review: Cardiology visit 11/6/24  Reviewed EKG results with patient.  Patient's dizziness lightheadedness do not appear to be related to cardiac etiology.  Patient is currently dizzy and lightheaded but has normal heart rate blood pressure and EKG.  Patient recently had carotid Dopplers 10/23/2024 which revealed tortuous carotids with less than 50% bilateral.  Will defer to primary team to evaluate other etiologies and help patient.  Continue medical management with bisoprolol hydrochlorothiazide, amlodipine, clopidogrel, aspirin, furosemide, rosuvastatin to help with blood pressure, hypertension, dyslipidemia, PAD tolerated.        Echocardiogram 1/10/19 reveals normal function PA pressures of 30-40 with mild AI/MR/TR and moderate size pericardial effusion, repeat echo 6/1/2019 revealed small pericardial effusion, LVEF of 70%  Lexiscan Cardiolite 6/25/2021 was negative for ischemia EF of 67%  Echocardiogram 6/25/2021 reviewed and interpreted by me reveals normal LV systolic function with LVEF of 70% with small pericardial effusion.  Echo 7/5/2023 reveals EF of 60 to 65%    Due to significant overcrowding in the emergency department patient was initially seen and evaluated in triage.  Provider in triage recommended patient placement in the treatment area to initiate therapy and movement to an ER bed as soon as possible.   Orders placed; medications will be deferred to main provider per protocol.        Subjective   History of Present Illness  Patient was seen by [facility] provider, I have reviewed and agree with [facility] providers note.          Review of Systems   Constitutional:  Negative for fever.       Past Medical History:   Diagnosis Date    Aortic aneurysm     COPD (chronic obstructive pulmonary disease)     Hernia, inguinal, left     Hyperlipidemia     Hypertension     Macular degeneration     Peripheral edema     PVD (peripheral vascular disease)        No Known Allergies    Past Surgical History:   Procedure Laterality Date    CARPAL TUNNEL RELEASE      DESCENDING AORTIC ANEURYSM REPAIR W/ STENT      HIP BIPOLAR REPLACEMENT Left 5/5/2023    Procedure: HIP BIPOLAR CEMENTED;  Surgeon: Guanako Harmon MD;  Location: [facility] MAIN OR;  Service: Orthopedics;  Laterality: Left;    INGUINAL HERNIA REPAIR Bilateral 11/16/2022    Procedure: INGUINAL HERNIA REPAIR LAPAROSCOPIC WITH DAVINCI ROBOT;  Surgeon: Jasper Mcneill MD;  Location: [facility] MAIN OR;  Service: Robotics - DaVinci;  Laterality: Bilateral;    INTERVENTIONAL RADIOLOGY PROCEDURE N/A 07/28/2021    Procedure: Abdominal Aortagram with Runoff, possible PCI;  Surgeon: Kyrie Campoverde MD;  Location: [facility] CATH INVASIVE LOCATION;  Service: Cardiovascular;  Laterality: N/A;    KNEE SURGERY  2020    OTHER SURGICAL HISTORY      STENT    VASCULAR SURGERY      fem fem bypass       Family History   Problem Relation Age of Onset    Cancer Mother     Aneurysm Father     No Known Problems Sister     No Known Problems Brother     No Known Problems Maternal Aunt     No Known Problems Maternal Uncle     No Known Problems Paternal Aunt     No Known Problems Paternal Uncle     No Known Problems Maternal Grandmother     No Known Problems Maternal Grandfather     No Known Problems Paternal Grandmother     No Known Problems Paternal Grandfather     Cancer Other     Diabetes Other     Anemia Neg Hx     Arrhythmia Neg Hx     Asthma Neg Hx     Clotting disorder Neg Hx     Fainting Neg Hx     Heart attack Neg Hx     Heart disease Neg Hx     Heart failure Neg Hx     Hyperlipidemia Neg Hx     Hypertension  "Neg Hx        Social History     Socioeconomic History    Marital status:    Tobacco Use    Smoking status: Former     Current packs/day: 0.00     Average packs/day: 1 pack/day for 30.0 years (30.0 ttl pk-yrs)     Types: Cigarettes     Start date:      Quit date: 2010     Years since quittin.9     Passive exposure: Past    Smokeless tobacco: Never   Vaping Use    Vaping status: Never Used   Substance and Sexual Activity    Alcohol use: No    Drug use: Never    Sexual activity: Defer           Objective   Physical Exam  Constitutional:       Appearance: Normal appearance.   HENT:      Head: Normocephalic and atraumatic.      Right Ear: Tympanic membrane normal.      Left Ear: Tympanic membrane normal.      Ears:      Comments: Positive [place]-Hallpike maneuver.     Nose: Nose normal.      Mouth/Throat:      Mouth: Mucous membranes are moist.   Eyes:      Extraocular Movements: Extraocular movements intact.   Cardiovascular:      Rate and Rhythm: Normal rate and regular rhythm.      Pulses: Normal pulses.      Heart sounds: Normal heart sounds.   Pulmonary:      Effort: Pulmonary effort is normal.      Breath sounds: Normal breath sounds. No wheezing.   Abdominal:      Palpations: Abdomen is soft.      Tenderness: There is no abdominal tenderness.   Musculoskeletal:         General: Normal range of motion.      Cervical back: Normal range of motion.   Skin:     General: Skin is warm and dry.      Capillary Refill: Capillary refill takes less than 2 seconds.   Neurological:      General: No focal deficit present.      Mental Status: She is alert and oriented to person, place, and time.   Psychiatric:         Mood and Affect: Mood normal.         Behavior: Behavior normal.         Procedures           ED Course            /84 (BP Location: Left arm, Patient Position: Sitting)   Pulse 70   Temp 98.3 °F (36.8 °C) (Oral)   Resp 18   Ht 162.6 cm (64\")   Wt 70.5 kg (155 lb 6.8 oz)   SpO2 93%   BMI " 26.68 kg/m²   Labs Reviewed   COVID-19/FLUA&B/RSV, NP SWAB IN TRANSPORT MEDIA 1 HR TAT - Abnormal; Notable for the following components:       Result Value    COVID19 Detected (*)     All other components within normal limits    Narrative:     Fact sheet for providers: https://www.fda.gov/media/720794/download    Fact sheet for patients: https://www.fda.gov/media/740227/download    Test performed by PCR.   RESPIRATORY PANEL PCR W/ COVID-19 (SARS-COV-2), NP SWAB IN UTM/VTP, 2 HR TAT - Abnormal; Notable for the following components:    COVID19 Detected (*)     All other components within normal limits    Narrative:     In the setting of a positive respiratory panel with a viral infection PLUS a negative procalcitonin without other underlying concern for bacterial infection, consider observing off antibiotics or discontinuation of antibiotics and continue supportive care. If the respiratory panel is positive for atypical bacterial infection (Bordetella pertussis, Chlamydophila pneumoniae, or Mycoplasma pneumoniae), consider antibiotic de-escalation to target atypical bacterial infection.   COMPREHENSIVE METABOLIC PANEL - Abnormal; Notable for the following components:    Glucose 102 (*)     BUN/Creatinine Ratio 38.6 (*)     All other components within normal limits    Narrative:     GFR Categories in Chronic Kidney Disease (CKD)      GFR Category          GFR (mL/min/1.73)    Interpretation  G1                     90 or greater         Normal or high (1)  G2                      60-89                Mild decrease (1)  G3a                   45-59                Mild to moderate decrease  G3b                   30-44                Moderate to severe decrease  G4                    15-29                Severe decrease  G5                    14 or less           Kidney failure          (1)In the absence of evidence of kidney disease, neither GFR category G1 or G2 fulfill the criteria for CKD.    eGFR calculation 2021  CKD-EPI creatinine equation, which does not include race as a factor   CBC WITH AUTO DIFFERENTIAL - Abnormal; Notable for the following components:    WBC 12.73 (*)     Hemoglobin 16.0 (*)     Hematocrit 50.4 (*)     MCV 98.8 (*)     Lymphocyte % 15.4 (*)     Immature Grans % 1.6 (*)     Neutrophils, Absolute 9.44 (*)     Monocytes, Absolute 0.97 (*)     Immature Grans, Absolute 0.20 (*)     All other components within normal limits   TROPONIN - Normal    Narrative:     High Sensitive Troponin T Reference Range:  <14.0 ng/L- Negative Female for AMI  <22.0 ng/L- Negative Male for AMI  >=14 - Abnormal Female indicating possible myocardial injury.  >=22 - Abnormal Male indicating possible myocardial injury.   Clinicians would have to utilize clinical acumen, EKG, Troponin, and serial changes to determine if it is an Acute Myocardial Infarction or myocardial injury due to an underlying chronic condition.        BNP (IN-HOUSE) - Normal    Narrative:     This assay is used as an aid in the diagnosis of individuals suspected of having heart failure. It can be used as an aid in the diagnosis of acute decompensated heart failure (ADHF) in patients presenting with signs and symptoms of ADHF to the emergency department (ED). In addition, NT-proBNP of <300 pg/mL indicates ADHF is not likely.    Age Range Result Interpretation  NT-proBNP Concentration (pg/mL:      <50             Positive            >450                   Gray                 300-450                    Negative             <300    50-75           Positive            >900                  Gray                300-900                  Negative            <300      >75             Positive            >1800                  Gray                300-1800                  Negative            <300   HIGH SENSITIVITIY TROPONIN T 1HR   CBC AND DIFFERENTIAL    Narrative:     The following orders were created for panel order CBC & Differential.  Procedure                                Abnormality         Status                     ---------                               -----------         ------                     CBC Auto Differential[[id number]]        Abnormal            Final result                 Please view results for these tests on the individual orders.     Medications - No data to display    XR Chest 1 View    Result Date: 12/10/2024  Impression: 1.Stable chronic findings without acute process identified. Electronically Signed: Rudi Araiza MD  12/10/2024 9:40 PM EST  Workstation ID: [id number]    XR Chest 2 View    Result Date: 12/9/2024  Impression: No acute cardiopulmonary disease. Electronically Signed: Cristopher Jolley MD  12/9/2024 5:40 PM EST  Workstation ID: [id number]                                              Medical Decision Making  Chart review: Patient was seen in the ED on 9/18/2024 for constipation and diarrhea.  Was seen by Cardiology on 11/6/2024 for recurrent dizziness, hypertension, pericardial effusion, COPD, PAD and AAA repair      Radiology interpretation:  X-rays chest reviewed and interpreted by Rudi Araiza MD : Stable chronic findings without acute process identified.  Further interpretation by radiologist as above    Lab interpretation:  Labs all viewed by me and significant for: , troponin 12, BUN 22, creatinine 0.57, potassium 4.1, white count 12.73, hemoglobin 16.0.  RPP positive for COVID-19.    EKG viewed and interpreted by Dr. Melo: Sinus rhythm, nonspecific T wave abnormalities, rate 66, QTc 408, no ST elevation noted.  comparison: Dated 5/4/2023, sinus rhythm, rate 81, QTc 477.    IV was established and labs were obtained to evaluate for infection, electrolyte imbalance, viral infection, MI.  Patient is a an 84-year-old female who presents to the ER for above complaint.  On initial examination patient was resting comfortably in the bed, nontoxic in appearance with no signs and symptoms of distress with family at bedside.  Patient was  seen here yesterday for same complaint.  RPP was positive for COVID.  Physical examination revealed a positive [place]-Hallpike maneuver.  Labs were indicative of leukocytosis, patient has recent steroid use.  Consult with [facility].  Spoke with GOLDEN Dudley with [facility] who agreed to admit.  On reexamination patient was resting comfortably, no signs and symptoms of distress, and nontoxic in appearance.  As I was in there for reexamination patient had productive cough.  Chest x-ray ordered.  Discussed findings and decision to admit with patient.  Patient was reluctant at first to be admitted, but after speaking with patient and daughter patient is agreeable to admission and plan of care.      Appropriate PPE worn during exam.  Discussed care with: GOLDEN Dudley with [facility].      Problems Addressed:  COVID-19: complicated acute illness or injury  Dizziness: complicated acute illness or injury  Weakness: complicated acute illness or injury    Amount and/or Complexity of Data Reviewed  Labs: ordered.  Radiology: ordered.  ECG/medicine tests: ordered.    Risk  Decision regarding hospitalization.        Final diagnoses:   Weakness   Dizziness   COVID-19       ED Disposition  ED Disposition       ED Disposition   Decision to Admit    Condition   --    Comment   Level of Care: Telemetry [5]   Admitting Physician: PAXTON CASPER [[id number]]   Attending Physician: PAXTON CASPER [[id number]]                 No follow-up provider specified.       Medication List      No changes were made to your prescriptions during this visit.            Laila Lee, APRN  12/10/24 1985

## 2024-12-11 NOTE — PLAN OF CARE
Goal Outcome Evaluation:              Outcome Evaluation: Patient arrived to the unit this morning. She has slept on and off since arriving. She has had some dizziness when sitting up in bed from a lying position. Which resolves quickly. Orthostatics done and charted.

## 2024-12-11 NOTE — PROGRESS NOTES
LOS: 0 days   Patient Care Team:  Janes Rosario NP as PCP - General (Family Medicine)  Kyrie Campoverde MD as Consulting Physician (Cardiology)  Jasper Mcneill MD as Consulting Physician (General Surgery)  Sung Tamayo MD (Vascular Surgery)    Subjective     Interval History:     Patient Complaints: pt sleeping    History taken from: patient    Review of Systems   Unable to perform ROS: Other           Objective     Vital Signs  Temp:  [97.7 °F (36.5 °C)-98.3 °F (36.8 °C)] 97.8 °F (36.6 °C)  Heart Rate:  [58-80] 72  Resp:  [17-21] 21  BP: (106-148)/(59-84) 125/70    Physical Exam:     General Appearance:    sleeping, in no acute distress   Head:    Normocephalic, without obvious abnormality, atraumatic   Eyes:            Lids and lashes normal, conjunctivae and sclerae normal, no   icterus, no pallor, corneas clear, PERRLA   Ears:    Ears appear intact with no abnormalities noted   Throat:   No oral lesions, no thrush, oral mucosa moist   Neck:   No adenopathy, supple, trachea midline, no thyromegaly, no   carotid bruit, no JVD   Lungs:     Scattered wheeze    Heart:    Regular rhythm and normal rate, normal S1 and S2, no            murmur, no gallop, no rub, no click   Chest Wall:    No abnormalities observed   Abdomen:     Normal bowel sounds, no masses, no organomegaly, soft        non-tender, non-distended, no guarding, no rebound                tenderness   Extremities:   Moves all extremities well, no edema, no cyanosis, no             redness   Pulses:   Pulses palpable and equal bilaterally   Skin:   No bleeding, bruising or rash   Lymph nodes:   No palpable adenopathy   Neurologic:   Cranial nerves 2 - 12 grossly intact, sensation intact, DTR       present and equal bilaterally        Results Review:    Lab Results (last 24 hours)       Procedure Component Value Units Date/Time    Basic Metabolic Panel [[id number]]  (Abnormal) Collected: 12/11/24 0229    Specimen: Blood Updated: 12/11/24  0349     Glucose 174 mg/dL      BUN 19 mg/dL      Creatinine 0.58 mg/dL      Sodium 140 mmol/L      Potassium 3.3 mmol/L      Chloride 100 mmol/L      CO2 29.9 mmol/L      Calcium 8.7 mg/dL      BUN/Creatinine Ratio 32.8     Anion Gap 10.1 mmol/L      eGFR 88.8 mL/min/1.73     Narrative:      GFR Categories in Chronic Kidney Disease (CKD)      GFR Category          GFR (mL/min/1.73)    Interpretation  G1                     90 or greater         Normal or high (1)  G2                      60-89                Mild decrease (1)  G3a                   45-59                Mild to moderate decrease  G3b                   30-44                Moderate to severe decrease  G4                    15-29                Severe decrease  G5                    14 or less           Kidney failure          (1)In the absence of evidence of kidney disease, neither GFR category G1 or G2 fulfill the criteria for CKD.    eGFR calculation 2021 CKD-EPI creatinine equation, which does not include race as a factor    CBC & Differential [[id number]]  (Abnormal) Collected: 12/11/24 0229    Specimen: Blood Updated: 12/11/24 0329    Narrative:      The following orders were created for panel order CBC & Differential.  Procedure                               Abnormality         Status                     ---------                               -----------         ------                     CBC Auto Differential[[id number]]        Abnormal            Final result                 Please view results for these tests on the individual orders.    CBC Auto Differential [[id number]]  (Abnormal) Collected: 12/11/24 0229    Specimen: Blood Updated: 12/11/24 0329     WBC 12.31 10*3/mm3      RBC 4.45 10*6/mm3      Hemoglobin 14.4 g/dL      Hematocrit 43.9 %      MCV 98.7 fL      MCH 32.4 pg      MCHC 32.8 g/dL      RDW 12.8 %      RDW-SD 46.1 fl      MPV 10.5 fL      Platelets 266 10*3/mm3      Neutrophil % 74.5 %      Lymphocyte % 15.7 %      Monocyte % 7.0  %      Eosinophil % 1.3 %      Basophil % 0.2 %      Immature Grans % 1.3 %      Neutrophils, Absolute 9.18 10*3/mm3      Lymphocytes, Absolute 1.93 10*3/mm3      Monocytes, Absolute 0.86 10*3/mm3      Eosinophils, Absolute 0.16 10*3/mm3      Basophils, Absolute 0.02 10*3/mm3      Immature Grans, Absolute 0.16 10*3/mm3      nRBC 0.0 /100 WBC     High Sensitivity Troponin T 1Hr [[id number]]  (Normal) Collected: 12/10/24 2221    Specimen: Blood Updated: 12/10/24 2248     HS Troponin T 11 ng/L      Troponin T Delta -1 ng/L     Narrative:      High Sensitive Troponin T Reference Range:  <14.0 ng/L- Negative Female for AMI  <22.0 ng/L- Negative Male for AMI  >=14 - Abnormal Female indicating possible myocardial injury.  >=22 - Abnormal Male indicating possible myocardial injury.   Clinicians would have to utilize clinical acumen, EKG, Troponin, and serial changes to determine if it is an Acute Myocardial Infarction or myocardial injury due to an underlying chronic condition.         Respiratory Panel PCR w/COVID-19(SARS-CoV-2) [facility] In-House, NP Swab in UTM/VTM, 2 HR TAT - Swab, Nasopharynx [[id number]]  (Abnormal) Collected: 12/10/24 1758    Specimen: Swab from Nasopharynx Updated: 12/10/24 2026     ADENOVIRUS, PCR Not Detected     Coronavirus 229E Not Detected     Coronavirus HKU1 Not Detected     Coronavirus NL63 Not Detected     Coronavirus OC43 Not Detected     COVID19 Detected     Human Metapneumovirus Not Detected     Human Rhinovirus/Enterovirus Not Detected     Influenza A PCR Not Detected     Influenza B PCR Not Detected     Parainfluenza Virus 1 Not Detected     Parainfluenza Virus 2 Not Detected     Parainfluenza Virus 3 Not Detected     Parainfluenza Virus 4 Not Detected     RSV, PCR Not Detected     Bordetella pertussis pcr Not Detected     Bordetella parapertussis PCR Not Detected     Chlamydophila pneumoniae PCR Not Detected     Mycoplasma pneumo by PCR Not Detected    Narrative:      In  the setting of a positive respiratory panel with a viral infection PLUS a negative procalcitonin without other underlying concern for bacterial infection, consider observing off antibiotics or discontinuation of antibiotics and continue supportive care. If the respiratory panel is positive for atypical bacterial infection (Bordetella pertussis, Chlamydophila pneumoniae, or Mycoplasma pneumoniae), consider antibiotic de-escalation to target atypical bacterial infection.    Comprehensive Metabolic Panel [[id number]]  (Abnormal) Collected: 12/10/24 1934    Specimen: Blood Updated: 12/10/24 2003     Glucose 102 mg/dL      BUN 22 mg/dL      Creatinine 0.57 mg/dL      Sodium 139 mmol/L      Potassium 4.1 mmol/L      Chloride 100 mmol/L      CO2 28.2 mmol/L      Calcium 9.4 mg/dL      Total Protein 7.0 g/dL      Albumin 4.0 g/dL      ALT (SGPT) 17 U/L      AST (SGOT) 25 U/L      Alkaline Phosphatase 60 U/L      Total Bilirubin 0.5 mg/dL      Globulin 3.0 gm/dL      A/G Ratio 1.3 g/dL      BUN/Creatinine Ratio 38.6     Anion Gap 10.8 mmol/L      eGFR 89.2 mL/min/1.73     Narrative:      GFR Categories in Chronic Kidney Disease (CKD)      GFR Category          GFR (mL/min/1.73)    Interpretation  G1                     90 or greater         Normal or high (1)  G2                      60-89                Mild decrease (1)  G3a                   45-59                Mild to moderate decrease  G3b                   30-44                Moderate to severe decrease  G4                    15-29                Severe decrease  G5                    14 or less           Kidney failure          (1)In the absence of evidence of kidney disease, neither GFR category G1 or G2 fulfill the criteria for CKD.    eGFR calculation 2021 CKD-EPI creatinine equation, which does not include race as a factor    High Sensitivity Troponin T [[id number]]  (Normal) Collected: 12/10/24 1934    Specimen: Blood Updated: 12/10/24 2003     HS Troponin T 12  ng/L     Narrative:      High Sensitive Troponin T Reference Range:  <14.0 ng/L- Negative Female for AMI  <22.0 ng/L- Negative Male for AMI  >=14 - Abnormal Female indicating possible myocardial injury.  >=22 - Abnormal Male indicating possible myocardial injury.   Clinicians would have to utilize clinical acumen, EKG, Troponin, and serial changes to determine if it is an Acute Myocardial Infarction or myocardial injury due to an underlying chronic condition.         BNP [[id number]]  (Normal) Collected: 12/10/24 1934    Specimen: Blood Updated: 12/10/24 2003     proBNP 463.0 pg/mL     Narrative:      This assay is used as an aid in the diagnosis of individuals suspected of having heart failure. It can be used as an aid in the diagnosis of acute decompensated heart failure (ADHF) in patients presenting with signs and symptoms of ADHF to the emergency department (ED). In addition, NT-proBNP of <300 pg/mL indicates ADHF is not likely.    Age Range Result Interpretation  NT-proBNP Concentration (pg/mL:      <50             Positive            >450                   Gray                 300-450                    Negative             <300    50-75           Positive            >900                  Gray                300-900                  Negative            <300      >75             Positive            >1800                  Gray                300-1800                  Negative            <300    CBC & Differential [[id number]]  (Abnormal) Collected: 12/10/24 1934    Specimen: Blood Updated: 12/10/24 1941    Narrative:      The following orders were created for panel order CBC & Differential.  Procedure                               Abnormality         Status                     ---------                               -----------         ------                     CBC Auto Differential[[id number]]        Abnormal            Final result                 Please view results for these tests on the individual orders.     CBC Auto Differential [[id number]]  (Abnormal) Collected: 12/10/24 1934    Specimen: Blood Updated: 12/10/24 1941     WBC 12.73 10*3/mm3      RBC 5.10 10*6/mm3      Hemoglobin 16.0 g/dL      Hematocrit 50.4 %      MCV 98.8 fL      MCH 31.4 pg      MCHC 31.7 g/dL      RDW 12.7 %      RDW-SD 46.7 fl      MPV 10.0 fL      Platelets 294 10*3/mm3      Neutrophil % 74.1 %      Lymphocyte % 15.4 %      Monocyte % 7.6 %      Eosinophil % 1.2 %      Basophil % 0.1 %      Immature Grans % 1.6 %      Neutrophils, Absolute 9.44 10*3/mm3      Lymphocytes, Absolute 1.96 10*3/mm3      Monocytes, Absolute 0.97 10*3/mm3      Eosinophils, Absolute 0.15 10*3/mm3      Basophils, Absolute 0.01 10*3/mm3      Immature Grans, Absolute 0.20 10*3/mm3      nRBC 0.0 /100 WBC     COVID-19, FLU A/B, RSV PCR 1 HR TAT - Swab, Nasopharynx [[id number]]  (Abnormal) Collected: 12/10/24 1758    Specimen: Swab from Nasopharynx Updated: 12/10/24 1849     COVID19 Detected     Influenza A PCR Not Detected     Influenza B PCR Not Detected     RSV, PCR Not Detected    Narrative:      Fact sheet for providers: https://www.fda.gov/media/076262/download    Fact sheet for patients: https://www.fda.gov/media/646305/download    Test performed by PCR.             Imaging Results (Last 24 Hours)       Procedure Component Value Units Date/Time    XR Chest 1 View [[id number]] Collected: 12/10/24 2139     Updated: 12/10/24 2142    Narrative:      XR CHEST 1 VW    Date of Exam: 12/10/2024 9:30 PM EST    Indication: productive cough, Covid positive    Comparison: 12/9/2024    Findings:  Cardiomediastinal silhouette is enlarged, similar to prior examination. There is moderate generalized interstitial prominence/scarring. No airspace disease, pneumothorax, nor pleural effusion. No acute osseous abnormality identified.      Impression:      Impression:  1.Stable chronic findings without acute process identified.      Electronically Signed: Rudi Araiza MD    12/10/2024 9:40  PM EST    Workstation ID: [id number]                 I reviewed the patient's new clinical results.    Medication Review:   Scheduled Meds:amLODIPine, 10 mg, Oral, Daily  aspirin, 81 mg, Oral, Daily  bisoprolol, 5 mg, Oral, Q24H  clopidogrel, 75 mg, Oral, Daily  enoxaparin, 40 mg, Subcutaneous, Q24H  oxybutynin XL, 10 mg, Oral, Daily  pantoprazole, 40 mg, Oral, Q AM  potassium chloride ER, 40 mEq, Oral, Q4H  rosuvastatin, 10 mg, Oral, Daily  sodium chloride, 10 mL, Intravenous, Q12H  tamsulosin, 0.4 mg, Oral, Daily      Continuous Infusions:sodium chloride, 50 mL/hr, Last Rate: 50 mL/hr (12/11/24 0954)      PRN Meds:.  acetaminophen    senna-docusate sodium **AND** polyethylene glycol **AND** bisacodyl **AND** bisacodyl    HYDROcodone-acetaminophen    ipratropium-albuterol    meclizine    nitroglycerin    ondansetron ODT **OR** ondansetron    Potassium Replacement - Follow Nurse / BPA Driven Protocol    sodium chloride    sodium chloride     Assessment & Plan       COVID-19              -Generalized weakness due to infection and decreased oral intake.              -IV fluids x 12 hours.  Patient appears slightly dry.  Hold HCTZ and lasix for now     Dizziness              -suspect vertigo  -PT consult- patient agreeable to vestibular therapy if needed.              -orthostatic BP: 115/57 lying at 1007, 110/56 sitting at 1010, 94/58 standing at 1012  - given another fluid bolus              -Last echo 7/2023 showed EF 60-65%              -Carotid ultrasound 10/23/2024 showeing bilateral tortuous carotid arteries with significant plaque, but no evidences of significant stenosis (50%)  and bilateral vertebral arteries demonstrate antegrade flow              -continue Meclizine prn.     Leukocytosis              -Suspect due to recent steroids.              -CXR clear              -Urine with no indications for infection at this time    COPD  HLD  HTN  PVD    VTE: Lovenox  PPI: protonix      Plan for  disposition:TBULISSES Prescott MD  12/11/24  10:02 EST

## 2024-12-11 NOTE — THERAPY EVALUATION
[facility] Call Center    Phone Message    May a detailed message be left on voicemail: yes     Reason for Call: Medication Refill Request    Has the patient contacted the pharmacy for the refill? Yes   Name of medication being requested: Tramadol   Provider who prescribed the medication: Dr. Jarvis   Pharmacy: [facility]   Date medication is needed: asap       Action Taken: Other: MG    Travel Screening: Not Applicable                                                                    Patient Name: Becka Oliva  : 1940    MRN: [id number]                              Today's Date: 2024       Admit Date: 12/10/2024    Visit Dx:     ICD-10-CM ICD-9-CM   1. Weakness  R53.1 780.79   2. Dizziness  R42 780.4   3. COVID-19  U07.1 079.89     Patient Active Problem List   Diagnosis    Atherosclerotic peripheral vascular disease    Benign hypertensive heart disease    Chronic kidney disease    COPD with hypoxia    Dyspnea on exertion    History of aortic aneurysm    Hypertension    Pericardial effusion    Primary localized osteoarthritis    Shortness of breath    Acute UTI    Pyelonephritis    Generalized weakness    PVD (peripheral vascular disease) with claudication    Dyslipidemia    Essential hypertension    PAD (peripheral artery disease)    Left inguinal hernia    Dyspnea, unspecified type    COPD with acute exacerbation    Closed fracture of left hip, initial encounter    Bilateral lower extremity edema    COVID-19     Past Medical History:   Diagnosis Date    Aortic aneurysm     COPD (chronic obstructive pulmonary disease)     Hernia, inguinal, left     Hyperlipidemia     Hypertension     Macular degeneration     Peripheral edema     PVD (peripheral vascular disease)      Past Surgical History:   Procedure Laterality Date    CARPAL TUNNEL RELEASE      DESCENDING AORTIC ANEURYSM REPAIR W/ STENT      HIP BIPOLAR REPLACEMENT Left 2023    Procedure: HIP BIPOLAR CEMENTED;  Surgeon: Guanako Harmon MD;  Location: [facility] MAIN OR;  Service: Orthopedics;  Laterality: Left;    INGUINAL HERNIA REPAIR Bilateral 2022    Procedure: INGUINAL HERNIA REPAIR LAPAROSCOPIC WITH DAVINCI ROBOT;  Surgeon: Jasper Mcneill MD;  Location: [facility] MAIN OR;  Service: Robotics - DaVinci;  Laterality: Bilateral;    INTERVENTIONAL RADIOLOGY PROCEDURE N/A 2021    Procedure: Abdominal Aortagram with Runoff, possible PCI;  Surgeon: Kyrie Campoverde MD;  Location: [facility] CATH INVASIVE LOCATION;   Service: Cardiovascular;  Laterality: N/A;    KNEE SURGERY  2020    OTHER SURGICAL HISTORY      STENT    VASCULAR SURGERY      fem fem bypass      General Information       Row Name 12/11/24 1640          Physical Therapy Time and Intention    Document Type evaluation  -     Mode of Treatment physical therapy  -       Row Name 12/11/24 1641 12/11/24 1640       General Information    Patient Profile Reviewed -- yes  -SS    Prior Level of Function -- independent:  -    Existing Precautions/Restrictions fall  - --    Barriers to Rehab -- hearing deficit  -      Row Name 12/11/24 1640          Living Environment    People in Home alone  -       Row Name 12/11/24 1640          Cognition    Orientation Status (Cognition) oriented to;person;place;situation  -       Row Name 12/11/24 1640          Safety Issues/Impairments Affecting Functional Mobility    Impairments Affecting Function (Mobility) balance  -               User Key  (r) = Recorded By, (t) = Taken By, (c) = Cosigned By      Initials Name Provider Type     Tanja Rucker PT Physical Therapist                   Mobility       Row Name 12/11/24 1641          Bed Mobility    Bed Mobility bed mobility (all) activities  -     All Activities, [place] (Bed Mobility) moderate assist (50% patient effort)  -       Row Name 12/11/24 1641          Sit-Stand Transfer    Sit-Stand [place] (Transfers) minimum assist (75% patient effort)  -       Row Name 12/11/24 1641          Gait/Stairs (Locomotion)    [place] Level (Gait) moderate assist (50% patient effort)  -     Patient was able to Ambulate yes  -     Distance in Feet (Gait) 20  -     Comment, (Gait/Stairs) moderate postural instability  -               User Key  (r) = Recorded By, (t) = Taken By, (c) = Cosigned By      Initials Name Provider Type     Tanja Rucker PT Physical Therapist                   Obj/Interventions       Row Name 12/11/24 1641           Range of Motion Comprehensive    General Range of Motion no range of motion deficits identified  -       Row Name 12/11/24 1641          Strength Comprehensive (MMT)    Comment, General Manual Muscle Testing (MMT) Assessment B LE >3/5  -       Row Name 12/11/24 1641          Balance    Balance Assessment sitting static balance;standing static balance;standing dynamic balance  -SS     Static Sitting Balance independent  -SS     Static Standing Balance minimal assist  -SS     Dynamic Standing Balance moderate assist  -SS               User Key  (r) = Recorded By, (t) = Taken By, (c) = Cosigned By      Initials Name Provider Type     Tanja Rucker L, PT Physical Therapist                   Goals/Plan       Row Name 12/11/24 1643          Bed Mobility Goal 1 (PT)    Activity/Assistive Device (Bed Mobility Goal 1, PT) bed mobility activities, all  -SS     [place] Level/Cues Needed (Bed Mobility Goal 1, PT) modified independence  -SS     Time Frame (Bed Mobility Goal 1, PT) long term goal (LTG);2 weeks  -       Row Name 12/11/24 1643          Transfer Goal 1 (PT)    Activity/Assistive Device (Transfer Goal 1, PT) transfers, all  -SS     [place] Level/Cues Needed (Transfer Goal 1, PT) modified independence  -SS     Time Frame (Transfer Goal 1, PT) long term goal (LTG);2 weeks  -       Row Name 12/11/24 1643          Gait Training Goal 1 (PT)    Activity/Assistive Device (Gait Training Goal 1, PT) gait (walking locomotion);walker, rolling  -SS     [place] Level (Gait Training Goal 1, PT) modified independence  -SS     Distance (Gait Training Goal 1, PT) 75'  -SS     Time Frame (Gait Training Goal 1, PT) long term goal (LTG);2 weeks  -       Row Name 12/11/24 1643          Therapy Assessment/Plan (PT)    Planned Therapy Interventions (PT) balance training;bed mobility training;gait training;home exercise program;transfer training;strengthening;patient/family education  -               User  Key  (r) = Recorded By, (t) = Taken By, (c) = Cosigned By      Initials Name Provider Type     Tanja Rucker, PT Physical Therapist                   Clinical Impression       Row Name 12/11/24 1642          Pain    Pretreatment Pain Rating 0/10 - no pain  -SS     Posttreatment Pain Rating 0/10 - no pain  -SS       Row Name 12/11/24 1642          Plan of Care Review    Plan of Care Reviewed With patient  -SS       Row Name 12/11/24 1642          Therapy Assessment/Plan (PT)    Rehab Potential (PT) good  -SS     Criteria for Skilled Interventions Met (PT) yes;meets criteria  -SS     Therapy Frequency (PT) 5 times/wk  -SS     Predicted Duration of Therapy Intervention (PT) until d/c  -SS       Row Name 12/11/24 1642          Vital Signs    Pre Systolic BP Rehab 109  -SS     Pre Treatment Diastolic BP 55  -SS     Intra Systolic BP Rehab 92  -SS     Intra Treatment Diastolic BP 52  -SS       Row Name 12/11/24 1642          Positioning and Restraints    Pre-Treatment Position in bed  -SS     Post Treatment Position bed  -SS     In Bed exit alarm on  -SS               User Key  (r) = Recorded By, (t) = Taken By, (c) = Cosigned By      Initials Name Provider Type     Tanja Rucker, PT Physical Therapist                   Outcome Measures       Row Name 12/11/24 0955          How much help from another person do you currently need...    Turning from your back to your side while in flat bed without using bedrails? 4  -GH     Moving from lying on back to sitting on the side of a flat bed without bedrails? 4  -GH     Moving to and from a bed to a chair (including a wheelchair)? 4  -GH     Standing up from a chair using your arms (e.g., wheelchair, bedside chair)? 4  -GH     Climbing 3-5 steps with a railing? 3  -GH     To walk in hospital room? 4  -GH     AM-PAC 6 Clicks Score (PT) 23  -GH               User Key  (r) = Recorded By, (t) = Taken By, (c) = Cosigned By      Initials Name Provider Type    RYAN Sosa  Abilio Tyler, RN Registered Nurse                                 Physical Therapy Education       Title: PT OT SLP Therapies (Done)       Topic: Physical Therapy (Done)       Point: Mobility training (Done)       Learning Progress Summary            Patient Acceptance, E, VU by  at 12/11/2024 1643                                      User Key       Initials Effective Dates Name Provider Type Discipline     06/16/21 -  Tanja Rucker PT Physical Therapist PT                  PT Recommendation and Plan  Planned Therapy Interventions (PT): balance training, bed mobility training, gait training, home exercise program, transfer training, strengthening, patient/family education        Time Calculation:   PT Evaluation Complexity  History, PT Evaluation Complexity: 3 or more personal factors and/or comorbidities  Examination of Body Systems (PT Eval Complexity): total of 3 or more elements  Clinical Presentation (PT Evaluation Complexity): evolving  Clinical Decision Making (PT Evaluation Complexity): moderate complexity  Overall Complexity (PT Evaluation Complexity): moderate complexity     PT Charges       Row Name 12/11/24 1643             Time Calculation    Start Time 1425  -      Stop Time 1503  -      Time Calculation (min) 38 min  -      PT Received On 12/11/24  -      PT - Next Appointment 12/12/24  -      PT Goal Re-Cert Due Date 12/25/24  -         Time Calculation- PT    Total Timed Code Minutes- PT 0 minute(s)  -                User Key  (r) = Recorded By, (t) = Taken By, (c) = Cosigned By      Initials Name Provider Type     Tanja Rucker, PT Physical Therapist                  Therapy Charges for Today       Code Description Service Date Service Provider Modifiers Qty    31273555694 HC PT EVAL MOD COMPLEXITY 4 12/11/2024 Tanja Rucker, PT GP 1    83261809706 HC PT CANALITH REPOSITIONING PER DAY 12/11/2024 Tanja Rucker, PT GP 1            PT G-Codes  AM-PAC 6  Clicks Score (PT): 23  PT Discharge Summary  Anticipated Discharge Disposition (PT): inpatient rehabilitation facility    Tanja Rucker, PT  12/11/2024

## 2024-12-11 NOTE — CASE MANAGEMENT/SOCIAL WORK
Discharge Planning Assessment   [facility]     Patient Name: Becka Oliva  MRN: [id number]  Today's Date: 12/11/2024    Admit Date: 12/10/2024    Plan: Anticipate return home. Home O2 2L PRN zuri/ [employer] [facility].   Discharge Needs Assessment       Row Name 12/11/24 1213       Living Environment    People in Home alone    Current Living Arrangements home    Potentially Unsafe Housing Conditions none    In the past 12 months has the electric, gas, oil, or water company threatened to shut off services in your home? No    Primary Care Provided by self    Provides Primary Care For no one    Family Caregiver if Needed child(honorio), adult    Family Caregiver Names Daughters-Jaci, Mireya; Son-Omkar    Quality of Family Relationships helpful;involved;supportive    Able to Return to Prior Arrangements yes       Resource/Environmental Concerns    Resource/Environmental Concerns none    Transportation Concerns none       Transportation Needs    In the past 12 months, has lack of transportation kept you from medical appointments or from getting medications? no    In the past 12 months, has lack of transportation kept you from meetings, work, or from getting things needed for daily living? No       Food Insecurity    Within the past 12 months, you worried that your food would run out before you got the money to buy more. Never true    Within the past 12 months, the food you bought just didn't last and you didn't have money to get more. Never true       Transition Planning    Patient/Family Anticipates Transition to home;home with help/services    Patient/Family Anticipated Services at Transition outpatient care    Transportation Anticipated family or friend will provide       Discharge Needs Assessment    Readmission Within the Last 30 Days no previous admission in last 30 days    Equipment Currently Used at Home cane, straight;walker, rolling;oxygen;nebulizer    Concerns to be Addressed discharge planning;care coordination/care  conferences    Do you want help finding or keeping work or a job? Patient declined    Do you want help with school or training? For example, starting or completing job training or getting a high school diploma, GED or equivalent No    Equipment Needed After Discharge none    Provided Post Acute Provider List? N/A    Provided Post Acute Provider Quality & Resource List? N/A                   Discharge Plan       Row Name 12/11/24 1214       Plan    Plan Anticipate return home. Home O2 2L PRN w/ [employer] [facility].    Patient/Family in Agreement with Plan yes    Plan Comments CM met with patient at bedside. Pt lives at home alone, does not drive, but reports independence with ADL's. She has 2 daughters and a son who assist with household chores and transportation. PCP and pharmacy confirmed- agreeable to use [facility]. Pt does report cost issues for her Trelegy inhaler. DME at home includes cane, rolling walker, nebulizer, and 2L O2 PRN supplied by [employer] [facility]. Pt denies current HHC/PT services but does think she will need vestibular rehab at [place]. One of her children will transport at [place].              Demographic Summary       Row Name 12/11/24 1212       General Information    Admission Type observation    Arrived From emergency department    Referral Source admission list    Reason for Consult care coordination/care conference;discharge planning    Preferred Language English       Contact Information    Permission Granted to Share Info With                    Functional Status       Row Name 12/11/24 1213       Functional Status    Usual Activity Tolerance moderate    Current Activity Tolerance moderate       Functional Status, IADL    Medications assistive person    Meal Preparation assistive person    Housekeeping assistive person    Laundry assistive person    Shopping assistive person    If for any reason you need help with day-to-day activities such as bathing, preparing meals, shopping,  managing finances, etc., do you get the help you need? I get all the help I need                  Megan Naegele, RN     Office Phone: [phone]  Office Cell: [phone]

## 2024-12-11 NOTE — H&P
Patient Care Team:  Janes Rosario NP as PCP - General (Family Medicine)  Kyrie Campoverde MD as Consulting Physician (Cardiology)  Jasper Mcneill MD as Consulting Physician (General Surgery)  Sung Tamayo MD (Vascular Surgery)    Chief complaint generalized weakness, dizziness    Subjective     Patient is a 84 y.o. female with a history of Panlobular emphysema, HTN, dyslipidemia, s/p left fem-fem bypass graft who presents with generalized weakness x 2 weeks along with dizziness which has been present for years. . Patient was positive for COVID-19 two weeks ago and remains positive this admission. Daughter is present at bedside and reports that she has just been getting weaker. Patient lives alone but has nearby family support.  Patient had presented to the ER on 12/9 and discharged home after a 500cc bolus.  Patient wears 2L NC as needed at home at baseline. She reports a decreased appetite.  She denies Nausea, vomiting, diarrhea.   Patient has had complaints of dizziness for an extended time. Reports it is worse with fast head movements and position changes.  States that it feels like the room is spinning.  She has never done any vestibular therapy for this.      Review of Systems   Constitutional:  Positive for activity change, appetite change and fatigue. Negative for chills and fever.   HENT:  Positive for congestion.    Respiratory:  Positive for shortness of breath and wheezing.    Cardiovascular:  Negative for chest pain and leg swelling.   Gastrointestinal:  Negative for abdominal distention, diarrhea and nausea.   Genitourinary:  Negative for difficulty urinating and urgency.   Musculoskeletal:  Positive for arthralgias.   Skin:  Negative for color change.   Neurological:  Positive for dizziness and weakness.   Psychiatric/Behavioral:  Negative for confusion.           History  Past Medical History:   Diagnosis Date    Aortic aneurysm     COPD (chronic obstructive pulmonary disease)      Hernia, inguinal, left     Hyperlipidemia     Hypertension     Macular degeneration     Peripheral edema     PVD (peripheral vascular disease)      Past Surgical History:   Procedure Laterality Date    CARPAL TUNNEL RELEASE      DESCENDING AORTIC ANEURYSM REPAIR W/ STENT      HIP BIPOLAR REPLACEMENT Left 5/5/2023    Procedure: HIP BIPOLAR CEMENTED;  Surgeon: Guanako Harmon MD;  Location: [facility] MAIN OR;  Service: Orthopedics;  Laterality: Left;    INGUINAL HERNIA REPAIR Bilateral 11/16/2022    Procedure: INGUINAL HERNIA REPAIR LAPAROSCOPIC WITH DAVINCI ROBOT;  Surgeon: Jasper Mcneill MD;  Location: [facility] MAIN OR;  Service: Robotics - DaVinci;  Laterality: Bilateral;    INTERVENTIONAL RADIOLOGY PROCEDURE N/A 07/28/2021    Procedure: Abdominal Aortagram with Runoff, possible PCI;  Surgeon: Kyrie Campoverde MD;  Location: [facility] CATH INVASIVE LOCATION;  Service: Cardiovascular;  Laterality: N/A;    KNEE SURGERY  2020    OTHER SURGICAL HISTORY      STENT    VASCULAR SURGERY      fem fem bypass     Family History   Problem Relation Age of Onset    Cancer Mother     Aneurysm Father     No Known Problems Sister     No Known Problems Brother     No Known Problems Maternal Aunt     No Known Problems Maternal Uncle     No Known Problems Paternal Aunt     No Known Problems Paternal Uncle     No Known Problems Maternal Grandmother     No Known Problems Maternal Grandfather     No Known Problems Paternal Grandmother     No Known Problems Paternal Grandfather     Cancer Other     Diabetes Other     Anemia Neg Hx     Arrhythmia Neg Hx     Asthma Neg Hx     Clotting disorder Neg Hx     Fainting Neg Hx     Heart attack Neg Hx     Heart disease Neg Hx     Heart failure Neg Hx     Hyperlipidemia Neg Hx     Hypertension Neg Hx      Social History     Tobacco Use    Smoking status: Former     Current packs/day: 0.00     Average packs/day: 1 pack/day for 30.0 years (30.0 ttl pk-yrs)     Types: Cigarettes     Start date: 1980      Quit date:      Years since quittin.9     Passive exposure: Past    Smokeless tobacco: Never   Vaping Use    Vaping status: Never Used   Substance Use Topics    Alcohol use: No    Drug use: Never     (Not in a hospital admission)    Allergies:  Patient has no known allergies.    Objective     Vital Signs  Temp:  [98.3 °F (36.8 °C)] 98.3 °F (36.8 °C)  Heart Rate:  [70] 70  Resp:  [18] 18  BP: (148)/(84) 148/84     Physical Exam:      General Appearance:    Alert, cooperative, in no acute distress   Head:    Normocephalic, without obvious abnormality, atraumatic   Eyes:            Lids and lashes normal, conjunctivae and sclerae normal, no   icterus, no pallor, corneas clear, PERRLA   Ears:    Ears appear intact with no abnormalities noted   Throat:   No oral lesions, no thrush, oral mucosa dry   Neck:   No adenopathy, supple, trachea midline, no thyromegaly, no   carotid bruit, no JVD   Lungs:     Bases diminshed, upper lobes occasional expiratory wheeze, respirations regular, even and unlabored.  On Room air    Heart:    Regular rhythm and normal rate, normal S1 and S2, no            murmur, no gallop, no rub, no click   Chest Wall:    No abnormalities observed   Abdomen:     Normal bowel sounds, no masses, no organomegaly, soft        non-tender, non-distended, no guarding, no rebound                tenderness   Extremities:   Moves all extremities well, no edema, no cyanosis, no             redness   Pulses:   Pulses palpable and equal bilaterally   Skin:   No bleeding, bruising or rash   Lymph nodes:   No palpable adenopathy   Neurologic:   Cranial nerves 2 - 12 grossly intact, sensation intact,        Results Review:     Imaging Results (Last 24 Hours)       Procedure Component Value Units Date/Time    XR Chest 1 View [[id number]] Collected: 12/10/24 2139     Updated: 12/10/24 2142    Narrative:      XR CHEST 1 VW    Date of Exam: 12/10/2024 9:30 PM EST    Indication: productive cough, Covid  positive    Comparison: 12/9/2024    Findings:  Cardiomediastinal silhouette is enlarged, similar to prior examination. There is moderate generalized interstitial prominence/scarring. No airspace disease, pneumothorax, nor pleural effusion. No acute osseous abnormality identified.      Impression:      Impression:  1.Stable chronic findings without acute process identified.      Electronically Signed: Rudi Araiza MD    12/10/2024 9:40 PM EST    Workstation ID: [id number]             Lab Results (last 24 hours)       Procedure Component Value Units Date/Time    High Sensitivity Troponin T 1Hr [[id number]]  (Normal) Collected: 12/10/24 2221    Specimen: Blood Updated: 12/10/24 2248     HS Troponin T 11 ng/L      Troponin T Delta -1 ng/L     Narrative:      High Sensitive Troponin T Reference Range:  <14.0 ng/L- Negative Female for AMI  <22.0 ng/L- Negative Male for AMI  >=14 - Abnormal Female indicating possible myocardial injury.  >=22 - Abnormal Male indicating possible myocardial injury.   Clinicians would have to utilize clinical acumen, EKG, Troponin, and serial changes to determine if it is an Acute Myocardial Infarction or myocardial injury due to an underlying chronic condition.         Respiratory Panel PCR w/COVID-19(SARS-CoV-2) [facility] In-House, NP Swab in UTM/VTM, 2 HR TAT - Swab, Nasopharynx [[id number]]  (Abnormal) Collected: 12/10/24 1758    Specimen: Swab from Nasopharynx Updated: 12/10/24 2026     ADENOVIRUS, PCR Not Detected     Coronavirus 229E Not Detected     Coronavirus HKU1 Not Detected     Coronavirus NL63 Not Detected     Coronavirus OC43 Not Detected     COVID19 Detected     Human Metapneumovirus Not Detected     Human Rhinovirus/Enterovirus Not Detected     Influenza A PCR Not Detected     Influenza B PCR Not Detected     Parainfluenza Virus 1 Not Detected     Parainfluenza Virus 2 Not Detected     Parainfluenza Virus 3 Not Detected     Parainfluenza Virus 4 Not Detected      RSV, PCR Not Detected     Bordetella pertussis pcr Not Detected     Bordetella parapertussis PCR Not Detected     Chlamydophila pneumoniae PCR Not Detected     Mycoplasma pneumo by PCR Not Detected    Narrative:      In the setting of a positive respiratory panel with a viral infection PLUS a negative procalcitonin without other underlying concern for bacterial infection, consider observing off antibiotics or discontinuation of antibiotics and continue supportive care. If the respiratory panel is positive for atypical bacterial infection (Bordetella pertussis, Chlamydophila pneumoniae, or Mycoplasma pneumoniae), consider antibiotic de-escalation to target atypical bacterial infection.    Comprehensive Metabolic Panel [[id number]]  (Abnormal) Collected: 12/10/24 1934    Specimen: Blood Updated: 12/10/24 2003     Glucose 102 mg/dL      BUN 22 mg/dL      Creatinine 0.57 mg/dL      Sodium 139 mmol/L      Potassium 4.1 mmol/L      Chloride 100 mmol/L      CO2 28.2 mmol/L      Calcium 9.4 mg/dL      Total Protein 7.0 g/dL      Albumin 4.0 g/dL      ALT (SGPT) 17 U/L      AST (SGOT) 25 U/L      Alkaline Phosphatase 60 U/L      Total Bilirubin 0.5 mg/dL      Globulin 3.0 gm/dL      A/G Ratio 1.3 g/dL      BUN/Creatinine Ratio 38.6     Anion Gap 10.8 mmol/L      eGFR 89.2 mL/min/1.73     Narrative:      GFR Categories in Chronic Kidney Disease (CKD)      GFR Category          GFR (mL/min/1.73)    Interpretation  G1                     90 or greater         Normal or high (1)  G2                      60-89                Mild decrease (1)  G3a                   45-59                Mild to moderate decrease  G3b                   30-44                Moderate to severe decrease  G4                    15-29                Severe decrease  G5                    14 or less           Kidney failure          (1)In the absence of evidence of kidney disease, neither GFR category G1 or G2 fulfill the criteria for CKD.    eGFR  calculation 2021 CKD-EPI creatinine equation, which does not include race as a factor    High Sensitivity Troponin T [[id number]]  (Normal) Collected: 12/10/24 1934    Specimen: Blood Updated: 12/10/24 2003     HS Troponin T 12 ng/L     Narrative:      High Sensitive Troponin T Reference Range:  <14.0 ng/L- Negative Female for AMI  <22.0 ng/L- Negative Male for AMI  >=14 - Abnormal Female indicating possible myocardial injury.  >=22 - Abnormal Male indicating possible myocardial injury.   Clinicians would have to utilize clinical acumen, EKG, Troponin, and serial changes to determine if it is an Acute Myocardial Infarction or myocardial injury due to an underlying chronic condition.         BNP [[id number]]  (Normal) Collected: 12/10/24 1934    Specimen: Blood Updated: 12/10/24 2003     proBNP 463.0 pg/mL     Narrative:      This assay is used as an aid in the diagnosis of individuals suspected of having heart failure. It can be used as an aid in the diagnosis of acute decompensated heart failure (ADHF) in patients presenting with signs and symptoms of ADHF to the emergency department (ED). In addition, NT-proBNP of <300 pg/mL indicates ADHF is not likely.    Age Range Result Interpretation  NT-proBNP Concentration (pg/mL:      <50             Positive            >450                   Gray                 300-450                    Negative             <300    50-75           Positive            >900                  Gray                300-900                  Negative            <300      >75             Positive            >1800                  Gray                300-1800                  Negative            <300    CBC & Differential [[id number]]  (Abnormal) Collected: 12/10/24 1934    Specimen: Blood Updated: 12/10/24 1941    Narrative:      The following orders were created for panel order CBC & Differential.  Procedure                               Abnormality         Status                     ---------                                -----------         ------                     CBC Auto Differential[[id number]]        Abnormal            Final result                 Please view results for these tests on the individual orders.    CBC Auto Differential [[id number]]  (Abnormal) Collected: 12/10/24 1934    Specimen: Blood Updated: 12/10/24 1941     WBC 12.73 10*3/mm3      RBC 5.10 10*6/mm3      Hemoglobin 16.0 g/dL      Hematocrit 50.4 %      MCV 98.8 fL      MCH 31.4 pg      MCHC 31.7 g/dL      RDW 12.7 %      RDW-SD 46.7 fl      MPV 10.0 fL      Platelets 294 10*3/mm3      Neutrophil % 74.1 %      Lymphocyte % 15.4 %      Monocyte % 7.6 %      Eosinophil % 1.2 %      Basophil % 0.1 %      Immature Grans % 1.6 %      Neutrophils, Absolute 9.44 10*3/mm3      Lymphocytes, Absolute 1.96 10*3/mm3      Monocytes, Absolute 0.97 10*3/mm3      Eosinophils, Absolute 0.15 10*3/mm3      Basophils, Absolute 0.01 10*3/mm3      Immature Grans, Absolute 0.20 10*3/mm3      nRBC 0.0 /100 WBC     COVID-19, FLU A/B, RSV PCR 1 HR TAT - Swab, Nasopharynx [[id number]]  (Abnormal) Collected: 12/10/24 1758    Specimen: Swab from Nasopharynx Updated: 12/10/24 1849     COVID19 Detected     Influenza A PCR Not Detected     Influenza B PCR Not Detected     RSV, PCR Not Detected    Narrative:      Fact sheet for providers: https://www.fda.gov/media/269174/download    Fact sheet for patients: https://www.fda.gov/media/227133/download    Test performed by PCR.             I reviewed the patient's new clinical results.    Assessment & Plan     COVID 19 infection   -Generalized weakness due to infection and decreased oral intake.   -IV fluids x 12 hours.  Patient appears slightly dry.  Hold HCTZ and lasix.    Dizziness   -suspect vertigo   -PT consult- patient agreeable to vestibular therapy if needed.   -check orthostatic BP   -Last echo 7/2023 showed EF 60-65%   -Carotid ultrasound 10/23/2024 showeing bilateral tortuous carotid arteries with significant  plaque, but no evidences of significant stenosis (50%)  and bilateral vertebral arteries demonstrate antegrade flow   -continue Meclizine prn.    Leukocytosis   -Suspect due to recent steroids.   -CXR clear   -Urine with no indications for infection at this time    VTE: Lovenox  PPI: protonix    Discussed with patient and daughter that if oxygenation and labs remain stable she may be ready for DC on 12/11/2024.  Patient and daughter in agreement with this plan.    I discussed the patient's findings and my recommendations with patient.     Octavia Barrow, APRN  12/11/24  00:32 EST

## 2024-12-11 NOTE — PLAN OF CARE
Goal Outcome Evaluation:  Plan of Care Reviewed With: patient   Pt orthostatics positive, reports dizziness sitting up from bed and standing from sitting, presents unsteadiness on initial stand. Fluid bolus ordered.     Progress: no change

## 2024-12-11 NOTE — PLAN OF CARE
Goal Outcome Evaluation:  Plan of Care Reviewed With: patient           Vestibular Exam    Smooth pursuit: normal  Spontaneous nystagmus: not present  Gaze holding nystagmus: not present  Saccades: not present   Convergence/divergence: normal  VOR slow: normal  Head thrust test: not tested  Head shaking nystagmus test: not tested  Autumn hallpike for posterior canal: (+) for R PC BPPV  Roll test for horizontal canal: not tested  Orthostatic vitals: (+)  Standing balance: moderate impairment  Gait: requiring mod A    83 y/o F who presented with dizziness and weakness. Has a recent history of covid-19. Patient lives at home alone. She is mod I with mobility and ADLs, using a STC. Patient has been experiencing months of intermittent dizziness that she describes as whirling. She is hard of hearing which complicates communication. She was found to be (+) for posterior canal BPPV canalithiasis on R. Treated with modified Epley x 2 with apparent resolution. Patient with noted balance impairment and requiring increased assist- mod A for bed mobility, transfers and gait. Would benefit from acute rehab to continue vestibular treatment if needed and address acute decline from independent baseline.        Anticipated Discharge Disposition (PT): skilled nursing facility

## 2024-12-12 ENCOUNTER — READMISSION MANAGEMENT (OUTPATIENT)
Dept: CALL CENTER | Facility: HOSPITAL | Age: 84
End: 2024-12-12
Payer: MEDICARE

## 2024-12-12 VITALS
OXYGEN SATURATION: 94 % | BODY MASS INDEX: 25.67 KG/M2 | SYSTOLIC BLOOD PRESSURE: 116 MMHG | TEMPERATURE: 97.9 F | WEIGHT: 150.35 LBS | HEIGHT: 64 IN | DIASTOLIC BLOOD PRESSURE: 55 MMHG | HEART RATE: 75 BPM | RESPIRATION RATE: 16 BRPM

## 2024-12-12 LAB
ANION GAP SERPL CALCULATED.3IONS-SCNC: 7.6 MMOL/L (ref 5–15)
BASOPHILS # BLD AUTO: 0.02 10*3/MM3 (ref 0–0.2)
BASOPHILS NFR BLD AUTO: 0.2 % (ref 0–1.5)
BUN SERPL-MCNC: 11 MG/DL (ref 8–23)
BUN/CREAT SERPL: 25 (ref 7–25)
CALCIUM SPEC-SCNC: 8.3 MG/DL (ref 8.6–10.5)
CHLORIDE SERPL-SCNC: 109 MMOL/L (ref 98–107)
CO2 SERPL-SCNC: 22.4 MMOL/L (ref 22–29)
CREAT SERPL-MCNC: 0.44 MG/DL (ref 0.57–1)
DEPRECATED RDW RBC AUTO: 52 FL (ref 37–54)
EGFRCR SERPLBLD CKD-EPI 2021: 94.9 ML/MIN/1.73
EOSINOPHIL # BLD AUTO: 0.24 10*3/MM3 (ref 0–0.4)
EOSINOPHIL NFR BLD AUTO: 2.6 % (ref 0.3–6.2)
ERYTHROCYTE [DISTWIDTH] IN BLOOD BY AUTOMATED COUNT: 13 % (ref 12.3–15.4)
GLUCOSE SERPL-MCNC: 89 MG/DL (ref 65–99)
HCT VFR BLD AUTO: 44.2 % (ref 34–46.6)
HGB BLD-MCNC: 13 G/DL (ref 12–15.9)
IMM GRANULOCYTES # BLD AUTO: 0.12 10*3/MM3 (ref 0–0.05)
IMM GRANULOCYTES NFR BLD AUTO: 1.3 % (ref 0–0.5)
LYMPHOCYTES # BLD AUTO: 1.6 10*3/MM3 (ref 0.7–3.1)
LYMPHOCYTES NFR BLD AUTO: 17.2 % (ref 19.6–45.3)
MCH RBC QN AUTO: 31.6 PG (ref 26.6–33)
MCHC RBC AUTO-ENTMCNC: 29.4 G/DL (ref 31.5–35.7)
MCV RBC AUTO: 107.3 FL (ref 79–97)
MONOCYTES # BLD AUTO: 0.99 10*3/MM3 (ref 0.1–0.9)
MONOCYTES NFR BLD AUTO: 10.6 % (ref 5–12)
NEUTROPHILS NFR BLD AUTO: 6.34 10*3/MM3 (ref 1.7–7)
NEUTROPHILS NFR BLD AUTO: 68.1 % (ref 42.7–76)
NRBC BLD AUTO-RTO: 0 /100 WBC (ref 0–0.2)
PLATELET # BLD AUTO: 198 10*3/MM3 (ref 140–450)
PMV BLD AUTO: 10.1 FL (ref 6–12)
POTASSIUM SERPL-SCNC: 4.6 MMOL/L (ref 3.5–5.2)
RBC # BLD AUTO: 4.12 10*6/MM3 (ref 3.77–5.28)
SODIUM SERPL-SCNC: 139 MMOL/L (ref 136–145)
WBC NRBC COR # BLD AUTO: 9.31 10*3/MM3 (ref 3.4–10.8)

## 2024-12-12 PROCEDURE — 97166 OT EVAL MOD COMPLEX 45 MIN: CPT

## 2024-12-12 PROCEDURE — 85025 COMPLETE CBC W/AUTO DIFF WBC: CPT

## 2024-12-12 PROCEDURE — 97116 GAIT TRAINING THERAPY: CPT

## 2024-12-12 PROCEDURE — G0378 HOSPITAL OBSERVATION PER HR: HCPCS

## 2024-12-12 PROCEDURE — 80048 BASIC METABOLIC PNL TOTAL CA: CPT

## 2024-12-12 PROCEDURE — 97530 THERAPEUTIC ACTIVITIES: CPT

## 2024-12-12 RX ORDER — ASPIRIN 81 MG/1
81 TABLET ORAL DAILY
Start: 2024-12-13

## 2024-12-12 RX ADMIN — PANTOPRAZOLE SODIUM 40 MG: 40 TABLET, DELAYED RELEASE ORAL at 05:56

## 2024-12-12 RX ADMIN — OXYBUTYNIN CHLORIDE 10 MG: 10 TABLET, EXTENDED RELEASE ORAL at 09:43

## 2024-12-12 RX ADMIN — ROSUVASTATIN 10 MG: 10 TABLET, FILM COATED ORAL at 09:43

## 2024-12-12 RX ADMIN — CLOPIDOGREL BISULFATE 75 MG: 75 TABLET ORAL at 09:43

## 2024-12-12 RX ADMIN — ASPIRIN 81 MG: 81 TABLET, COATED ORAL at 09:43

## 2024-12-12 RX ADMIN — BISOPROLOL FUMARATE 5 MG: 5 TABLET ORAL at 09:43

## 2024-12-12 RX ADMIN — Medication 10 ML: at 09:45

## 2024-12-12 RX ADMIN — AMLODIPINE BESYLATE 10 MG: 5 TABLET ORAL at 09:43

## 2024-12-12 RX ADMIN — TAMSULOSIN HYDROCHLORIDE 0.4 MG: 0.4 CAPSULE ORAL at 09:43

## 2024-12-12 NOTE — PLAN OF CARE
Goal Outcome Evaluation:  Plan of Care Reviewed With: patient      Becka Oliva is an 83 y/o F who presented with dizziness and weakness. Has a recent history of covid-19. Was treated for BPPV yesterday which resolved and pt has had significant functional mobility improvement. Patient is now safe to d/c home with home health as she Is near her baseline, ambulating with SBA and STC, no LOB. Will continue to follow and progress as tolerated.           Anticipated Discharge Disposition (PT): inpatient rehabilitation facility

## 2024-12-12 NOTE — PLAN OF CARE
Goal Outcome Evaluation:  Plan of Care Reviewed With: patient           Outcome Evaluation: 85 y/o F who presented with dizziness and weakness. Also positive for covid-19. PMH significant for Panlobular emphysema, HTN, dyslipidemia, s/p left fem-fem bypass graft. Patient lives at home alone. She is mod I with mobility and ADLs, using a hurrycane. This date, pt appears to have improved significantly. CGA provided for bed mobility, and Min A to stand and transfer to commode. Toileting completed with SBA/CGA. On standing from commode, pt completed with CGA and ambulated around bed with CGA. With improved function this date, OT feels pt safe to dc home with assist and home health therapy. Will continue to follow while at [facility].    Anticipated Discharge Disposition (OT): home with assist, home with home health

## 2024-12-12 NOTE — THERAPY EVALUATION
Patient Name: Becka Oliva  : 1940    MRN: [id number]                              Today's Date: 2024       Admit Date: 12/10/2024    Visit Dx:     ICD-10-CM ICD-9-CM   1. Weakness  R53.1 780.79   2. Dizziness  R42 780.4   3. COVID-19  U07.1 079.89     Patient Active Problem List   Diagnosis    Atherosclerotic peripheral vascular disease    Benign hypertensive heart disease    Chronic kidney disease    COPD with hypoxia    Dyspnea on exertion    History of aortic aneurysm    Hypertension    Pericardial effusion    Primary localized osteoarthritis    Shortness of breath    Acute UTI    Pyelonephritis    Generalized weakness    PVD (peripheral vascular disease) with claudication    Dyslipidemia    Essential hypertension    PAD (peripheral artery disease)    Left inguinal hernia    Dyspnea, unspecified type    COPD with acute exacerbation    Closed fracture of left hip, initial encounter    Bilateral lower extremity edema    COVID-19     Past Medical History:   Diagnosis Date    Aortic aneurysm     COPD (chronic obstructive pulmonary disease)     Hernia, inguinal, left     Hyperlipidemia     Hypertension     Macular degeneration     Peripheral edema     PVD (peripheral vascular disease)      Past Surgical History:   Procedure Laterality Date    CARPAL TUNNEL RELEASE      DESCENDING AORTIC ANEURYSM REPAIR W/ STENT      HIP BIPOLAR REPLACEMENT Left 2023    Procedure: HIP BIPOLAR CEMENTED;  Surgeon: Guanako Harmon MD;  Location: [facility] MAIN OR;  Service: Orthopedics;  Laterality: Left;    INGUINAL HERNIA REPAIR Bilateral 2022    Procedure: INGUINAL HERNIA REPAIR LAPAROSCOPIC WITH DAVINCI ROBOT;  Surgeon: Jasper Mcneill MD;  Location: [facility] MAIN OR;  Service: Robotics - DaVinci;  Laterality: Bilateral;    INTERVENTIONAL RADIOLOGY PROCEDURE N/A 2021    Procedure: Abdominal Aortagram with Runoff, possible PCI;  Surgeon: Kyrie Campoverde MD;  Location: [facility] CATH INVASIVE LOCATION;   Service: Cardiovascular;  Laterality: N/A;    KNEE SURGERY  2020    OTHER SURGICAL HISTORY      STENT    VASCULAR SURGERY      fem fem bypass      General Information       Row Name 12/12/24 1611          OT Time and Intention    Document Type evaluation  -LS     Mode of Treatment occupational therapy  -LS     Patient Effort good  -       Row Name 12/12/24 1611          General Information    Patient Profile Reviewed yes  -LS     Prior Level of Function independent:;ADL's;all household mobility;community mobility  -     Barriers to Rehab hearing deficit  -       Row Name 12/12/24 1611          Living Environment    People in Home alone  -       Row Name 12/12/24 1611          Home Main Entrance    Number of Stairs, Main Entrance none  -LS       Row Name 12/12/24 1611          Stairs Within Home, Primary    Number of Stairs, Within Home, Primary none  -LS       Row Name 12/12/24 1611          Cognition    Orientation Status (Cognition) oriented to;person;place;situation;time  -               User Key  (r) = Recorded By, (t) = Taken By, (c) = Cosigned By      Initials Name Provider Type    LS Michael Collado, YECENIA Occupational Therapist                     Mobility/ADL's       Row Name 12/12/24 1619          Bed Mobility    Bed Mobility bed mobility (all) activities  -     All Activities, [place] (Bed Mobility) standby assist  -       Row Name 12/12/24 1619          Transfers    Transfers sit-stand transfer;bed-chair transfer  -       Row Name 12/12/24 1619          Bed-Chair Transfer    Bed-Chair [place] (Transfers) minimum assist (75% patient effort)  -       Row Name 12/12/24 1619          Sit-Stand Transfer    Sit-Stand [place] (Transfers) minimum assist (75% patient effort)  -       Row Name 12/12/24 1619          Functional Mobility    Functional Mobility- Ind. Level minimum assist (75% patient effort)  -     Patient was able to Ambulate yes  -       Row Name 12/12/24 1619           Activities of Daily Living    BADL Assessment/Intervention toileting;lower body dressing  -LS       Row Name 12/12/24 1619          Toileting Assessment/Training    [place] Level (Toileting) toileting skills;contact guard assist  -LS       [facility] Name 12/12/24 1619          Lower Body Dressing Assessment/Training    [place] Level (Lower Body Dressing) lower body dressing skills;contact guard assist  -LS               User Key  (r) = Recorded By, (t) = Taken By, (c) = Cosigned By      Initials Name Provider Type     Michael Collado OT Occupational Therapist                   Obj/Interventions       Row Name 12/12/24 1623          Sensory Assessment (Somatosensory)    Sensory Assessment (Somatosensory) sensation intact  -[facility] Name 12/12/24 1623          Range of Motion Comprehensive    General Range of Motion no range of motion deficits identified  -LS       Row Name 12/12/24 1623          Strength Comprehensive (MMT)    General Manual Muscle Testing (MMT) Assessment no strength deficits identified  -LS       Row Name 12/12/24 1623          Balance    Balance Assessment sitting static balance;sitting dynamic balance;standing static balance;standing dynamic balance  -LS     Static Sitting Balance independent  -LS     Dynamic Sitting Balance independent  -LS     Position, Sitting Balance unsupported;sitting edge of bed  -LS     Static Standing Balance contact guard  -LS     Dynamic Standing Balance minimal assist  -LS     Position/Device Used, Standing Balance unsupported  -LS               User Key  (r) = Recorded By, (t) = Taken By, (c) = Cosigned By      Initials Name Provider Type    Michael Anderson OT Occupational Therapist                   Goals/Plan       Row Name 12/12/24 1629          Bed Mobility Goal 1 (OT)    Activity/Assistive Device (Bed Mobility Goal 1, OT) bed mobility activities, all  -LS     [place] Level/Cues Needed (Bed Mobility Goal 1, OT) modified independence  -LS      Time Frame (Bed Mobility Goal 1, OT) long term goal (LTG);2 weeks  -LS       Row Name 12/12/24 1629          Transfer Goal 1 (OT)    Activity/Assistive Device (Transfer Goal 1, OT) transfers, all  -LS     [place] Level/Cues Needed (Transfer Goal 1, OT) modified independence  -LS     Time Frame (Transfer Goal 1, OT) long term goal (LTG);2 weeks  -LS       Row Name 12/12/24 1629          Dressing Goal 1 (OT)    Activity/Device (Dressing Goal 1, OT) dressing skills, all  -LS     [place]/Cues Needed (Dressing Goal 1, OT) modified independence  -LS     Time Frame (Dressing Goal 1, OT) long term goal (LTG);2 weeks  -LS       Row Name 12/12/24 1629          Toileting Goal 1 (OT)    Activity/Device (Toileting Goal 1, OT) toileting skills, all  -LS     [place] Level/Cues Needed (Toileting Goal 1, OT) modified independence  -LS     Time Frame (Toileting Goal 1, OT) long term goal (LTG);2 weeks  -       Row Name 12/12/24 1624          Therapy Assessment/Plan (OT)    Planned Therapy Interventions (OT) activity tolerance training;BADL retraining;functional balance retraining;IADL retraining;occupation/activity based interventions;ROM/therapeutic exercise;strengthening exercise;transfer/mobility retraining;patient/caregiver education/training  -               User Key  (r) = Recorded By, (t) = Taken By, (c) = Cosigned By      Initials Name Provider Type    Michael Anderson OT Occupational Therapist                   Clinical Impression       Row Name 12/12/24 1625          Pain Assessment    Pretreatment Pain Rating 0/10 - no pain  -LS     Posttreatment Pain Rating 0/10 - no pain  -LS       Row Name 12/12/24 1623          Plan of Care Review    Plan of Care Reviewed With patient  -     Outcome Evaluation 85 y/o F who presented with dizziness and weakness. Also positive for covid-19. PMH significant for Panlobular emphysema, HTN, dyslipidemia, s/p left fem-fem bypass graft. Patient lives at home alone. She is  mod I with mobility and ADLs, using a hurrycane. This date, pt appears to have improved significantly. CGA provided for bed mobility, and Min A to stand and transfer to commode. Toileting completed with SBA/CGA. On standing from commode, pt completed with CGA and ambulated around bed with CGA. With improved function this date, OT feels pt safe to dc home with assist and home health therapy. Will continue to follow while at [facility].  -       Row Name 12/12/24 1627          Therapy Assessment/Plan (OT)    Rehab Potential (OT) good  -LS     Criteria for Skilled Therapeutic Interventions Met (OT) yes;skilled treatment is necessary  -     Therapy Frequency (OT) 3 times/wk  -LS     Predicted Duration of Therapy Intervention (OT) until dc  -       Row Name 12/12/24 1629          Therapy Plan Review/Discharge Plan (OT)    Anticipated Discharge Disposition (OT) home with assist;home with home health  -       Row Name 12/12/24 1624          Vital Signs    O2 Delivery Pre Treatment room air  -LS     O2 Delivery Intra Treatment room air  -LS     O2 Delivery Post Treatment room air  -LS     Pre Patient Position Supine  -LS     Intra Patient Position Standing  -LS     Post Patient Position Supine  -LS       Row Name 12/12/24 1626          Positioning and Restraints    Pre-Treatment Position in bed  -LS     Post Treatment Position bed  -LS     In Bed notified nsg;fowlers;call light within reach;encouraged to call for assist;exit alarm on  -LS               User Key  (r) = Recorded By, (t) = Taken By, (c) = Cosigned By      Initials Name Provider Type    LS Michael Collado OT Occupational Therapist                   Outcome Measures       Row Name 12/12/24 4935          How much help from another is currently needed...    Putting on and taking off regular lower body clothing? 3  -LS     Bathing (including washing, rinsing, and drying) 3  -LS     Toileting (which includes using toilet bed pan or urinal) 3  -LS     Putting on and  taking off regular upper body clothing 4  -LS     Taking care of personal grooming (such as brushing teeth) 4  -LS     Eating meals 4  -LS     AM-PAC 6 Clicks Score (OT) 21  -LS       Row Name 12/12/24 0855          How much help from another person do you currently need...    Turning from your back to your side while in flat bed without using bedrails? 4  -SS     Moving from lying on back to sitting on the side of a flat bed without bedrails? 4  -SS     Moving to and from a bed to a chair (including a wheelchair)? 4  -SS     Standing up from a chair using your arms (e.g., wheelchair, bedside chair)? 4  -SS     Climbing 3-5 steps with a railing? 3  -SS     To walk in hospital room? 3  -SS     AM-PAC 6 Clicks Score (PT) 22  -       Row Name 12/12/24 1629          Functional Assessment    Outcome Measure Options AM-PAC 6 Clicks Daily Activity (OT)  -               User Key  (r) = Recorded By, (t) = Taken By, (c) = Cosigned By      Initials Name Provider Type    Michael Anderson OT Occupational Therapist     Lizzette Purvis LPN Licensed Nurse                    Occupational Therapy Education       Title: PT OT SLP Therapies (Resolved)       Topic: Occupational Therapy (Resolved)       Point: ADL training (Resolved)       Description:   Instruct learner(s) on proper safety adaptation and remediation techniques during self care or transfers.   Instruct in proper use of assistive devices.                  Learning Progress Summary            Patient Acceptance, E,TB, VU by LS at 12/12/2024 1629                      Point: Precautions (Resolved)       Description:   Instruct learner(s) on prescribed precautions during self-care and functional transfers.                  Learning Progress Summary            Patient Acceptance, E,TB, VU by  at 12/12/2024 1629                      Point: Body mechanics (Resolved)       Description:   Instruct learner(s) on proper positioning and spine alignment during self-care,  functional mobility activities and/or exercises.                  Learning Progress Summary            Patient Acceptance, E,TB, VU by  at 12/12/2024 1629                                      User Key       Initials Effective Dates Name Provider Type Discipline     09/22/22 -  Michael Collado OT Occupational Therapist OT                  OT Recommendation and Plan  Planned Therapy Interventions (OT): activity tolerance training, BADL retraining, functional balance retraining, IADL retraining, occupation/activity based interventions, ROM/therapeutic exercise, strengthening exercise, transfer/mobility retraining, patient/caregiver education/training  Therapy Frequency (OT): 3 times/wk  Plan of Care Review  Plan of Care Reviewed With: patient  Outcome Evaluation: 85 y/o F who presented with dizziness and weakness. Also positive for covid-19. PMH significant for Panlobular emphysema, HTN, dyslipidemia, s/p left fem-fem bypass graft. Patient lives at home alone. She is mod I with mobility and ADLs, using a hurrycane. This date, pt appears to have improved significantly. CGA provided for bed mobility, and Min A to stand and transfer to commode. Toileting completed with SBA/CGA. On standing from commode, pt completed with CGA and ambulated around bed with CGA. With improved function this date, OT feels pt safe to dc home with assist and home health therapy. Will continue to follow while at [facility].     Time Calculation:         Time Calculation- OT       Row Name 12/12/24 1630             Time Calculation- OT    OT Start Time 0829  -      OT Stop Time 0851  -      OT Time Calculation (min) 22 min  -LS      OT Received On 12/12/24  -      OT - Next Appointment 12/16/24  -      OT Goal Re-Cert Due Date 12/26/24  -         Untimed Charges    OT Eval/Re-eval Minutes 22  -LS         Total Minutes    Untimed Charges Total Minutes 22  -LS       Total Minutes 22  -LS                User Key  (r) = Recorded By, (t) = Taken  By, (c) = Cosigned By      Initials Name Provider Type     Michael Collado OT Occupational Therapist                  Therapy Charges for Today       Code Description Service Date Service Provider Modifiers Qty    67140153651 HC OT EVAL MOD COMPLEXITY 4 12/12/2024 Michael Collado OT GO 1                 Michael Collado OT  12/12/2024

## 2024-12-12 NOTE — PLAN OF CARE
Goal Outcome Evaluation:  Plan of Care Reviewed With: patient        Progress: no change  Outcome Evaluation: pt has slept on and off throughout the night, no new complaints at this time, covid positive, pt remains on 2lnc, no complaints of dizziness at this time, pt reminded to turn throughout the night, possible snf placement at d/c

## 2024-12-12 NOTE — CONSULTS
Spiritual care consult. Pt in room alone and welcomed . She was hoping to go home today and initially stated that she had no needs at this time. She engaged in small talk about [employer] and began to speak about how she missed going to her [employer], and that her  had been in touch with her. She spoke of various preachers and asked if  knew any of them. She is Covid+ and a little hard of hearing and the mask hindered her hearing a bit more than usual. She accepted healing prayer and was appreciative of the visit. There were no other needs after the prayer.

## 2024-12-12 NOTE — DISCHARGE PLACEMENT REQUEST
"Becka Dos Santos (84 y.o. Female)       Date of Birth   1940    Social Security Number       Address   [address] [place] [address] [place] [address]    Home Phone   [phone]    MRN   [id number]       Protestant   Anabaptism    Marital Status                               Admission Date   12/10/24    Admission Type   Emergency    Admitting Provider   Ingrid Prescott MD    Attending Provider   Ingrid Prescott MD    Department, Room/Bed   [facility] MEDICAL INPATIENT, 362/B       Discharge Date       Discharge Disposition       Discharge Destination                                 Attending Provider: Ingrid Prescott MD    Allergies: No Known Allergies    Isolation: Enh Drop/Con   Infection: COVID (confirmed) (12/10/24)   Code Status: CPR    Ht: 162.6 cm (64\")   Wt: 68.2 kg (150 lb 5.7 oz)    Admission Cmt: None   Principal Problem: COVID-19 [U07.1]                   Active Insurance as of 12/10/2024       Primary Coverage       Payor Plan Insurance Group Employer/Plan Group    MEDICARE MEDICARE A & B        Payor Plan Address Payor Plan Phone Number Payor Plan Fax Number Effective Dates    [address] [phone]  2/1/2004 - None Entered    [facility] [id number]         Subscriber Name Subscriber Birth Date Member ID       BECKA DOS SANTOS 1940 [id number]               Secondary Coverage       Payor Plan Insurance Group Employer/Plan Group    [facility]        Payor Plan Address Payor Plan Phone Number Payor Plan Fax Number Effective Dates    [facility] [phone]  1/1/2019 - None Entered    [address]       [facility] [address]         Subscriber Name Subscriber Birth Date Member ID       BECKA DOS SANTOS 1940 [id number]                     Emergency Contacts        (Rel.) Home Phone Work Phone Mobile Phone    JUNGMARYJO (Daughter) -- -- [phone]    Janis Louise (Daughter) -- -- [phone]          "

## 2024-12-12 NOTE — THERAPY TREATMENT NOTE
"Subjective: Pt agreeable to therapeutic plan of care. Patient reports she is not dizzy today.     Objective:       Bed mobility - SBA  Transfers - CGA  Ambulation - 30 feet SBA with STC      Vitals: WNL    Pain: 0 VAS   Location: n/a  Intervention for pain: N/A    Education: Provided education on the importance of mobility in the acute care setting    Assessment: Becka Oliva is an 83 y/o F who presented with dizziness and weakness. Has a recent history of covid-19. Was treated for BPPV yesterday which resolved and pt has had significant functional mobility improvement. Patient is now safe to d/c home with home health as she Is near her baseline, ambulating with SBA and STC, no LOB. Will continue to follow and progress as tolerated.     Plan/Recommendations:   If medically appropriate, Low Intensity Therapy recommended post-acute care - This is recommended as therapy feels this patient would require 2-3 visits per week. OP or HH would be the best option depending on patient's home bound status. Consider, if the patient has other  \"skilled\" needs such as wounds, IV antibiotics, etc. Combined with \"low intensity\" could also equate to a SNF. If patient is medically complex, consider LTAC. Pt requires no DME at discharge.     Pt desires Home with Home Health at discharge. Pt cooperative; agreeable to therapeutic recommendations and plan of care.       Post-Tx Position: Supine with HOB Elevated, Alarms activated, and Call light and personal items within reach  PPE: gloves, gown, eye protection, and N95    Therapy Charges for Today       Code Description Service Date Service Provider Modifiers Qty    79445694782 HC PT EVAL MOD COMPLEXITY 4 12/11/2024 Tanja Rucker, PT GP 1    79710256329 HC PT CANALITH REPOSITIONING PER DAY 12/11/2024 Tanja Rucker, PT GP 1    88572550871 HC GAIT TRAINING EA 15 MIN 12/12/2024 Tanja Rucker, PT GP 1    31221793521 HC PT THERAPEUTIC ACT EA 15 MIN 12/12/2024 Chaim, " Tanja ERAZO, PT GP 1           PT Charges       Row Name 12/12/24 1558             Time Calculation    Start Time 1410  -      Stop Time 1432  -      Time Calculation (min) 22 min  -      PT Received On 12/12/24  -      PT - Next Appointment 12/13/24  -         Time Calculation- PT    Total Timed Code Minutes- PT 22 minute(s)  -                User Key  (r) = Recorded By, (t) = Taken By, (c) = Cosigned By      Initials Name Provider Type    SS Tanja Rucker, PT Physical Therapist

## 2024-12-12 NOTE — PLAN OF CARE
Goal Outcome Evaluation:              Outcome Evaluation: Patient is alert and oriented on 2L NC. No complaints of pain at this time. Covid +. PT recommend home with home health.

## 2024-12-12 NOTE — CASE MANAGEMENT/SOCIAL WORK
Continued Stay Note  [facility]     Patient Name: Becka Oliva  MRN: [id number]  Today's Date: 12/12/2024    Admit Date: 12/10/2024    Plan: Return home with [facility] (accepted, order per MD). Home O2 2L PRN w/ [employer]   Discharge Plan       Row Name 12/12/24 1449       Plan    Plan Return home with [facility] (accepted, order per MD). Home O2 2L PRN w/ [employer]    Patient/Family in Agreement with Plan yes    Provided Post Acute Provider List? Yes    Post Acute Provider List Inpatient Rehab    Delivered To Patient    Method of Delivery In person    Plan Comments CM met with patient at bedside and contacted daughter Jaci by phone. Discussed therapy's recommendation for acute rehab at discharge. Provided rehab list and pt reports being at [facility] before (now [facility]) and agreeable to going there again. Referral sent in Epic basket and liaabdias Wiggins notified. Acceptance has been pending today d/t Covid+ status. CM received update from TOMMY Agrawal that pt is greatly improved today and safe to return home with home health. Dr Prescott and nursing updated and plans to discharge today. CM spoke to kelby Beatty by phone again regarding change in discharge recommendations. Daughter reports pt has had home health in the past but cannot remember name of agency. Per chart review, pt used [facility] May 2023. Liaison Antonette notified and she confirmed previous services and can accept.             Megan Naegele, RN     Office Phone: [phone]  Office Cell: [phone]

## 2024-12-13 NOTE — DISCHARGE SUMMARY
Date of Discharge:  12/12/2024    Discharge Diagnosis:   COVID-19  Dizziness  Vertigo  Leukocytosis  COPD  HLD  HTN  PVD    Presenting Problem/History of Present Illness  Active Hospital Problems    Diagnosis  POA    **COVID-19 [U07.1]  Yes      Resolved Hospital Problems   No resolved problems to display.          Hospital Course  84 y.o. female with a history of Panlobular emphysema, HTN, dyslipidemia, s/p left fem-fem bypass graft who presented to the ER with generalized weakness x 2 weeks along with dizziness which has been present for years. . Patient was positive for COVID-19 two weeks ago and remains positive this admission. Daughter was present at bedside and reports that she has just been getting weaker. Patient lives alone but has nearby family support.  Patient had presented to the ER on 12/9 and discharged home after a 500cc bolus.  Then presented again on 12/10. Patient wears 2L NC as needed at home at baseline. She reports a decreased appetite.  She denies Nausea, vomiting, diarrhea.   Patient has had complaints of dizziness for an extended time. Reports it is worse with fast head movements and position changes.  States that it feels like the room is spinning.    In the ER, COVID positive but no evidence of pneumonia. She was dehydrated and received IVF. She received supportive care for the COVID.  PT eval pt and felt that her sx of dizziness were related to vestibular dysfunction and she improved with tx.  She will benefit from continue vestibular rehab.  Initially, it was felt that pt would need rehab, but she improved each day and is stable for d/c to home.  She will have home health with PT/OT.  Her lasix was held during hospital stay because of dehydration.  She will not resume this at home unless she begins to have edema     Procedures Performed         Consults:   Consults       No orders found from 11/11/2024 to 12/11/2024.            Pertinent Test Results:    Lab Results (most recent)        Procedure Component Value Units Date/Time    Basic Metabolic Panel [[id number]]  (Abnormal) Collected: 12/12/24 0605    Specimen: Blood from Arm, Left Updated: 12/12/24 0643     Glucose 89 mg/dL      BUN 11 mg/dL      Creatinine 0.44 mg/dL      Sodium 139 mmol/L      Potassium 4.6 mmol/L      Comment: Specimen hemolyzed.  Result may be falsely elevated.        Chloride 109 mmol/L      CO2 22.4 mmol/L      Calcium 8.3 mg/dL      BUN/Creatinine Ratio 25.0     Anion Gap 7.6 mmol/L      eGFR 94.9 mL/min/1.73     Narrative:      GFR Categories in Chronic Kidney Disease (CKD)      GFR Category          GFR (mL/min/1.73)    Interpretation  G1                     90 or greater         Normal or high (1)  G2                      60-89                Mild decrease (1)  G3a                   45-59                Mild to moderate decrease  G3b                   30-44                Moderate to severe decrease  G4                    15-29                Severe decrease  G5                    14 or less           Kidney failure          (1)In the absence of evidence of kidney disease, neither GFR category G1 or G2 fulfill the criteria for CKD.    eGFR calculation 2021 CKD-EPI creatinine equation, which does not include race as a factor    CBC & Differential [[id number]]  (Abnormal) Collected: 12/12/24 0605    Specimen: Blood from Arm, Left Updated: 12/12/24 0622    Narrative:      The following orders were created for panel order CBC & Differential.  Procedure                               Abnormality         Status                     ---------                               -----------         ------                     CBC Auto Differential[[id number]]        Abnormal            Final result               Scan Slide[[id number]]                                                                    Please view results for these tests on the individual orders.    CBC Auto Differential [[id number]]  (Abnormal) Collected: 12/12/24 0605     Specimen: Blood from Arm, Left Updated: 12/12/24 0622     WBC 9.31 10*3/mm3      RBC 4.12 10*6/mm3      Hemoglobin 13.0 g/dL      Hematocrit 44.2 %      .3 fL      MCH 31.6 pg      MCHC 29.4 g/dL      RDW 13.0 %      RDW-SD 52.0 fl      MPV 10.1 fL      Platelets 198 10*3/mm3      Neutrophil % 68.1 %      Lymphocyte % 17.2 %      Monocyte % 10.6 %      Eosinophil % 2.6 %      Basophil % 0.2 %      Immature Grans % 1.3 %      Neutrophils, Absolute 6.34 10*3/mm3      Lymphocytes, Absolute 1.60 10*3/mm3      Monocytes, Absolute 0.99 10*3/mm3      Eosinophils, Absolute 0.24 10*3/mm3      Basophils, Absolute 0.02 10*3/mm3      Immature Grans, Absolute 0.12 10*3/mm3      nRBC 0.0 /100 WBC     Potassium [[id number]]  (Normal) Collected: 12/11/24 1643    Specimen: Blood from Arm, Right Updated: 12/11/24 1729     Potassium 4.5 mmol/L     Basic Metabolic Panel [[id number]]  (Abnormal) Collected: 12/11/24 0229    Specimen: Blood Updated: 12/11/24 0349     Glucose 174 mg/dL      BUN 19 mg/dL      Creatinine 0.58 mg/dL      Sodium 140 mmol/L      Potassium 3.3 mmol/L      Chloride 100 mmol/L      CO2 29.9 mmol/L      Calcium 8.7 mg/dL      BUN/Creatinine Ratio 32.8     Anion Gap 10.1 mmol/L      eGFR 88.8 mL/min/1.73     Narrative:      GFR Categories in Chronic Kidney Disease (CKD)      GFR Category          GFR (mL/min/1.73)    Interpretation  G1                     90 or greater         Normal or high (1)  G2                      60-89                Mild decrease (1)  G3a                   45-59                Mild to moderate decrease  G3b                   30-44                Moderate to severe decrease  G4                    15-29                Severe decrease  G5                    14 or less           Kidney failure          (1)In the absence of evidence of kidney disease, neither GFR category G1 or G2 fulfill the criteria for CKD.    eGFR calculation 2021 CKD-EPI creatinine equation, which does not include race  as a factor    CBC & Differential [[id number]]  (Abnormal) Collected: 12/11/24 0229    Specimen: Blood Updated: 12/11/24 0329    Narrative:      The following orders were created for panel order CBC & Differential.  Procedure                               Abnormality         Status                     ---------                               -----------         ------                     CBC Auto Differential[[id number]]        Abnormal            Final result                 Please view results for these tests on the individual orders.    CBC Auto Differential [[id number]]  (Abnormal) Collected: 12/11/24 0229    Specimen: Blood Updated: 12/11/24 0329     WBC 12.31 10*3/mm3      RBC 4.45 10*6/mm3      Hemoglobin 14.4 g/dL      Hematocrit 43.9 %      MCV 98.7 fL      MCH 32.4 pg      MCHC 32.8 g/dL      RDW 12.8 %      RDW-SD 46.1 fl      MPV 10.5 fL      Platelets 266 10*3/mm3      Neutrophil % 74.5 %      Lymphocyte % 15.7 %      Monocyte % 7.0 %      Eosinophil % 1.3 %      Basophil % 0.2 %      Immature Grans % 1.3 %      Neutrophils, Absolute 9.18 10*3/mm3      Lymphocytes, Absolute 1.93 10*3/mm3      Monocytes, Absolute 0.86 10*3/mm3      Eosinophils, Absolute 0.16 10*3/mm3      Basophils, Absolute 0.02 10*3/mm3      Immature Grans, Absolute 0.16 10*3/mm3      nRBC 0.0 /100 WBC     High Sensitivity Troponin T 1Hr [[id number]]  (Normal) Collected: 12/10/24 2221    Specimen: Blood Updated: 12/10/24 2248     HS Troponin T 11 ng/L      Troponin T Delta -1 ng/L     Narrative:      High Sensitive Troponin T Reference Range:  <14.0 ng/L- Negative Female for AMI  <22.0 ng/L- Negative Male for AMI  >=14 - Abnormal Female indicating possible myocardial injury.  >=22 - Abnormal Male indicating possible myocardial injury.   Clinicians would have to utilize clinical acumen, EKG, Troponin, and serial changes to determine if it is an Acute Myocardial Infarction or myocardial injury due to an underlying chronic condition.          Respiratory Panel PCR w/COVID-19(SARS-CoV-2) MAXIMINO/GREG/ISIAH/PAD/COR/BIBIANA In-House, NP Swab in UTM/VTM, 2 HR TAT - Swab, Nasopharynx [[id number]]  (Abnormal) Collected: 12/10/24 1758    Specimen: Swab from Nasopharynx Updated: 12/10/24 2026     ADENOVIRUS, PCR Not Detected     Coronavirus 229E Not Detected     Coronavirus HKU1 Not Detected     Coronavirus NL63 Not Detected     Coronavirus OC43 Not Detected     COVID19 Detected     Human Metapneumovirus Not Detected     Human Rhinovirus/Enterovirus Not Detected     Influenza A PCR Not Detected     Influenza B PCR Not Detected     Parainfluenza Virus 1 Not Detected     Parainfluenza Virus 2 Not Detected     Parainfluenza Virus 3 Not Detected     Parainfluenza Virus 4 Not Detected     RSV, PCR Not Detected     Bordetella pertussis pcr Not Detected     Bordetella parapertussis PCR Not Detected     Chlamydophila pneumoniae PCR Not Detected     Mycoplasma pneumo by PCR Not Detected    Narrative:      In the setting of a positive respiratory panel with a viral infection PLUS a negative procalcitonin without other underlying concern for bacterial infection, consider observing off antibiotics or discontinuation of antibiotics and continue supportive care. If the respiratory panel is positive for atypical bacterial infection (Bordetella pertussis, Chlamydophila pneumoniae, or Mycoplasma pneumoniae), consider antibiotic de-escalation to target atypical bacterial infection.    Comprehensive Metabolic Panel [[id number]]  (Abnormal) Collected: 12/10/24 1934    Specimen: Blood Updated: 12/10/24 2003     Glucose 102 mg/dL      BUN 22 mg/dL      Creatinine 0.57 mg/dL      Sodium 139 mmol/L      Potassium 4.1 mmol/L      Chloride 100 mmol/L      CO2 28.2 mmol/L      Calcium 9.4 mg/dL      Total Protein 7.0 g/dL      Albumin 4.0 g/dL      ALT (SGPT) 17 U/L      AST (SGOT) 25 U/L      Alkaline Phosphatase 60 U/L      Total Bilirubin 0.5 mg/dL      Globulin 3.0 gm/dL      A/G Ratio 1.3 g/dL       BUN/Creatinine Ratio 38.6     Anion Gap 10.8 mmol/L      eGFR 89.2 mL/min/1.73     Narrative:      GFR Categories in Chronic Kidney Disease (CKD)      GFR Category          GFR (mL/min/1.73)    Interpretation  G1                     90 or greater         Normal or high (1)  G2                      60-89                Mild decrease (1)  G3a                   45-59                Mild to moderate decrease  G3b                   30-44                Moderate to severe decrease  G4                    15-29                Severe decrease  G5                    14 or less           Kidney failure          (1)In the absence of evidence of kidney disease, neither GFR category G1 or G2 fulfill the criteria for CKD.    eGFR calculation 2021 CKD-EPI creatinine equation, which does not include race as a factor    High Sensitivity Troponin T [[id number]]  (Normal) Collected: 12/10/24 1934    Specimen: Blood Updated: 12/10/24 2003     HS Troponin T 12 ng/L     Narrative:      High Sensitive Troponin T Reference Range:  <14.0 ng/L- Negative Female for AMI  <22.0 ng/L- Negative Male for AMI  >=14 - Abnormal Female indicating possible myocardial injury.  >=22 - Abnormal Male indicating possible myocardial injury.   Clinicians would have to utilize clinical acumen, EKG, Troponin, and serial changes to determine if it is an Acute Myocardial Infarction or myocardial injury due to an underlying chronic condition.         BNP [[id number]]  (Normal) Collected: 12/10/24 1934    Specimen: Blood Updated: 12/10/24 2003     proBNP 463.0 pg/mL     Narrative:      This assay is used as an aid in the diagnosis of individuals suspected of having heart failure. It can be used as an aid in the diagnosis of acute decompensated heart failure (ADHF) in patients presenting with signs and symptoms of ADHF to the emergency department (ED). In addition, NT-proBNP of <300 pg/mL indicates ADHF is not likely.    Age Range Result Interpretation  NT-proBNP  Concentration (pg/mL:      <50             Positive            >450                   Gray                 300-450                    Negative             <300    50-75           Positive            >900                  Gray                300-900                  Negative            <300      >75             Positive            >1800                  Gray                300-1800                  Negative            <300    COVID-19, FLU A/B, RSV PCR 1 HR TAT - Swab, Nasopharynx [[id number]]  (Abnormal) Collected: 12/10/24 1758    Specimen: Swab from Nasopharynx Updated: 12/10/24 1849     COVID19 Detected     Influenza A PCR Not Detected     Influenza B PCR Not Detected     RSV, PCR Not Detected    Narrative:      Fact sheet for providers: https://www.fda.gov/media/416700/download    Fact sheet for patients: https://www.fda.gov/media/134143/download    Test performed by PCR.             Results for orders placed during the hospital encounter of 07/05/23    Adult Transthoracic Echo Complete W/ Cont if Necessary Per Protocol    Interpretation Summary  Normal LV size and contractility EF of 60 to 65%  Normal RV size  Normal atrial size  Pulmonic valve is not well visualized.  Aortic valve, mitral valve, tricuspid valve appears structurally normal, trace aortic, mitral, tricuspid regurgitation seen.  Normal RV systolic pressure of 22 mmHg.  No pericardial effusion seen.  Proximal aorta appears normal in size.              Condition on Discharge:  good    Vital Signs       Physical Exam:     General Appearance:    Alert, cooperative, in no acute distress   Head:    Normocephalic, without obvious abnormality, atraumatic   Eyes:            Lids and lashes normal, conjunctivae and sclerae normal, no   icterus, no pallor, corneas clear, PERRLA   Ears:    Ears appear intact with no abnormalities noted   Throat:   No oral lesions, no thrush, oral mucosa moist   Neck:   No adenopathy, supple, trachea midline, no thyromegaly, no    carotid bruit, no JVD   Lungs:     Clear to auscultation,respirations regular, even and                  unlabored    Heart:    Regular rhythm and normal rate, normal S1 and S2, no            murmur, no gallop, no rub, no click   Chest Wall:    No abnormalities observed   Abdomen:     Normal bowel sounds, no masses, no organomegaly, soft        non-tender, non-distended, no guarding, no rebound                tenderness   Extremities:   Moves all extremities well, no edema, no cyanosis, no             redness   Pulses:   Pulses palpable and equal bilaterally   Skin:   No bleeding, bruising or rash   Lymph nodes:   No palpable adenopathy   Neurologic:   Cranial nerves 2 - 12 grossly intact, sensation intact, DTR       present and equal bilaterally       Discharge Disposition  Home or Self Care    Discharge Medications     Discharge Medications        New Medications        Instructions Start Date   aspirin 81 MG EC tablet  Replaces: aspirin 325 MG tablet   81 mg, Oral, Daily             Continue These Medications        Instructions Start Date   acetaminophen 500 MG tablet  Commonly known as: TYLENOL   1,000 mg, Oral, Every 8 Hours      albuterol sulfate  (90 Base) MCG/ACT inhaler  Commonly known as: PROVENTIL HFA;VENTOLIN HFA;PROAIR HFA   2 puffs, Inhalation, Every 4 Hours PRN      amLODIPine 10 MG tablet  Commonly known as: NORVASC   10 mg, Oral, Daily      bisoprolol-hydrochlorothiazide 5-6.25 MG per tablet  Commonly known as: ZIAC   1 tablet, Oral, Daily      cholecalciferol 25 MCG (1000 UT) tablet  Commonly known as: VITAMIN D3   2,000 Units, Daily      clopidogrel 75 MG tablet  Commonly known as: PLAVIX   75 mg, Oral, Daily      Gemtesa 75 MG tablet  Generic drug: Vibegron   Oral      HYDROcodone-acetaminophen 5-325 MG per tablet  Commonly known as: NORCO   1-2 tablets, Every 6 Hours PRN      ipratropium-albuterol 0.5-2.5 mg/3 ml nebulizer  Commonly known as: DUO-NEB   3 mL, Nebulization, Every 4 Hours  PRN      meclizine 25 MG tablet  Commonly known as: ANTIVERT   25 mg, 3 Times Daily PRN      omeprazole 20 MG capsule  Commonly known as: priLOSEC   20 mg, Daily      PRESERVISION AREDS 2+MULTI VIT PO   1 tablet, Oral, 2 Times Daily      pyridoxine 100 MG tablet tablet  Commonly known as: VITAMIN B-6   100 mg, Daily      rosuvastatin 10 MG tablet  Commonly known as: CRESTOR   TAKE 1 TABLET BY MOUTH EVERY DAY      sennosides-docusate 8.6-50 MG per tablet  Commonly known as: PERICOLACE   2 tablets, Oral, 2 Times Daily PRN      tamsulosin 0.4 MG capsule 24 hr capsule  Commonly known as: FLOMAX   1 capsule, Oral, Daily      Trelegy Ellipta 100-62.5-25 MCG/ACT inhaler  Generic drug: Fluticasone-Umeclidin-Vilant   1 puff, Daily - RT      vitamin B-12 1000 MCG tablet  Commonly known as: CYANOCOBALAMIN   2,500 mcg, Daily             Stop These Medications      aspirin 325 MG tablet  Replaced by: aspirin 81 MG EC tablet     furosemide 40 MG tablet  Commonly known as: LASIX     potassium chloride 20 MEQ CR tablet  Commonly known as: KLOR-CON M20              Discharge Diet:     Activity at Discharge:     Follow-up Appointments  Future Appointments   Date Time Provider Department Center   11/10/2025 12:45 PM Kyrie Campoverde MD [facility]     Additional Instructions for the Follow-ups that You Need to Schedule       Ambulatory Referral to Home Health (Hospital)   As directed      Face to Face Visit Date: 12/12/2024   Follow-up provider for Plan of Care?: I treated the patient in an acute care facility and will not continue treatment after discharge.   Follow-up provider: WAQAR REIS [[id number]]   Reason/Clinical Findings: weakness, COVID   Describe mobility limitations that make leaving home difficult: unable to drive   Nursing/Therapeutic Services Requested: Skilled Nursing Physical Therapy   Skilled nursing orders: Cardiopulmonary assessments   PT orders: Strengthening   Frequency: 1 Week 1         Discharge Follow-up with PCP   As directed       Currently Documented PCP:    Janes Rosario NP    PCP Phone Number:    [phone]     Follow Up Details: 1-2 weeks                Test Results Pending at Discharge       Ingrid Prescott MD  12/13/24  13:08 EST

## 2024-12-13 NOTE — CASE MANAGEMENT/SOCIAL WORK
Case Management Discharge Note      Final Note: Home with [facility]    Provided Post Acute Provider List?: Yes  Post Acute Provider List: Inpatient Rehab  Provided Post Acute Provider Quality & Resource List?: N/A  Delivered To: Patient  Method of Delivery: In person    Selected Continued Care - Discharged on 12/12/2024 Admission date: 12/10/2024 - Discharge disposition: Home or Self Care        Home Medical Care Coordination complete.      Service Provider Services Address Phone Fax Patient Preferred    [facility] [address] [place] [address], [facility] [address] [place] [id number] [phone] --               Transportation Services  Private: Car    Final Discharge Disposition Code: 06 - home with home health care

## 2024-12-13 NOTE — OUTREACH NOTE
Prep Survey      Flowsheet Row Responses   [facility] discharged from? [facility]   Is LACE score < 7 ? No   Eligibility Readm Mgmt   Discharge diagnosis COVID-19   Does the patient have one of the following disease processes/diagnoses(primary or secondary)? Other   Does the patient have Home health ordered? Yes   What is the Home health agency?  [facility]   Is there a DME ordered? No   Prep survey completed? Yes            YOANA DE OLIVEIRA - Registered Nurse

## 2024-12-16 ENCOUNTER — READMISSION MANAGEMENT (OUTPATIENT)
Dept: CALL CENTER | Facility: HOSPITAL | Age: 84
End: 2024-12-16
Payer: MEDICARE

## 2024-12-16 NOTE — OUTREACH NOTE
Medical Week 1 Survey      Flowsheet Row Responses   [facility] facility patient discharged from? [facility]   Does the patient have one of the following disease processes/diagnoses(primary or secondary)? Other   Week 1 attempt successful? No   Unsuccessful attempts Attempt 1            AMERICA JOSEPH - Registered Nurse

## 2024-12-18 ENCOUNTER — READMISSION MANAGEMENT (OUTPATIENT)
Dept: CALL CENTER | Facility: HOSPITAL | Age: 84
End: 2024-12-18
Payer: MEDICARE

## 2024-12-18 NOTE — OUTREACH NOTE
Medical Week 1 Survey      Flowsheet Row Responses   [facility] patient discharged from? [facility]   Does the patient have one of the following disease processes/diagnoses(primary or secondary)? Other   Week 1 attempt successful? Yes   Call start time 1304   Call end time 1306   Discharge diagnosis COVID-19   Is patient permission given to speak with other caregiver? Yes   List who call center can speak with Jaci daughter   Person spoke with today (if not patient) and relationship Jaci daughter   Meds reviewed with patient/caregiver? Yes   Is the patient having any side effects they believe may be caused by any medication additions or changes? No   Does the patient have all medications ordered at discharge? Yes   Is the patient taking all medications as directed (includes completed medication regime)? Yes   Does the patient have a primary care provider?  Yes   Has the patient kept scheduled appointments due by today? Yes  [PCP apt on 12/18/24]   What is the Home health agency?  [facility]   Home health comments (Pt may decline)   Did the patient receive a copy of their discharge instructions? Yes   Nursing interventions Reviewed instructions with patient   What is the patient's perception of their health status since discharge? Improving   Is the patient/caregiver able to teach back signs and symptoms related to disease process for when to call PCP? Yes   Is the patient/caregiver able to teach back signs and symptoms related to disease process for when to call 911? Yes   Is the patient/caregiver able to teach back the hierarchy of who to call/visit for symptoms/problems? PCP, Specialist, Home health nurse, Urgent Care, ED, 911 Yes   If the patient is a current smoker, are they able to teach back resources for cessation? Not a smoker   Week 1 call completed? Yes   Graduated Yes   Did the patient feel the follow up calls were helpful during their recovery period? Yes   Graduated/Revoked comments Pt plans to attend  her PCP apt today (12/18/24)   Call end time 1306            Neeru H - Registered Nurse

## 2025-03-20 RX ORDER — BISOPROLOL FUMARATE AND HYDROCHLOROTHIAZIDE 5; 6.25 MG/1; MG/1
1 TABLET ORAL DAILY
Qty: 90 TABLET | Refills: 2 | Status: SHIPPED | OUTPATIENT
Start: 2025-03-20

## 2025-03-20 NOTE — TELEPHONE ENCOUNTER
Rx Refill Note  Requested Prescriptions     Pending Prescriptions Disp Refills    bisoprolol-hydrochlorothiazide (ZIAC) 5-6.25 MG per tablet [Pharmacy Med Name: BISOPROLOL/HCTZ 5MG/6.25MG TABS] 90 tablet 2     Sig: TAKE 1 TABLET BY MOUTH DAILY      Last office visit with prescribing clinician: 11/6/2024   Last telemedicine visit with prescribing clinician: Visit date not found   Next office visit with prescribing clinician: 11/10/2025                         Would you like a call back once the refill request has been completed: [] Yes [] No    If the office needs to give you a call back, can they leave a voicemail: [] Yes [] No    Deedee Cruz MA  03/20/25, 12:04 EDT

## 2025-04-28 RX ORDER — ROSUVASTATIN CALCIUM 10 MG/1
10 TABLET, COATED ORAL DAILY
Qty: 90 TABLET | Refills: 2 | Status: SHIPPED | OUTPATIENT
Start: 2025-04-28

## 2025-04-28 NOTE — TELEPHONE ENCOUNTER
Rx Refill Note  Requested Prescriptions     Pending Prescriptions Disp Refills    rosuvastatin (CRESTOR) 10 MG tablet [Pharmacy Med Name: ROSUVASTATIN 10MG TABLETS] 90 tablet 3     Sig: TAKE 1 TABLET BY MOUTH EVERY DAY      Last office visit with prescribing clinician: 11/6/2024   Last telemedicine visit with prescribing clinician: Visit date not found   Next office visit with prescribing clinician: 11/10/2025                         Would you like a call back once the refill request has been completed: [] Yes [] No    If the office needs to give you a call back, can they leave a voicemail: [] Yes [] No    Delma Calvillo MA  04/28/25, 11:02 EDT

## 2025-06-19 ENCOUNTER — TRANSCRIBE ORDERS (OUTPATIENT)
Dept: PHYSICAL THERAPY | Facility: HOSPITAL | Age: 85
End: 2025-06-19
Payer: MEDICARE

## 2025-06-19 DIAGNOSIS — R60.0 EDEMA OF LEFT LOWER LEG: Primary | ICD-10-CM

## 2025-06-27 RX ORDER — AMLODIPINE BESYLATE 10 MG/1
10 TABLET ORAL DAILY
Qty: 90 TABLET | Refills: 1 | Status: SHIPPED | OUTPATIENT
Start: 2025-06-27

## 2025-06-27 NOTE — TELEPHONE ENCOUNTER
Rx Refill Note  Requested Prescriptions     Pending Prescriptions Disp Refills    amLODIPine (NORVASC) 10 MG tablet [Pharmacy Med Name: AMLODIPINE BESYLATE 10MG TABLETS] 90 tablet 3     Sig: TAKE 1 TABLET BY MOUTH DAILY      Last office visit with prescribing clinician: 11/6/2024   Last telemedicine visit with prescribing clinician: Visit date not found   Next office visit with prescribing clinician: 11/10/2025                         Would you like a call back once the refill request has been completed: [] Yes [] No    If the office needs to give you a call back, can they leave a voicemail: [] Yes [] No    Delma Calvillo MA  06/27/25, 08:12 EDT

## 2025-07-02 ENCOUNTER — HOSPITAL ENCOUNTER (OUTPATIENT)
Dept: OCCUPATIONAL THERAPY | Facility: HOSPITAL | Age: 85
Setting detail: THERAPIES SERIES
Discharge: HOME | End: 2025-07-02
Payer: MEDICARE

## 2025-07-02 DIAGNOSIS — I89.0 LYMPHEDEMA, NOT ELSEWHERE CLASSIFIED: Primary | ICD-10-CM

## 2025-07-02 PROCEDURE — 97166 OT EVAL MOD COMPLEX 45 MIN: CPT

## 2025-07-02 PROCEDURE — 97535 SELF CARE MNGMENT TRAINING: CPT

## 2025-07-02 NOTE — THERAPY EVALUATION
Outpatient Occupational Therapy Lymphedema Initial Evaluation  [facility]     Patient Name: Becka Oliva  : 1940  MRN: [id number]  Today's Date: 2025      Visit Date: 2025    Patient Active Problem List   Diagnosis    Atherosclerotic peripheral vascular disease    Benign hypertensive heart disease    Chronic kidney disease    COPD with hypoxia    Dyspnea on exertion    History of aortic aneurysm    Hypertension    Pericardial effusion    Primary localized osteoarthritis    Shortness of breath    Acute UTI    Pyelonephritis    Generalized weakness    PVD (peripheral vascular disease) with claudication    Dyslipidemia    Essential hypertension    PAD (peripheral artery disease)    Left inguinal hernia    Dyspnea, unspecified type    COPD with acute exacerbation    Closed fracture of left hip, initial encounter    Bilateral lower extremity edema    COVID-19        Past Medical History:   Diagnosis Date    Aortic aneurysm     COPD (chronic obstructive pulmonary disease)     Hernia, inguinal, left     Hyperlipidemia     Hypertension     Macular degeneration     Peripheral edema     PVD (peripheral vascular disease)         Past Surgical History:   Procedure Laterality Date    CARPAL TUNNEL RELEASE      DESCENDING AORTIC ANEURYSM REPAIR W/ STENT      HIP BIPOLAR REPLACEMENT Left 2023    Procedure: HIP BIPOLAR CEMENTED;  Surgeon: Guanako Harmon MD;  Location: [facility] MAIN OR;  Service: Orthopedics;  Laterality: Left;    INGUINAL HERNIA REPAIR Bilateral 2022    Procedure: INGUINAL HERNIA REPAIR LAPAROSCOPIC WITH DAVINCI ROBOT;  Surgeon: Jasper Mcneill MD;  Location: [facility] MAIN OR;  Service: Robotics - DaVinci;  Laterality: Bilateral;    INTERVENTIONAL RADIOLOGY PROCEDURE N/A 2021    Procedure: Abdominal Aortagram with Runoff, possible PCI;  Surgeon: Kyrie Campoverde MD;  Location: [facility] CATH INVASIVE LOCATION;  Service: Cardiovascular;  Laterality: N/A;    KNEE SURGERY       OTHER SURGICAL HISTORY      STENT    VASCULAR SURGERY      fem fem bypass         Visit Dx:     ICD-10-CM ICD-9-CM   1. Lymphedema, not elsewhere classified  I89.0 457.1        Patient History       Row Name 07/02/25 1400             History    Chief Complaint Swelling;Tightness  -CW      Brief Description of Current Complaint Pt. reports her LE edema started  about 1 year ago and has gotten worse over time. Hx. Hx  L hip fx. COPD, PVD, peripherial edema, Aortic aneurysm repair with stent, L hip bipolar cemented, endograft repair with common ilitac stent  -CW      Patient/Caregiver Goals Know what to do to help the symptoms;Decrease swelling  -CW      How has patient tried to help current problem? compression stockings, elevation, diuretics.  -CW      Results of Clinical Tests No recent blood clots  -CW         Fall Risk Assessment    Any falls in the past year: No  -CW         Daily Activities    Primary Language English  -CW      Are you able to read Yes  -CW      Are you able to write Yes  -CW      Teaching needs identified Management of Condition;Home Exercise Program  -CW      Patient is concerned about/has problems with Standing;Walking;Transfers (getting out of a chair, bed);Performing home management (household chores, shopping, care of dependents);Difficulty with self care (i.e. bathing, dressing, toileting:;Climbing Stairs  -CW      Barriers to learning Visual  -CW      Functional Status mobility issues preventing performance of daily activities  -CW      Explanation of Functional Status Problem Pt. has difficulty at times with steps, walking distances, tranfers. Is legally blind. Family can assist pt.  -CW      Pt Participated in POC and Goals Yes  -CW                User Key  (r) = Recorded By, (t) = Taken By, (c) = Cosigned By      Initials Name Provider Type    Marah Rivas OTR Occupational Therapist                     Lymphedema       Row Name 07/02/25 1400             Subjective Pain    Able to  "rate subjective pain? yes  -CW      Pre-Treatment Pain Level 2  -CW      Post-Treatment Pain Level 2  -CW         Subjective    Subjective Comments Pt. reports some dicomfort LLE with numbness.  -CW         Lymphedema Assessment    Lymphedema Classification RLE:;LLE:;stage 2 (Spontaneously Irreversible)  -CW      Infections or Cellulitis? unspecified  -CW      Lymphedema Assessment Comments LLE edema >RLE.  -CW         LLIS - Physical Concerns    The amount of pain associated with my lymphedema is: 1  -CW      The amount of limb heaviness associated with my lymphedema is: 4  -CW      The amount of skin tightness associated with my lymphedema is: 4  -CW      The size of my swollen limb(s) seems: 4  -CW      Lymphedema affects the movement of my swollen limb(s): 4  -CW      The strength in my swollen limb(s) is: 4  -CW         LLIS - Psychosocial Concerns    Lymphedema affects my body image (i.e., \"how I think I look\"). 4  -CW      Lymphedema affects my socializing with others. 4  -CW      Lymphedema affects my intimate relations with spouse or partner (rate 0 if not applicable 0  -CW      Lymphedema \"gets me down\" (i.e., depression, frustration, or anger) 4  -CW      I must rely on others for help due to my lymphedema. 4  -CW      I know what to do to manage my lymphedema 4  -CW         LLIS - Functional Concerns    Lymphedema affects my ability to perform self-care activities (i.e. eating, dressing, hygiene) 3  -CW      Lymphedema affects my ability to perform routine home or work-related activities. 3  -CW      Lymphedema affects my performance of preferred leisure activities. 3  -CW      Lymphedema affects proper fit of clothing/shoes 2  -CW      Lymphedema affects my sleep 0  -CW         Posture/Observations    Observations Edema  -CW      Posture/Observations Comments Ambulates with a cane. Is legally blind.  -CW         General ROM    GENERAL ROM COMMENTS BLE AROM is limited with stiffness B ankles, knees, hip " "flexors  -CW         MMT (Manual Muscle Testing)    General MMT Comments General BLE 3+/5  -CW         Lymphedema Edema Assessment    Ptting Edema Category By grade out of 4  -CW      Pitting Edema Moderate;+ 3/4;+ 2/4  -CW      Stemmer Sign bilateral:;positive  -CW      Edema Assessment Comment Edema LLE foot to thigh 3+, RLE foot to knee 2+  -CW         Skin Changes/Observations    Location/Assessment Lower Extremity  -CW      Lower Extremity Conditions bilateral:;dry;intact  -CW      Lower Extremity Color/Pigment fibrosis;left:  -CW      Skin Observations Comment Pitting B lower legs.  -CW         Lymphedema Sensation    Lymphedema Sensation Comments Numbness  LLE>RLE/left side. Reports her L LE feels \"cooler\"  -CW         Lymphedema Measurements    Measurement Type(s) Circumferential  -CW      Circumferential Areas Lower extremities  -CW         BLE Circumferential (cm)    Measurement Location 1 10 cm above knee  -CW      Left 1 47.4 cm  -CW      Right 1 44 cm  -CW      Measurement Location 2 knee  -CW      Left 2 41.3 cm  -CW      Right 2 36 cm  -CW      Measurement Location 3 10 cm below  knee  -CW      Left 3 45.3 cm  -CW      Right 3 37.5 cm  -CW      Measurement Location 4 20 cm below knee  -CW      Left 4 35 cm  -CW      Right 4 29 cm  -CW      Measurement Location 5 30 cm below knee  -CW      Left 5 31.8 cm  -CW      Right 5 27.5 cm  -CW      Measurement Location 6 ankle  -CW      Left 6 33.3 cm  -CW      Right 6 29 cm  -CW      Measurement Location 7 mid foot  -CW      Left 7 23 cm  -CW      Right 7 21.6 cm  -CW      LLE Circumferential Total 257.1 cm  -CW      RLE Circumferential Total 224.6 cm  -CW         Compression/Skin Care    Compression/Skin Care compression garment  -CW      Compression Garment Comments Pt. has light compression socks at home.  -CW         Lymphedema Life Impact Scale Totals    A.  Total Q1 - Q17 (Do not include Q18) 52  -CW      B.  Total number of questions answered (Q1-Q17) " 17  -CW      C. Divide A by B 3.06  -CW      D. Multiple C by 25 76.5  -CW                User Key  (r) = Recorded By, (t) = Taken By, (c) = Cosigned By      Initials Name Provider Type    Marah Rivas OTR Occupational Therapist                            Therapy Education  Education Details: Discussed lymph. tx. program and poc. Reviewed long term edema management options. Issued handouts including Healthy habits for edema risk reduction. Wearing schedule for her stockings.  Given: Edema management, Symptoms/condition management  Program: New  How Provided: Verbal  Provided to: Patient, Caregiver  Level of Understanding: Verbalized  68254 - OT Self Care/Mgmt Minutes: 15         OT Goals       Row Name 07/02/25 1400          OT Short Term Goals    STG Date to Achieve 07/16/25  -CW     STG 1 Patient to demonstrate proper awareness of “What is Lymphedema” and DO’s and Don’ts” for improved prevention, management, care of symptoms, and ease of transition to self-care of condition.  -CW     STG 1 Progress New  -CW     STG 2 Patient independent and compliant with self-wrapping techniques of compression bandages/velcro compression  with family member or caregiver as indicated for improved self-management of condition.  -CW     STG 2 Progress New  -CW     STG 3 Patient demonstrate decreased net edema of  BLE  >/5-10 cm. for decreased in edema, symptoms, decreased risk of infection, and improved skin-care/transition to self-care of condition.  -CW     STG 3 Progress New  -CW     STG 4 Patient independent and compliant with home exercise program (as able) focused on range of motion, flexibility, to improve lymphatic flow and discomfort.  -CW     STG 4 Progress New  -        Long Term Goals    LTG Date to Achieve 07/30/25  -CW     LTG 1 Patient will demonstrate decreased net edema of  BLE >/=10-20 cm. for decrease in symptoms, decreased risk of infection, and improved skin-care/transition to self-care of condition.  -CW      LTG 1 Progress New  -CW     LTG 2 Patient/family/caregivers independent with self-care techniques for self-management of condition.  -CW     LTG 2 Progress New  -CW     LTG 3 Patient independent and compliant with use and care of compression (example garments, inelastic velcro compression) with assistance of a caregiver as needed to promote self-care independence.  -CW     LTG 3 Progress New  -CW        Time Calculation    OT Goal Re-Cert Due Date 09/24/25  -CW               User Key  (r) = Recorded By, (t) = Taken By, (c) = Cosigned By      Initials Name Provider Type    CW Marah Street OTR Occupational Therapist                     OT Assessment/Plan       Row Name 07/02/25 1500          OT Assessment    Functional Limitations Limitations in functional capacity and performance;Limitations in community activities;Limitation in home management;Performance in self-care ADL;Decreased safety during functional activities  -CW     Impairments Integumentary integrity;Edema;Impaired lymphatic circulation;Impaired venous circulation  -CW     Assessment Comments Pt. presents 86 y/o female with increased edema BLE’s LLE>RLE. Edema is consistent with secondary stage 2 mostly LLE 3 + foot to thigh and RLE foot to knee 2+. Stemmer sign positive with pitting lower legs and fibrotic skin changes. Skin is very dry, flaking with no open wounds. . Pt. reports her edema started about 1 year ago and has gotten worse over time. Hx L femoral bypass graft. See pt. hx. flow sheet. Pt. complains of LLE pain 2/10 at times. Her RLE is tender mid calf area with numbness noted LLE.  Total circumference .6 cm. and .1 cm.  Mobility including LLE AROM is limited with stiff ankles/knees/hips with weakness. Pt. has some difficulty with steps. standing, walking, LE dressing and uses a cane for ambulation. Pt. is legally blind.  Family is supportive and can assist with compression at home.  Discussed phase 1 acute decongestive  phase and phase 2 maintenance phase of lymphedema tx. Reviewed options for long term edema management and pt/daughter is interested to obtain velcro compression that may be easier for pt. to apply.  Pt. would benefit from full Complete Decongestive Therapy (CDT) to decrease edema, decrease risk of infection, improve skin integrity, and to learn independent self-care edema management. Pt is scheduled for next clinic day with medi reps for velcro compression.  -CW     Please refer to paper survey for additional self-reported information Yes  -CW     OT Rehab Potential Good  -CW     Patient/caregiver participated in establishment of treatment plan and goals Yes  -CW     Patient would benefit from skilled therapy intervention Yes  -CW        OT Plan    OT Frequency 4x/week  -CW     Predicted Duration of Therapy Intervention (OT) 3-4x/week for 1 month  -CW     Planned CPT's? OT EVAL MOD COMPLEXITY: 43547;OT MANUAL THERAPY EA 15 MIN: 26998;OT THER PROC EA 15 MIN: [id number];OT SELF CARE/MGMT/TRAIN 15 MIN: 44755  -CW     Planned Therapy Interventions (Optional Details) home exercise program;manual therapy techniques;patient/family education  -CW     OT Plan Comments Plan to schedule pt. for tx.  -CW               User Key  (r) = Recorded By, (t) = Taken By, (c) = Cosigned By      Initials Name Provider Type    CW Marah Street OTR Occupational Therapist                              Time Calculation:   OT Start Time: 1230  OT Stop Time: 1330  OT Time Calculation (min): 60 min  Total Timed Code Minutes- OT: 15 minute(s)  Timed Charges  70451 - OT Self Care/Mgmt Minutes: 15  Total Minutes  Timed Charges Total Minutes: 15   Total Minutes: 15     Therapy Charges for Today       Code Description Service Date Service Provider Modifiers Qty    52772251878  OT SELF CARE/MGMT/TRAIN EA 15 MIN 7/2/2025 Marah Street OTR GO 1    [id number] HC OT EVAL MOD COMPLEXITY 3 7/2/2025 Marah Street OTR GO 1                      Marah Street  OTR  7/2/2025

## 2025-07-14 ENCOUNTER — DOCUMENTATION (OUTPATIENT)
Dept: OCCUPATIONAL THERAPY | Facility: HOSPITAL | Age: 85
End: 2025-07-14
Payer: MEDICARE

## 2025-07-14 DIAGNOSIS — I89.0 LYMPHEDEMA, NOT ELSEWHERE CLASSIFIED: Primary | ICD-10-CM

## 2025-07-14 NOTE — THERAPY TREATMENT NOTE
Outpatient Occupational Therapy Lymphedema Treatment Note       Patient Name: Becka Oliva  : 1940  MRN: [id number]  Today's Date: 2025      Visit Date: 2025    Patient Active Problem List   Diagnosis    Atherosclerotic peripheral vascular disease    Benign hypertensive heart disease    Chronic kidney disease    COPD with hypoxia    Dyspnea on exertion    History of aortic aneurysm    Hypertension    Pericardial effusion    Primary localized osteoarthritis    Shortness of breath    Acute UTI    Pyelonephritis    Generalized weakness    PVD (peripheral vascular disease) with claudication    Dyslipidemia    Essential hypertension    PAD (peripheral artery disease)    Left inguinal hernia    Dyspnea, unspecified type    COPD with acute exacerbation    Closed fracture of left hip, initial encounter    Bilateral lower extremity edema    COVID-19        Past Medical History:   Diagnosis Date    Aortic aneurysm     COPD (chronic obstructive pulmonary disease)     Hernia, inguinal, left     Hyperlipidemia     Hypertension     Macular degeneration     Peripheral edema     PVD (peripheral vascular disease)         Past Surgical History:   Procedure Laterality Date    CARPAL TUNNEL RELEASE      DESCENDING AORTIC ANEURYSM REPAIR W/ STENT      HIP BIPOLAR REPLACEMENT Left 2023    Procedure: HIP BIPOLAR CEMENTED;  Surgeon: Guanako Harmon MD;  Location: [facility] MAIN OR;  Service: Orthopedics;  Laterality: Left;    INGUINAL HERNIA REPAIR Bilateral 2022    Procedure: INGUINAL HERNIA REPAIR LAPAROSCOPIC WITH DAVINCI ROBOT;  Surgeon: Jasper Mcneill MD;  Location: [facility] MAIN OR;  Service: Robotics - DaVinci;  Laterality: Bilateral;    INTERVENTIONAL RADIOLOGY PROCEDURE N/A 2021    Procedure: Abdominal Aortagram with Runoff, possible PCI;  Surgeon: Kyrie Campoverde MD;  Location: [facility] CATH INVASIVE LOCATION;  Service: Cardiovascular;  Laterality: N/A;    KNEE SURGERY  2020    OTHER SURGICAL  HISTORY      STENT    VASCULAR SURGERY      fem fem bypass         Visit Dx:      ICD-10-CM ICD-9-CM   1. Lymphedema, not elsewhere classified  I89.0 457.1                    OT Assessment/Plan       Row Name 07/14/25 1714          OT Assessment    Assessment Comments Pt presents with increased BLE edema LLE >RLE stage 2. See evaluation for details. For phase 1 acute decongestive phase of treatment leading to phase 2 maintenance phase of lymphedema treatment pt. needs the following:  MTM/RTW  Product  Length  Size  Options  Side  Quantity     RTW  circaid reduction kit whole leg        short  regular  Lower leg  right  X1     RTW  Circiad reduction kit whole leg     short  regular  Lower leg  left  X1     RTW  Circiad reduction kit whole leg     short  regular  Upper leg  right  X1     RTW  Circiad reduction kit whole leg     short  regular  Upper leg  left  X1     RTW  Customize interlocking AFW  standard  right  X1     RTW  Customize interlocking AFW  standard  left  X1  RTW  Circaid compressive undersocks  standard  25-35 mmHG  right  X3     RTW  Circaid compressive undersocks  standard  25-35 mmHG  right  X3     A gradient compression wraps lower leg and upper leg (whole leg)bilaterally with adjustable velcro straps 30-50mmHg are needed for daytime use due to the severity of patient's lymphedema. The interlocking ankle foot wraps are needed to provide adequate compression distally at bilateral ankles and feet due to increased edema accumulation. The compression undersocks are needed to provide a liner with additional ankle/foot compression distally to facilitate gradient compression. The requested garment wraps are essential to the patient's long term lymphedema management and are a part of the care plan for discharge from treatment.  -CW               User Key  (r) = Recorded By, (t) = Taken By, (c) = Cosigned By      Initials Name Provider Type    Marah Rivas OTR Occupational Therapist                                                Time Calculation:                         Marah Street, OTR  7/14/2025

## 2025-07-28 RX ORDER — CLOPIDOGREL BISULFATE 75 MG/1
75 TABLET ORAL DAILY
Qty: 90 TABLET | Refills: 1 | Status: SHIPPED | OUTPATIENT
Start: 2025-07-28

## 2025-07-28 NOTE — TELEPHONE ENCOUNTER
Rx Refill Note  Requested Prescriptions     Pending Prescriptions Disp Refills    clopidogrel (PLAVIX) 75 MG tablet [Pharmacy Med Name: CLOPIDOGREL 75MG TABLETS] 90 tablet 1     Sig: TAKE 1 TABLET BY MOUTH DAILY      Last office visit with prescribing clinician: 11/6/2024   Last telemedicine visit with prescribing clinician: Visit date not found   Next office visit with prescribing clinician: 11/10/2025                         Would you like a call back once the refill request has been completed: [] Yes [] No    If the office needs to give you a call back, can they leave a voicemail: [] Yes [] No    Deedee Cruz MA  07/28/25, 13:39 EDT

## (undated) DEVICE — PK TOTL HIP 50

## (undated) DEVICE — SOL IRRIG NACL 1000ML

## (undated) DEVICE — CVR HNDL LT SURG ACCSSRY BLU STRL

## (undated) DEVICE — CATH OMNI FLUSH 5FR

## (undated) DEVICE — HIP PILLOW, ABDUCTION: Brand: DEROYAL

## (undated) DEVICE — PROXIMATE RH ROTATING HEAD SKIN STAPLERS (35 WIDE) CONTAINS 35 STAINLESS STEEL STAPLES: Brand: PROXIMATE

## (undated) DEVICE — UNDYED BRAIDED (POLYGLACTIN 910), SYNTHETIC ABSORBABLE SUTURE: Brand: COATED VICRYL

## (undated) DEVICE — TIP COVER ACCESSORY

## (undated) DEVICE — GLV SURG SENSICARE PI ORTHO SZ7.5 LF STRL

## (undated) DEVICE — KT SURG TURNOVER 050

## (undated) DEVICE — PCH INST SURG INVISISHIELD 2PCKT

## (undated) DEVICE — SLV SCD CALF HEMOFORCE DVT THERP REPROC MD

## (undated) DEVICE — ELECTRD BLD EZ CLN MOD 6.5IN

## (undated) DEVICE — LAPAROSCOPIC GAS CONDITIONING DEVICE.: Brand: INSUFLOW

## (undated) DEVICE — GLV SURG SENSICARE PI ORTHO SZ8 LF STRL

## (undated) DEVICE — UNDRGLV SURG BIOGEL PIMICROINDICATOR SYNTH SZ8 LF STRL

## (undated) DEVICE — NDL HYPO PRECISIONGLIDE/REG 18G 11/2 PNK

## (undated) DEVICE — GENERAL LAPAROSCOPY CDS: Brand: MEDLINE INDUSTRIES, INC.

## (undated) DEVICE — CEMENT MIXING SYSTEM WITH FEMORAL BREAKWAY NOZZLE: Brand: REVOLUTION

## (undated) DEVICE — TOWEL,OR,DSP,ST,WHITE,DLX,4/PK,20PK/CS: Brand: MEDLINE

## (undated) DEVICE — COLUMN DRAPE

## (undated) DEVICE — DRSNG SLVR/ANTIBAC PRIMASEAL POST/OP ADHS 3.5X12IN

## (undated) DEVICE — VIOLET BRAIDED (POLYGLACTIN 910), SYNTHETIC ABSORBABLE SUTURE: Brand: COATED VICRYL

## (undated) DEVICE — NDL HYPO PRECISIONGLIDE REG 22G 1 1/2

## (undated) DEVICE — SOL IRR NACL 0.9PCT ARTHROMATIC 3000ML

## (undated) DEVICE — BLADELESS OBTURATOR: Brand: WECK VISTA

## (undated) DEVICE — ELECTRD BLD EZ CLN MOD XLNG 2.75IN

## (undated) DEVICE — PINNACLE INTRODUCER SHEATH: Brand: PINNACLE

## (undated) DEVICE — DRSNG SLVR/ANTIBAC PRIMASEAL POST/OP ADHS 3.5X10IN

## (undated) DEVICE — BLANKT WARM UPPR/BDY ARM/OUT 57X196CM

## (undated) DEVICE — SOLUTION,WATER,IRRIGATION,1000ML,STERILE: Brand: MEDLINE

## (undated) DEVICE — PENCL HND ROCKRSWTCH HOLSTR EZ CLEAN TP CRD 10FT

## (undated) DEVICE — DUAL CUT SAGITTAL BLADE

## (undated) DEVICE — HANDPIECE SET WITH COAXIAL MULTI-ORIFICE TIP AND SUCTION TUBE: Brand: INTERPULSE

## (undated) DEVICE — 40580 - THE PINK PAD - ADVANCED TRENDELENBURG POSITIONING KIT: Brand: 40580 - THE PINK PAD - ADVANCED TRENDELENBURG POSITIONING KIT

## (undated) DEVICE — SHEET,DRAPE,53X77,STERILE: Brand: MEDLINE

## (undated) DEVICE — PK TRY HEART CATH 50

## (undated) DEVICE — ANTIBACTERIAL UNDYED BRAIDED (POLYGLACTIN 910), SYNTHETIC ABSORBABLE SUTURE: Brand: COATED VICRYL

## (undated) DEVICE — SUT ETHIB 2 CV V37 MS/4 30IN MX69G

## (undated) DEVICE — COAXIAL FEMORAL CANAL TIP

## (undated) DEVICE — DRAPE SHEET ULTRAGARD: Brand: MEDLINE

## (undated) DEVICE — SYR LUERLOK 30CC

## (undated) DEVICE — DRAPE,U/ SHT,SPLIT,PLAS,STERIL: Brand: MEDLINE

## (undated) DEVICE — SUT MNCRYL 4/0 PS2 18 IN

## (undated) DEVICE — BIPOLAR SEALER 23-112-1 AQM 6.0: Brand: AQUAMANTYS™

## (undated) DEVICE — ENDOPATH XCEL WITH OPTIVIEW TECHNOLOGY BLADELESS TROCARS WITH STABILITY SLEEVES: Brand: ENDOPATH XCEL OPTIVIEW

## (undated) DEVICE — GLV SURG SENSICARE PI LF PF 8 GRN STRL

## (undated) DEVICE — ARM DRAPE

## (undated) DEVICE — GLV SURG SENSICARE PI LF PF 7.5 GRN STRL

## (undated) DEVICE — GLV SURG SIGNATURE ESSENTIAL PF LTX SZ7.5

## (undated) DEVICE — FOAM BUMP, LARGE: Brand: MEDLINE INDUSTRIES, INC.

## (undated) DEVICE — CAUTERY TIP POLISHER: Brand: DEVON

## (undated) DEVICE — 3M™ IOBAN™ 2 ANTIMICROBIAL INCISE DRAPE 6650EZ: Brand: IOBAN™ 2

## (undated) DEVICE — ADHS SKIN PREMIERPRO EXOFIN TOPICAL HI/VISC .5ML

## (undated) DEVICE — ELECTRD BLD EZ CLN STD 6.5IN

## (undated) DEVICE — CANNULA SEAL

## (undated) DEVICE — GW DIAG EMERALD HEPCOAT MOVE JTIP STD .035 3MM 150CM

## (undated) DEVICE — RADIFOCUS OBTURATOR: Brand: RADIFOCUS